# Patient Record
Sex: FEMALE | Race: WHITE | NOT HISPANIC OR LATINO | Employment: OTHER | URBAN - METROPOLITAN AREA
[De-identification: names, ages, dates, MRNs, and addresses within clinical notes are randomized per-mention and may not be internally consistent; named-entity substitution may affect disease eponyms.]

---

## 2017-01-19 ENCOUNTER — OFFICE VISIT (OUTPATIENT)
Dept: CONSULT | Facility: CLINIC | Age: 22
End: 2017-01-19
Attending: PEDIATRICS
Payer: COMMERCIAL

## 2017-01-19 VITALS
DIASTOLIC BLOOD PRESSURE: 66 MMHG | RESPIRATION RATE: 20 BRPM | SYSTOLIC BLOOD PRESSURE: 104 MMHG | HEART RATE: 102 BPM | TEMPERATURE: 97.6 F | OXYGEN SATURATION: 97 %

## 2017-01-19 DIAGNOSIS — M81.0 OSTEOPOROSIS: ICD-10-CM

## 2017-01-19 DIAGNOSIS — K92.89 GASTROINTESTINAL DYSMOTILITY: ICD-10-CM

## 2017-01-19 DIAGNOSIS — R48.8 PERSEVERATION: ICD-10-CM

## 2017-01-19 DIAGNOSIS — Z87.440 PERSONAL HISTORY OF URINARY TRACT INFECTION: ICD-10-CM

## 2017-01-19 DIAGNOSIS — F41.9 ANXIETY: ICD-10-CM

## 2017-01-19 DIAGNOSIS — E74.89 CONGENITAL DISORDER OF GLYCOSYLATION ASSOCIATED WITH MUTATION IN PMM2 GENE (H): Primary | ICD-10-CM

## 2017-01-19 PROCEDURE — 99213 OFFICE O/P EST LOW 20 MIN: CPT | Mod: ZF

## 2017-01-19 ASSESSMENT — PAIN SCALES - GENERAL: PAINLEVEL: NO PAIN (0)

## 2017-01-19 NOTE — Clinical Note
"  1/19/2017      RE: Sherly Ramires  C/O [facility] JUAN MANUEL [facility]  [address] [facility] [address]         Pediatric Integrative Health Subsequent Visit      Primary Care Provider: Leeanne Mustafa    Reason for initial consultation: integrative suggestions that may be of assistance for   Encounter Diagnoses   Name Primary?     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 Mom and Leticia, form her group home, are accompanying today. Anna continues to attend school as before. Since our last visit, it became more apparent that Anna was not sleeping well at night and not staying asleep which seemed to correlate with her behavior changes and were exacerbated by Doxepin and repeat melatonin dosing. Mom returned with Anna to Dr. Chaves at [place] who prescribed evening trazodone (Desyrel) which resolved the issues. Still uses sheep and whale noises at bedtime.    Anna still has perseverative behaviors, as noted when she went bowling with her twin brother and was unable to enjoy this because she was fixated on a toy behind the shelf.   Mom and the home have not yet received the \"Behavioral Plan\" and so are unsure about next steps. They have not implemented some of the suggestions form the last visit as the staff may need more direction. Mom would likeTali to have a laser acupoint treatment today.         Anxiety: not as much of an issue as above behaviors  Screen time:on iPad here but they try not to use it as much at the home, preferring for Anna to interact..    ALLERGIES:  Allergies   Allergen Reactions     Ativan [Lorazepam] " Other (See Comments)     Paradoxical reaction     Motrin [Ibuprofen] GI Disturbance     Pt is able to have motrin with food but not on an empty stomache.        IMMUNIZATIONS:  Immunization History   Administered Date(s) Administered     Influenza (IIV3) 10/12/2012       CURRENT MEDICATIONS:  Current Outpatient Prescriptions   Medication Sig Dispense Refill     UNABLE TO FIND MEDICATION NAME: Stress relax (magnesium citrate) 380 mg at bedtime       UNABLE TO FIND MEDICATION NAME: Mood probiotic 1 capsule (3,000,000,000 CFUs) daily.       melatonin 1 MG/ML LIQD liquid Take 3 mLs (3 mg) by mouth nightly as needed for sleep, only PRN 1 Bottle 2     sertraline (ZOLOFT) 20 MG/ML concentrated solution Take 200 mg by mouth daily        cholecalciferol (VITAMIN D/ D-VI-SOL) 400 UNIT/ML LIQD Take 1,600 Units by mouth daily       calcium carbonate 1250 (500 CA) MG/5ML SUSP Take 6 mLs (1,500 mg) by mouth 2 times daily (with meals) 450 mL 12     Nutritional Supplements (NUTREN 1.0) LIQD 4 Cans by gastronomy tube route daily (Patient taking differently: 4 Cans by gastronomy tube route daily Now using complete pediatric formula 4-5 cans daily) 124 each 12     ondansetron (ZOFRAN) 4 MG/5ML solution Take 5 mLs (4 mg) by mouth every 6 hours as needed for nausea or vomiting 90 mL 0     ARIPiprazole (ABILIFY) 1 MG/ML SOLN 7.5 mg daily        acetaminophen (TYLENOL) 160 MG/5ML solution 480 mg (15 mL) by mouth every 4 hours as needed       levETIRAcetam (KEPPRA) 100 MG/ML solution 10 mL by mouth  in the AM and 15 ml in the PM       diazepam (VALIUM) 5 MG/ML solution Take 7.5 mg by mouth every hour as needed for seizures (Seizures lasting longer than 5 minutes)       ORDER FOR DME Equipment being ordered: Other: chest strap for her tilt in space manual wheel chair  Treatment Diagnosis: For safe transportation to home secondary to poor sitting balance in upright. CDG type Ia, seizures, and G-tube dependence. 1 Device 0     bacitracin  ointment Apply topically 2 times daily as needed for wound care 14 g 0     Polyethylene Glycol 3350 (MIRALAX PO) Take 1 tsp. by mouth daily as needed        Docusate Sodium (ENEMEEZ MINI RE) 3 times a week (MWF) to help with bowel movements     . D-mannose 1/2 teaspoon, twice daily         The following history was reviewed and there were no changes except as noted below: PAST MEDICAL HISTORY:   Past Medical History   Diagnosis Date     Metabolic disease      congenital disorder of glycosilation     Seizures (H)      last at age 8 before today     Scoliosis      Strabismus        PAST SURGICAL HISTORY:   Past Surgical History   Procedure Laterality Date     Gi surgery       g tube     Back surgery       c1 decompression and fusionx3, scoliosis with instrumentation x1     Eye surgery Bilateral 1996     BMR 6mm (Dru)      Ent surgery        ear tubes     Hc replace gastrostomy/cecostomy tube percutaneous N/A 12/10/2014     Procedure: REPLACE GASTROSTOMY TUBE, PERCUTANEOUS;  Surgeon: Phong Perea MD;  Location: [facility] OR     Electroretinogram Bilateral 12/10/2014     ERG ([facility])     Exam under anesthesia eye(s) Bilateral 12/10/2014     EUA ([facility])     Recession resection (repair strabismus) bilateral Bilateral 12/10/2014     BLR 11 ([facility])     Extraction(s) dental N/A 12/10/2014     Procedure: EXTRACTION(S) DENTAL;  Surgeon: Hubert York DDS;  Location: [facility] OR     Strabismus surgery  1996     (1996) BMR 6 ([facility])     Strabismus surgery  2014     BLR 11 ([facility]) -        FAMILY HISTORY:   Family History   Problem Relation Age of Onset     Glaucoma Mother      no medications or surg to date     Amblyopia No family hx of      Strabismus No family hx of      Glaucoma Maternal Grandmother      s/p surg, decreased [facility]        SOCIAL HISTORY:   Social History   Substance Use Topics     Smoking status: Never Smoker      Smokeless tobacco: Never Used     Alcohol Use: No       Physical Exam:   Temp:  [97.6  F  (36.4  C)] 97.6  F (36.4  C)  Pulse:  [102] 102  Resp:  [20] 20  BP: (104)/(66) 104/66 mmHg  SpO2:  [97 %] 97 %  /66 mmHg  Pulse 102  Temp(Src) 97.6  F (36.4  C) (Axillary)  Resp 20  Ht   Wt   SpO2 97%  Filed Vitals:     TCM Pulses: Kim > Yin    Playing on iPad, smiling occasionally  Easy resps  Sitting up in wheelchair  No edema      Labs and Tests:  No results found for this or any previous visit (from the past 24 hour(s)).    Assessment:  Sherly is a 21 year old female patient with  Encounter Diagnoses   Name Primary?     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 Please do not hesitate to contact me with any questions or concerns.     I spent a total of 45 minutes face-to-face with Sherly Ramires during today's office visit.  Over 50% of this time was spent counseling the patient-family and/or coordinating care regarding comfort.  See note for details.    Sherrie Fontana MD, CM  Medical Director Pediatric Integrative Health and Wellbeing, [facility]    CC  Patient Care Team:  Leeanne Mustafa as PCP - General (Family Practice)  Ana Granda MD as MD (Pediatrics)  Barrett Lennon MD as MD (Pediatrics)  Skyler Correa MD (Ophthalmology)  Sara Miles MD as Referring Physician (Pediatrics)

## 2017-01-19 NOTE — PROGRESS NOTES
"  Pediatric Integrative Health Subsequent Visit      Primary Care Provider: Leeanne Mustafa    Reason for initial consultation: integrative suggestions that may be of assistance for   Encounter Diagnoses   Name Primary?     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 Mom and Leticia, form her group home, are accompanying today. Anna continues to attend school as before. Since our last visit, it became more apparent that Anna was not sleeping well at night and not staying asleep which seemed to correlate with her behavior changes and were exacerbated by Doxepin and repeat melatonin dosing. Mom returned with Anna to Dr. Chaves at [place] who prescribed evening trazodone (Desyrel) which resolved the issues. Still uses sheep and whale noises at bedtime.    Anna still has perseverative behaviors, as noted when she went bowling with her twin brother and was unable to enjoy this because she was fixated on a toy behind the shelf.   Mom and the home have not yet received the \"Behavioral Plan\" and so are unsure about next steps. They have not implemented some of the suggestions form the last visit as the staff may need more direction. Mom would likeAnna to have a laser acupoint treatment today.         Anxiety: not as much of an issue as above behaviors  Screen time:on iPad here but they try not to use it as much at the home, preferring for Anna to interact..    ALLERGIES:  Allergies   Allergen Reactions     Ativan [Lorazepam] Other (See Comments)     Paradoxical reaction     Motrin [Ibuprofen] GI Disturbance     Pt is able to have motrin with food but not " on an empty stomache.        IMMUNIZATIONS:  Immunization History   Administered Date(s) Administered     Influenza (IIV3) 10/12/2012       CURRENT MEDICATIONS:  Current Outpatient Prescriptions   Medication Sig Dispense Refill     UNABLE TO FIND MEDICATION NAME: Stress relax (magnesium citrate) 380 mg at bedtime       UNABLE TO FIND MEDICATION NAME: Mood probiotic 1 capsule (3,000,000,000 CFUs) daily.       melatonin 1 MG/ML LIQD liquid Take 3 mLs (3 mg) by mouth nightly as needed for sleep, only PRN 1 Bottle 2     sertraline (ZOLOFT) 20 MG/ML concentrated solution Take 200 mg by mouth daily        cholecalciferol (VITAMIN D/ D-VI-SOL) 400 UNIT/ML LIQD Take 1,600 Units by mouth daily       calcium carbonate 1250 (500 CA) MG/5ML SUSP Take 6 mLs (1,500 mg) by mouth 2 times daily (with meals) 450 mL 12     Nutritional Supplements (NUTREN 1.0) LIQD 4 Cans by gastronomy tube route daily (Patient taking differently: 4 Cans by gastronomy tube route daily Now using complete pediatric formula 4-5 cans daily) 124 each 12     ondansetron (ZOFRAN) 4 MG/5ML solution Take 5 mLs (4 mg) by mouth every 6 hours as needed for nausea or vomiting 90 mL 0     ARIPiprazole (ABILIFY) 1 MG/ML SOLN 7.5 mg daily        acetaminophen (TYLENOL) 160 MG/5ML solution 480 mg (15 mL) by mouth every 4 hours as needed       levETIRAcetam (KEPPRA) 100 MG/ML solution 10 mL by mouth  in the AM and 15 ml in the PM       diazepam (VALIUM) 5 MG/ML solution Take 7.5 mg by mouth every hour as needed for seizures (Seizures lasting longer than 5 minutes)       ORDER FOR DME Equipment being ordered: Other: chest strap for her tilt in space manual wheel chair  Treatment Diagnosis: For safe transportation to home secondary to poor sitting balance in upright. CDG type Ia, seizures, and G-tube dependence. 1 Device 0     bacitracin ointment Apply topically 2 times daily as needed for wound care 14 g 0     Polyethylene Glycol 3350 (MIRALAX PO) Take 1 tsp. by mouth  daily as needed        Docusate Sodium (ENEMEEZ MINI RE) 3 times a week (MWF) to help with bowel movements     . D-mannose 1/2 teaspoon, twice daily         The following history was reviewed and there were no changes except as noted below: PAST MEDICAL HISTORY:   Past Medical History   Diagnosis Date     Metabolic disease      congenital disorder of glycosilation     Seizures (H)      last at age 8 before today     Scoliosis      Strabismus        PAST SURGICAL HISTORY:   Past Surgical History   Procedure Laterality Date     Gi surgery       g tube     Back surgery       c1 decompression and fusionx3, scoliosis with instrumentation x1     Eye surgery Bilateral 1996     BMR 6mm ([facility])      Ent surgery        ear tubes     Hc replace gastrostomy/cecostomy tube percutaneous N/A 12/10/2014     Procedure: REPLACE GASTROSTOMY TUBE, PERCUTANEOUS;  Surgeon: Phong Perea MD;  [facility]     Electroretinogram Bilateral 12/10/2014     ERG ([facility])     Exam under anesthesia eye(s) Bilateral 12/10/2014     EUA ([facility])     Recession resection (repair strabismus) bilateral Bilateral 12/10/2014     BLR 11 ([facility])     Extraction(s) dental N/A 12/10/2014     Procedure: EXTRACTION(S) DENTAL;  Surgeon: Hubert York DDS;  [facility]     Strabismus surgery  1996     (1996) BMR 6 ([facility])     Strabismus surgery  2014     BLR 11 ([facility]) -        FAMILY HISTORY:   Family History   Problem Relation Age of Onset     Glaucoma Mother      no medications or surg to date     Amblyopia No family hx of      Strabismus No family hx of      Glaucoma Maternal Grandmother      s/p surg, decreased VA        SOCIAL HISTORY:   Social History   Substance Use Topics     Smoking status: Never Smoker      Smokeless tobacco: Never Used     Alcohol Use: No       Physical Exam:   Temp:  [97.6  F (36.4  C)] 97.6  F (36.4  C)  Pulse:  [102] 102  Resp:  [20] 20  BP: (104)/(66) 104/66 mmHg  SpO2:  [97 %] 97 %  /66 mmHg  Pulse  102  Temp(Src) 97.6  F (36.4  C) (Axillary)  Resp 20  Ht   Wt   SpO2 97%  Filed Vitals:     TCM Pulses: Kim > Yin    Playing on iPad, smiling occasionally  Easy resps  Sitting up in wheelchair  No edema      Labs and Tests:  No results found for this or any previous visit (from the past 24 hour(s)).    Assessment:  Sherly is a 21 year old female patient with  Encounter Diagnoses   Name Primary?     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 Please do not hesitate to contact me with any questions or concerns.     I spent a total of 45 minutes face-to-face with Sherly Ramires during today's office visit.  Over 50% of this time was spent counseling the patient-family and/or coordinating care regarding comfort.  See note for details.    Sherrie Fontana MD, CM  Medical Director Pediatric Integrative Health and Wellbeing, [facility]    CC  Patient Care Team:  Warner Mustafa as PCP - General (Family Practice)  Ana Granda MD as MD (Pediatrics)  Barrett Lennon MD as MD (Pediatrics)  Skyler Correa MD (Ophthalmology)  Sara Miles MD as Referring Physician (Pediatrics)  Sherrie Fontana MD as MD (Pediatrics)  WARNER MUSTAFA

## 2017-01-19 NOTE — NURSING NOTE
Chief Complaint   Patient presents with     RECHECK     Patient here today for follow up in pain management     /66 mmHg  Pulse 102  Temp(Src) 97.6  F (36.4  C) (Axillary)  Resp 20  Ht   Wt   SpO2 97%   Barbra Reynaga  January 19, 2017

## 2017-02-02 ENCOUNTER — OFFICE VISIT (OUTPATIENT)
Dept: ENDOCRINOLOGY | Facility: CLINIC | Age: 22
End: 2017-02-02
Attending: PEDIATRICS
Payer: COMMERCIAL

## 2017-02-02 VITALS
DIASTOLIC BLOOD PRESSURE: 62 MMHG | WEIGHT: 88 LBS | HEIGHT: 55 IN | BODY MASS INDEX: 20.37 KG/M2 | SYSTOLIC BLOOD PRESSURE: 96 MMHG | HEART RATE: 77 BPM

## 2017-02-02 DIAGNOSIS — R79.89 ELEVATED TSH: ICD-10-CM

## 2017-02-02 DIAGNOSIS — E16.2 HYPOGLYCEMIA: Primary | ICD-10-CM

## 2017-02-02 DIAGNOSIS — M81.0 OSTEOPOROSIS: ICD-10-CM

## 2017-02-02 DIAGNOSIS — E74.89 CONGENITAL DISORDER OF GLYCOSYLATION ASSOCIATED WITH MUTATION IN PMM2 GENE (H): ICD-10-CM

## 2017-02-02 DIAGNOSIS — E28.39 OVARIAN FAILURE: ICD-10-CM

## 2017-02-02 PROCEDURE — 99212 OFFICE O/P EST SF 10 MIN: CPT | Mod: ZF

## 2017-02-02 ASSESSMENT — PAIN SCALES - GENERAL: PAINLEVEL: NO PAIN (0)

## 2017-02-02 NOTE — NURSING NOTE
"Chief Complaint   Patient presents with     RECHECK     CDG follow up       Initial BP 96/62 mmHg  Pulse 77  Ht 4' 1\" (124.5 cm)  Wt 88 lb (39.917 kg)  BMI 25.75 kg/m2 Estimated body mass index is 25.75 kg/(m^2) as calculated from the following:    Height as of this encounter: 4' 1\" (124.5 cm).    Weight as of this encounter: 88 lb (39.917 kg).  BP completed using cuff size: regular    "

## 2017-02-02 NOTE — MR AVS SNAPSHOT
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  It was a pleasure to see you for your office visit today.   Barrett Lennon MD PhD, Sarah Dubon MD,   Lyubov Franco, [facility],  Samira Alan RN CNP  Shante Thurman MD    If you had any blood work, imaging or other tests:  Normal test results will be mailed to your home address in a letter.  Abnormal results will be communicated to you via phone call / letter.  Please allow 2 weeks for processing/interpretation of most lab work.  For urgent issues that cannot wait until the next business day, call [phone] and ask for the Pediatric Endocrinologist on call.    RN Care Coordinators (non urgent) Mon- Fri:  Julianne Norris MS,RN  [phone]  Amanda Bryan, [phone]  Please leave a message on one line only. Calls will be returned as soon as possible.  Requests for results will be returned after your physician has been able to review the results.  Main Office: [phone]  Fax: [phone]  Growth Hormone Coordinator:  Clau Miller [phone]  Medication renewals: Contact your pharmacy. Allow 3-4 days for completion.     Scheduling:    Pediatric Call Center, [phone]  Infusion Center: [phone] (for stimulation tests)  Radiology/ [phone]     Services:   [phone]     Please try the Passport to [facility] ([facility]) phone application for Virtual Tours, Procedure Preparation,  Resources, Preparation for Hospital Stay and the Coloring Board.     MD Instructions:  Please get fasting labs with her next visit with Dr. Rocha. Please contact me if any fractures or other concerns.          Follow-ups after your visit        Follow-up notes from your care team     Return in about 1 year (around 2018).      Your next 10 appointments already scheduled     May 11, 2017 10:00 AM   Return Visit with Sherrie Fontana MD   Peds Integrative Health ([facility])    [facility]  9th Floor  [address] [facility] [id number] [phone]            2017 10:00 AM   Return Metabolic Visit with Ana Granda MD   Peds Metabolism ([facility])    [facility]  [address] [facility]  [address] [id number] [phone]              Who to contact     Please call your clinic at [phone] to:    Ask questions about your health    Make or cancel appointments    Discuss your medicines    Learn about your test results    Speak to your doctor   If you have compliments or concerns about an experience at your clinic, or if you wish to file a complaint, please contact [facility] Patient Relations at [phone] or email us at [email or web address]         Additional Information About Your Visit        MyChart Information     Preisbock is an electronic gateway that provides easy, online access to your medical records. With Preisbock, you can request a clinic appointment, read your test results, renew a prescription or communicate with your care team.     To sign up for [employer]ze[employer] visit the website at [email or web address]   You will be asked to enter the access code listed below, as well as some personal information. Please follow the directions to create your username and password.     Your access code is: [email or web address]  Expires: 3/14/2017 10:55 AM     Your access code will  in 90 days. If you need help  "or a new code, please contact your [facility] or call [phone] for assistance.        Care EveryWhere ID     This is your Care EveryWhere ID. This could be used by other organizations to access your [place] medical records  [id number]        Your Vitals Were     Pulse Height BMI (Body Mass Index)             77 4' 1\" (124.5 cm) 25.75 kg/m2          Blood Pressure from Last 3 Encounters:   02/02/17 96/62   01/19/17 104/66   12/14/16 108/82    Weight from Last 3 Encounters:   02/02/17 88 lb (39.917 kg)   12/14/16 86 lb 8 oz (39.236 kg)   12/08/16 93 lb (42.185 kg)              Today, you had the following     No orders found for display         Today's Medication Changes          These changes are accurate as of: 2/2/17 12:42 PM.  If you have any questions, ask your nurse or doctor.               These medicines have changed or have updated prescriptions.        Dose/Directions    NUTREN 1.0 Liqd   This may have changed:  additional instructions   Used for:  Congenital disorder of glycosylation (CDG) (H)        Dose:  4 Can   4 Cans by gastronomy tube route daily   Quantity:  124 each   Refills:  12                Primary Care Provider Office Phone # Fax #    Leeanne SUSANNAH Mustafa [phone]       [facility] [address] [place] [address] [address] [place] [address]        Thank you!     Thank you for choosing PEDIATRIC ENDOCRINOLOGY  for your care. Our goal is always to provide you with excellent care. Hearing back from our patients is one way we can continue to improve our services. Please take a few minutes to complete the written survey that you may receive in the mail after your visit with us. Thank you!             Your Updated Medication List - Protect others around you: Learn how to safely use, store and throw away your medicines at www.disposemymeds.org.          This list is accurate as of: 2/2/17 12:42 PM.  Always use your most recent med list.                   Brand " Name Dispense Instructions for use    acetaminophen 160 MG/5ML solution    TYLENOL     480 mg (15 mL) by mouth every 4 hours as needed       ARIPiprazole 1 MG/ML Soln solution    ABILIFY     7.5 mg daily       bacitracin ointment     14 g    Apply topically 2 times daily as needed for wound care       calcium carbonate 1250 (500 CA) MG/5ML Susp suspension     450 mL    Take 6 mLs (1,500 mg) by mouth 2 times daily (with meals)       cholecalciferol 400 UNIT/ML Liqd liquid    vitamin D/D-VI-SOL     Take 1,600 Units by mouth daily       diazepam 5 MG/ML (HIGH CONC) solution    VALIUM     Take 7.5 mg by mouth every hour as needed for seizures (Seizures lasting longer than 5 minutes)       doxepin 10 MG/ML (HIGH CONC) solution    SINEquan     Take 50-75 mg by mouth nightly as needed       ENEMEEZ MINI RE      3 times a week (MWF) to help with bowel movements       levETIRAcetam 100 MG/ML solution    KEPPRA     10 mL by mouth  in the AM and 15 ml in the PM       melatonin 1 MG/ML Liqd liquid     1 Bottle    Take 3 mLs (3 mg) by mouth nightly as needed for sleep       MIRALAX PO      Take 1 tsp. by mouth daily as needed       nitrofurantoin 25 MG/5ML suspension    FURADANTIN    280 mL    Take 10 mLs (50 mg) by mouth 4 times daily       NUTREN 1.0 Liqd     124 each    4 Cans by gastronomy tube route daily       ondansetron 2 mg/2.5 mL solution    ZOFRAN    90 mL    Take 5 mLs (4 mg) by mouth every 6 hours as needed for nausea or vomiting       order for DME     1 Device    Equipment being ordered: Other: chest strap for her tilt in space manual wheel chair Treatment Diagnosis: For safe transportation to home secondary to poor sitting balance in upright. CDG type Ia, seizures, and G-tube dependence.       saccharomyces boulardii 250 MG capsule    FLORASTOR    60 capsule    1 capsule (250 mg) by Per J Tube route 2 times daily       sertraline 20 MG/ML (HIGH CONC) solution    ZOLOFT     Take 200 mg by mouth daily        TRAZODONE HCL PO      Take 50 mg by mouth       * UNABLE TO FIND      MEDICATION NAME: Stress relax (magnesium citrate) 380 mg at bedtime       * UNABLE TO FIND      MEDICATION NAME: Mood probiotic 1 capsule (3,000,000,000 CFUs) daily.       * Notice:  This list has 2 medication(s) that are the same as other medications prescribed for you. Read the directions carefully, and ask your doctor or other care provider to review them with you.

## 2017-02-02 NOTE — PATIENT INSTRUCTIONS
Thank you for choosing [facility].  It was a pleasure to see you for your office visit today.   Barrett Lennon MD PhD, Sarah Dubon MD,   Lyubov Franco, [facility],  Samira Alan, RN CNP  Shante Thurman MD    If you had any blood work, imaging or other tests:  Normal test results will be mailed to your home address in a letter.  Abnormal results will be communicated to you via phone call / letter.  Please allow 2 weeks for processing/interpretation of most lab work.  For urgent issues that cannot wait until the next business day, call [phone] and ask for the Pediatric Endocrinologist on call.    RN Care Coordinators (non urgent) Mon- Fri:  Julianne Norris MS,RN  [phone]  Amanda Bryan [phone]  Please leave a message on one line only. Calls will be returned as soon as possible.  Requests for results will be returned after your physician has been able to review the results.  Main Office: [phone]  Fax: [phone]  Growth Hormone Coordinator:  Clau Miller [phone]  Medication renewals: Contact your pharmacy. Allow 3-4 days for completion.     Scheduling:    Pediatric Call Center, [phone]  Infusion Center: [phone] (for stimulation tests)  Radiology/ [phone]     Services:   [phone]     Please try the Passport to [facility] ([facility]) phone application for Virtual Tours, Procedure Preparation, Resources, Preparation for Hospital Stay and the Coloring Board.     MD Instructions:  Please get fasting labs with her next visit with Dr. Rocha. Please contact me if any fractures or other concerns.

## 2017-02-02 NOTE — Clinical Note
Date: 2017 Regarding: Sherly Ramires   To:   [facility] [address] [place] [address]   MRN: [id number]     :  1995     Ordering Provider: Barrett Lennon MD, PhD     Lab Request    Diagnosis (ICD-10) Code: Congenital Disorder of Glycosylation (E74.8), Ovarian Failure (E28.39), Hypoglycemia (E16.2)    TEST: Prealbumin, TSH, Free T4, PT, PTT, CBC with platelets, Comprehensive metabolic panel. Insulin. LH, FSH, estradiol, 25-hydroxy vitamin D, Hgb A1c, Transferrin   REASON: Monitor Condition   FREQUENCY: Once per year   DURATION: 1 year   SPECIAL INSTRUCTIONS: Obtain fasting.     Please fax results once available to me at:  [phone].    If you or the family have questions or concerns regarding the above lab test request, please feel free to contact our office at [phone].  Thank you for your assistance with Sherly cuellar care.  Sincerely,    Barrett Lennon MD, PhD    Pediatric Endocrinology  [facility]  Phone: [phone]  Fax:  [phone]     CC:  Parents of Sherly Ramires  C/O [facility]  [id number] [place] [id number]

## 2017-02-02 NOTE — PROGRESS NOTES
Pediatric Endocrinology Follow-up Consultation    Patient: Sherly Ramires MRN# [id number]   YOB: 1995 Age: 21 year old    Date of Visit: Feb 2, 2017    Dear Dr. 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 Her past medical history is remarkable for twin gestation. She had an apneic episode at four days old and went home on apnea and bradycardia monitors. She was seen at three months of age due to maternal concerns of strabismus, fat pads on her knuckles, and developmental delay. She was diagnosed with congenital disorder of glycosylation at six months of age. Mom reports that no DNA or enzyme analysis was performed at that time. 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   She has had a history of recurrent otitis media  "and had tubes placed once, and she has not had any ear infections since the age of [age]. She had abnormal thyroid functions and was treated with Synthroid for six months. It was found to be an abnormal thyroglobulin and replacement was stopped. She has also had spinal stenosis with lower extremity weakness. Symptoms began in October 2002, diagnosed in 2003, and has undergone surgical repair. She developed seizure in 2013. She has anxiety, OCD-like tendencies, and muscle contractures.   Sherly has a history of fragility fractures. There have been two fragility fractures in her life. One occurred when she stuck her leg out and hit a door. It was a spiral fracture of the lower leg. Another occurred when her 4 year old brother fell on her and broke her lower leg. She has previously had normal PTH and DXA (when corrected for age).  Sherly was last evaluated in our clinic 2/27/14. At that time, the thyroid functions were normal. Previous hormone studies show elevated FSH (178) with nearly undetectable estradiol levels consistent with primary gonadal failure. We have not considered estrogen replacement due to the concern of hypercoaguability in individuals with CDG and the potentially increased risk of strokes or stroke-like episodes.      INTERIM HISTORY: Since the last visit on 2/04/16, Sherly has moved into a group home. Some behavioral changes have been noted by staff but mom believes this is just secondary to her getting over a \"honeymoon\" phase at the group home. She has also gained weight, mostly to the abdominal region, so her Gtube formula has been transitioned to a lower calorie formula. She gets 4-5 cans per day with the 5th being an optional PO meal. Group home staff has noticed an increase in pubic and axillary hair but there is no associated breast growth, vaginal discharge or vaginal bleeding. She also began taking Trazadone this past December due to lightness of sleep, which has been beneficial in " "allowing her deeper and less interrupted sleep. She has not had any changes in bowel habits, changes in skin, muscle cramps or pains. She has had 2-3 self resolving \"colds\" over the winter months. She was hospitalized in 05/2016 and 03/2016 for UTI and emesis respectively.     History was obtained from patient's mother.          Social History:     Social History     Social History Narrative    Lives with m/d/3 sibs    Sherly is in transition school until she is age 22 years. Sherly has been swimming.     Social history was reviewed and is unchanged. Refer to the initial note.         Family History:     Family History   Problem Relation Age of Onset     Glaucoma Mother      no medications or surg to date     Amblyopia No family hx of      Strabismus No family hx of      Glaucoma Maternal Grandmother      s/p surg, decreased [facility]    Mother was diagnosed with osteoporosis.     Family history was reviewed and is unchanged. Refer to the initial note.         Allergies:     Allergies   Allergen Reactions     Ativan [Lorazepam] Other (See Comments)     Paradoxical reaction     Motrin [Ibuprofen] GI Disturbance     Pt is able to have motrin with food but not on an empty stomache.              Medications:     Current Outpatient Prescriptions   Medication Sig Dispense Refill     TRAZODONE HCL PO Take 50 mg by mouth       nitrofurantoin (FURADANTIN) 25 MG/5ML suspension Take 10 mLs (50 mg) by mouth 4 times daily 280 mL 0     saccharomyces boulardii (FLORASTOR) 250 MG capsule 1 capsule (250 mg) by Per J Tube route 2 times daily 60 capsule 1     UNABLE TO FIND MEDICATION NAME: Stress relax (magnesium citrate) 380 mg at bedtime       UNABLE TO FIND MEDICATION NAME: Mood probiotic 1 capsule (3,000,000,000 CFUs) daily.       melatonin 1 MG/ML LIQD liquid Take 3 mLs (3 mg) by mouth nightly as needed for sleep 1 Bottle 2     sertraline (ZOLOFT) 20 MG/ML concentrated solution Take 200 mg by mouth daily        cholecalciferol " (VITAMIN D/ D-VI-SOL) 400 UNIT/ML LIQD Take 1,600 Units by mouth daily       calcium carbonate 1250 (500 CA) MG/5ML SUSP Take 6 mLs (1,500 mg) by mouth 2 times daily (with meals) 450 mL 12     Nutritional Supplements (NUTREN 1.0) LIQD 4 Cans by gastronomy tube route daily (Patient taking differently: 4 Cans by gastronomy tube route daily Now using complete pediatric formula 4-5 cans daily) 124 each 12     ondansetron (ZOFRAN) 4 MG/5ML solution Take 5 mLs (4 mg) by mouth every 6 hours as needed for nausea or vomiting 90 mL 0     bacitracin ointment Apply topically 2 times daily as needed for wound care 14 g 0     ARIPiprazole (ABILIFY) 1 MG/ML SOLN 7.5 mg daily        acetaminophen (TYLENOL) 160 MG/5ML solution 480 mg (15 mL) by mouth every 4 hours as needed       levETIRAcetam (KEPPRA) 100 MG/ML solution 10 mL by mouth  in the AM and 15 ml in the PM       diazepam (VALIUM) 5 MG/ML solution Take 7.5 mg by mouth every hour as needed for seizures (Seizures lasting longer than 5 minutes)       ORDER FOR DME Equipment being ordered: Other: chest strap for her tilt in space manual wheel chair  Treatment Diagnosis: For safe transportation to home secondary to poor sitting balance in upright. CDG type Ia, seizures, and G-tube dependence. 1 Device 0     Polyethylene Glycol 3350 (MIRALAX PO) Take 1 tsp. by mouth daily as needed        Docusate Sodium (ENEMEEZ MINI RE) 3 times a week (MWF) to help with bowel movements       DoxePIN (SINEQUAN) 10 MG/ML solution Take 50-75 mg by mouth nightly as needed                Review of Systems:   Gen: Negative  Eye:  Sherly was evaluated by the ophthalmologist on 2/1/16 and it was reported that her vision has not showed decline.  ENT: Sherly is evaluated by [facility].   Pulmonary:  Negative.   Cardio: Next cardiology evaluation will take place in 2017.  Gastrointestinal: See HPI regarding hospitalization for emesis  Hematologic: Negative, no blood clots.  Genitourinary: See  "HPI for UTI hospitalization.   Musculoskeletal: Negative, no recent fractures. One fall with subsequent bruising to right foot.  Psychiatric: Anxiety, Ma  Neurologic: Nystagmus, since hospitalization a year ago. 5-6 months ago, mom noticed that Sherly's eye movements were rapid for a week duration. Upon notifying the neurologist, Sherly's Keppra was increased, The nystagmus then subsided and she has been maintaining well, but mom has noticed that she has had increased nystagmus.  Skin: Negative  Endocrine: see HPI. No vaginal bleeding or discharge. She has had some underarm hair growth.             Physical Exam:   Blood pressure 96/62, pulse 77, height 4' 1\" (124.5 cm), weight 88 lb (39.917 kg).  Height: 124.5 cm  Weight: 39.92 kg (actual weight),   BMI: Body mass index is 25.75 kg/(m^2).       GENERAL:  She is alert and in no apparent distress.   HEENT:  Head is  normocephalic and atraumatic.  Pupils equal, round and reactive to light and accommodation.  Extraocular movements are intact.  Funduscopic exam shows crisp disc margins and normal venous pulsations.  Nares are clear.  Oropharynx shows normal dentition uvula and palate.  Tympanic membranes visualized and clear.   NECK:  Supple.  Thyroid was nonpalpable.   LUNGS:  Clear to auscultation bilaterally.   CARDIOVASCULAR:  Regular rate and rhythm without murmur, gallop or rub.   BREASTS:  Aguila III with atrophy .  Axillary hair present, odor and sweat were absent.   ABDOMEN:  Nondistended.  Positive bowel sounds, soft and nontender.  No hepatosplenomegaly or masses palpable.   GENITOURINARY EXAM:  Pubic hair is Aguila IV.  Normal external female genitalia.   MUSCULOSKELETAL:  Sherly is wheelchair bound with decreased muscle mass and tone.  NEUROLOGIC:  Cranial nerves II-XII tested and intact.  Deep tendon reflexes 2+ and symmetric. Sherly has dysarthria and developmental delay.  SKIN:  Normal with no evidence of acne or oiliness.         Laboratory results: "     [facility]/Nurse Visit on 06/23/2016   Component Date Value Ref Range Status     Color Urine 06/23/2016 Light Yellow   Final     Appearance Urine 06/23/2016 Slightly Cloudy   Final     Glucose Urine 06/23/2016 Negative  NEG mg/dL Final     Bilirubin Urine 06/23/2016 Negative  NEG Final     Ketones Urine 06/23/2016 Negative  NEG mg/dL Final     Specific Gravity Urine 06/23/2016 1.005  1.003 - 1.035 Final     Blood Urine 06/23/2016 Small* NEG Final     pH Urine 06/23/2016 7.5* 5.0 - 7.0 pH Final     Protein Albumin Urine 06/23/2016 Negative  NEG mg/dL Final     Urobilinogen mg/dL 06/23/2016 Normal  0.0 - 2.0 mg/dL Final     Nitrite Urine 06/23/2016 Negative  NEG Final     Leukocyte Esterase Urine 06/23/2016 Moderate* NEG Final     Source 06/23/2016 Catheterized Urine   Final     WBC Urine 06/23/2016 13* 0 - 2 /HPF Final     RBC Urine 06/23/2016 0  0 - 2 /HPF Final     Bacteria Urine 06/23/2016 Few* NEG /HPF Final     Mucous Urine 06/23/2016 Present* NEG /LPF Final     Granular Casts 06/23/2016 1* NEG /LPF Final     Amorphous Crystals 06/23/2016 Few* NEG /HPF Final     Specimen Description 06/23/2016 Catheterized Urine   Final     Culture Micro 06/23/2016 *  Final                    Value:>100,000 colonies/mL Escherichia coli ESBL ESBL (extended beta lactamase)   producing organisms require contact precautions.       Micro Report Status 06/23/2016 FINAL 06/26/2016   Final     Organism: 06/23/2016    Final                    Value:>100,000 colonies/mL Escherichia coli ESBL ESBL (extended beta lactamase)   producing organisms require contact precautions.               Assessment and Plan:   1. Primary Gonadal Failure.  2. Osteoporosis.  3. Fatigue.  4. 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  5. Risk of Endocrinopathies.  6. Hypocalcemia    Sherly has PMM2 deficiency, a Congenital Disorder of Glycosylation. This condition causes a mixture of primary and secondary hypogonadism and is associated  with osteopenia and risk for osteoporosis.  Hypothyroidism is increased due to abnormal glycosylation of TSH and thyroid binding globulin.  Hypoglycemia is common in this condition.  There is an increased risk of adrenal insufficiency.    Sherly has been stable since the last visit with no new concerns. Due to Sherly's relative immobility, she is also at increased risk for fractures. Sherly is receiving calcium and vitamin D supplementation.    MD Instructions:  Please get fasting labs with her next visit with Dr. Rocha. Please contact me if any fractures or other concerns.    An order letter was provided.     No orders of the defined types were placed in this encounter.    RTC for follow up evaluation in 1 year.     This document serves as a record of the services and decisions personally performed and made by Barrett Lennon MD, PhD. It was created on his behalf by Chito Steve, a medical student. The creation of this document is based on the provider's statements to the medical scribe.    Thank you for allowing me to participate in the care of your patient.  Please do not hesitate to call with questions or concerns.    Sincerely,    I personally performed the entire clinical encounter documented in this note.    Barrett Lennon MD, PhD    Pediatric Endocrinology  [facility]  Phone: [phone]  Fax:   [phone]     Total face-to-face time 40 minutes, >50% of time spent counseling and coordination of care regarding assessment and plan described above.     CC  Patient Care Team:  Leeanne Mustafa as PCP - General (Family Practice)  Ana Granda MD as MD (Pediatric Metabolism)  Barrett Lenonn MD as MD (Pediatrics)  Skyler Correa MD (Ophthalmology)  Sara Miles MD as Referring Physician (Pediatrics)  Sherrie Fontana MD as MD (Pediatrics)     Parents of Sherly Ramires  [facility] [address]  [facility]  [id number]

## 2017-02-02 NOTE — LETTER
2/2/2017      RE: Sherly Ramires  [address] [facility]  [address] [facility] [address]       Pediatric Endocrinology Follow-up Consultation    Patient: Sherly Ramires MRN# [id number]   YOB: 1995 Age: 21 year old    Date of Visit: Feb 2, 2017    Dear Dr. 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 Her past medical history is remarkable for twin gestation. She had an apneic episode at four days old and went home on apnea and bradycardia monitors. She was seen at three months of age due to maternal concerns of strabismus, fat pads on her knuckles, and developmental delay. She was diagnosed with congenital disorder of glycosylation at six months of age. Mom reports that no DNA or enzyme analysis was performed at that time. 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   She has had a history of recurrent otitis media and had tubes placed once, and she has not had any ear infections since the age of [age]. She had abnormal thyroid functions and was treated with Synthroid for six months. It was found to be an abnormal thyroglobulin and replacement was stopped. She has also had spinal stenosis with lower extremity weakness. Symptoms began in October 2002, diagnosed in 2003, and has undergone surgical repair. She developed seizure in 2013. She has anxiety, OCD-like tendencies, and muscle contractures.   Sherly has a history of fragility fractures. There have been two fragility fractures in her life. One occurred when she stuck her leg out and hit a door. It was a spiral fracture of the lower leg. Another occurred when her 4 year old brother fell on her and broke her lower leg. She has previously had normal PTH and DXA (when corrected for age).  Sherly was last evaluated in our clinic 2/27/14. At that time, the thyroid functions were normal. Previous hormone studies show elevated FSH (178) with nearly undetectable estradiol levels consistent with primary gonadal failure. We have not considered estrogen replacement due to the concern of hypercoaguability in individuals with CDG and the potentially increased risk of strokes or stroke-like episodes.      INTERIM HISTORY: Since the last visit on 2/04/16, Sherly has moved into a group home. Some behavioral changes have been noted by staff but mom believes this is just secondary to her getting over a \"honeymoon\" phase at the group home. She has also gained weight, mostly to the abdominal region, so her Gtube formula has been transitioned to a lower calorie formula. She gets 4-5 cans per day with the 5th being an optional PO meal. Group home staff has noticed an increase in pubic and axillary hair but there is no associated breast growth, vaginal discharge " "or vaginal bleeding. She also began taking Trazadone this past December due to lightness of sleep, which has been beneficial in allowing her deeper and less interrupted sleep. She has not had any changes in bowel habits, changes in skin, muscle cramps or pains. She has had 2-3 self resolving \"colds\" over the winter months. She was hospitalized in 05/2016 and 03/2016 for UTI and emesis respectively.     History was obtained from patient's mother.          Social History:     Social History     Social History Narrative    Lives with m/d/3 sibs    Sherly is in transition school until she is age 22 years. Sherly has been swimming.     Social history was reviewed and is unchanged. Refer to the initial note.         Family History:     Family History   Problem Relation Age of Onset     Glaucoma Mother      no medications or surg to date     Amblyopia No family hx of      Strabismus No family hx of      Glaucoma Maternal Grandmother      s/p surg, decreased VA    Mother was diagnosed with osteoporosis.     Family history was reviewed and is unchanged. Refer to the initial note.         Allergies:     Allergies   Allergen Reactions     Ativan [Lorazepam] Other (See Comments)     Paradoxical reaction     Motrin [Ibuprofen] GI Disturbance     Pt is able to have motrin with food but not on an empty stomache.              Medications:     Current Outpatient Prescriptions   Medication Sig Dispense Refill     TRAZODONE HCL PO Take 50 mg by mouth       nitrofurantoin (FURADANTIN) 25 MG/5ML suspension Take 10 mLs (50 mg) by mouth 4 times daily 280 mL 0     saccharomyces boulardii (FLORASTOR) 250 MG capsule 1 capsule (250 mg) by Per J Tube route 2 times daily 60 capsule 1     UNABLE TO FIND MEDICATION NAME: Stress relax (magnesium citrate) 380 mg at bedtime       UNABLE TO FIND MEDICATION NAME: Mood probiotic 1 capsule (3,000,000,000 CFUs) daily.       melatonin 1 MG/ML LIQD liquid Take 3 mLs (3 mg) by mouth nightly as needed for " sleep 1 Bottle 2     sertraline (ZOLOFT) 20 MG/ML concentrated solution Take 200 mg by mouth daily        cholecalciferol (VITAMIN D/ D-VI-SOL) 400 UNIT/ML LIQD Take 1,600 Units by mouth daily       calcium carbonate 1250 (500 CA) MG/5ML SUSP Take 6 mLs (1,500 mg) by mouth 2 times daily (with meals) 450 mL 12     Nutritional Supplements (NUTREN 1.0) LIQD 4 Cans by gastronomy tube route daily (Patient taking differently: 4 Cans by gastronomy tube route daily Now using complete pediatric formula 4-5 cans daily) 124 each 12     ondansetron (ZOFRAN) 4 MG/5ML solution Take 5 mLs (4 mg) by mouth every 6 hours as needed for nausea or vomiting 90 mL 0     bacitracin ointment Apply topically 2 times daily as needed for wound care 14 g 0     ARIPiprazole (ABILIFY) 1 MG/ML SOLN 7.5 mg daily        acetaminophen (TYLENOL) 160 MG/5ML solution 480 mg (15 mL) by mouth every 4 hours as needed       levETIRAcetam (KEPPRA) 100 MG/ML solution 10 mL by mouth  in the AM and 15 ml in the PM       diazepam (VALIUM) 5 MG/ML solution Take 7.5 mg by mouth every hour as needed for seizures (Seizures lasting longer than 5 minutes)       ORDER FOR DME Equipment being ordered: Other: chest strap for her tilt in space manual wheel chair  Treatment Diagnosis: For safe transportation to home secondary to poor sitting balance in upright. CDG type Ia, seizures, and G-tube dependence. 1 Device 0     Polyethylene Glycol 3350 (MIRALAX PO) Take 1 tsp. by mouth daily as needed        Docusate Sodium (ENEMEEZ MINI RE) 3 times a week (MWF) to help with bowel movements       DoxePIN (SINEQUAN) 10 MG/ML solution Take 50-75 mg by mouth nightly as needed                Review of Systems:   Gen: Negative  Eye:  Sherly was evaluated by the ophthalmologist on 2/1/16 and it was reported that her vision has not showed decline.  ENT: Sherly is evaluated by [facility].   Pulmonary:  Negative.   Cardio: Next cardiology evaluation will take place in  "2017.  Gastrointestinal: See HPI regarding hospitalization for emesis  Hematologic: Negative, no blood clots.  Genitourinary: See HPI for UTI hospitalization.   Musculoskeletal: Negative, no recent fractures. One fall with subsequent bruising to right foot.  Psychiatric: Anxiety, Ma  Neurologic: Nystagmus, since hospitalization a year ago. 5-6 months ago, mom noticed that Sherly's eye movements were rapid for a week duration. Upon notifying the neurologist, Sherly's Keppra was increased, The nystagmus then subsided and she has been maintaining well, but mom has noticed that she has had increased nystagmus.  Skin: Negative  Endocrine: see HPI. No vaginal bleeding or discharge. She has had some underarm hair growth.             Physical Exam:   Blood pressure 96/62, pulse 77, height 4' 1\" (124.5 cm), weight 88 lb (39.917 kg).  Height: 124.5 cm  Weight: 39.92 kg (actual weight),   BMI: Body mass index is 25.75 kg/(m^2).       GENERAL:  She is alert and in no apparent distress.   HEENT:  Head is  normocephalic and atraumatic.  Pupils equal, round and reactive to light and accommodation.  Extraocular movements are intact.  Funduscopic exam shows crisp disc margins and normal venous pulsations.  Nares are clear.  Oropharynx shows normal dentition uvula and palate.  Tympanic membranes visualized and clear.   NECK:  Supple.  Thyroid was nonpalpable.   LUNGS:  Clear to auscultation bilaterally.   CARDIOVASCULAR:  Regular rate and rhythm without murmur, gallop or rub.   BREASTS:  Aguila III with atrophy .  Axillary hair present, odor and sweat were absent.   ABDOMEN:  Nondistended.  Positive bowel sounds, soft and nontender.  No hepatosplenomegaly or masses palpable.   GENITOURINARY EXAM:  Pubic hair is Aguila IV.  Normal external female genitalia.   MUSCULOSKELETAL:  Sherly is wheelchair bound with decreased muscle mass and tone.  NEUROLOGIC:  Cranial nerves II-XII tested and intact.  Deep tendon reflexes 2+ and " symmetric. Sherly has dysarthria and developmental delay.  SKIN:  Normal with no evidence of acne or oiliness.         Laboratory results:     [facility]/Nurse Visit on 06/23/2016   Component Date Value Ref Range Status     Color Urine 06/23/2016 Light Yellow   Final     Appearance Urine 06/23/2016 Slightly Cloudy   Final     Glucose Urine 06/23/2016 Negative  NEG mg/dL Final     Bilirubin Urine 06/23/2016 Negative  NEG Final     Ketones Urine 06/23/2016 Negative  NEG mg/dL Final     Specific Gravity Urine 06/23/2016 1.005  1.003 - 1.035 Final     Blood Urine 06/23/2016 Small* NEG Final     pH Urine 06/23/2016 7.5* 5.0 - 7.0 pH Final     Protein Albumin Urine 06/23/2016 Negative  NEG mg/dL Final     Urobilinogen mg/dL 06/23/2016 Normal  0.0 - 2.0 mg/dL Final     Nitrite Urine 06/23/2016 Negative  NEG Final     Leukocyte Esterase Urine 06/23/2016 Moderate* NEG Final     Source 06/23/2016 Catheterized Urine   Final     WBC Urine 06/23/2016 13* 0 - 2 /HPF Final     RBC Urine 06/23/2016 0  0 - 2 /HPF Final     Bacteria Urine 06/23/2016 Few* NEG /HPF Final     Mucous Urine 06/23/2016 Present* NEG /LPF Final     Granular Casts 06/23/2016 1* NEG /LPF Final     Amorphous Crystals 06/23/2016 Few* NEG /HPF Final     Specimen Description 06/23/2016 Catheterized Urine   Final     Culture Micro 06/23/2016 *  Final                    Value:>100,000 colonies/mL Escherichia coli ESBL ESBL (extended beta lactamase)   producing organisms require contact precautions.       Micro Report Status 06/23/2016 FINAL 06/26/2016   Final     Organism: 06/23/2016    Final                    Value:>100,000 colonies/mL Escherichia coli ESBL ESBL (extended beta lactamase)   producing organisms require contact precautions.               Assessment and Plan:   1. Primary Gonadal Failure.  2. Osteoporosis.  3. Fatigue.  4. 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  5. Risk of Endocrinopathies.  6. Hypocalcemia    Sherly has PMM2  deficiency, a Congenital Disorder of Glycosylation. This condition causes a mixture of primary and secondary hypogonadism and is associated with osteopenia and risk for osteoporosis.  Hypothyroidism is increased due to abnormal glycosylation of TSH and thyroid binding globulin.  Hypoglycemia is common in this condition.  There is an increased risk of adrenal insufficiency.    Sherly has been stable since the last visit with no new concerns. Due to Sherly's relative immobility, she is also at increased risk for fractures. Sherly is receiving calcium and vitamin D supplementation.    MD Instructions:  Please get fasting labs with her next visit with Dr. Rocha. Please contact me if any fractures or other concerns.    An order letter was provided.     No orders of the defined types were placed in this encounter.    RTC for follow up evaluation in 1 year.     This document serves as a record of the services and decisions personally performed and made by Barrett Lennon MD, PhD. It was created on his behalf by Chito Steve, a medical student. The creation of this document is based on the provider's statements to the medical scribe.    Thank you for allowing me to participate in the care of your patient.  Please do not hesitate to call with questions or concerns.    Sincerely,    I personally performed the entire clinical encounter documented in this note.    Barrett Lennon MD, PhD    Pediatric Endocrinology  [facility]  Phone: [phone]  Fax:   [phone]     Total face-to-face time 40 minutes, >50% of time spent counseling and coordination of care regarding assessment and plan described above.     CC  Patient Care Team:  Leeanne Mustafa as PCP - General (Family Practice)  Ana Granda MD as MD (Pediatric Metabolism)  Barrett Lennon MD as MD (Pediatrics)  Skyler Correa MD (Ophthalmology)  Sara Miles MD as Referring  Physician (Pediatrics)  Sherrie Fontana MD as MD (Pediatrics)     Parents of Sherly Ramires  [address]  [facility] [address]

## 2017-03-06 RX ORDER — SERTRALINE HYDROCHLORIDE 20 MG/ML
200 SOLUTION ORAL DAILY
COMMUNITY

## 2017-03-06 RX ORDER — D-MANNOSE 99 %
1 POWDER (GRAM) MISCELLANEOUS 2 TIMES DAILY
COMMUNITY

## 2017-03-07 ENCOUNTER — ANESTHESIA (OUTPATIENT)
Dept: PEDIATRICS | Facility: CLINIC | Age: 22
End: 2017-03-07
Payer: COMMERCIAL

## 2017-03-07 ENCOUNTER — ANESTHESIA EVENT (OUTPATIENT)
Dept: PEDIATRICS | Facility: CLINIC | Age: 22
End: 2017-03-07
Payer: COMMERCIAL

## 2017-03-07 ENCOUNTER — HOSPITAL ENCOUNTER (OUTPATIENT)
Dept: MRI IMAGING | Facility: CLINIC | Age: 22
End: 2017-03-07
Attending: PSYCHIATRY & NEUROLOGY
Payer: COMMERCIAL

## 2017-03-07 ENCOUNTER — HOSPITAL ENCOUNTER (OUTPATIENT)
Facility: CLINIC | Age: 22
Discharge: HOME | End: 2017-03-07
Attending: PEDIATRICS | Admitting: PEDIATRICS
Payer: COMMERCIAL

## 2017-03-07 VITALS
TEMPERATURE: 97.2 F | BODY MASS INDEX: 25.77 KG/M2 | SYSTOLIC BLOOD PRESSURE: 120 MMHG | OXYGEN SATURATION: 99 % | WEIGHT: 88 LBS | RESPIRATION RATE: 24 BRPM | DIASTOLIC BLOOD PRESSURE: 92 MMHG

## 2017-03-07 DIAGNOSIS — M48.02 CERVICAL STENOSIS OF SPINE: ICD-10-CM

## 2017-03-07 DIAGNOSIS — H55.00 NYSTAGMUS: ICD-10-CM

## 2017-03-07 DIAGNOSIS — G93.40 ENCEPHALOPATHY: ICD-10-CM

## 2017-03-07 PROCEDURE — 25000125 ZZHC RX 250: Performed by: NURSE ANESTHETIST, CERTIFIED REGISTERED

## 2017-03-07 PROCEDURE — 37000008 ZZH ANESTHESIA TECHNICAL FEE, 1ST 30 MIN

## 2017-03-07 PROCEDURE — 25800025 ZZH RX 258: Performed by: NURSE ANESTHETIST, CERTIFIED REGISTERED

## 2017-03-07 PROCEDURE — 37000009 ZZH ANESTHESIA TECHNICAL FEE, EACH ADDTL 15 MIN

## 2017-03-07 PROCEDURE — 40000165 ZZH STATISTIC POST-PROCEDURE RECOVERY CARE

## 2017-03-07 PROCEDURE — 72141 MRI NECK SPINE W/O DYE: CPT

## 2017-03-07 RX ORDER — PROPOFOL 10 MG/ML
INJECTION, EMULSION INTRAVENOUS CONTINUOUS PRN
Status: DISCONTINUED | OUTPATIENT
Start: 2017-03-07 | End: 2017-03-07

## 2017-03-07 RX ORDER — LIDOCAINE HYDROCHLORIDE 20 MG/ML
INJECTION, SOLUTION INFILTRATION; PERINEURAL PRN
Status: DISCONTINUED | OUTPATIENT
Start: 2017-03-07 | End: 2017-03-07

## 2017-03-07 RX ORDER — EPHEDRINE SULFATE 50 MG/ML
INJECTION, SOLUTION INTRAMUSCULAR; INTRAVENOUS; SUBCUTANEOUS PRN
Status: DISCONTINUED | OUTPATIENT
Start: 2017-03-07 | End: 2017-03-07

## 2017-03-07 RX ORDER — PROPOFOL 10 MG/ML
INJECTION, EMULSION INTRAVENOUS PRN
Status: DISCONTINUED | OUTPATIENT
Start: 2017-03-07 | End: 2017-03-07

## 2017-03-07 RX ORDER — SODIUM CHLORIDE, SODIUM LACTATE, POTASSIUM CHLORIDE, CALCIUM CHLORIDE 600; 310; 30; 20 MG/100ML; MG/100ML; MG/100ML; MG/100ML
INJECTION, SOLUTION INTRAVENOUS CONTINUOUS PRN
Status: DISCONTINUED | OUTPATIENT
Start: 2017-03-07 | End: 2017-03-07

## 2017-03-07 RX ADMIN — Medication 30 MG: at 10:30

## 2017-03-07 RX ADMIN — Medication 4 MG: at 10:55

## 2017-03-07 RX ADMIN — SODIUM CHLORIDE, POTASSIUM CHLORIDE, SODIUM LACTATE AND CALCIUM CHLORIDE: 600; 310; 30; 20 INJECTION, SOLUTION INTRAVENOUS at 10:30

## 2017-03-07 RX ADMIN — PROPOFOL 200 MCG/KG/MIN: 10 INJECTION, EMULSION INTRAVENOUS at 10:31

## 2017-03-07 RX ADMIN — Medication 4 MG: at 11:13

## 2017-03-07 RX ADMIN — PROPOFOL 70 MG: 10 INJECTION, EMULSION INTRAVENOUS at 10:30

## 2017-03-07 ASSESSMENT — ENCOUNTER SYMPTOMS: SEIZURES: 1

## 2017-03-07 NOTE — ANESTHESIA PREPROCEDURE EVALUATION
Anesthesia Evaluation    ROS/Med Hx    No history of anesthetic complications    Cardiovascular Findings   Comments: Followed by Cardiology, note stated no indication of cardiac involvement, annual ECHO     Neuro Findings   (+) cervical spine instability, developmental delay and seizures    Seizures    Controlled: well controlled      Comments: Cervical stenosis of spine; Encephalopathy  Weakness upper and lower extremity    Pulmonary Findings - negative ROS    HENT Findings   (+) hearing problem  Comments: Nystagmus, Strabismus  Corneal epithelial defect - Left Eye   Corneal dellen of left eye        Skin Findings - negative skin ROS      GI/Hepatic/Renal Findings   (+) gastrostomy present    Endocrine/Metabolic Findings   (+) metabolic disease (Congenital disorder of glycosylation type 1a)      Comments: GHD (growth hormone deficiency) (H)  Hypoglycemia  Osteoporosis   Elevated TSH  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 (H)           Genetic/Syndrome Findings   (+) genetic syndrome    Hematology/Oncology Findings   (+) clotting disorder    Additional Notes  Scoliosis  Anxiety  Ovarian failure  Hip dislocation, left (H)      Physical Exam  Normal systems: cardiovascular, pulmonary and dental    Airway   TM distance: >3 FB  Neck ROM: limited    Dental     Cardiovascular   Rhythm and rate: regular and normal      Pulmonary    breath sounds clear to auscultation          Anesthesia Plan      History & Physical Review  History and physical reviewed and following examination, relevant changes include:    ASA Status:  3 .    NPO Status:  > 6 hours    Plan for MAC with Intravenous and Propofol induction. Maintenance will be TIVA.  Reason for MAC:  Extreme anxiety (QS)  PONV prophylaxis:  Ondansetron (or other 5HT-3)  21 yo for 3T MRI Cervical spine under MAC/GA backup      Postoperative Care  Postoperative pain management:  IV analgesics.      Consents  Anesthetic plan, risks,  benefits and alternatives discussed with:  Parent (Mother and/or Father) and Patient..

## 2017-03-07 NOTE — ANESTHESIA POSTPROCEDURE EVALUATION
Patient: Sherly Ramires    Procedure(s):  3T MRI Cervical spine - Wound Class: N/A    Diagnosis:Nystagmus  Cervical stenosis of spine  Encephalopathy  Diagnosis Additional Information: No value filed.    Anesthesia Type:  MAC    Note:  Anesthesia Post Evaluation    Patient location during evaluation: PACU  Patient participation: Unable to participate in evaluation secondary to underlying medical condition  Level of consciousness: awake and alert  Pain management: adequate  Airway patency: patent  Cardiovascular status: stable  Respiratory status: spontaneous ventilation and room air  Hydration status: stable  PONV: none     Anesthetic complications: None          Last vitals:  Vitals:    03/07/17 1149 03/07/17 1200 03/07/17 1215   BP:  132/90 (!) 120/92   Resp:  24 24   Temp:  36.2  C (97.2  F)    SpO2: 99% 99% 99%         Electronically Signed By: Ted Olguin MD  March 7, 2017  1:28 PM

## 2017-03-07 NOTE — ANESTHESIA CARE TRANSFER NOTE
Patient: Sherly Ramires    Procedure(s):  3T MRI Cervical spine - Wound Class: N/A    Diagnosis: Nystagmus  Cervical stenosis of spine  Encephalopathy  Diagnosis Additional Information: No value filed.    Anesthesia Type:   MAC     Note:  Airway :Nasal Cannula  Patient transferred to:PS Recovery  Comments: Patient transferred to peds sedation.  VSS.  Transfer of care with report to MARSHA.      Shante Knight CRNA      Vitals: (Last set prior to Anesthesia Care Transfer)    CRNA VITALS  3/7/2017 1105 - 3/7/2017 1142      3/7/2017             NIBP: (!)  75/36    Pulse: 77    NIBP Mean: 53    SpO2: 97 %    Resp Rate (observed): 19    EKG: NSR                Electronically Signed By: KIMBERLY Torres CRNA  March 7, 2017  11:42 AM

## 2017-03-07 NOTE — IP AVS SNAPSHOT
[facility] Sedation [facility]ation    [address]    [place] [address]    Phone:  [phone]                                       After Visit Summary   3/7/2017    Sherly Ramires    MRN: [id number]           After Visit Summary Signature Page     I have received my discharge instructions, and my questions have been answered. I have discussed any challenges I see with this plan with the nurse or doctor.    ..........................................................................................................................................  Patient/Patient Representative Signature      ..........................................................................................................................................  Patient Representative Print Name and Relationship to Patient    ..................................................               ................................................  Date                                            Time    ..........................................................................................................................................  Reviewed by Signature/Title    ...................................................              ..............................................  Date                                                            Time

## 2017-03-07 NOTE — DISCHARGE INSTRUCTIONS
Home Instructions for Your Child after Sedation  Today your child received propofol. Please keep this form with your health records  Your child may be more sleepy and irritable today than normal. Wake your child up every 1 to 11/2 hours during the day. (This way, both you and your child will sleep through the night.) Also, an adult should stay with your child for the rest of the day. The medicine may make the child dizzy. Avoid activities that require balance (bike riding, skating, climbing stairs, walking).  Remember:    For young infants: Do not allow the car seat or infant seat to bend the child's head forward and down. If it does, your child may not be able to breathe.    When your child wants to eat again, start with liquids (juice, soda pop, Popsicles). If your child feels well enough, you may try a regular diet. It is best to offer light meals for the first 24 hours.    If your child has nausea (feels sick to the stomach) or vomiting (throws up), give small amounts of clear liquids (7-Up, Sprite, apple juice or broth). Fluids are more important than food until your child is feeling better.    Wait 24 hours before giving medicine that contains alcohol. This includes liquid cold, cough and allergy medicines (Robitussin, Vicks Formula 44 for children, Benadryl, Chlor-Trimeton).    If you will leave your child with a , give the sitter a copy of these instructions.  Call your doctor if:    You have questions about the test results.    Your child vomits (throws up) more than two times.    Your child is very fussy or irritable.    You have trouble waking your child.     If your child has trouble breathing, call [phone].  If you have any questions or concerns, please call:  Pediatric Sedation Unit [phone]  Pediatric clinic  [phone]  [facility]  [phone] (ask for the anesthesia doctor on call)  Emergency department [phone]  [facility] toll-free number [phone]  (Monday--Friday, 8 a.m. to 4:30 p.m.)  I understand these instructions. I have all of my personal belongings.

## 2017-03-07 NOTE — IP AVS SNAPSHOT
MRN:[id number]                      After Visit Summary   3/7/2017    Sherly Ramires    MRN: [id number]           Thank you!     Thank you for choosing [facility] for your care. Our goal is always to provide you with excellent care. Hearing back from our patients is one way we can continue to improve our services. Please take a few minutes to complete the written survey that you may receive in the mail after you visit with us. Thank you!        Patient Information     Date Of Birth          1995        About your hospital stay     You were admitted on:  March 7, 2017 You last received care in the:  [facility] Sedation Observation    You were discharged on:  March 7, 2017       Who to Call     For medical emergencies, please call 911.  For non-urgent questions about your medical care, please call your primary care provider or clinic, [phone]  For questions related to your surgery, please call your surgery clinic        Attending Provider     Provider Specialty    Barrett Lennon MD Pediatrics       Primary Care Provider Office Phone # Fax #    Leeanne Mustafa [phone]       [facility] [address] [place] [address] [address] [place] [address]        Your next 10 appointments already scheduled     May 11, 2017 10:00 AM CDT   Return Visit with Sherrie Fontana MD   Peds Integrative Health ([facility])    [facility]  9th Floor  [facility] [address] [phone]            Jun 14, 2017 10:00 AM CDT   Return Metabolic Visit with Ana Granda MD   Peds Metabolism ([facility])    [facility]  [address] [facility]  [address] [address]   [phone]            Feb 01, 2018 11:15 AM CST   Return Visit with Barrett Lennon MD   Pediatric Endocrinology ([facility])    [facility]  [address] [place] [address] [phone]              Further instructions from  your care team       Home Instructions for Your Child after Sedation  Today your child received propofol. Please keep this form with your health records  Your child may be more sleepy and irritable today than normal. Wake your child up every 1 to 11/2 hours during the day. (This way, both you and your child will sleep through the night.) Also, an adult should stay with your child for the rest of the day. The medicine may make the child dizzy. Avoid activities that require balance (bike riding, skating, climbing stairs, walking).  Remember:    For young infants: Do not allow the car seat or infant seat to bend the child's head forward and down. If it does, your child may not be able to breathe.    When your child wants to eat again, start with liquids (juice, soda pop, Popsicles). If your child feels well enough, you may try a regular diet. It is best to offer light meals for the first 24 hours.    If your child has nausea (feels sick to the stomach) or vomiting (throws up), give small amounts of clear liquids (7-Up, Sprite, apple juice or broth). Fluids are more important than food until your child is feeling better.    Wait 24 hours before giving medicine that contains alcohol. This includes liquid cold, cough and allergy medicines (Robitussin, Vicks Formula 44 for children, Benadryl, Chlor-Trimeton).    If you will leave your child with a , give the sitter a copy of these instructions.  Call your doctor if:    You have questions about the test results.    Your child vomits (throws up) more than two times.    Your child is very fussy or irritable.    You have trouble waking your child.     If your child has trouble breathing, call [phone].  If you have any questions or concerns, please call:  Pediatric Sedation Unit [phone]  Pediatric clinic  [phone]  [facility]  [phone] (ask for the anesthesia doctor on call)  Emergency department [phone]  [facility] toll-free  "number [phone] (Monday--Friday, 8 a.m. to 4:30 p.m.)  I understand these instructions. I have all of my personal belongings.                Pending Results     Date and Time Order Name Status Description    3/7/2017 0854 MR Cervical Spine w/o Contrast In process             Admission Information     Date & Time Provider Department Dept. Phone    3/7/2017 Barrett Lennon MD [facility] Sedation Observation [phone]      Your Vitals Were     Blood Pressure Temperature Respirations Weight Pulse Oximetry BMI (Body Mass Index)    120/82 97.7  F (36.5  C) (Axillary) 20 39.9 kg (88 lb) 99% 25.77 kg/m2      MyChart Information     VivoText lets you send messages to your doctor, view your test results, renew your prescriptions, schedule appointments and more. To sign up, go to [email or web address] . Click on \"Log in\" on the left side of the screen, which will take you to the Welcome page. Then click on \"Sign up Now\" on the right side of the page.     You will be asked to enter the access code listed below, as well as some personal information. Please follow the directions to create your username and password.     Your access code is: [email or web address]  Expires: 3/14/2017 10:55 AM     Your access code will  in 90 days. If you need help or a new code, please call your [facility] clinic or [phone].        Care EveryWhere ID     This is your Care EveryWhere ID. This could be used by other organizations to access your [facility] medical records  [id number]           Review of your medicines      UNREVIEWED medicines. Ask your doctor about these medicines        Dose / Directions    acetaminophen 160 MG/5ML solution   Commonly known as:  TYLENOL        480 mg (15 mL) by mouth every 4 hours as needed   Refills:  0       ARIPiprazole 1 MG/ML Soln solution   Commonly known as:  ABILIFY        Dose:  7.5 mg   7.5 mg daily   Refills:  0       bacitracin ointment   Used for:  Skin irritation        Apply topically 2 " times daily as needed for wound care   Quantity:  14 g   Refills:  0       calcium carbonate 1250 (500 CA) MG/5ML Susp suspension   Used for:  Congenital disorder of glycosylation (CDG) (H), Osteoporosis        Dose:  1500 mg   Take 6 mLs (1,500 mg) by mouth 2 times daily (with meals)   Quantity:  450 mL   Refills:  12       cholecalciferol 400 UNIT/ML Liqd liquid   Commonly known as:  vitamin D/D-VI-SOL        Dose:  1600 Units   Take 1,600 Units by mouth daily   Refills:  0       COMPLEAT PEDIATRIC PO        Refills:  0       D-Mannose Powd        2 times daily   Refills:  0       diazepam 5 MG/ML (HIGH CONC) solution   Commonly known as:  VALIUM        Dose:  7.5 mg   Take 7.5 mg by mouth every hour as needed for seizures (Seizures lasting longer than 5 minutes)   Refills:  0       ENEMEEZ MINI RE        3 times a week (MWF) to help with bowel movements   Refills:  0       levETIRAcetam 100 MG/ML solution   Commonly known as:  KEPPRA        10 mL by mouth  in the AM and 15 ml in the PM   Refills:  0       MIRALAX PO        Dose:  1 tsp.   Take 1 tsp. by mouth daily as needed   Refills:  0       ondansetron 4 MG/5ML solution   Commonly known as:  ZOFRAN   Used for:  Viral gastroenteritis        Dose:  4 mg   Take 5 mLs (4 mg) by mouth every 6 hours as needed for nausea or vomiting   Quantity:  90 mL   Refills:  0       sertraline 20 MG/ML (HIGH CONC) solution   Commonly known as:  ZOLOFT        Dose:  10 mg   Take 10 mg by mouth daily   Refills:  0       * UNABLE TO FIND        MEDICATION NAME: Stress relax (magnesium citrate) 380 mg at bedtime   Refills:  0       * UNABLE TO FIND        MEDICATION NAME: Mood probiotic 1 capsule (3,000,000,000 CFUs) daily.   Refills:  0       * UNABLE TO FIND        MEDICATION NAME: ***   Refills:  0       * Notice:  This list has 3 medication(s) that are the same as other medications prescribed for you. Read the directions carefully, and ask your doctor or other care provider to  review them with you.      CONTINUE these medicines which have NOT CHANGED        Dose / Directions    order for DME   Used for:  Viral gastroenteritis, Dehydration, Acute respiratory failure with hypoxia (H), Congenital disorder of glycosylation (CDG) (H)        Equipment being ordered: Other: chest strap for her tilt in space manual wheel chair Treatment Diagnosis: For safe transportation to home secondary to poor sitting balance in upright. CDG type Ia, seizures, and G-tube dependence.   Quantity:  1 Device   Refills:  0                Protect others around you: Learn how to safely use, store and throw away your medicines at www.disposemymeds.org.             Medication List: This is a list of all your medications and when to take them. Check marks below indicate your daily home schedule. Keep this list as a reference.      Medications           Morning Afternoon Evening Bedtime As Needed    acetaminophen 160 MG/5ML solution   Commonly known as:  TYLENOL   480 mg (15 mL) by mouth every 4 hours as needed                                ARIPiprazole 1 MG/ML Soln solution   Commonly known as:  ABILIFY   7.5 mg daily                                bacitracin ointment   Apply topically 2 times daily as needed for wound care                                calcium carbonate 1250 (500 CA) MG/5ML Susp suspension   Take 6 mLs (1,500 mg) by mouth 2 times daily (with meals)                                cholecalciferol 400 UNIT/ML Liqd liquid   Commonly known as:  vitamin D/D-VI-SOL   Take 1,600 Units by mouth daily                                COMPLEAT PEDIATRIC PO                                D-Mannose Powd   2 times daily                                diazepam 5 MG/ML (HIGH CONC) solution   Commonly known as:  VALIUM   Take 7.5 mg by mouth every hour as needed for seizures (Seizures lasting longer than 5 minutes)                                ENEMEEZ MINI RE   3 times a week (MWF) to help with bowel movements                                 levETIRAcetam 100 MG/ML solution   Commonly known as:  KEPPRA   10 mL by mouth  in the AM and 15 ml in the PM                                MIRALAX PO   Take 1 tsp. by mouth daily as needed                                ondansetron 4 MG/5ML solution   Commonly known as:  ZOFRAN   Take 5 mLs (4 mg) by mouth every 6 hours as needed for nausea or vomiting                                order for DME   Equipment being ordered: Other: chest strap for her tilt in space manual wheel chair Treatment Diagnosis: For safe transportation to home secondary to poor sitting balance in upright. CDG type Ia, seizures, and G-tube dependence.                                sertraline 20 MG/ML (HIGH CONC) solution   Commonly known as:  ZOLOFT   Take 10 mg by mouth daily                                * UNABLE TO FIND   MEDICATION NAME: Stress relax (magnesium citrate) 380 mg at bedtime                                * UNABLE TO FIND   MEDICATION NAME: Mood probiotic 1 capsule (3,000,000,000 CFUs) daily.                                * UNABLE TO FIND   MEDICATION NAME: ***                                * Notice:  This list has 3 medication(s) that are the same as other medications prescribed for you. Read the directions carefully, and ask your doctor or other care provider to review them with you.

## 2017-03-09 NOTE — PROGRESS NOTES
03/07/17 1023   Child Life   Location Sedation  (MRI)   Intervention Initial Assessment;Family Support;Preparation   Preparation Comment CFL introduced self to patient and family in room.  This writer provided age apporpriate Birthday gift for Sherly.  Per mom: did great with PIV.  No further needs at this time.   Family Support Comment Mom present.  Another female adult present, possibly at home nurse.   Growth and Development Comment Developmental delays   Anxiety Low Anxiety;Appropriate   Able to Shift Focus From Anxiety Easy   Special Interests [place]   Outcomes/Follow Up Provided Materials;Continue to Follow/Support

## 2017-03-16 ENCOUNTER — TRANSFERRED RECORDS (OUTPATIENT)
Dept: HEALTH INFORMATION MANAGEMENT | Facility: CLINIC | Age: 22
End: 2017-03-16

## 2017-04-19 ENCOUNTER — ALLIED HEALTH/NURSE VISIT (OUTPATIENT)
Dept: NEUROLOGY | Facility: CLINIC | Age: 22
End: 2017-04-19

## 2017-04-19 ENCOUNTER — OFFICE VISIT (OUTPATIENT)
Dept: NEUROLOGY | Facility: CLINIC | Age: 22
End: 2017-04-19

## 2017-04-19 VITALS
SYSTOLIC BLOOD PRESSURE: 107 MMHG | BODY MASS INDEX: 19.44 KG/M2 | WEIGHT: 84 LBS | DIASTOLIC BLOOD PRESSURE: 66 MMHG | HEART RATE: 77 BPM | HEIGHT: 55 IN

## 2017-04-19 DIAGNOSIS — E74.89 CONGENITAL DISORDER OF GLYCOSYLATION ASSOCIATED WITH MUTATION IN PMM2 GENE (H): Primary | ICD-10-CM

## 2017-04-19 DIAGNOSIS — G40.209 COMPLEX PARTIAL SEIZURE EVOLVING TO GENERALIZED SEIZURE (H): ICD-10-CM

## 2017-04-19 DIAGNOSIS — R56.9 SEIZURES (H): Primary | ICD-10-CM

## 2017-04-19 RX ORDER — LEVETIRACETAM 100 MG/ML
SOLUTION ORAL
Qty: 750 ML | Refills: 11 | Status: SHIPPED | OUTPATIENT
Start: 2017-04-19 | End: 2017-10-25

## 2017-04-19 NOTE — MR AVS SNAPSHOT
After Visit Summary   4/19/2017    Sherly Ramires    MRN: [id number]           Patient Information     Date Of Birth          1995        Visit Information        Provider Department      4/19/2017 9:30 AM [facility] EEG 3 [facility] Epilepsy [facility]        Today's Diagnoses     Seizures (H)    -  1       Follow-ups after your visit        Your next 10 appointments already scheduled     May 11, 2017 10:00 AM CDT   Return Visit with Sherrie Fontana MD   Peds Integrative Health ([facility])    [facility]  9th Floor  [address] [facility] [address]   [phone]            Jun 14, 2017 10:00 AM CDT   Return Metabolic Visit with Ana Granda MD   Peds Metabolism ([facility])    [facility]  [address] [place] [address]  [address] [place] [address]   [phone]            Sep 06, 2017 10:00 AM CDT   Return Pediatric Visit with Skyler Correa MD   [facility] Peds Eye General ([facility])    [address]  [place] [address] [facility] [address] [phone]            Feb 01, 2018 11:15 AM CST   Return Visit with Barrett Lennon MD   Pediatric Endocrinology ([facility])    [facility]  [address] [facility]  [address] [facility] [address] [phone]              Who to contact     Please call your clinic at [phone] to:    Ask questions about your health    Make or cancel appointments    Discuss your medicines    Learn about your test results    Speak to your doctor   If you have compliments or concerns about an experience at your clinic, or if you wish to file a complaint, please contact [facility] Patient Relations at [phone] or email us at [email or web address]         Additional Information About Your Visit        MyChart Information     BeGo is an electronic gateway that provides easy, online access to your medical records. With BeGo, you can request a  clinic appointment, read your test results, renew a prescription or communicate with your care team.     To sign up for Definition 6hart visit the website at [email or web address]   You will be asked to enter the access code listed below, as well as some personal information. Please follow the directions to create your username and password.     Your access code is: [email or web address]  Expires: 2017  1:42 PM     Your access code will  in 90 days. If you need help or a new code, please contact your [facility] or call [phone] for assistance.        Care EveryWhere ID     This is your Care EveryWhere ID. This could be used by other organizations to access your [facility] medical records  [id number]         Blood Pressure from Last 3 Encounters:   17 107/66   17 (!) 120/92   17 96/62    Weight from Last 3 Encounters:   17 84 lb (38.1 kg)   17 88 lb (39.9 kg)   17 88 lb (39.9 kg)              We Performed the Following     CHARGE: Video EEG  < 12 hours ([address]27)        Primary Care Provider Office Phone # Fax #    Leeanen DAVALOS Mustafa [phone]       [facility] [address] [place] [address] [address] [place] [address]        Thank you!     Thank you for choosing [facility]  for your care. Our goal is always to provide you with excellent care. Hearing back from our patients is one way we can continue to improve our services. Please take a few minutes to complete the written survey that you may receive in the mail after your visit with us. Thank you!             Your Updated Medication List - Protect others around you: Learn how to safely use, store and throw away your medicines at www.disposemymeds.org.          This list is accurate as of: 17  1:42 PM.  Always use your most recent med list.                   Brand Name Dispense Instructions for use    acetaminophen 160 MG/5ML solution    TYLENOL     480 mg (15 mL) by mouth every 4  hours as needed       ARIPiprazole 1 MG/ML Soln solution    ABILIFY     7.5 mg daily       bacitracin ointment     14 g    Apply topically 2 times daily as needed for wound care       calcium carbonate 1250 (500 CA) MG/5ML Susp suspension     450 mL    Take 6 mLs (1,500 mg) by mouth 2 times daily (with meals)       COMPLEAT PEDIATRIC PO          D-Mannose Powd      2 times daily       diazepam 5 MG/ML (HIGH CONC) solution    VALIUM     Take 7.5 mg by mouth every hour as needed for seizures (Seizures lasting longer than 5 minutes)       ENEMEEZ MINI RE      3 times a week (MWF) to help with bowel movements       levETIRAcetam 100 MG/ML solution    KEPPRA     10 mL by mouth  in the AM and 15 ml in the PM       ondansetron 4 MG/5ML solution    ZOFRAN    90 mL    Take 5 mLs (4 mg) by mouth every 6 hours as needed for nausea or vomiting       order for DME     1 Device    Equipment being ordered: Other: chest strap for her tilt in space manual wheel chair Treatment Diagnosis: For safe transportation to home secondary to poor sitting balance in upright. CDG type Ia, seizures, and G-tube dependence.       sertraline 20 MG/ML (HIGH CONC) solution    ZOLOFT     Take 10 mg by mouth daily       * UNABLE TO FIND      MEDICATION NAME: Stress relax (magnesium citrate) 380 mg at bedtime       * UNABLE TO FIND      MEDICATION NAME: Mood probiotic 1 capsule (3,000,000,000 CFUs) daily.       * Notice:  This list has 2 medication(s) that are the same as other medications prescribed for you. Read the directions carefully, and ask your doctor or other care provider to review them with you.

## 2017-04-19 NOTE — MR AVS SNAPSHOT
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      Your next 10 appointments already scheduled     May 11, 2017 10:00 AM CDT   Return Visit with Sherrie Fontana MD   Peds Integrative Health ([facility])    [facility]  9th Floor  [address] [facility] [address] [phone]            Jun 14, 2017 10:00 AM CDT   Return Metabolic Visit with Ana Granda MD   Peds Metabolism ([facility])    [facility]  [address] [facility] 3rd [address]  [address] [place] [id number] [phone]            Sep 06, 2017 10:00 AM CDT   Return Pediatric Visit with Skyler Correa MD   [facility] Peds Eye General ([facility])    [address]  [place] 3rd [facility] [address] [phone]            Oct 25, 2017  1:00 PM CDT   Return Visit with MD [facility]IG[facility] ([facility])    [address] [place], [address]  [facility] [address]   [phone]            Feb 01, 2018 11:15 AM CST   Return Visit with Barrett Lennon MD   Pediatric Endocrinology ([facility])    [facility]  [address] [facility]  [address] [facility] [address] [phone]              Who to contact     Please call your clinic at [phone] to:    Ask questions about your health    Make or cancel appointments    Discuss your medicines    Learn about your test results    Speak to your doctor   If you have  "compliments or concerns about an experience at your clinic, or if you wish to file a complaint, please contact [facility] Patient Relations at [phone] or email us at [email or web address]         Additional Information About Your Visit        [employer] Information     Workday is an electronic gateway that provides easy, online access to your medical records. With Workday, you can request a clinic appointment, read your test results, renew a prescription or communicate with your care team.     To sign up for Workday visit the website at [email or web address]   You will be asked to enter the access code listed below, as well as some personal information. Please follow the directions to create your username and password.     Your access code is: [email or web address]  Expires: 2017  1:42 PM     Your access code will  in 90 days. If you need help or a new code, please contact your [facility] or call [phone] for assistance.        Care EveryWhere ID     This is your Care EveryWhere ID. This could be used by other organizations to access your [place] medical records  [id number]        Your Vitals Were     Pulse Height Breastfeeding? BMI (Body Mass Index)          77 4' 1\" (124.5 cm) No 24.6 kg/m2         Blood Pressure from Last 3 Encounters:   17 107/66   17 (!) 120/92   17 96/62    Weight from Last 3 Encounters:   17 84 lb (38.1 kg)   17 88 lb (39.9 kg)   17 88 lb (39.9 kg)              We Performed the Following     Levetiracetam level          Today's Medication Changes          These changes are accurate as of: 17  2:39 PM.  If you have any questions, ask your nurse or doctor.               These medicines have changed or have updated prescriptions.        Dose/Directions    levETIRAcetam 100 MG/ML solution   Commonly known as:  KEPPRA   This may have changed:  how to take this   Used for:  " Complex partial seizure evolving to generalized seizure (H)   Changed by:  Nhan Knott MD        10 mL by mouth  in the AM and 15 ml in the PM   Quantity:  750 mL   Refills:  11            Where to get your medicines      Some of these will need a paper prescription and others can be bought over the counter.  Ask your nurse if you have questions.     Bring a paper prescription for each of these medications     levETIRAcetam 100 MG/ML solution                Primary Care Provider Office Phone # Fax #    Leeanne Mustafa [phone]       [facility] [address] [place] [address]        Thank you!     Thank you for choosing [facility]  for your care. Our goal is always to provide you with excellent care. Hearing back from our patients is one way we can continue to improve our services. Please take a few minutes to complete the written survey that you may receive in the mail after your visit with us. Thank you!             Your Updated Medication List - Protect others around you: Learn how to safely use, store and throw away your medicines at www.disposemymeds.org.          This list is accurate as of: 4/19/17  2:39 PM.  Always use your most recent med list.                   Brand Name Dispense Instructions for use    acetaminophen 160 MG/5ML solution    TYLENOL     480 mg (15 mL) by mouth every 4 hours as needed       ARIPiprazole 1 MG/ML Soln solution    ABILIFY     7.5 mg daily       bacitracin ointment     14 g    Apply topically 2 times daily as needed for wound care       calcium carbonate 1250 (500 CA) MG/5ML Susp suspension     450 mL    Take 6 mLs (1,500 mg) by mouth 2 times daily (with meals)       COMPLEAT PEDIATRIC PO          D-Mannose Powd      2 times daily       diazepam 5 MG/ML (HIGH CONC) solution    VALIUM     Take 7.5 mg by mouth every hour as needed for seizures (Seizures lasting longer than 5 minutes)       ENEMEEZ MINI RE      3 times a week (MWF)  to help with bowel movements       levETIRAcetam 100 MG/ML solution    KEPPRA    750 mL    10 mL by mouth  in the AM and 15 ml in the PM       ondansetron 4 MG/5ML solution    ZOFRAN    90 mL    Take 5 mLs (4 mg) by mouth every 6 hours as needed for nausea or vomiting       order for DME     1 Device    Equipment being ordered: Other: chest strap for her tilt in space manual wheel chair Treatment Diagnosis: For safe transportation to home secondary to poor sitting balance in upright. CDG type Ia, seizures, and G-tube dependence.       sertraline 20 MG/ML (HIGH CONC) solution    ZOLOFT     Take 10 mg by mouth daily       * UNABLE TO FIND      MEDICATION NAME: Stress relax (magnesium citrate) 380 mg at bedtime       * UNABLE TO FIND      MEDICATION NAME: Mood probiotic 1 capsule (3,000,000,000 CFUs) daily.       * Notice:  This list has 2 medication(s) that are the same as other medications prescribed for you. Read the directions carefully, and ask your doctor or other care provider to review them with you.

## 2017-04-19 NOTE — LETTER
2017       RE: Sherly Ramires  : 1995   MRN: [id number]      Dear Colleague,    Thank you for referring your patient, Sherly Ramires, to the [facility] EPILEPSY CARE at [facility]. Please see a copy of my visit note below.      [facility]  Epilepsy Clinic:  NEW PATIENT EVALUATION    HISTORY:  Ms. Sherly Ramires is a 22-year-old, right-handed woman who was referred by Dr. Ancelmo Smith for evaluation of seizures, mental retardation and spastic quadriparesis in the setting of congenital disorder of glycosylation type 1a.  The patient was brought to the visit today by her mother, who is her guardian, and also one of the staff persons at the group home where she lives.  Her mother was able to provide highly detailed medical history spanning her entire life and I also reviewed extensive medical records on the [facility] chart.      Ictal semiology-history:   The patient has had essentially 3 types of seizures in her lifetime, which have been responsive to levetiracetam in the case of the most recent seizures.  These essentially occurred as generalized atonic seizures in early childhood and later as complex partial seizures and generalized tonic-clonic seizures.      The patient had generalized atonic seizures which occurred approximately 12 times, usually in association with a febrile illness or other physiological stressors, between the ages of 3 and 6 years.  Her mother was the primary witnesses of these events and noted that the patient suddenly became unresponsive, usually while lying in her crib and when she moves the patient's limbs, she noted generalized flaccidity.  She was told that these were generalized atonic seizures and the patient was given rectal Diastat to prevent a cluster of these seizures.  She did not receive chronic antiepileptic drug therapy at this time.  The patient had essentially no seizures that were definitely witnessed between the ages  of 6 and 16 years.      At 16 years of age, the patient had her first generalized tonic-clonic seizure witnessed by her mother.  She had 3 of these on the day of onset, a single grand mal seizure later that year and then a single grand mal seizure about 1 year after the first seizures.  All of these were associated with sleep deprivation.  Her mother witnessed her to suddenly extend her limbs stiffly with truncal extension followed by generalized rhythmic jerking for a minute or 2.  She did not have tongue biting.      It appears that over several years after the grand mal seizures began, there was a question with various care providers of brief staring spells.  Her mother witnessed a definite staring spell with essentially no movement and no responsiveness, but with diffuse stiffness for a minute or 2.  This occurred in , in association with sleep deprivation.  Subsequently, no one has seen any definite staring spell seizures.      The patient reportedly has no other type of paroxysmal behavioral alterations.     Epilepsy-seizure predispositions:   With early developmental delay, the patient was evaluated and eventually diagnosed with congenital disorder of glycosylation type 1.  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  She has had slow language development and has learned to communicate by producing single linguistic sound sequences which are not complete intelligible words, but are accompanied by gesturing to supplement the vocalization.  In this manner the patient is able to make a number of requests of people who know her well.  Her mother thinks that she has much greater comprehension of speech compared with her own speech production.      The patient has no family history of epilepsy or seizures.  She has no history of gestational or  injury, febrile convulsions, developmental delay, stroke, meningitis,  encephalitis, significant head injury, or other epileptic predispositions than CDG type 1a.     Laboratory evaluations:   The patient has had brain imaging and electroencephalography at a number of different clinics in [facility] and [place].  The most recent brain MRI was performed at [facility] on 06/07/2013, and this was reported to show deformities at the craniocervical junction consistent with reported surgical procedures, marked hypoplasia of the cerebellar vermis and cerebellar hemispheres (unchanged) and myelomalacia of adjacent portions of the superior cervical spinal cord, with limited T2 signal alterations in subcortical white matter.      Notes from her earlier pediatric neurologist, Dr. Gus Ny indicate that she had definite right frontal lobe interictal spikes on electroencephalograms in 2003 and 2004.     Epilepsy therapeutics:   The patient was treated with levetiracetam following her first grand mal seizure at 16 years of age.  She has continued on this since then, with upward dose adjustments a number of years ago.  She has not been noted to have adverse effects of levetiracetam.  She has not been on other chronic antiepileptic drugs therapies.     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  Her mother thinks that she was twice accidentally dropped or fell a small distance when she was about 6 years of age, once sliding often an adaptive tricycle onto the floor and once collapsing down out of the hands of a person who was holding her in an erect position.  It was at around this time that the patient was noted to have bilateral leg weakness, which progressed at an early age, leaving her with a spastic quadriplegia involving legs much more than arms.  Now over the last 6 months she appears to have had progression in arm weakness, however, probably  involving the left arm more than the right arm.  She appears clearly weaker in her assistance with transfers, although she is able to readily manipulate light objects such that she can feed herself and use coloring crayons.  With this myelopathy, she has had complete urinary and fecal incontinence, although possibly these problems began before other manifestations of myelopathy.  She seems to have some feeling in her feet.  Her mother is planning to pursue evaluation to determine whether further cervical spine surgery is indicated.     PAST MEDICAL-SURGICAL HISTORY:    1.  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     2.  Congenital cervical spine malformations, possibly complicated by early cervical trauma, with spastic quadriparesis; status post occipital-C2 fusion and C1 laminectomy (2003); status post foramen magnum decompression and C1 decompression, with dorsal [id number]-[id number] fusion (2003); status post additional decompression and stabilization procedure (2004).   3.  Status post Burns rojelio procedure for kyphoscoliosis (2007).   4.  Status post gastrostomy tube placement and multiple replacements.   5.  Status post 2 procedures for strabismus.     FAMILY HISTORY:  There is no family history of epilepsy, seizures or other neurological conditions than migraine headaches in both parents.     PERSONAL AND SOCIAL HISTORY:  The patient lived with her parents and siblings for most of her life and had special education classes for much of this time.  More recently she moved into a specialized group home with 3 other highly disabled clients and appears to be doing well there.  She reportedly is able to swallow safely based on repeated swallowing studies, but has very little appetite, so receives most of her  nutrition through the gastrostomy tube.  She does assist with some with activities of daily living.      REVIEW OF SYSTEMS:  The patient reportedly has not been evaluated for peripheral neuropathy.   She has no history of rashes and easy bruising.  The remainder of a 14-system symptom review was negative or unobtainable, except as noted above.       MEDICATIONS:  Levetiracetam solution 1000 mg in the morning and 1500 mg in the evening by mouth, sertraline, aripiprazole, diazepam, and other medications as per the electronic medical record.     PHYSICAL EXAMINATION:    The patient appeared continuously alert, sitting in her wheelchair, working on a board with coloring crayons.  Pulse was 77 and regular.  Blood pressure was 107/66.  Respirations were 20 per minute.  Height was reported to be 49 inches.  Weight was 84 pounds.  Skull and high cervical deformities were consistent with reported surgery, with markedly diminished cervical passive range of motion.      She communicated with her mother in single English language sounds sequences which utilized some sounds appropriate to the apparent intended word, with considerable gesturing with her right hand.  She appeared irritated by interactions with the examiner.  She did demonstrate full extraocular movements with variable nystagmus, but funduscopy was not possible due to inadequate fixation.  Pupils were equal, round and reactive to light.  She demonstrated slight movement of the face bilaterally and appeared to feel touch to the face on each side.  She did not open her mouth or move her tongue on command.  She did appear to observe high contrast shapes in her environment.  She clearly heard her mother's voice spoken softly.  Muscle tone was moderately hypotonic diffusely, with markedly reduced muscle masses.  She did not demonstrate strength on command, but was observed to move her right arm spontaneously more than her left arm.  Deep tendon reflexes were hypoactive  throughout.  She appeared distressed with further examination and toe signs were not tested.     IMPRESSION:    The patient has had diagnosis of partial epilepsy by observed convulsive and nonconvulsive seizures, in the past associated with spikes over the right frontal lobe on interictal EEG.  She has been seizure-free since 2014, on levetiracetam monotherapy.  We will check a serum level today to screen for any difficulties with renal excretion.  Her mother would like to consolidate her care in the [facility] system and I agreed to take over management of her levetiracetam.      The report of progressive bilateral arm weakness is quite concerning.  I agree with her mother that in view of her history of craniocervical bony anomalies with myelomalacia, cord or root compression would be of greatest concern.  She had a cervical MRI performed in the [facility] system on 03/07/2017, and this clearly ruled out any unexpected pathologies such as cervical abscess, meningioma or other neoplasms.  The known high cervical myelomalacia was again imaged with spinal canal narrowing extending as low as C7 and loss of C5-C7 vertebral height consistent with compression fracture or osteonecrosis.  In general, one might want to screen for evidence of root compression or even progressive peripheral neuropathy using electromyography with nerve conduction studies.  Clearly this would be uncomfortable and her mother would not be in favor of performing this.  She has decided to seek review by the neurosurgeon who performed her second and third craniocervical procedures a number of years ago, Dr. Italo Root, who practices at the [facility].  Presuming that he and his team have established protocols for further MR imaging as well as electrophysiological studies, I recommended that she would not have any more studies performed in the [facility], but rather directly arrange for an early review at the  [facility].  She will proceed with this.        PLAN:    1.  Check serum levetiracetam level today.   2.  Continue levetiracetam at 1000/1500 mg daily.   3.  Return visit in approximately 6 months.   I spent 65 minutes in this patient care, more than 50% of which consisted of counseling and coordinating care.       Nhan Knott M.D.    of Neurology         D: 2017 18:41   T: 2017 22:40   MT: nh      Name:     ANTOLIN WHITFIELD   MRN:      [id number]        Account:      [id number]   :      1995           Service Date: 2017      Document: [id number]

## 2017-04-20 NOTE — PROGRESS NOTES
[facility]  Epilepsy Clinic:  NEW PATIENT EVALUATION    HISTORY:  Ms. Sherly Ramires is a 22-year-old, right-handed woman who was referred by Dr. Ancelmo Smith for evaluation of seizures, mental retardation and spastic quadriparesis in the setting of congenital disorder of glycosylation type 1a.  The patient was brought to the visit today by her mother, who is her guardian, and also one of the staff persons at the group home where she lives.  Her mother was able to provide highly detailed medical history spanning her entire life and I also reviewed extensive medical records on the [facility] chart.      Ictal semiology-history:   The patient has had essentially 3 types of seizures in her lifetime, which have been responsive to levetiracetam in the case of the most recent seizures.  These essentially occurred as generalized atonic seizures in early childhood and later as complex partial seizures and generalized tonic-clonic seizures.      The patient had generalized atonic seizures which occurred approximately 12 times, usually in association with a febrile illness or other physiological stressors, between the ages of 3 and 6 years.  Her mother was the primary witnesses of these events and noted that the patient suddenly became unresponsive, usually while lying in her crib and when she moves the patient's limbs, she noted generalized flaccidity.  She was told that these were generalized atonic seizures and the patient was given rectal Diastat to prevent a cluster of these seizures.  She did not receive chronic antiepileptic drug therapy at this time.  The patient had essentially no seizures that were definitely witnessed between the ages of 6 and 16 years.      At 16 years of age, the patient had her first generalized tonic-clonic seizure witnessed by her mother.  She had 3 of these on the day of onset, a single grand mal seizure later that year and then a single grand mal seizure about 1 year after the first  seizures.  All of these were associated with sleep deprivation.  Her mother witnessed her to suddenly extend her limbs stiffly with truncal extension followed by generalized rhythmic jerking for a minute or 2.  She did not have tongue biting.      It appears that over several years after the grand mal seizures began, there was a question with various care providers of brief staring spells.  Her mother witnessed a definite staring spell with essentially no movement and no responsiveness, but with diffuse stiffness for a minute or 2.  This occurred in , in association with sleep deprivation.  Subsequently, no one has seen any definite staring spell seizures.      The patient reportedly has no other type of paroxysmal behavioral alterations.     Epilepsy-seizure predispositions:   With early developmental delay, the patient was evaluated and eventually diagnosed with congenital disorder of glycosylation type 1.  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  She has had slow language development and has learned to communicate by producing single linguistic sound sequences which are not complete intelligible words, but are accompanied by gesturing to supplement the vocalization.  In this manner the patient is able to make a number of requests of people who know her well.  Her mother thinks that she has much greater comprehension of speech compared with her own speech production.      The patient has no family history of epilepsy or seizures.  She has no history of gestational or  injury, febrile convulsions, developmental delay, stroke, meningitis, encephalitis, significant head injury, or other epileptic predispositions than CDG type 1a.     Laboratory evaluations:   The patient has had brain imaging and electroencephalography at a number of different clinics in [facility] and [place].  The most recent brain  MRI was performed at [facility] on 06/07/2013, and this was reported to show deformities at the craniocervical junction consistent with reported surgical procedures, marked hypoplasia of the cerebellar vermis and cerebellar hemispheres (unchanged) and myelomalacia of adjacent portions of the superior cervical spinal cord, with limited T2 signal alterations in subcortical white matter.      Notes from her earlier pediatric neurologist, Dr. Gus Ny indicate that she had definite right frontal lobe interictal spikes on electroencephalograms in 2003 and 2004.     Epilepsy therapeutics:   The patient was treated with levetiracetam following her first grand mal seizure at 16 years of age.  She has continued on this since then, with upward dose adjustments a number of years ago.  She has not been noted to have adverse effects of levetiracetam.  She has not been on other chronic antiepileptic drugs therapies.     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  Her mother thinks that she was twice accidentally dropped or fell a small distance when she was about 6 years of age, once sliding often an adaptive tricycle onto the floor and once collapsing down out of the hands of a person who was holding her in an erect position.  It was at around this time that the patient was noted to have bilateral leg weakness, which progressed at an early age, leaving her with a spastic quadriplegia involving legs much more than arms.  Now over the last 6 months she appears to have had progression in arm weakness, however, probably involving the left arm more than the right arm.  She appears clearly weaker in her assistance with transfers, although she is able to readily manipulate light objects such that she can feed herself and use coloring crayons.  With this myelopathy, she has had complete urinary and fecal  incontinence, although possibly these problems began before other manifestations of myelopathy.  She seems to have some feeling in her feet.  Her mother is planning to pursue evaluation to determine whether further cervical spine surgery is indicated.     PAST MEDICAL-SURGICAL HISTORY:    1.  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     2.  Congenital cervical spine malformations, possibly complicated by early cervical trauma, with spastic quadriparesis; status post occipital-C2 fusion and C1 laminectomy (2003); status post foramen magnum decompression and C1 decompression, with dorsal uatzypt-N3-X6 fusion (2003); status post additional decompression and stabilization procedure (2004).   3.  Status post Burns rojelio procedure for kyphoscoliosis (2007).   4.  Status post gastrostomy tube placement and multiple replacements.   5.  Status post 2 procedures for strabismus.     FAMILY HISTORY:  There is no family history of epilepsy, seizures or other neurological conditions than migraine headaches in both parents.     PERSONAL AND SOCIAL HISTORY:  The patient lived with her parents and siblings for most of her life and had special education classes for much of this time.  More recently she moved into a specialized group home with 3 other highly disabled clients and appears to be doing well there.  She reportedly is able to swallow safely based on repeated swallowing studies, but has very little appetite, so receives most of her nutrition through the gastrostomy tube.  She does assist with some with activities of daily living.      REVIEW OF SYSTEMS:  The patient reportedly has not been evaluated for peripheral neuropathy.   She has no history of rashes and easy bruising.  The remainder of a 14-system symptom  review was negative or unobtainable, except as noted above.       MEDICATIONS:  Levetiracetam solution 1000 mg in the morning and 1500 mg in the evening by mouth, sertraline, aripiprazole, diazepam, and other medications as per the electronic medical record.     PHYSICAL EXAMINATION:    The patient appeared continuously alert, sitting in her wheelchair, working on a board with coloring crayons.  Pulse was 77 and regular.  Blood pressure was 107/66.  Respirations were 20 per minute.  Height was reported to be 49 inches.  Weight was 84 pounds.  Skull and high cervical deformities were consistent with reported surgery, with markedly diminished cervical passive range of motion.      She communicated with her mother in single English language sounds sequences which utilized some sounds appropriate to the apparent intended word, with considerable gesturing with her right hand.  She appeared irritated by interactions with the examiner.  She did demonstrate full extraocular movements with variable nystagmus, but funduscopy was not possible due to inadequate fixation.  Pupils were equal, round and reactive to light.  She demonstrated slight movement of the face bilaterally and appeared to feel touch to the face on each side.  She did not open her mouth or move her tongue on command.  She did appear to observe high contrast shapes in her environment.  She clearly heard her mother's voice spoken softly.  Muscle tone was moderately hypotonic diffusely, with markedly reduced muscle masses.  She did not demonstrate strength on command, but was observed to move her right arm spontaneously more than her left arm.  Deep tendon reflexes were hypoactive throughout.  She appeared distressed with further examination and toe signs were not tested.     IMPRESSION:    The patient has had diagnosis of partial epilepsy by observed convulsive and nonconvulsive seizures, in the past associated with spikes over the right frontal lobe on  interictal EEG.  She has been seizure-free since 2014, on levetiracetam monotherapy.  We will check a serum level today to screen for any difficulties with renal excretion.  Her mother would like to consolidate her care in the [facility] system and I agreed to take over management of her levetiracetam.      The report of progressive bilateral arm weakness is quite concerning.  I agree with her mother that in view of her history of craniocervical bony anomalies with myelomalacia, cord or root compression would be of greatest concern.  She had a cervical MRI performed in the [place] system on 03/07/2017, and this clearly ruled out any unexpected pathologies such as cervical abscess, meningioma or other neoplasms.  The known high cervical myelomalacia was again imaged with spinal canal narrowing extending as low as C7 and loss of C5-C7 vertebral height consistent with compression fracture or osteonecrosis.  In general, one might want to screen for evidence of root compression or even progressive peripheral neuropathy using electromyography with nerve conduction studies.  Clearly this would be uncomfortable and her mother would not be in favor of performing this.  She has decided to seek review by the neurosurgeon who performed her second and third craniocervical procedures a number of years ago, Dr. Italo Root, who practices at the [facility].  Presuming that he and his team have established protocols for further MR imaging as well as electrophysiological studies, I recommended that she would not have any more studies performed in the [facility], but rather directly arrange for an early review at the [facility].  She will proceed with this.        PLAN:    1.  Check serum levetiracetam level today.   2.  Continue levetiracetam at 1000/1500 mg daily.   3.  Return visit in approximately 6 months.   I spent 65 minutes in this patient care, more than 50% of which consisted of  counseling and coordinating care.       Nhan Knott M.D.    of Neurology         D: 2017 18:41   T: 2017 22:40   MT: nh      Name:     ANTOLIN WHITFIELD   MRN:      [id number]        Account:      [id number]   :      1995           Service Date: 2017      Document: [id number]

## 2017-04-21 LAB — LEVETIRACETAM SERPL-MCNC: 38 UG/ML

## 2017-04-21 NOTE — PROCEDURES
[facility] EEG # [id number] (Out-Patient Video-EEG Monitoring)    Name:     SHERLY RAMIRES   MRN:      [id number]   :      1995      Duration of Recordin hours, 55 minutes.      CLINICAL SUMMARY:  This diagnostic video-EEG monitoring procedure was performed in evaluation of seizures in Sherly Ramires. She was reported to be receiving levetiracetam, diazepam, sertraline and aripiprazole at the time of this recording.      TECHNICAL SUMMARY:  This continuous EEG monitoring procedure was performed with 23 scalp electrodes in 10-20 system placements, and additional scalp, precordial and other surface electrodes used for electrical referencing and artifact detection.  A single channel of EKG was recorded for purposes of analyzing EKG artifacts in the EEG channels.  Video monitoring was utilized and periodically reviewed by EEG technologist and the physician for electroclinical correlation.    INTERICTAL EEG ACTIVITIES:  During waking there was a poorly sustained bilateral posterior dominant rhythm of approximately 9 Hz.  During waking there was ongoing generalized irregular 2-8 Hz theta-delta slowing of moderate amplitude, with frequently intermixed faster frequencies.  During waking there were frequent 1-2 seconds long bursts of high amplitude, irregular and semi-rhythmic, 1-4 Hz delta transients of bilateral frontocentral predominance, with shifting right-sided and left-sided maxima.  Photic stimulation did not result in any definite response.  Hyperventilation was not performed.    No interictal epileptiform abnormalities were recorded.      ICTAL RECORDINGS:  No electrographic seizures and no paroxysmal behavioral events were recorded during the period of monitoring.      SUMMARY OF VIDEO-EEG MONITORING:    The interictal EEG recording during waking was abnormal due to ongoing generalized theta-delta slowing, with frequent brief bursts of high-amplitude bifrontocentral delta slowing.  No interictal  epileptiform abnormalities, no electrographic seizures, and no paroxysmal behavioral events were recorded during the period of monitoring.    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  Clinical correlation is recommended.   Nhan Knott M.D., Professor of Neurology            D: 2017 12:31   T: 2017 14:42   MT: al      Name:     ANTOLIN WHITFIELD   MRN:      [id number]        Account:        [id number]   :      1995           Procedure Date: 2017      Document: [id number]

## 2017-05-02 ENCOUNTER — TELEPHONE (OUTPATIENT)
Dept: NEUROLOGY | Facility: CLINIC | Age: 22
End: 2017-05-02

## 2017-05-02 NOTE — LETTER
"5/2/2017       RE: Sherly Ramires  C/O ZACKARY ZAMBRANO [facility]  [address] [address] [id number]      After review of recent levetiracetam level, that were drawn on 4/19/17, Dr. Knott noted the following:    \"The levetiracetam level was in the range expected for the dose.\"      Next follow up appointment is 10/25/17    Please contact [facility] for any additional inquiries.           Daily Grimaldo, RN, BAN, CPN   Epilepsy Care Coordinator   [phone]       "

## 2017-05-02 NOTE — TELEPHONE ENCOUNTER
"Letter drafted and mailed to patient's home address with the result note below:   \"The levetiracetam level was in the range expected for the dose.\"    "

## 2017-05-02 NOTE — TELEPHONE ENCOUNTER
"EEG   Status:  Final result   Visible to patient:  No (Not Released) Order: [id number]       Notes Recorded by Nhan Knott MD on 4/21/2017 at 6:41 PM    For pt letter:  \"No epilepsy-related abnormalities were recorded on this EEG.\"                             "

## 2017-05-02 NOTE — TELEPHONE ENCOUNTER
"Levetiracetam level   Status:  Final result   Visible to patient:  No (Inaccessible in MyChart) Dx:  Complex partial seizure evolving to g... Order: [id number]       Notes Recorded by Nhan Knott MD on 4/21/2017 at 6:38 PM    For pt letter:  \"The levetiracetam level was in the range expected for the dose.\"            13d ago       Levetiracetam Level 38.0     Comments: Analysis performed by [facility] [place] [address]   Reference range: 10.0 to 40.0   Unit: ug/mL        Resulting Agency [facility]          Specimen Collected: 04/19/17  2:28 PM                 "

## 2017-05-02 NOTE — LETTER
"5/2/2017       RE: Sherly Ramires  C/O ZACKARY ZAMBRANO [facility]  [address] [address] [address]      After review of recent EEG results, that was completed on 4/19/17, Dr. Knott noted the following:    \"No epilepsy-related abnormalities were recorded on this EEG.\"    Next follow up appointment is 10/25/17    Please contact [facility] for any additional inquiries.           Daily Grmialdo, RN, BAN, CPN   Epilepsy Care Coordinator   [phone]       "

## 2017-05-11 ENCOUNTER — OFFICE VISIT (OUTPATIENT)
Dept: CONSULT | Facility: CLINIC | Age: 22
End: 2017-05-11
Attending: PEDIATRICS
Payer: COMMERCIAL

## 2017-05-11 ENCOUNTER — TELEPHONE (OUTPATIENT)
Dept: NEUROLOGY | Facility: CLINIC | Age: 22
End: 2017-05-11

## 2017-05-11 VITALS
TEMPERATURE: 96.8 F | HEART RATE: 118 BPM | RESPIRATION RATE: 24 BRPM | DIASTOLIC BLOOD PRESSURE: 62 MMHG | SYSTOLIC BLOOD PRESSURE: 107 MMHG | OXYGEN SATURATION: 99 %

## 2017-05-11 DIAGNOSIS — R48.8 PERSEVERATION: ICD-10-CM

## 2017-05-11 DIAGNOSIS — E74.89 CONGENITAL DISORDER OF GLYCOSYLATION ASSOCIATED WITH MUTATION IN PMM2 GENE (H): Primary | ICD-10-CM

## 2017-05-11 DIAGNOSIS — E74.89 CONGENITAL DISORDER OF GLYCOSYLATION (CDG) (H): ICD-10-CM

## 2017-05-11 DIAGNOSIS — F41.9 ANXIETY: ICD-10-CM

## 2017-05-11 DIAGNOSIS — M81.0 OSTEOPOROSIS: ICD-10-CM

## 2017-05-11 DIAGNOSIS — K92.89 GASTROINTESTINAL DYSMOTILITY: ICD-10-CM

## 2017-05-11 DIAGNOSIS — Z87.440 PERSONAL HISTORY OF URINARY TRACT INFECTION: ICD-10-CM

## 2017-05-11 PROCEDURE — 99213 OFFICE O/P EST LOW 20 MIN: CPT | Mod: ZF

## 2017-05-11 NOTE — PROGRESS NOTES
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 Since our last visit, Anna has continued to sleep well at night with trazodone. Her urination and bowel movements are fine with her bowel program. Behaviorally, things seem to be going better, except for the occasional bad day; school is also going well, but ends in 3 weeks. Anna has been accepted at the [facility] recreational program but it is another transition for her. Initially, she will be one-on-one but the paln is for her to interact socially, so this may change going forward.      Concerns today: change probiotic-rotate to another? suggestions for upcoming graduation party given Anna's stressed response to her 22st birthday party at the home recently, concerns for her apparent change in muscle strength, particularly in upper extremities.  Staff and family have noticed her dropping things that she had previously held, like a Yahtze cup; slouching more in her upper body, and drooping of her upper extremities.They will be taking her to her neurosurgeon in [place] but want to know if there will be imaging first, so they can prepare for a sedated scan, if necessary.    ALLERGIES:  Allergies   Allergen Reactions     Ativan [Lorazepam] Other (See Comments)     Paradoxical reaction     Motrin [Ibuprofen] GI Disturbance     Pt is  able to have motrin with food but not on an empty stomache.        IMMUNIZATIONS:  Immunization History   Administered Date(s) Administered     Influenza (IIV3) 10/12/2012       CURRENT MEDICATIONS:  Current Outpatient Prescriptions   Medication Sig Dispense Refill     Magnesium citrate MEDICATION NAME: Stress relax (magnesium citrate)= 1 scoop 300 mg at bedtime       Innovix probiotic (Bifidobacterium longum and Lactobacillus helveticus)  MEDICATION NAME: Mood probiotic 1 capsule (3,000,000,000 CFUs) daily.       melatonin 1 MG/ML LIQD liquid Take 3 mLs (3 mg) by mouth nightly as needed for sleep, only PRN 1 Bottle 2     sertraline (ZOLOFT) 20 MG/ML concentrated solution Take 200 mg by mouth daily        cholecalciferol (VITAMIN D/ D-VI-SOL) 400 UNIT/ML LIQD Take 1,600 Units by mouth daily       calcium carbonate 1250 (500 CA) MG/5ML SUSP Take 6 mLs (1,500 mg) by mouth 2 times daily (with meals) 450 mL 12     Nutritional Supplements (Pediatric COMPLEAT Formula) LIQD 4 Cans by gastronomy tube route daily (Patient taking differently: 4 Cans by gastronomy tube route daily up to 5 cans daily) 124 each 12     ondansetron (ZOFRAN) 4 MG/5ML solution Take 5 mLs (4 mg) by mouth every 6 hours as needed for nausea or vomiting 90 mL 0     ARIPiprazole (ABILIFY) 1 MG/ML SOLN 7.5 mg daily        acetaminophen (TYLENOL) 160 MG/5ML solution 480 mg (15 mL) by mouth every 4 hours as needed       levETIRAcetam (KEPPRA) 100 MG/ML solution    Trazodone   50 mg  Per GT at hs                                                 10 mL (1000 mg) in the AM        diazepam (VALIUM) 5 MG/ML solution Take 7.5 mg by mouth every hour as needed for seizures (Seizures lasting longer than 5 minutes)       ORDER FOR DME Equipment being ordered: Other: chest strap for her tilt in space manual wheel chair  Treatment Diagnosis: For safe transportation to home secondary to poor sitting balance in upright. CDG type Ia, seizures, and G-tube dependence. 1  Device 0     bacitracin ointment Apply topically 2 times daily as needed for wound care 14 g 0     Polyethylene Glycol 3350 (MIRALAX PO) Take 1 tsp. by mouth daily as needed        Docusate Sodium (ENEMEEZ MINI RE) 3 times a week (MWF) to help with bowel movements     . D-mannose 1/2 teaspoon, twice daily       Bach Rescue Remedy 1/2 dropper prn anxiety/stress  The following history was reviewed and there were no changes except as noted below: PAST MEDICAL HISTORY:   Past Medical History:   Diagnosis Date     Anxiety      Global developmental delay      Hypoglycemia      Metabolic disease     congenital disorder of glycosilation     Scoliosis      Seizures (H)     last at age 8 before today     Strabismus        PAST SURGICAL HISTORY:   Past Surgical History:   Procedure Laterality Date     ANESTHESIA OUT OF OR MRI 3T N/A 3/7/2017    Procedure: ANESTHESIA PEDS SEDATION MRI 3T;  Surgeon: GENERIC ANESTHESIA PROVIDER;  [facility]      BACK SURGERY      c1 decompression and fusionx3, scoliosis with instrumentation x1     ELECTRORETINOGRAM Bilateral 12/10/2014    ERG ([facility])     ENT SURGERY       ear tubes     EXAM UNDER ANESTHESIA EYE(S) Bilateral 12/10/2014    EUA ([facility])     EXTRACTION(S) DENTAL N/A 12/10/2014    Procedure: EXTRACTION(S) DENTAL;  Surgeon: Hubert York DDS;  [facility]     EYE SURGERY Bilateral 1996    BMR 6mm ([facility])      GI SURGERY      g tube     HC REPLACE GASTROSTOMY/CECOSTOMY TUBE PERCUTANEOUS N/A 12/10/2014    Procedure: REPLACE GASTROSTOMY TUBE, PERCUTANEOUS;  Surgeon: Phong Perea MD;  [facility]     RECESSION RESECTION (REPAIR STRABISMUS) BILATERAL Bilateral 12/10/2014    BLR 11 ([facility])     STRABISMUS SURGERY  1996    (1996) BMR 6 ([facility])     STRABISMUS SURGERY  2014    BLR 11 ([facility]) -        FAMILY HISTORY:   Family History   Problem Relation Age of Onset     Glaucoma Mother      no medications or surg to date     Glaucoma Maternal Grandmother       s/p surg, decreased VA      Amblyopia No family hx of      Strabismus No family hx of        SOCIAL HISTORY:   Social History   Substance Use Topics     Smoking status: Never Smoker     Smokeless tobacco: Never Used     Alcohol use No       Physical Exam:   Temp:  [96.8  F (36  C)] 96.8  F (36  C)  Pulse:  [118] 118  Resp:  [24] 24  BP: (107)/(62) 107/62  SpO2:  [99 %] 99 %  /62 (BP Location: Left arm, Patient Position: Fowlers, Cuff Size: Adult Small)  Pulse 118  Temp 96.8  F (36  C) (Axillary)  Resp 24  SpO2 99%    Showing some sadness/stress, refusing even shoni-shin today  Head preferentially turned to R as usual   Easy resps  Abdomen firm, but compressible, non-tender  Sitting up in wheelchair  No edema      Labs and Tests:  No results found for this or any previous visit (from the past 24 hour(s))., however there was a vitamin D level performed at [facility] that we have requested.     Assessment:  Sherly is a 21 year old female patient with complex medical hx now in group home with some new findings of UE weakness, NS evaluation pending.       Plan:  1. Orders signed for group home  2. Info given re: Massage Therapy class to be offered in September as info for staff at group home  3. Requested vitamin D level which was done at OSH to be faxed to us for Anna's records.  4. Rotate probiotic to Ultimate Laisha 25 billion capsules 1/day. Consider rotating on q 6 month schedule.    5. Suggestions made for manipulating the environment in anticipation of extra stimulation at Anna's graduation party.  5. RTC in 6-8 weeks; call earlier prn.      Therapy today:   Anna declined acupoint therapy today. Will start with therapy first next time to see if this is helpful, rather than waiting until end of visit when she is tired.      Follow-up:  Return to clinic 6-8 weeks, prn    Thank you for this consultation. Please do not hesitate to contact me with any questions or concerns.     I spent a total of 45  minutes face-to-face with Sherly Ramires during today's office visit.  Over 50% of this time was spent counseling the patient-family and/or coordinating care regarding comfort.  See note for details.    Sherrie Fontana MD, CM  Medical Director Pediatric Integrative Health and Wellbeing, [facility]    CC  Patient Care Team:  Warner Mustafa as PCP - General (Family Practice)  Ana Granda MD as MD (Pediatric Metabolism)  Brarett Lennon MD as MD (Pediatrics)  Skyler Correa MD (Ophthalmology)  Sherrie Fontana MD as MD (Pediatrics)  Ancelmo Smith MD as Referring Physician (Neurology)  Nhan Knott MD as MD (Neurology)  Brennan Vigil, RN as Registered Nurse  WARNER MUSTAFA

## 2017-05-11 NOTE — NURSING NOTE
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

## 2017-05-11 NOTE — TELEPHONE ENCOUNTER
Form printed from [employer] and completed based on clinic visit notes in EPIC. Spoke with Stone in Dr. Root' office and will fax it to him to review and add any necessary information.     Will fax to [phone].

## 2017-05-11 NOTE — PATIENT INSTRUCTIONS
1. Orders signed for group home  2. Info given re: Massage Therapy class to be offered in September as info for staff at group home  3. Requested vitamin D level which was done at OSH to be faxed to us for Anna's records.  4. Rotate probiotic to Ultimate Laisha 25 billion capsules 1/day. Consider rotating on q 6 month schedule.    5. Suggestions made for manipulating the environment in anticipation of extra stimulation at Anna's graduation party.  5. RTC in 6-8 weeks; call earlier prn.

## 2017-05-11 NOTE — LETTER
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, [facility]. Since our last visit, Anna has continued to sleep well at night with trazodone. Her urination and bowel movements are fine with her bowel program. Behaviorally, things seem to be going better, except for the occasional bad day; school is also going well, but ends in 3 weeks. Anna has been accepted at the [facility] recreational program but it is another transition for her. Initially, she will be one-on-one but the paln is for her to interact socially, so this may change going forward.      Concerns today: change probiotic-rotate to another? suggestions for upcoming graduation party given Anna's stressed response to her 22st birthday party at the home recently, concerns for her apparent change in muscle strength, particularly in upper extremities.  Staff and family have noticed her dropping things that she had previously held, like a Yahtze cup; slouching more in her upper body, and drooping of her upper extremities.They will be taking her to her neurosurgeon in [place] but want to know if there will be imaging first, so they can prepare for a sedated scan, if necessary.    ALLERGIES:  Allergies    Allergen Reactions     Ativan [Lorazepam] Other (See Comments)     Paradoxical reaction     Motrin [Ibuprofen] GI Disturbance     Pt is able to have motrin with food but not on an empty stomache.        IMMUNIZATIONS:  Immunization History   Administered Date(s) Administered     Influenza (IIV3) 10/12/2012       CURRENT MEDICATIONS:  Current Outpatient Prescriptions   Medication Sig Dispense Refill     Magnesium citrate MEDICATION NAME: Stress relax (magnesium citrate)= 1 scoop 300 mg at bedtime       Innovix probiotic (Bifidobacterium longum and Lactobacillus helveticus)  MEDICATION NAME: Mood probiotic 1 capsule (3,000,000,000 CFUs) daily.       melatonin 1 MG/ML LIQD liquid Take 3 mLs (3 mg) by mouth nightly as needed for sleep, only PRN 1 Bottle 2     sertraline (ZOLOFT) 20 MG/ML concentrated solution Take 200 mg by mouth daily        cholecalciferol (VITAMIN D/ D-VI-SOL) 400 UNIT/ML LIQD Take 1,600 Units by mouth daily       calcium carbonate 1250 (500 CA) MG/5ML SUSP Take 6 mLs (1,500 mg) by mouth 2 times daily (with meals) 450 mL 12     Nutritional Supplements (Pediatric COMPLEAT Formula) LIQD 4 Cans by gastronomy tube route daily (Patient taking differently: 4 Cans by gastronomy tube route daily up to 5 cans daily) 124 each 12     ondansetron (ZOFRAN) 4 MG/5ML solution Take 5 mLs (4 mg) by mouth every 6 hours as needed for nausea or vomiting 90 mL 0     ARIPiprazole (ABILIFY) 1 MG/ML SOLN 7.5 mg daily        acetaminophen (TYLENOL) 160 MG/5ML solution 480 mg (15 mL) by mouth every 4 hours as needed       levETIRAcetam (KEPPRA) 100 MG/ML solution    Trazodone   50 mg  Per GT at hs                                                 10 mL (1000 mg) in the AM        diazepam (VALIUM) 5 MG/ML solution Take 7.5 mg by mouth every hour as needed for seizures (Seizures lasting longer than 5 minutes)       ORDER FOR DME Equipment being ordered: Other: chest strap for her tilt in space manual wheel chair  Treatment  Diagnosis: For safe transportation to home secondary to poor sitting balance in upright. CDG type Ia, seizures, and G-tube dependence. 1 Device 0     bacitracin ointment Apply topically 2 times daily as needed for wound care 14 g 0     Polyethylene Glycol 3350 (MIRALAX PO) Take 1 tsp. by mouth daily as needed        Docusate Sodium (ENEMEEZ MINI RE) 3 times a week (MWF) to help with bowel movements     . D-mannose 1/2 teaspoon, twice daily       Bach Rescue Remedy 1/2 dropper prn anxiety/stress  The following history was reviewed and there were no changes except as noted below: PAST MEDICAL HISTORY:   Past Medical History:   Diagnosis Date     Anxiety      Global developmental delay      Hypoglycemia      Metabolic disease     congenital disorder of glycosilation     Scoliosis      Seizures (H)     last at age 8 before today     Strabismus        PAST SURGICAL HISTORY:   Past Surgical History:   Procedure Laterality Date     ANESTHESIA OUT OF OR MRI 3T N/A 3/7/2017    Procedure: ANESTHESIA PEDS SEDATION MRI 3T;  Surgeon: GENERIC ANESTHESIA PROVIDER;  [facility]      BACK SURGERY      c1 decompression and fusionx3, scoliosis with instrumentation x1     ELECTRORETINOGRAM Bilateral 12/10/2014    ERG ([facility])     ENT SURGERY       ear tubes     EXAM UNDER ANESTHESIA EYE(S) Bilateral 12/10/2014    EUA ([facility])     EXTRACTION(S) DENTAL N/A 12/10/2014    Procedure: EXTRACTION(S) DENTAL;  Surgeon: Hubert York DDS;  [facility]     EYE SURGERY Bilateral 1996    BMR 6mm ([facility])      GI SURGERY      g tube     HC REPLACE GASTROSTOMY/CECOSTOMY TUBE PERCUTANEOUS N/A 12/10/2014    Procedure: REPLACE GASTROSTOMY TUBE, PERCUTANEOUS;  Surgeon: Phong Peera MD;  [facility]     RECESSION RESECTION (REPAIR STRABISMUS) BILATERAL Bilateral 12/10/2014    BLR 11 ([facility])     STRABISMUS SURGERY  1996    (1996) BMR 6 ([facility])     STRABISMUS SURGERY  2014    BLR 11 ([facility]) -        FAMILY HISTORY:    Family History   Problem Relation Age of Onset     Glaucoma Mother      no medications or surg to date     Glaucoma Maternal Grandmother      s/p surg, decreased VA      Amblyopia No family hx of      Strabismus No family hx of        SOCIAL HISTORY:   Social History   Substance Use Topics     Smoking status: Never Smoker     Smokeless tobacco: Never Used     Alcohol use No       Physical Exam:   Temp:  [96.8  F (36  C)] 96.8  F (36  C)  Pulse:  [118] 118  Resp:  [24] 24  BP: (107)/(62) 107/62  SpO2:  [99 %] 99 %  /62 (BP Location: Left arm, Patient Position: Fowlers, Cuff Size: Adult Small)  Pulse 118  Temp 96.8  F (36  C) (Axillary)  Resp 24  SpO2 99%    Showing some sadness/stress, refusing even shoni-shin today  Head preferentially turned to R as usual   Easy resps  Abdomen firm, but compressible, non-tender  Sitting up in wheelchair  No edema      Labs and Tests:  No results found for this or any previous visit (from the past 24 hour(s))., however there was a vitamin D level performed at [facility] that we have requested.     Assessment:  Sherly is a 21 year old female patient with complex medical hx now in group home with some new findings of UE weakness, NS evaluation pending.       Plan:  1. Orders signed for group home  2. Info given re: Massage Therapy class to be offered in September as info for staff at group home  3. Requested vitamin D level which was done at OSH to be faxed to us for Anna's records.  4. Rotate probiotic to Ultimate Laisha 25 billion capsules 1/day. Consider rotating on q 6 month schedule.    5. Suggestions made for manipulating the environment in anticipation of extra stimulation at Anna's graduation party.  5. RTC in 6-8 weeks; call earlier prn.      Therapy today:   Anna declined acupoint therapy today. Will start with therapy first next time to see if this is helpful, rather than waiting until end of visit when she is tired.      Follow-up:  Return to clinic 6-8  weeks, prn    Thank you for this consultation. Please do not hesitate to contact me with any questions or concerns.     I spent a total of 45 minutes face-to-face with Sherly Ramires during today's office visit.  Over 50% of this time was spent counseling the patient-family and/or coordinating care regarding comfort.  See note for details.    Sherrie Fontana MD, CM  Medical Director Pediatric Integrative Health and Wellbeing, [facility]    CC  Patient Care Team:  Leeanne Mustafa as PCP - General (Family Practice)  Ana Granda MD as MD (Pediatric Metabolism)  Skyler Correa MD (Ophthalmology)  Ancelmo Smith MD as Referring Physician (Neurology)  Nhan Knott MD as MD (Neurology)  Brennan Vigil, RN as Registered Nurse

## 2017-05-11 NOTE — TELEPHONE ENCOUNTER
"----- Message from Manuel Navarrete sent at 5/11/2017  8:58 AM CDT -----  Regarding: PA for insurance  Joni Ambrosio, I understand you are Dr Knott's nurse? Hopefully I've got that right. Dr Knott referred a patient to Dr Italo Root from [facility] and this provider is out of network, and the pt's [employer] is requiring a prior auth to be submitted to pay for the out of network provider. Can you do this? You can go to [email or web address] and type in the search bar \"prior authorization request for out of network providers\" and the top link is a form to fill out and submit. The caller was Stone from Dr Root' office, can you follow up with him on this at [phone].    Thanks  Manuel"

## 2017-05-26 ENCOUNTER — TELEPHONE (OUTPATIENT)
Dept: NEUROLOGY | Facility: CLINIC | Age: 22
End: 2017-05-26

## 2017-05-26 NOTE — TELEPHONE ENCOUNTER
Forms refaxed on 5/25.     Stone contacted to verify these have been received. Forms sent again via email.    Forms then faxed to [employer] for preauthorization. Awaiting approval.

## 2017-05-26 NOTE — TELEPHONE ENCOUNTER
----- Message from Pita Campbell sent at 5/25/2017  1:42 PM CDT -----  Caller: Stone    Relationship to Patient: Nurse @ Dr. Root Office    Call Back Number: [phone]    Reason for Call: Stone did not receive the [employer] form from you ~ 2 weeks ago.  Would like to follow up on whether or not that's been submitted to [employer] and if patient's appointments will be covered.    Thanks, Pita

## 2017-06-01 ENCOUNTER — TELEPHONE (OUTPATIENT)
Dept: NEUROLOGY | Facility: CLINIC | Age: 22
End: 2017-06-01

## 2017-06-01 NOTE — TELEPHONE ENCOUNTER
Writer received below message, and forwarded to Dr. Knott.  Called Leticia to see if the surgeon had given any directions or parameters; Leticia indicates that the surgeon is dictating a letter (? To Dr. Knott ) and indicated thart it should be custom made in the prosthetics department at the [facility] and would like her scheduled asap.

## 2017-06-01 NOTE — TELEPHONE ENCOUNTER
----- Message from Krystyna Garcia CMA sent at 6/1/2017 11:32 AM CDT -----  Regarding: nikita   Caller: Leticia    Relationship to Patient: staff    Call Back Number: [phone]    Reason for Call: Patient had spinal surgery done when she was 7yrs old and was advise by Dr Knott at the last appt to see them. Patient went and saw this provider recently and was advised to get a [place] Collar through her neurologist. Would like to know if Dr Knott would be willing to write a prescription for this.

## 2017-06-02 DIAGNOSIS — G95.89 MYELOMALACIA OF CERVICAL CORD (H): ICD-10-CM

## 2017-06-02 DIAGNOSIS — E74.89 CONGENITAL DISORDER OF GLYCOSYLATION ASSOCIATED WITH MUTATION IN PMM2 GENE (H): ICD-10-CM

## 2017-06-02 DIAGNOSIS — G40.209 COMPLEX PARTIAL SEIZURE EVOLVING TO GENERALIZED SEIZURE (H): Primary | ICD-10-CM

## 2017-06-02 NOTE — TELEPHONE ENCOUNTER
Spoke to Mom about attending Physiatry clinic with Sherly, which she is readily agreeable with; she indicated that she is receiving letter from [facility] with directions regarding details of the brace that is to be made for Sherly.  When she gets the letter she will copy it and send a copy to [facility] to Dr. Knott's attention.

## 2017-06-09 NOTE — TELEPHONE ENCOUNTER
Received call from Mother asking about referral appointment in prosthetics dept. As she has not heard anything from them.    Writer indicated that he confirmed referral in [employer]IC, and had placed a call to them to inquire about appointment as it needed to be scheduled asap so that it could be completd as soon as orders came in from the [facility] (surgeon ).    Mother offered to call them herself and writer gave her the general number to call.   Mother also indicates that see is calling the [facility] to see what is delaying orders for the [place] collar.

## 2017-06-14 ENCOUNTER — OFFICE VISIT (OUTPATIENT)
Dept: PEDIATRICS | Facility: CLINIC | Age: 22
End: 2017-06-14
Attending: MEDICAL GENETICS
Payer: COMMERCIAL

## 2017-06-14 VITALS
SYSTOLIC BLOOD PRESSURE: 96 MMHG | WEIGHT: 89.5 LBS | HEART RATE: 81 BPM | BODY MASS INDEX: 26.21 KG/M2 | DIASTOLIC BLOOD PRESSURE: 65 MMHG

## 2017-06-14 DIAGNOSIS — E74.89 CONGENITAL DISORDER OF GLYCOSYLATION ASSOCIATED WITH MUTATION IN PMM2 GENE (H): Primary | ICD-10-CM

## 2017-06-14 PROCEDURE — 99212 OFFICE O/P EST SF 10 MIN: CPT | Mod: ZF

## 2017-06-14 RX ORDER — TRAZODONE HYDROCHLORIDE 50 MG/1
50 TABLET, FILM COATED ORAL AT BEDTIME
COMMUNITY
End: 2020-03-14

## 2017-06-14 ASSESSMENT — PAIN SCALES - GENERAL: PAINLEVEL: NO PAIN (0)

## 2017-06-14 NOTE — LETTER
2017      RE: Sherly Ramires  C/O ZACKARY ZAMBRANO [facility]  [address] [facility] [address]       Adult Metabolism Clinic Return Visit  Name:  Sherly Ramires  :   1995  MRN:   [id number]  Date of Visit: 2017  PCP: Leeanne Mustafa    Immunization status is: up to date.  Managing Metabolic Center(s):  [facility].    Chief Complaint:  Ms. Sherly Ramires is a 22 year old female whom I saw in follow up in Metabolism Clinic for congenital idsorder of glycosilation .  Ms. Ramires was accompanied to this visit by her  group home primary staff.     Assessment:    Ms. 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  Ms. Ramires was accompanied to this visit by her  group home staff.  She has been adjusting to her new group home finally and is thriving and doing well there.       Plan:    1. Testing ordered at this visit:   No orders of the defined types were placed in this encounter.    2. Medications: No specific medications related to her metabolic disorder    3. Continue to observe emergency precautions as previously discussed.  Our on-call metabolic service is available 24 hours/day by calling the page  ([phone]) and asking for the Genetics and Metabolism doctor on call.  4. Return to the Adult Metabolism Clinic in 6 months for follow-up.       ----------  History of Present Illness:    Graduated from [facility] [facility] [employer] plus in  and now planning on getting in a day program.  Doesn't do well with 1:1 because very demanding, whereas when she is interacting with others and social, she is less self centered/demanding and behaviors are better.     Had increased weakness in both arms and legs in December.  Neurology recommended MRI of the cervical spine due to a prior surgery in the neck and this showed an area of  weakness between her prior fusion and rods (cannot specify further).  She saw the surgeon who had done her surgery a few weeks ago and he recommended a neck brace.  She actually likes the brace and has been doing well with it, says it is more comfortable with it on.     Of note, new neurologist is Nhan Knott with [facility] (as she had to transition out of [facility] neurology clinic due to age)    No other new illnesses, hospitalizations, surgeries.  Not currently receiving any specific therapies.      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  Since the last clinic visit Sherly was not seen in the ED.  She did have the visit mentioned above.  No other acute concerns.     Other health services currently received are primary care, spine surgery (in [place]), neurology, endocrinology, psychiatrist   Current research study participation: none         Past Medical History:  Past Medical History:   Diagnosis Date     Anxiety      Global developmental delay      Hypoglycemia      Metabolic disease     congenital disorder of glycosilation     Scoliosis      Seizures (H)     last at age 8 before today     Strabismus      Lives in group home. Has 3 house mates, they all have their own room.   Stressors for patient and family: none.  Family moved out of cities, but mom comes in every weekend to stay and family overall still very involved.    Community resources received currently are: hoping for day program acceptance soon   Current insurance status commercial/private.   Neuropsychological evaluation: none.    Behavioral concerns: none reported - improved   Special education: graduated in June     I have reviewed Sherly s past medical history, family history, social history, medications and allergies as documented in the patient's electronic medical record.  There were no additional findings except as noted.     Review of Systems:   Constitional: negative  Eyes: negative - reportedly poor per last   Ears/Nose/Throat: negative - normal hearing  Respiratory: negative  Cardiovascular: negative  Gastrointestinal: negative  Genitourinary: negative  Hematologic/Lymphatic: negative  Allergy/Immunologic: negative - no drug allergies  Musculoskeletal: see above  Endocrine: At risk for osteoporosis and fractures given immobility.  Last saw Dr. Lennon with endocrinology in Feb 2017, no changes made at that time, due for follow up in 1 year from last visit   Integument: negative  Neurologic: negative  Psychiatric: anxiety, well controlled currently, uses a rescue remedy (natural herb supplements) through [facility] to help with any acute concerns and this works well     Nutrition History:  - Sees dietician, has switched to lower calorie formula per recs from dietician and endo as she had gained some weight, this has helped  - Occasionally eats lunch instead of formula     Medications:  Current Outpatient Prescriptions   Medication Sig Dispense Refill     traZODone (DESYREL) 50 MG tablet by Gastric Tube route At Bedtime       levETIRAcetam (KEPPRA) 100 MG/ML solution 10 mL by mouth  in the AM and 15 ml in the  mL 11     sertraline (ZOLOFT) 20 MG/ML (HIGH CONC) solution Take 10 mg by mouth daily       D-Mannose POWD 2 times daily       Nutritional Supplements (COMPLEAT PEDIATRIC PO)        UNABLE TO FIND MEDICATION NAME: Stress relax (magnesium  "citrate) 380 mg at bedtime       UNABLE TO FIND MEDICATION NAME: Mood probiotic 1 capsule (3,000,000,000 CFUs) daily.       calcium carbonate 1250 (500 CA) MG/5ML SUSP Take 6 mLs (1,500 mg) by mouth 2 times daily (with meals) 450 mL 12     ondansetron (ZOFRAN) 4 MG/5ML solution Take 5 mLs (4 mg) by mouth every 6 hours as needed for nausea or vomiting 90 mL 0     bacitracin ointment Apply topically 2 times daily as needed for wound care 14 g 0     ARIPiprazole (ABILIFY) 1 MG/ML SOLN 7.5 mg daily        acetaminophen (TYLENOL) 160 MG/5ML solution 480 mg (15 mL) by mouth every 4 hours as needed       diazepam (VALIUM) 5 MG/ML solution Take 7.5 mg by mouth every hour as needed for seizures (Seizures lasting longer than 5 minutes)       ORDER FOR DME Equipment being ordered: Other: chest strap for her tilt in space manual wheel chair  Treatment Diagnosis: For safe transportation to home secondary to poor sitting balance in upright. CDG type Ia, seizures, and G-tube dependence. 1 Device 0     Docusate Sodium (ENEMEEZ MINI RE) 3 times a week (MWF) to help with bowel movements     - Natural rescue anxiety regimen      Allergies:  Allergies   Allergen Reactions     Ativan [Lorazepam] Other (See Comments)     Paradoxical reaction     Motrin [Ibuprofen] GI Disturbance     Pt is able to have motrin with food but not on an empty stomache.      Physical Examination:  Blood pressure 96/65, pulse 81, weight 89 lb 8 oz (40.6 kg), head circumference 54.5 cm (21.46\"), not currently breastfeeding.  Body surface area is 1.18 meters squared.    General: Alert, sitting in wheel chair, has neck brace in place. Engaged with group home staff but primarily avoids interactions with me.   Head and Neck:  She had hair of normal texture and distribution and her head was proportionate in appearance.The neck was supple without lymphadenopathy.    Eyes:  The pupils were equal, round, and reacted to light.   The conjunctivae were clear.   Ears:  Her " ears were normal in architecture and placement.   Nose: The nose was clear.    Mouth and Throat: Unwilling to open mouth therefore unable to examine.   Respiratory: CTAB from anterior position, not willing to sit forward in chair for posterior examination     CV:  RRR, no murmurs  GI: Patient would not allow abdominal exam.     : I deferred a  examination.   Musculoskeletal: Thin upper and lower extremities.   Neurologic: Developmentally delayed.  One word, slow speech, which is difficult to understand. Withdrew while attempting to test tone therefore unable to examine.   Integument: The skin was normal with no rashes or unusual pigmentation.    Results of laboratory studies collected at this visit and available when this note was completed:   Results for orders placed or performed in visit on 04/19/17   Levetiracetam level   Result Value Ref Range    Levetiracetam Level 38.0      Patient seen and d/w Dr. Granda, metabolism attending    Ivana Iniguez MD  Med/Peds PGY-4    ANTOLIN WHITFIELD was seen in the [facility] Metabolism Clinic by me.  I reviewed the history & exam and repeated key elements as needed. Assessment and plan were jointly made.    SALINAS GRANDA M.D.  Professor and Director   Division of Genetics and Metabolism  Department of Pediatrics  [facility]     Routed to patient in Comm Mgt  Also to Leeanne Mustafa  Care Team

## 2017-06-14 NOTE — PROGRESS NOTES
Adult Metabolism Clinic Return Visit  Name:  Sherly Ramires  :   1995  MRN:   [id number]  Date of Visit: 2017  PCP: Leeanne Mustafa    Immunization status is: up to date.  Managing Metabolic Center(s):  [facility].    Chief Complaint:  Ms. Sherly Ramires is a 22 year old female whom I saw in follow up in Metabolism Clinic for congenital idsorder of glycosilation .  Ms. Ramires was accompanied to this visit by her  group home primary staff.     Assessment:    Ms. 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  Ms. Ramires was accompanied to this visit by her  group home staff.  She has been adjusting to her new group home finally and is thriving and doing well there.       Plan:    1. Testing ordered at this visit:   No orders of the defined types were placed in this encounter.    2. Medications: No specific medications related to her metabolic disorder    3. Continue to observe emergency precautions as previously discussed.  Our on-call metabolic service is available 24 hours/day by calling the page  ([phone]) and asking for the Genetics and Metabolism doctor on call.  4. Return to the Adult Metabolism Clinic in 6 months for follow-up.       ----------  History of Present Illness:    Graduated from [facility] in  and now planning on getting in a day program.  Doesn't do well with 1:1 because very demanding, whereas when she is interacting with others and social, she is less self centered/demanding and behaviors are better.     Had increased weakness in both arms and legs in December.  Neurology recommended MRI of the cervical spine due to a prior surgery in the neck and this showed an area of weakness between her prior fusion and rods (cannot specify further).  She saw the surgeon who had done her surgery a few weeks ago  and he recommended a neck brace.  She actually likes the brace and has been doing well with it, says it is more comfortable with it on.     Of note, new neurologist is Nhan Knott with [facility] (as she had to transition out of [facility] neurology clinic due to age)    No other new illnesses, hospitalizations, surgeries.  Not currently receiving any specific therapies.      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  Since the last clinic visit Sherly was not seen in the ED.  She did have the visit mentioned above.  No other acute concerns.     Other health services currently received are primary care, spine surgery (in [place]), neurology, endocrinology, psychiatrist   Current research study participation: none         Past Medical History:  Past Medical History:   Diagnosis Date     Anxiety      Global developmental delay      Hypoglycemia      Metabolic disease     congenital disorder of glycosilation     Scoliosis      Seizures (H)     last at age 8 before today     Strabismus      Lives in group home. Has 3 house mates, they all have their own room.   Stressors for patient and family: none.  Family moved out of cities, but mom comes in every weekend to stay and family overall still very involved.   Community resources received currently are: hoping for day program acceptance soon   Current insurance status commercial/private.    Neuropsychological evaluation: none.    Behavioral concerns: none reported - improved   Special education: graduated in June     I have reviewed Sherly s past medical history, family history, social history, medications and allergies as documented in the patient's electronic medical record.  There were no additional findings except as noted.     Review of Systems:   Constitional: negative  Eyes: negative - reportedly poor per last   Ears/Nose/Throat: negative - normal hearing  Respiratory: negative  Cardiovascular: negative  Gastrointestinal: negative  Genitourinary: negative  Hematologic/Lymphatic: negative  Allergy/Immunologic: negative - no drug allergies  Musculoskeletal: see above  Endocrine: At risk for osteoporosis and fractures given immobility.  Last saw Dr. Lennon with endocrinology in Feb 2017, no changes made at that time, due for follow up in 1 year from last visit   Integument: negative  Neurologic: negative  Psychiatric: anxiety, well controlled currently, uses a rescue remedy (natural herb supplements) through [facility] to help with any acute concerns and this works well     Nutrition History:  - Sees dietician, has switched to lower calorie formula per recs from dietician and endo as she had gained some weight, this has helped  - Occasionally eats lunch instead of formula     Medications:  Current Outpatient Prescriptions   Medication Sig Dispense Refill     traZODone (DESYREL) 50 MG tablet by Gastric Tube route At Bedtime       levETIRAcetam (KEPPRA) 100 MG/ML solution 10 mL by mouth  in the AM and 15 ml in the  mL 11     sertraline (ZOLOFT) 20 MG/ML (HIGH CONC) solution Take 10 mg by mouth daily       D-Mannose POWD 2 times daily       Nutritional Supplements (COMPLEAT PEDIATRIC PO)        UNABLE TO FIND MEDICATION NAME: Stress relax (magnesium citrate) 380 mg at bedtime       UNABLE TO FIND MEDICATION NAME: Mood probiotic 1 capsule (3,000,000,000 CFUs) daily.       calcium  "carbonate 1250 (500 CA) MG/5ML SUSP Take 6 mLs (1,500 mg) by mouth 2 times daily (with meals) 450 mL 12     ondansetron (ZOFRAN) 4 MG/5ML solution Take 5 mLs (4 mg) by mouth every 6 hours as needed for nausea or vomiting 90 mL 0     bacitracin ointment Apply topically 2 times daily as needed for wound care 14 g 0     ARIPiprazole (ABILIFY) 1 MG/ML SOLN 7.5 mg daily        acetaminophen (TYLENOL) 160 MG/5ML solution 480 mg (15 mL) by mouth every 4 hours as needed       diazepam (VALIUM) 5 MG/ML solution Take 7.5 mg by mouth every hour as needed for seizures (Seizures lasting longer than 5 minutes)       ORDER FOR DME Equipment being ordered: Other: chest strap for her tilt in space manual wheel chair  Treatment Diagnosis: For safe transportation to home secondary to poor sitting balance in upright. CDG type Ia, seizures, and G-tube dependence. 1 Device 0     Docusate Sodium (ENEMEEZ MINI RE) 3 times a week (MWF) to help with bowel movements     - Natural rescue anxiety regimen      Allergies:  Allergies   Allergen Reactions     Ativan [Lorazepam] Other (See Comments)     Paradoxical reaction     Motrin [Ibuprofen] GI Disturbance     Pt is able to have motrin with food but not on an empty stomache.      Physical Examination:  Blood pressure 96/65, pulse 81, weight 89 lb 8 oz (40.6 kg), head circumference 54.5 cm (21.46\"), not currently breastfeeding.  Body surface area is 1.18 meters squared.    General: Alert, sitting in wheel chair, has neck brace in place. Engaged with group home staff but primarily avoids interactions with me.   Head and Neck:  She had hair of normal texture and distribution and her head was proportionate in appearance.The neck was supple without lymphadenopathy.    Eyes:  The pupils were equal, round, and reacted to light.   The conjunctivae were clear.   Ears:  Her ears were normal in architecture and placement.   Nose: The nose was clear.    Mouth and Throat: Unwilling to open mouth therefore " unable to examine.   Respiratory: CTAB from anterior position, not willing to sit forward in chair for posterior examination     CV:  RRR, no murmurs  GI: Patient would not allow abdominal exam.     : I deferred a  examination.   Musculoskeletal: Thin upper and lower extremities.   Neurologic: Developmentally delayed.  One word, slow speech, which is difficult to understand. Withdrew while attempting to test tone therefore unable to examine.   Integument: The skin was normal with no rashes or unusual pigmentation.    Results of laboratory studies collected at this visit and available when this note was completed:   Results for orders placed or performed in visit on 04/19/17   Levetiracetam level   Result Value Ref Range    Levetiracetam Level 38.0      Patient seen and d/w Dr. Granda, metabolism attending    Ivana Iniguez MD  Med/Peds PGY-4    ANTOLIN SUSANNAH WHITFIELD was seen in the [facility] by me.  I reviewed the history & exam and repeated key elements as needed. Assessment and plan were jointly made.    SALINAS GRANDA M.D.  Professor and Director   Division of Genetics and Metabolism  Department of Pediatrics  [facility]     Routed to patient in Comm Mgt  Also to Leeanne Mustafa  Care Team

## 2017-06-14 NOTE — PATIENT INSTRUCTIONS
Metabolism Clinic    Maintain metabolic diet and medications.  Call if any general care questions arise - contact our nurse coordinator Terri Grady at [phone].      Contact the metabolic doctor on-call if any immediate need because of illness - [phone]

## 2017-06-14 NOTE — NURSING NOTE
"Chief Complaint   Patient presents with     RECHECK     follow up       Initial BP 96/65  Pulse 81  Wt 89 lb 8 oz (40.6 kg)  HC 54.5 cm (21.46\")  BMI 26.21 kg/m2 Estimated body mass index is 26.21 kg/(m^2) as calculated from the following:    Height as of 4/19/17: 4' 1\" (124.5 cm).    Weight as of this encounter: 89 lb 8 oz (40.6 kg).  Medication Reconciliation: complete     Ender Mosley LPN      "

## 2017-06-14 NOTE — MR AVS SNAPSHOT
After Visit Summary   6/14/2017    Sherly Ramires    MRN: [id number]           Patient Information     Date Of Birth          1995        Visit Information        Provider Department      6/14/2017 10:00 AM Ana Granda MD Peds Metabolism        Care Instructions    Metabolism Clinic    Maintain metabolic diet and medications.  Call if any general care questions arise - contact our nurse coordinator Terri Grady at [phone].      Contact the metabolic doctor on-call if any immediate need because of illness - [phone]            Follow-ups after your visit        Follow-up notes from your care team     Return in about 6 months (around 12/14/2017).      Your next 10 appointments already scheduled     Jun 22, 2017 10:00 AM CDT   Return Visit with Sherrie Fontana MD   Peds Integrative Health ([facility])    [facility]  9th Floor  [address] [address] [phone]            Sep 06, 2017 10:00 AM CDT   Return Pediatric Visit with Skyler Correa MD   [facility] Peds Eye General ([facility])    [address] [place] [facility]   [address] [address] [phone]            Oct 25, 2017  1:00 PM CDT   Return Visit with Nhan Knott MD   [facility] Epilepsy Care ([facility])    [address], [address]  [facility] [address]   [phone]            Feb 01, 2018 11:15 AM CST   Return Visit with Barrett Lennon MD   Pediatric Endocrinology ([facility])    [facility] [address]  [address] [place] [id number] [phone]              Who to contact     Please call your clinic at [phone] to:    Ask questions about your health    Make or cancel appointments    Discuss your medicines    Learn about your test results    Speak to your doctor   If you have compliments or concerns about an experience at your clinic, or if you wish to file a complaint, please  "contact [facility] Patient Relations at [phone] or email us at [email or web address]         Additional Information About Your Visit        Musicnotes[employer]Thomas [employer] Information     Bango is an electronic gateway that provides easy, online access to your medical records. With Bango, you can request a clinic appointment, read your test results, renew a prescription or communicate with your care team.     To sign up for Bango visit the website at [email or web address]   You will be asked to enter the access code listed below, as well as some personal information. Please follow the directions to create your username and password.     Your access code is: [email or web address]  Expires: 2017  1:42 PM     Your access code will  in 90 days. If you need help or a new code, please contact your [facility] or call [phone] for assistance.        Care EveryWhere ID     This is your Care EveryWhere ID. This could be used by other organizations to access your [place] medical records  [id number]        Your Vitals Were     Pulse Head Circumference BMI (Body Mass Index)             81 54.5 cm (21.46\") 26.21 kg/m2          Blood Pressure from Last 3 Encounters:   17 96/65   17 107/62   17 107/66    Weight from Last 3 Encounters:   17 89 lb 8 oz (40.6 kg)   17 84 lb (38.1 kg)   17 88 lb (39.9 kg)              Today, you had the following     No orders found for display       Primary Care Provider Office Phone # Fax #    Leeanne SUSANNAH Mustafa [phone]       [facility] [address] [place] [address] DR [place] [address]        Thank you!     Thank you for choosing PEDS METABOLISM  for your care. Our goal is always to provide you with excellent care. Hearing back from our patients is one way we can continue to improve our services. Please take a few minutes to complete the written survey that you may receive in the " mail after your visit with us. Thank you!             Your Updated Medication List - Protect others around you: Learn how to safely use, store and throw away your medicines at www.disposemymeds.org.          This list is accurate as of: 6/14/17 11:28 AM.  Always use your most recent med list.                   Brand Name Dispense Instructions for use    acetaminophen 160 MG/5ML solution    TYLENOL     480 mg (15 mL) by mouth every 4 hours as needed       ARIPiprazole 1 MG/ML Soln solution    ABILIFY     7.5 mg daily       bacitracin ointment     14 g    Apply topically 2 times daily as needed for wound care       calcium carbonate 1250 (500 CA) MG/5ML Susp suspension     450 mL    Take 6 mLs (1,500 mg) by mouth 2 times daily (with meals)       COMPLEAT PEDIATRIC PO          D-Mannose Powd      2 times daily       diazepam 5 MG/ML (HIGH CONC) solution    VALIUM     Take 7.5 mg by mouth every hour as needed for seizures (Seizures lasting longer than 5 minutes)       ENEMEEZ MINI RE      3 times a week (MWF) to help with bowel movements       levETIRAcetam 100 MG/ML solution    KEPPRA    750 mL    10 mL by mouth  in the AM and 15 ml in the PM       ondansetron 4 MG/5ML solution    ZOFRAN    90 mL    Take 5 mLs (4 mg) by mouth every 6 hours as needed for nausea or vomiting       order for DME     1 Device    Equipment being ordered: Other: chest strap for her tilt in space manual wheel chair Treatment Diagnosis: For safe transportation to home secondary to poor sitting balance in upright. CDG type Ia, seizures, and G-tube dependence.       sertraline 20 MG/ML (HIGH CONC) solution    ZOLOFT     Take 10 mg by mouth daily       traZODone 50 MG tablet    DESYREL     by Gastric Tube route At Bedtime       * UNABLE TO FIND      MEDICATION NAME: Stress relax (magnesium citrate) 380 mg at bedtime       * UNABLE TO FIND      MEDICATION NAME: Mood probiotic 1 capsule (3,000,000,000 CFUs) daily.       * Notice:  This list has 2  medication(s) that are the same as other medications prescribed for you. Read the directions carefully, and ask your doctor or other care provider to review them with you.

## 2017-06-22 ENCOUNTER — OFFICE VISIT (OUTPATIENT)
Dept: CONSULT | Facility: CLINIC | Age: 22
End: 2017-06-22
Attending: PEDIATRICS
Payer: COMMERCIAL

## 2017-06-22 VITALS
SYSTOLIC BLOOD PRESSURE: 110 MMHG | RESPIRATION RATE: 21 BRPM | BODY MASS INDEX: 24.89 KG/M2 | WEIGHT: 85 LBS | TEMPERATURE: 97 F | OXYGEN SATURATION: 97 % | DIASTOLIC BLOOD PRESSURE: 70 MMHG | HEART RATE: 87 BPM

## 2017-06-22 DIAGNOSIS — F41.9 ANXIETY: ICD-10-CM

## 2017-06-22 DIAGNOSIS — E74.89 CONGENITAL DISORDER OF GLYCOSYLATION ASSOCIATED WITH MUTATION IN PMM2 GENE (H): ICD-10-CM

## 2017-06-22 DIAGNOSIS — E74.89 CONGENITAL DISORDER OF GLYCOSYLATION (CDG) (H): Primary | ICD-10-CM

## 2017-06-22 DIAGNOSIS — Z87.440 PERSONAL HISTORY OF URINARY TRACT INFECTION: ICD-10-CM

## 2017-06-22 DIAGNOSIS — R48.8 PERSEVERATION: ICD-10-CM

## 2017-06-22 PROCEDURE — 99213 OFFICE O/P EST LOW 20 MIN: CPT | Mod: ZF

## 2017-06-22 ASSESSMENT — PAIN SCALES - GENERAL: PAINLEVEL: NO PAIN (0)

## 2017-06-22 NOTE — NURSING NOTE
Chief Complaint   Patient presents with     RECHECK     Patient here today for pain managment     /70 (BP Location: Right arm, Patient Position: Fowlers, Cuff Size: Adult Small)  Pulse 87  Temp 97  F (36.1  C) (Axillary)  Resp 21  Wt 38.6 kg (85 lb)  SpO2 97%  BMI 24.89 kg/m2  Eveyln Dangelo M.A  June 22, 2017

## 2017-06-24 NOTE — PATIENT INSTRUCTIONS
Note if any behavior changes helped with today's therapy. If better, consider increasing frequency of treatments while awaiting Adult DayProgram placement.  Will look for Labs form [facility] that mom had forwarded.  LAG

## 2017-06-24 NOTE — PROGRESS NOTES
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 We have switched things around at this visit and did Anna's shoni-cárdenas and acupoint therapy first before talking about any current problems in front of her. This was very successful and well-tolerated.   Since our last visit, Anna did well at her graduation party with the suggestions made at the last visit of slow introduction of people into her environment and \"normalization\" of her special day.   There were no new findings from their visit with the orthopedic surgeon, other than anticipated C4-C7 compression due to her rojelio placement- this is not amenable to surgical options. He recommended a neck brace for when Anna is upright. The brace can be off in reclining in wheelchair or when in bed.     The most pressing issue is Anna's behavioral regression and lack of cooperation with the staff since her housemates leave every day for the Adult Day Services Program and she has to stay home without structure or programming in her day. There has been a glitch in Anna's acceptance there over a dispute about her \"one-on-one\" care-which she has never required or received in the past. Mom continues to explore options and to advocate for her. "     ALLERGIES:  Allergies   Allergen Reactions     Ativan [Lorazepam] Other (See Comments)     Paradoxical reaction     Motrin [Ibuprofen] GI Disturbance     Pt is able to have motrin with food but not on an empty stomache.        IMMUNIZATIONS:  Immunization History   Administered Date(s) Administered     Influenza (IIV3) 10/12/2012       CURRENT MEDICATIONS:  Current Outpatient Prescriptions   Medication Sig Dispense Refill     Magnesium citrate MEDICATION NAME: Stress relax (magnesium citrate)= 1 scoop 300 mg at bedtime       Innovix probiotic (Bifidobacterium longum and Lactobacillus helveticus)  MEDICATION NAME: Mood probiotic 1 capsule (3,000,000,000 CFUs) daily.       melatonin 1 MG/ML LIQD liquid Take 3 mLs (3 mg) by mouth nightly as needed for sleep, only PRN 1 Bottle 2     sertraline (ZOLOFT) 20 MG/ML concentrated solution Take 200 mg by mouth daily        cholecalciferol (VITAMIN D/ D-VI-SOL) 400 UNIT/ML LIQD Take 1,600 Units by mouth daily       calcium carbonate 1250 (500 CA) MG/5ML SUSP Take 6 mLs (1,500 mg) by mouth 2 times daily (with meals) 450 mL 12     Nutritional Supplements (Pediatric COMPLEAT Formula) LIQD 4 Cans by gastronomy tube route daily (Patient taking differently: 4 Cans by gastronomy tube route daily up to 5 cans daily) 124 each 12     ondansetron (ZOFRAN) 4 MG/5ML solution Take 5 mLs (4 mg) by mouth every 6 hours as needed for nausea or vomiting 90 mL 0     ARIPiprazole (ABILIFY) 1 MG/ML SOLN 7.5 mg daily        acetaminophen (TYLENOL) 160 MG/5ML solution 480 mg (15 mL) by mouth every 4 hours as needed       levETIRAcetam (KEPPRA) 100 MG/ML solution    Trazodone   50 mg  Per GT at hs                                                 10 mL (1000 mg) in the AM        diazepam (VALIUM) 5 MG/ML solution Take 7.5 mg by mouth every hour as needed for seizures (Seizures lasting longer than 5 minutes)       ORDER FOR DME Equipment being ordered: Other: chest strap for her tilt in space manual  wheel chair  Treatment Diagnosis: For safe transportation to home secondary to poor sitting balance in upright. CDG type Ia, seizures, and G-tube dependence. 1 Device 0     bacitracin ointment Apply topically 2 times daily as needed for wound care 14 g 0     Polyethylene Glycol 3350 (MIRALAX PO) Take 1 tsp. by mouth daily as needed        Docusate Sodium (ENEMEEZ MINI RE) 3 times a week (MWF) to help with bowel movements     . D-mannose 1/2 teaspoon, twice daily       Bach Rescue Remedy 1/2 dropper prn anxiety/stress  The following history was reviewed and there were no changes except as noted below: PAST MEDICAL HISTORY:   Past Medical History:   Diagnosis Date     Anxiety      Global developmental delay      Hypoglycemia      Metabolic disease     congenital disorder of glycosilation     Scoliosis      Seizures (H)     last at age 8 before today     Strabismus        PAST SURGICAL HISTORY:   Past Surgical History:   Procedure Laterality Date     ANESTHESIA OUT OF OR MRI 3T N/A 3/7/2017    Procedure: ANESTHESIA PEDS SEDATION MRI 3T;  Surgeon: GENERIC ANESTHESIA PROVIDER;  Location: [facility] PEDS SEDATION      BACK SURGERY      c1 decompression and fusionx3, scoliosis with instrumentation x1     ELECTRORETINOGRAM Bilateral 12/10/2014    ERG ([place])     ENT SURGERY       ear tubes     EXAM UNDER ANESTHESIA EYE(S) Bilateral 12/10/2014    EUA ([place])     EXTRACTION(S) DENTAL N/A 12/10/2014    Procedure: EXTRACTION(S) DENTAL;  Surgeon: Hubert York DDS;  Location: [facility] OR     EYE SURGERY Bilateral 1996    BMR 6mm ([place])      GI SURGERY      g tube     HC REPLACE GASTROSTOMY/CECOSTOMY TUBE PERCUTANEOUS N/A 12/10/2014    Procedure: REPLACE GASTROSTOMY TUBE, PERCUTANEOUS;  Surgeon: Phong Perea MD;  Location: [facility] OR     RECESSION RESECTION (REPAIR STRABISMUS) BILATERAL Bilateral 12/10/2014    BLR 11 ([place])     STRABISMUS SURGERY  1996    (1996) BMR 6 ([place])     STRABISMUS SURGERY  2014    BLR 11 ([place]) -   "      FAMILY HISTORY:   Family History   Problem Relation Age of Onset     Glaucoma Mother      no medications or surg to date     Glaucoma Maternal Grandmother      s/p surg, decreased VA      Amblyopia No family hx of      Strabismus No family hx of        SOCIAL HISTORY:   Social History   Substance Use Topics     Smoking status: Never Smoker     Smokeless tobacco: Never Used     Alcohol use No       Physical Exam:   Sitting up in wheelchair with neck brace on in upright.   /70 (BP Location: Right arm, Patient Position: Fowlers, Cuff Size: Adult Small)  Pulse 87  Temp 97  F (36.1  C) (Axillary)  Resp 21  Wt 38.6 kg (85 lb)  SpO2 97%  BMI 24.89 kg/m2   Smiling, playing with iPad.  Easy respirations, only minimal intermittent distress.  No edema.    Shoni-shin using Kakibari rake and Choki with brass \"needle\" for tonification, bringing down the Kim.      Labs and Tests:  Copy of labs from 3/17 [facility] just in mail today. Will review and discuss with Anna's [facility] subspecialty team.      Assessment:  Sherly is a 21 year old female patient with complex medical hx now in group home with recent findings of UE weakness thought to be due to compression forces stemming from her extensive rojelio placement. New behavior concerns with lack of structure pending placemement in Adult Day Services Program.       Plan/Follow-up:  1. Orders signed for group home.  2. Observe closely for behavior changes in response to today's therapy. If seems improved, would consider increasing frequency while awaiting Day Program placement.   3. RTC in 6-8 weeks; call earlier prn.    Thank you for this consultation. Please do not hesitate to contact me with any questions or concerns.     I spent a total of 35 minutes face-to-face with Sherly Ramires during today's office visit.  Over 50% of this time was spent counseling the patient-family and/or coordinating care regarding comfort.  See note for details.    Sherrie HOGUE" MD Addi, CM  Medical Director Pediatric Integrative Health and [facility]    CC  Patient Care Team:  Warner Mustafa as PCP - General (Family Practice)  Ana Granda MD as MD (Pediatric Metabolism)  Barrett Lennon MD as MD (Pediatrics)  Skyler Correa MD (Ophthalmology)  hSerrie Fontana MD as MD (Pediatrics)  Ancelmo Simth MD as Referring Physician (Neurology)  Nhan Knott MD as MD (Neurology)  Brennan Vigil, RN as Registered Nurse  WARNER MUSTAFA

## 2017-06-26 DIAGNOSIS — T45.2X1A VITAMIN D TOXICITY, ACCIDENTAL OR UNINTENTIONAL, INITIAL ENCOUNTER: Primary | ICD-10-CM

## 2017-06-29 DIAGNOSIS — R62.50 SEVERE DEVELOPMENTAL DELAY: ICD-10-CM

## 2017-06-29 DIAGNOSIS — Z78.9 LIVES IN GROUP HOME: ICD-10-CM

## 2017-06-29 DIAGNOSIS — E74.89 CONGENITAL DISORDER OF GLYCOSYLATION ASSOCIATED WITH MUTATION IN PMM2 GENE (H): ICD-10-CM

## 2017-06-29 DIAGNOSIS — F41.9 ANXIETY: Primary | ICD-10-CM

## 2017-06-29 DIAGNOSIS — R47.01 NONVERBAL: ICD-10-CM

## 2017-06-30 PROBLEM — T45.2X1A: Status: ACTIVE | Noted: 2017-06-30

## 2017-07-03 ENCOUNTER — TELEPHONE (OUTPATIENT)
Dept: ENDOCRINOLOGY | Facility: CLINIC | Age: 22
End: 2017-07-03

## 2017-07-03 NOTE — TELEPHONE ENCOUNTER
----- Message from Barrett Lennon MD sent at 6/26/2017  6:12 PM CDT -----  Regarding: RE: Vitamin D level  I have been seeing her for bone health issues. I would recommend she reduce to 800 IU daily.  I will communicate this to the [facility] and her mom.  Junior,  Chago  ----- Message -----     From: Sara Miles MD     Sent: 6/26/2017   7:10 AM       To: Barrett Lennon MD, Ana Granda MD, #  Subject: RE: Vitamin D level                              I do not follow Anna; she is seen by Metabolism and Endocrine, so I defer to them.  ----- Message -----     From: Sherrie Fontana MD     Sent: 6/24/2017   2:30 PM       To: Barrett Lennon MD, #  Subject: Vitamin D level                                  Hi Team, for Anna, I had requested to see her last vitamin D level and found one from 3/16/2017 in a [employer] set of labs. I will ask for this entire grouping to be scanned into her chart, but the level was >96 ng/ml. Was this addressed? Her chart shows 1600 iu/day. Who is in charge of her calcium and vitamin D so that we have a coordinated plan for this going forward?  Please remember that any changes in her meds need to go through [facility] and her mom, Julianne would like to know as well as her PCP.   Thanks, All, LAG

## 2017-08-03 ENCOUNTER — OFFICE VISIT (OUTPATIENT)
Dept: CONSULT | Facility: CLINIC | Age: 22
End: 2017-08-03
Attending: PEDIATRICS
Payer: COMMERCIAL

## 2017-08-03 VITALS
OXYGEN SATURATION: 99 % | DIASTOLIC BLOOD PRESSURE: 76 MMHG | RESPIRATION RATE: 24 BRPM | TEMPERATURE: 98.7 F | SYSTOLIC BLOOD PRESSURE: 107 MMHG | HEART RATE: 102 BPM

## 2017-08-03 DIAGNOSIS — R48.8 PERSEVERATION: ICD-10-CM

## 2017-08-03 DIAGNOSIS — M81.0 OSTEOPOROSIS WITHOUT CURRENT PATHOLOGICAL FRACTURE, UNSPECIFIED OSTEOPOROSIS TYPE: ICD-10-CM

## 2017-08-03 DIAGNOSIS — E74.89 CONGENITAL DISORDER OF GLYCOSYLATION (CDG) (H): Primary | ICD-10-CM

## 2017-08-03 DIAGNOSIS — E74.89 CONGENITAL DISORDER OF GLYCOSYLATION ASSOCIATED WITH MUTATION IN PMM2 GENE (H): ICD-10-CM

## 2017-08-03 DIAGNOSIS — Z87.440 PERSONAL HISTORY OF URINARY TRACT INFECTION: ICD-10-CM

## 2017-08-03 DIAGNOSIS — F41.9 ANXIETY: ICD-10-CM

## 2017-08-03 DIAGNOSIS — K92.89 GASTROINTESTINAL DYSMOTILITY: ICD-10-CM

## 2017-08-03 NOTE — MR AVS SNAPSHOT
After Visit Summary   8/3/2017    Sherly Ramires    MRN: [id number]           Patient Information     Date Of Birth          1995        Visit Information        Provider Department      8/3/2017 10:00 AM Sherrie Fontana MD Peds Integrative Health         Follow-ups after your visit        Your next 10 appointments already scheduled     Sep 06, 2017 10:00 AM CDT   Return Pediatric Visit with Skyler Correa MD   [facility] Peds Eye General ([facility])    [address]  [place] [address] [facility] [address]   [phone]            Sep 14, 2017 10:00 AM CDT   Return Visit with Sherrie Fontana MD   Peds Integrative Health ([facility])    [facility]  9th Floor  [address] [address] [phone]            Oct 25, 2017  1:00 PM CDT   Return Visit with Nhan Knott MD   [facility] Epilepsy [facility] ([facility])    [address]  [facility] [address]   [phone]            Dec 13, 2017 10:00 AM CST   Return Metabolic Visit with Ana Granda MD   Peds Metabolism ([facility])    [facility]  [address]  [address] [facility] [address] [phone]            Feb 01, 2018 11:15 AM CST   Return Visit with Barrett Lennon MD   Pediatric Endocrinology ([facility])    [facility]  [address]  [address] [address] [phone]              Who to contact     Please call your clinic at [phone] to:    Ask questions about your health    Make or cancel appointments    Discuss your medicines    Learn about your test results    Speak to your doctor   If you have compliments or concerns about an experience at your clinic, or if you wish to file a complaint, please contact [facility] Patient Relations at [phone] or email us at [email or web address]         Additional Information About  Your Visit        iLike Information     iLike is an electronic gateway that provides easy, online access to your medical records. With iLike, you can request a clinic appointment, read your test results, renew a prescription or communicate with your care team.     To sign up for iLike visit the website at [email or web address]   You will be asked to enter the access code listed below, as well as some personal information. Please follow the directions to create your username and password.     Your access code is: [email or web address]  Expires: 2017 10:31 AM     Your access code will  in 90 days. If you need help or a new code, please contact your [facility] or call [phone] for assistance.        Care EveryWhere ID     This is your Care EveryWhere ID. This could be used by other organizations to access your [place] medical records  [id number]        Your Vitals Were     Pulse Temperature Respirations Pulse Oximetry          102 98.7  F (37.1  C) (Axillary) 24 99%         Blood Pressure from Last 3 Encounters:   17 107/76   17 110/70   17 96/65    Weight from Last 3 Encounters:   17 85 lb (38.6 kg)   17 89 lb 8 oz (40.6 kg)   17 84 lb (38.1 kg)              Today, you had the following     No orders found for display       Primary Care Provider Office Phone # Fax #    Leeanne DAVALOS Mustafa [phone]       [facility] [address] [place] [address] DR [place] [address]        Equal Access to Services     [facility]: Hadii aad ku hadasho Soomaali, waaxda luqadaha, qaybta kaalmada adeegyada, anahi moseley . So [facility] [phone].    ATENCIÓN: Si habla español, tiene a chakraborty disposición servicios gratuitos de asistencia lingüística. Llame al [phone].    We comply with applicable federal civil rights laws and [place] laws. We do not discriminate on the basis of race, color, national origin, age,  disability sex, sexual orientation or gender identity.            Thank you!     Thank you for choosing [facility]  for your care. Our goal is always to provide you with excellent care. Hearing back from our patients is one way we can continue to improve our services. Please take a few minutes to complete the written survey that you may receive in the mail after your visit with us. Thank you!             Your Updated Medication List - Protect others around you: Learn how to safely use, store and throw away your medicines at www.disposemymeds.org.          This list is accurate as of: 8/3/17 10:31 AM.  Always use your most recent med list.                   Brand Name Dispense Instructions for use Diagnosis    acetaminophen 160 MG/5ML solution    TYLENOL     480 mg (15 mL) by mouth every 4 hours as needed        ARIPiprazole 1 MG/ML Soln solution    ABILIFY     7.5 mg daily        bacitracin ointment     14 g    Apply topically 2 times daily as needed for wound care    Skin irritation       calcium carbonate 1250 (500 CA) MG/5ML Susp suspension     450 mL    Take 6 mLs (1,500 mg) by mouth 2 times daily (with meals)    Congenital disorder of glycosylation (CDG) (H), Osteoporosis       COMPLEAT PEDIATRIC PO           D-Mannose Powd      2 times daily        diazepam 5 MG/ML (HIGH CONC) solution    VALIUM     Take 7.5 mg by mouth every hour as needed for seizures (Seizures lasting longer than 5 minutes)        ENEMEEZ MINI RE      3 times a week (MWF) to help with bowel movements        levETIRAcetam 100 MG/ML solution    KEPPRA    750 mL    10 mL by mouth  in the AM and 15 ml in the PM    Complex partial seizure evolving to generalized seizure (H)       ondansetron 4 MG/5ML solution    ZOFRAN    90 mL    Take 5 mLs (4 mg) by mouth every 6 hours as needed for nausea or vomiting    Viral gastroenteritis       order for DME     1 Device    Equipment being ordered: Other: chest strap for her tilt in space  manual wheel chair Treatment Diagnosis: For safe transportation to home secondary to poor sitting balance in upright. CDG type Ia, seizures, and G-tube dependence.    Viral gastroenteritis, Dehydration, Acute respiratory failure with hypoxia (H), Congenital disorder of glycosylation (CDG) (H)       sertraline 20 MG/ML (HIGH CONC) solution    ZOLOFT     Take 10 mg by mouth daily        traZODone 50 MG tablet    DESYREL     by Gastric Tube route At Bedtime        * UNABLE TO FIND      MEDICATION NAME: Stress relax (magnesium citrate) 380 mg at bedtime        * UNABLE TO FIND      MEDICATION NAME: Mood probiotic 1 capsule (3,000,000,000 CFUs) daily.        * Notice:  This list has 2 medication(s) that are the same as other medications prescribed for you. Read the directions carefully, and ask your doctor or other care provider to review them with you.

## 2017-08-03 NOTE — NURSING NOTE
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    Margarita Rojas LPN  August 3, 2017

## 2017-08-03 NOTE — LETTER
8/3/2017      RE: Sherly Ramires  C/O [facility] JUAN MANUEL [facility]  [address]  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, [facility]. Anna has new soft straps on her wheelchair and a new soft cervical collar and she is very pleased to point hat out today. We do her shoni-shin treatment first, the longest she has tolerated so far, and then get an update. Anna is pleased to have picture to color while we chat and is able to loosely grasp a crayon in her right hand and to scribble on the page.     Leticia confirms that vitamin D has been discontinued to allow Anna's level to come down from >96 ng/mL. She has been otherwise healthy and doing well. Her dad is in town and will be visiting her this week.  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 It is ADA accessible and she was able to go on a boat, feed a loon, and go in the water.     With respect to the Adult Day Services Program that Anna was waiting to hear from, her  Mom advocated for her and she has now been accepted by the state, although they don't yet have the staff there to  care for her. The [facility] would agree to send their own staff for 3 half-days a week just to get Anna started there and a meeting is scheduled to discuss this proposal later this week.     Behavior is good as long as Anna has a schedule and familiar staff around her.      ALLERGIES:  Allergies   Allergen Reactions     Ativan [Lorazepam] Other (See Comments)     Paradoxical reaction     Motrin [Ibuprofen] GI Disturbance     Pt is able to have motrin with food but not on an empty stomache.        IMMUNIZATIONS:  Immunization History   Administered Date(s) Administered     Influenza (IIV3) 10/12/2012       CURRENT MEDICATIONS:      Current Outpatient Prescriptions:      traZODone (DESYREL) 50 MG tablet, by Gastric Tube route At Bedtime, Disp: , Rfl:      levETIRAcetam (KEPPRA) 100 MG/ML solution, 10 mL by mouth  in the AM and 15 ml in the PM, Disp: 750 mL, Rfl: 11     sertraline (ZOLOFT) 20 MG/ML (HIGH CONC) solution, Take 10 mg by mouth daily, Disp: , Rfl:      D-Mannose POWD, 2 times daily, Disp: , Rfl:      Nutritional Supplements (COMPLEAT PEDIATRIC PO), , Disp: , Rfl:      UNABLE TO FIND, MEDICATION NAME: Stress relax (magnesium citrate) 380 mg at bedtime, Disp: , Rfl:      UNABLE TO FIND, MEDICATION NAME: Mood probiotic 1 capsule (3,000,000,000 CFUs) daily., Disp: , Rfl:      ondansetron (ZOFRAN) 4 MG/5ML solution, Take 5 mLs (4 mg) by mouth every 6 hours as needed for nausea or vomiting, Disp: 90 mL, Rfl: 0     bacitracin ointment, Apply topically 2 times daily as needed for wound care, Disp: 14 g, Rfl: 0     ARIPiprazole (ABILIFY) 1 MG/ML SOLN, 7.5 mg daily , Disp: , Rfl:      acetaminophen (TYLENOL) 160 MG/5ML solution, 480 mg (15 mL) by mouth every 4 hours as needed, Disp: , Rfl:      diazepam (VALIUM) 5 MG/ML solution, Take 7.5 mg by mouth every hour as needed for seizures (Seizures lasting longer than 5 minutes), Disp: , Rfl:      ORDER FOR DME, Equipment being ordered: Other: chest strap for her  tilt in space manual wheel chair Treatment Diagnosis: For safe transportation to home secondary to poor sitting balance in upright. CDG type Ia, seizures, and G-tube dependence., Disp: 1 Device, Rfl: 0     Docusate Sodium (ENEMEEZ MINI RE), 3 times a week (MWF) to help with bowel movements, Disp: , Rfl:      calcium carbonate 1250 (500 CA) MG/5ML SUSP, Take 6 mLs (1,500 mg) by mouth 2 times daily (with meals), Disp: 450 mL, Rfl: 12      Bach Rescue Remedy spray, prn. anxiety/stressful situation.     PAST MEDICAL HISTORY:   Past Medical History:   Diagnosis Date     Anxiety      Global developmental delay      Hypoglycemia      Metabolic disease     congenital disorder of glycosilation     Scoliosis      Seizures (H)     last at age 8 before today     Strabismus        PAST SURGICAL HISTORY:   Past Surgical History:   Procedure Laterality Date     ANESTHESIA OUT OF OR MRI 3T N/A 3/7/2017    Procedure: ANESTHESIA PEDS SEDATION MRI 3T;  Surgeon: GENERIC ANESTHESIA PROVIDER;  [facility] P[facility]      BACK SURGERY      c1 decompression and fusionx3, scoliosis with instrumentation x1     ELECTRORETINOGRAM Bilateral 12/10/2014    ERG ([facility])     ENT SURGERY       ear tubes     EXAM UNDER ANESTHESIA EYE(S) Bilateral 12/10/2014    EUA ([place])     EXTRACTION(S) DENTAL N/A 12/10/2014    Procedure: EXTRACTION(S) DENTAL;  Surgeon: Hubert York DDS;  [facility]: [facility] OR     EYE SURGERY Bilateral 1996    BMR 6mm ([place])      GI SURGERY      g tube     HC REPLACE GASTROSTOMY/CECOSTOMY TUBE PERCUTANEOUS N/A 12/10/2014    Procedure: REPLACE GASTROSTOMY TUBE, PERCUTANEOUS;  Surgeon: Phong Perea MD;  [facility] OR     RECESSION RESECTION (REPAIR STRABISMUS) BILATERAL Bilateral 12/10/2014    BLR 11 ([place])     STRABISMUS SURGERY  1996    (1996) BMR 6 ([place])     STRABISMUS SURGERY  2014    BLR 11 ([place]) -        FAMILY HISTORY:   Family History   Problem Relation Age of Onset     Glaucoma Mother      no  "medications or surg to date     Glaucoma Maternal Grandmother      s/p surg, decreased [place]      Amblyopia No family hx of      Strabismus No family hx of        SOCIAL HISTORY:   Social History   Substance Use Topics     Smoking status: Never Smoker     Smokeless tobacco: Never Used     Alcohol use No       Physical Exam:   Sitting up in wheelchair with soft neck collar on in upright, smiling lots.   No edhand ema or jaundice  Easy respirations, L hand not moving too much; R with 3 finger , loose.    Shoni-cárdenas using Choki with brass \"needle\" for tonification, bringing down the Yang.  Focus points; SP-6, LR-8, ST-36, Victor Manuel 17, Victor Manuel 12, Victor Manuel-6; ALFRED-1, DU-20, DU-23      Labs and Tests:  none  Assessment:  Sherly is a 21 year old female patient with complex medical hx now in group home with recent findings of UE weakness, managed by soft collar and supportive positioning.      Plan/Follow-up:  1. Orders signed for group home.   2. RTC in 6-8 weeks; call earlier prn.      Thank you for this consultation. Please do not hesitate to contact me with any questions or concerns.     I spent a total of 25 minutes face-to-face with Sherly Ramires during today's office visit.  Over 50% of this time was spent counseling the patient-family-staff and/or coordinating care regarding comfort.  See note for details.    Sherrie Fontana MD, CM  Medical Director Pediatric Integrative Health and Wellbeing, [facility]          "

## 2017-08-04 NOTE — PROGRESS NOTES
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, [facility]. Anna has new soft straps on her wheelchair and a new soft cervical collar and she is very pleased to point hat out today. We do her shoni-shin treatment first, the longest she has tolerated so far, and then get an update. Anna is pleased to have picture to color while we chat and is able to loosely grasp a crayon in her right hand and to scribble on the page.     Leticia confirms that vitamin D has been discontinued to allow Anna's level to come down from >96 ng/mL. She has been otherwise healthy and doing well. Her dad is in town and will be visiting her this week.  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 It is ADA accessible and she was able to go on a boat, feed a loon, and go in the water.     With respect to the Adult Day Services Program that Anna was waiting to hear from, her  Mom advocated for her and she has now been accepted by the state, although they don't yet have the staff there to care for her. The [facility] would agree to send their own staff for 3 half-days a week just to get Anna started there and a  meeting is scheduled to discuss this proposal later this week.     Behavior is good as long as Anna has a schedule and familiar staff around her.      ALLERGIES:  Allergies   Allergen Reactions     Ativan [Lorazepam] Other (See Comments)     Paradoxical reaction     Motrin [Ibuprofen] GI Disturbance     Pt is able to have motrin with food but not on an empty stomache.        IMMUNIZATIONS:  Immunization History   Administered Date(s) Administered     Influenza (IIV3) 10/12/2012       CURRENT MEDICATIONS:      Current Outpatient Prescriptions:      traZODone (DESYREL) 50 MG tablet, by Gastric Tube route At Bedtime, Disp: , Rfl:      levETIRAcetam (KEPPRA) 100 MG/ML solution, 10 mL by mouth  in the AM and 15 ml in the PM, Disp: 750 mL, Rfl: 11     sertraline (ZOLOFT) 20 MG/ML (HIGH CONC) solution, Take 10 mg by mouth daily, Disp: , Rfl:      D-Mannose POWD, 2 times daily, Disp: , Rfl:      Nutritional Supplements (COMPLEAT PEDIATRIC PO), , Disp: , Rfl:      UNABLE TO FIND, MEDICATION NAME: Stress relax (magnesium citrate) 380 mg at bedtime, Disp: , Rfl:      UNABLE TO FIND, MEDICATION NAME: Mood probiotic 1 capsule (3,000,000,000 CFUs) daily., Disp: , Rfl:      ondansetron (ZOFRAN) 4 MG/5ML solution, Take 5 mLs (4 mg) by mouth every 6 hours as needed for nausea or vomiting, Disp: 90 mL, Rfl: 0     bacitracin ointment, Apply topically 2 times daily as needed for wound care, Disp: 14 g, Rfl: 0     ARIPiprazole (ABILIFY) 1 MG/ML SOLN, 7.5 mg daily , Disp: , Rfl:      acetaminophen (TYLENOL) 160 MG/5ML solution, 480 mg (15 mL) by mouth every 4 hours as needed, Disp: , Rfl:      diazepam (VALIUM) 5 MG/ML solution, Take 7.5 mg by mouth every hour as needed for seizures (Seizures lasting longer than 5 minutes), Disp: , Rfl:      ORDER FOR DME, Equipment being ordered: Other: chest strap for her tilt in space manual wheel chair Treatment Diagnosis: For safe transportation to home secondary to poor sitting balance in upright.  CDG type Ia, seizures, and G-tube dependence., Disp: 1 Device, Rfl: 0     Docusate Sodium (ENEMEEZ MINI RE), 3 times a week (MWF) to help with bowel movements, Disp: , Rfl:      calcium carbonate 1250 (500 CA) MG/5ML SUSP, Take 6 mLs (1,500 mg) by mouth 2 times daily (with meals), Disp: 450 mL, Rfl: 12      Bach Rescue Remedy spray, prn. anxiety/stressful situation.     PAST MEDICAL HISTORY:   Past Medical History:   Diagnosis Date     Anxiety      Global developmental delay      Hypoglycemia      Metabolic disease     congenital disorder of glycosilation     Scoliosis      Seizures (H)     last at age 8 before today     Strabismus        PAST SURGICAL HISTORY:   Past Surgical History:   Procedure Laterality Date     ANESTHESIA OUT OF OR MRI 3T N/A 3/7/2017    Procedure: ANESTHESIA PEDS SEDATION MRI 3T;  Surgeon: GENERIC ANESTHESIA PROVIDER;  [facility]      BACK SURGERY      c1 decompression and fusionx3, scoliosis with instrumentation x1     ELECTRORETINOGRAM Bilateral 12/10/2014    ERG ([facility])     ENT SURGERY       ear tubes     EXAM UNDER ANESTHESIA EYE(S) Bilateral 12/10/2014    EUA ([facility])     EXTRACTION(S) DENTAL N/A 12/10/2014    Procedure: EXTRACTION(S) DENTAL;  Surgeon: Hubert York DDS;  [facility]     EYE SURGERY Bilateral 1996    BMR 6mm ([place])      GI SURGERY      g tube     HC REPLACE GASTROSTOMY/CECOSTOMY TUBE PERCUTANEOUS N/A 12/10/2014    Procedure: REPLACE GASTROSTOMY TUBE, PERCUTANEOUS;  Surgeon: Phong Perea MD;  [facility]     RECESSION RESECTION (REPAIR STRABISMUS) BILATERAL Bilateral 12/10/2014    BLR 11 ([facility])     STRABISMUS SURGERY  1996    (1996) BMR 6 ([place])     STRABISMUS SURGERY  2014    BLR 11 ([place]) -        FAMILY HISTORY:   Family History   Problem Relation Age of Onset     Glaucoma Mother      no medications or surg to date     Glaucoma Maternal Grandmother      s/p surg, decreased [place]      Amblyopia No family hx of      Strabismus  "No family hx of        SOCIAL HISTORY:   Social History   Substance Use Topics     Smoking status: Never Smoker     Smokeless tobacco: Never Used     Alcohol use No       Physical Exam:   Sitting up in wheelchair with soft neck collar on in upright, smiling lots.   No edhand ema or jaundice  Easy respirations, L hand not moving too much; R with 3 finger , loose.    Shoni-cárdenas using Choki with brass \"needle\" for tonification, bringing down the Yang.  Focus points; SP-6, LR-8, ST-36, Victor Manuel 17, Victor Manuel 12, Victor Manuel-6; ALFRED-1, DU-20, DU-23      Labs and Tests:  none  Assessment:  Sherly is a 21 year old female patient with complex medical hx now in group home with recent findings of UE weakness, managed by soft collar and supportive positioning.      Plan/Follow-up:  1. Orders signed for group home.   2. RTC in 6-8 weeks; call earlier prn.      Thank you for this consultation. Please do not hesitate to contact me with any questions or concerns.     I spent a total of 25 minutes face-to-face with Sherly Ramires during today's office visit.  Over 50% of this time was spent counseling the patient-family-staff and/or coordinating care regarding comfort.  See note for details.    Sherrie Fontana MD, CM  Medical Director Pediatric Integrative Health and Wellbeing, [facility]          "

## 2017-08-24 ENCOUNTER — HOSPITAL ENCOUNTER (OUTPATIENT)
Facility: CLINIC | Age: 22
Setting detail: OBSERVATION
Discharge: HOME | End: 2017-08-25
Attending: EMERGENCY MEDICINE | Admitting: PEDIATRICS
Payer: COMMERCIAL

## 2017-08-24 ENCOUNTER — APPOINTMENT (OUTPATIENT)
Dept: GENERAL RADIOLOGY | Facility: CLINIC | Age: 22
End: 2017-08-24
Attending: EMERGENCY MEDICINE
Payer: COMMERCIAL

## 2017-08-24 DIAGNOSIS — N39.0 URINARY TRACT INFECTION, SITE UNSPECIFIED: ICD-10-CM

## 2017-08-24 DIAGNOSIS — E74.89 CONGENITAL DISORDER OF GLYCOSYLATION ASSOCIATED WITH MUTATION IN PMM2 GENE (H): ICD-10-CM

## 2017-08-24 DIAGNOSIS — R11.11 VOMITING WITHOUT NAUSEA, INTRACTABILITY OF VOMITING NOT SPECIFIED, UNSPECIFIED VOMITING TYPE: ICD-10-CM

## 2017-08-24 PROBLEM — K56.7 ILEUS (H): Status: ACTIVE | Noted: 2017-08-24

## 2017-08-24 LAB
ALBUMIN SERPL-MCNC: 3.3 G/DL (ref 3.4–5)
ALBUMIN UR-MCNC: 10 MG/DL
ALP SERPL-CCNC: 88 U/L (ref 40–150)
ALT SERPL W P-5'-P-CCNC: 26 U/L (ref 0–50)
ANION GAP SERPL CALCULATED.3IONS-SCNC: 9 MMOL/L (ref 3–14)
APPEARANCE UR: ABNORMAL
APTT PPP: 30 SEC (ref 22–37)
AST SERPL W P-5'-P-CCNC: 19 U/L (ref 0–45)
BACTERIA #/AREA URNS HPF: ABNORMAL /HPF
BASOPHILS # BLD AUTO: 0 10E9/L (ref 0–0.2)
BASOPHILS NFR BLD AUTO: 0 %
BILIRUB SERPL-MCNC: 0.3 MG/DL (ref 0.2–1.3)
BILIRUB UR QL STRIP: NEGATIVE
BUN SERPL-MCNC: 13 MG/DL (ref 7–30)
CALCIUM SERPL-MCNC: 8.4 MG/DL (ref 8.5–10.1)
CHLORIDE SERPL-SCNC: 109 MMOL/L (ref 94–109)
CO2 SERPL-SCNC: 25 MMOL/L (ref 20–32)
COLOR UR AUTO: YELLOW
CREAT SERPL-MCNC: 0.23 MG/DL (ref 0.52–1.04)
DIFFERENTIAL METHOD BLD: ABNORMAL
EOSINOPHIL # BLD AUTO: 0 10E9/L (ref 0–0.7)
EOSINOPHIL NFR BLD AUTO: 0 %
ERYTHROCYTE [DISTWIDTH] IN BLOOD BY AUTOMATED COUNT: 12.4 % (ref 10–15)
GFR SERPL CREATININE-BSD FRML MDRD: >90 ML/MIN/1.7M2
GLUCOSE SERPL-MCNC: 83 MG/DL (ref 70–99)
GLUCOSE UR STRIP-MCNC: NEGATIVE MG/DL
HCG UR QL: NEGATIVE
HCT VFR BLD AUTO: 40.7 % (ref 35–47)
HGB BLD-MCNC: 13.6 G/DL (ref 11.7–15.7)
HGB UR QL STRIP: NEGATIVE
IMM GRANULOCYTES # BLD: 0 10E9/L (ref 0–0.4)
IMM GRANULOCYTES NFR BLD: 0.2 %
INR PPP: 1.03 (ref 0.86–1.14)
INTERNAL QC OK POCT: YES
KETONES UR STRIP-MCNC: 40 MG/DL
LEUKOCYTE ESTERASE UR QL STRIP: ABNORMAL
LYMPHOCYTES # BLD AUTO: 0.5 10E9/L (ref 0.8–5.3)
LYMPHOCYTES NFR BLD AUTO: 8.5 %
MCH RBC QN AUTO: 31.3 PG (ref 26.5–33)
MCHC RBC AUTO-ENTMCNC: 33.4 G/DL (ref 31.5–36.5)
MCV RBC AUTO: 94 FL (ref 78–100)
MONOCYTES # BLD AUTO: 0.5 10E9/L (ref 0–1.3)
MONOCYTES NFR BLD AUTO: 9.4 %
NEUTROPHILS # BLD AUTO: 4.7 10E9/L (ref 1.6–8.3)
NEUTROPHILS NFR BLD AUTO: 81.9 %
NITRATE UR QL: POSITIVE
NRBC # BLD AUTO: 0 10*3/UL
NRBC BLD AUTO-RTO: 0 /100
PH UR STRIP: 7.5 PH (ref 5–7)
PLATELET # BLD AUTO: 114 10E9/L (ref 150–450)
POTASSIUM SERPL-SCNC: 3.5 MMOL/L (ref 3.4–5.3)
PROT SERPL-MCNC: 7.1 G/DL (ref 6.8–8.8)
RBC # BLD AUTO: 4.35 10E12/L (ref 3.8–5.2)
RBC #/AREA URNS AUTO: 1 /HPF (ref 0–2)
SODIUM SERPL-SCNC: 143 MMOL/L (ref 133–144)
SOURCE: ABNORMAL
SP GR UR STRIP: 1.01 (ref 1–1.03)
TRANS CELLS #/AREA URNS HPF: 1 /HPF (ref 0–1)
UROBILINOGEN UR STRIP-MCNC: NORMAL MG/DL (ref 0–2)
WBC # BLD AUTO: 5.8 10E9/L (ref 4–11)
WBC #/AREA URNS AUTO: 31 /HPF (ref 0–2)

## 2017-08-24 PROCEDURE — G0378 HOSPITAL OBSERVATION PER HR: HCPCS

## 2017-08-24 PROCEDURE — 25000128 H RX IP 250 OP 636: Performed by: PEDIATRICS

## 2017-08-24 PROCEDURE — 99285 EMERGENCY DEPT VISIT HI MDM: CPT | Mod: 25 | Performed by: EMERGENCY MEDICINE

## 2017-08-24 PROCEDURE — 25000132 ZZH RX MED GY IP 250 OP 250 PS 637: Performed by: STUDENT IN AN ORGANIZED HEALTH CARE EDUCATION/TRAINING PROGRAM

## 2017-08-24 PROCEDURE — 25000132 ZZH RX MED GY IP 250 OP 250 PS 637: Performed by: PEDIATRICS

## 2017-08-24 PROCEDURE — 96361 HYDRATE IV INFUSION ADD-ON: CPT

## 2017-08-24 PROCEDURE — 87086 URINE CULTURE/COLONY COUNT: CPT | Performed by: EMERGENCY MEDICINE

## 2017-08-24 PROCEDURE — 85025 COMPLETE CBC W/AUTO DIFF WBC: CPT | Performed by: EMERGENCY MEDICINE

## 2017-08-24 PROCEDURE — 87186 SC STD MICRODIL/AGAR DIL: CPT | Performed by: EMERGENCY MEDICINE

## 2017-08-24 PROCEDURE — 99285 EMERGENCY DEPT VISIT HI MDM: CPT | Mod: Z6 | Performed by: EMERGENCY MEDICINE

## 2017-08-24 PROCEDURE — 80053 COMPREHEN METABOLIC PANEL: CPT | Performed by: EMERGENCY MEDICINE

## 2017-08-24 PROCEDURE — 74020 XR ABDOMEN 2 VW: CPT

## 2017-08-24 PROCEDURE — 96361 HYDRATE IV INFUSION ADD-ON: CPT | Performed by: EMERGENCY MEDICINE

## 2017-08-24 PROCEDURE — 25800025 ZZH RX 258

## 2017-08-24 PROCEDURE — 85730 THROMBOPLASTIN TIME PARTIAL: CPT | Performed by: EMERGENCY MEDICINE

## 2017-08-24 PROCEDURE — 85610 PROTHROMBIN TIME: CPT | Performed by: EMERGENCY MEDICINE

## 2017-08-24 PROCEDURE — 81025 URINE PREGNANCY TEST: CPT | Performed by: EMERGENCY MEDICINE

## 2017-08-24 PROCEDURE — 96365 THER/PROPH/DIAG IV INF INIT: CPT | Performed by: EMERGENCY MEDICINE

## 2017-08-24 PROCEDURE — 87088 URINE BACTERIA CULTURE: CPT | Performed by: EMERGENCY MEDICINE

## 2017-08-24 PROCEDURE — 81001 URINALYSIS AUTO W/SCOPE: CPT | Performed by: EMERGENCY MEDICINE

## 2017-08-24 RX ORDER — CEFTRIAXONE 2 G/1
2 INJECTION, POWDER, FOR SOLUTION INTRAMUSCULAR; INTRAVENOUS EVERY 24 HOURS
Status: DISCONTINUED | OUTPATIENT
Start: 2017-08-24 | End: 2017-08-25 | Stop reason: HOSPADM

## 2017-08-24 RX ORDER — LEVETIRACETAM 100 MG/ML
1000 SOLUTION ORAL EVERY MORNING
Status: DISCONTINUED | OUTPATIENT
Start: 2017-08-24 | End: 2017-08-25 | Stop reason: HOSPADM

## 2017-08-24 RX ORDER — ONDANSETRON HYDROCHLORIDE 4 MG/5ML
4 SOLUTION ORAL EVERY 6 HOURS PRN
Status: DISCONTINUED | OUTPATIENT
Start: 2017-08-24 | End: 2017-08-25 | Stop reason: HOSPADM

## 2017-08-24 RX ORDER — ARIPIPRAZOLE ORAL 1 MG/ML
7.5 SOLUTION ORAL DAILY
Status: DISCONTINUED | OUTPATIENT
Start: 2017-08-24 | End: 2017-08-25 | Stop reason: HOSPADM

## 2017-08-24 RX ORDER — D-MANNOSE 99 %
0.5 POWDER (GRAM) MISCELLANEOUS 2 TIMES DAILY
Status: DISCONTINUED | OUTPATIENT
Start: 2017-08-24 | End: 2017-08-24

## 2017-08-24 RX ORDER — DIAZEPAM ORAL SOLUTION (CONCENTRATE) 5 MG/ML
7.5 SOLUTION ORAL
Status: DISCONTINUED | OUTPATIENT
Start: 2017-08-24 | End: 2017-08-25 | Stop reason: HOSPADM

## 2017-08-24 RX ORDER — TRAZODONE HYDROCHLORIDE 50 MG/1
50 TABLET, FILM COATED ORAL AT BEDTIME
Status: DISCONTINUED | OUTPATIENT
Start: 2017-08-24 | End: 2017-08-25 | Stop reason: HOSPADM

## 2017-08-24 RX ORDER — LEVETIRACETAM 100 MG/ML
1500 SOLUTION ORAL EVERY EVENING
Status: DISCONTINUED | OUTPATIENT
Start: 2017-08-24 | End: 2017-08-25 | Stop reason: HOSPADM

## 2017-08-24 RX ORDER — SERTRALINE HYDROCHLORIDE 20 MG/ML
200 SOLUTION ORAL DAILY
Status: DISCONTINUED | OUTPATIENT
Start: 2017-08-24 | End: 2017-08-25 | Stop reason: HOSPADM

## 2017-08-24 RX ORDER — LACTOBACILLUS RHAMNOSUS GG 10B CELL
1 CAPSULE ORAL DAILY
Status: DISCONTINUED | OUTPATIENT
Start: 2017-08-24 | End: 2017-08-25 | Stop reason: HOSPADM

## 2017-08-24 RX ADMIN — LEVETIRACETAM 1500 MG: 100 SOLUTION ORAL at 19:50

## 2017-08-24 RX ADMIN — CALCIUM CARBONATE 1500 MG: 1250 SUSPENSION ORAL at 18:17

## 2017-08-24 RX ADMIN — CEFTRIAXONE 2 G: 2 INJECTION, POWDER, FOR SOLUTION INTRAMUSCULAR; INTRAVENOUS at 11:26

## 2017-08-24 RX ADMIN — DEXTROSE AND SODIUM CHLORIDE 85 ML/HR: 10; .45 INJECTION, SOLUTION INTRAVENOUS at 10:08

## 2017-08-24 RX ADMIN — LEVETIRACETAM 1000 MG: 100 SOLUTION ORAL at 15:19

## 2017-08-24 RX ADMIN — Medication 1 CAPSULE: at 15:18

## 2017-08-24 RX ADMIN — TRAZODONE HYDROCHLORIDE 50 MG: 50 TABLET ORAL at 19:50

## 2017-08-24 RX ADMIN — ARIPIPRAZOLE 7.5 MG: 1 SOLUTION ORAL at 15:19

## 2017-08-24 ASSESSMENT — ACTIVITIES OF DAILY LIVING (ADL)
TRANSFERRING: 4-->COMPLETELY DEPENDENT
TOILETING: 4-->COMPLETELY DEPENDENT
DRESS: 4-->COMPLETELY DEPENDENT
RETIRED_EATING: 4-->COMPLETELY DEPENDENT
BATHING: 4-->COMPLETELY DEPENDENT
SWALLOWING: 2-->DIFFICULTY SWALLOWING LIQUIDS
COMMUNICATION: 3-->UNABLE TO UNDERSTAND (NOT RELATED TO LANGUAGE BARRIER)
TRANSFERRING: 4-->COMPLETELY DEPENDENT
BATHING: 4-->COMPLETELY DEPENDENT
RETIRED_COMMUNICATION: 3-->UNABLE TO UNDERSTAND (NOT RELATED TO LANGUAGE BARRIER)
EATING: 4-->COMPLETELY DEPENDENT
TOILETING: 4-->COMPLETELY DEPENDENT
DRESS: 4-->COMPLETELY DEPENDENT
AMBULATION: 4-->COMPLETELY DEPENDENT
AMBULATION: 4-->COMPLETELY DEPENDENT
SWALLOWING: 2-->DIFFICULTY SWALLOWING LIQUIDS

## 2017-08-24 ASSESSMENT — ENCOUNTER SYMPTOMS
FEVER: 0
CHILLS: 0
DIARRHEA: 0
COUGH: 0
EYE REDNESS: 0
ABDOMINAL PAIN: 1
VOMITING: 1

## 2017-08-24 NOTE — PROGRESS NOTES
Discharge Criteria - Outpatient/Observation goals to be met before discharge home:   1. No supplemental oxygen.   2. Tolerating G-tube feeds   3. Pain and nausea controlled on PO medications.    Pt not requiring supplemental oxygen, sats >98% on RA. Pedialyte running at 40ml/hour, tolerating. No s/s of pain or nausea, so no PRNs given.

## 2017-08-24 NOTE — ED NOTES
Pt lives in group home, mom was called and told pt began vomiting this morning around 0300 and has vomited every 30 minutes since then. Last emesis at 0730. Mom denies other symptoms, no fever. Per mom pt reported abdominal pain to staff. Pt alert and interactive with mom and staff.

## 2017-08-24 NOTE — IP AVS SNAPSHOT
MRN:[id number]                      After Visit Summary   8/24/2017    Sherly Ramires    MRN: [id number]           Thank you!     Thank you for choosing [facility] for your care. Our goal is always to provide you with excellent care. Hearing back from our patients is one way we can continue to improve our services. Please take a few minutes to complete the written survey that you may receive in the mail after you visit with us. Thank you!        Patient Information     Date Of Birth          1995        Designated Caregiver       Most Recent Value    Caregiver    Will someone help with your care after discharge? yes    Name of designated caregiver Julianne Ramires    Phone number of caregiver [phone]    Caregiver address [address] [place] [address]      About your hospital stay     You were admitted on:  August 24, 2017 You last received care in the:  [facility]    You were discharged on:  August 25, 2017        Reason for your hospital stay       Sherly was hospitalized for treatment of a urinary tract infection and advancement of tube feeds.                  Who to Call     For medical emergencies, please call 911.  For non-urgent questions about your medical care, please call your primary care provider or clinic, [phone]          Attending Provider     Provider Specialty    Kj Carpio MD Emergency Medicine - Pediatric Emergency Medicine    [facility]       Primary Care Provider Office Phone # Fax #    Leeanne Mustafa [phone]       When to contact your care team       Contact your care team if you have fevers, inability to tolerate feeds, or decreased urine output                  After Care Instructions     Activity       Your activity upon discharge: activity as tolerated            Diet       Follow this diet upon discharge: Pednikita at  80mL/hr  Advancement:   Increase feed concentration, start w/ 20% formula x 6 hrs, increase 10% every 6 hrs until goal 50% formula.  Increase rate to 100mL/hr (50% formula). Increase by 50 ml/hr w/ each bolus until reaching her normal rate of 400 ml/hr. If vomits, do 2 hrs of pedialyte & go one step back on concentration for 6 hours.   Mom can alter feeding plan based on her knowledge and experience.                  Follow-up Appointments     Follow Up and recommended labs and tests       Follow up with care team as previously scheduled                  Your next 10 appointments already scheduled     Sep 06, 2017 10:00 AM CDT   Return Pediatric Visit with Skyler Correa MD   [facility] Peds Eye General ([facility])    [address]  [place] [address] [facility] [address]   [phone]            Sep 14, 2017 10:00 AM CDT   Return Visit with Sherrie Fontana MD   Peds Integrative Health ([facility])    [facility]  9th Floor  [address] [address]   [phone]            Oct 25, 2017  1:00 PM CDT   Return Visit with Nhan Knott MD   [facility] Epilepsy Care ([facility])    [address], [address]  [facility] [address]   [phone]            Dec 13, 2017 10:00 AM CST   Return Metabolic Visit with Ana Granda MD   Peds Metabolism ([facility])    [facility]  [address]  [address] [facility] [place] [facility] [address]   [phone]            Feb 01, 2018 11:15 AM CST   Return Visit with Barrett Lennon MD   Pediatric Endocrinology ([facility])    [facility]  [address] [facility]  [address] [address]   [phone]              Pending Results     Date and Time Order Name Status Description    8/24/2017 0922 Urine Culture Aerobic Bacterial Preliminary             Statement of Approval     Ordered          08/25/17 1116  I have reviewed and agree with all the recommendations  "and orders detailed in this document.  EFFECTIVE NOW     Approved and electronically signed by:  Chelsey Hager MD             Admission Information     Date & Time Provider Department Dept. Phone    2017 Chelsey Hager MD [facility] Pediatric Medical Surgical Unit 5 [phone]      Your Vitals Were     Blood Pressure Temperature Respirations Weight Pulse Oximetry BMI (Body Mass Index)    96/66 (BP Location: Right arm) 97.6  F (36.4  C) (Axillary) 20 36.3 kg (80 lb) 96% 23.43 kg/m2      MyChart Information     [facility] lets you send messages to your doctor, view your test results, renew your prescriptions, schedule appointments and more. To sign up, go to [email or web address] . Click on \"Log in\" on the left side of the screen, which will take you to the Welcome page. Then click on \"Sign up Now\" on the right side of the page.     You will be asked to enter the access code listed below, as well as some personal information. Please follow the directions to create your username and password.     Your access code is: [email or web address]  Expires: 2017 10:31 AM     Your access code will  in 90 days. If you need help or a new code, please call your [facility] clinic or [phone].        Care EveryWhere ID     This is your Care EveryWhere ID. This could be used by other organizations to access your [facility] medical records  [id number]        Equal Access to Services     [facility] : Hadii ramos ku hadasho Soomaali, waaxda luqadaha, qaybta kaalmada adeegyada, anahi moseley . So [facility] [phone].    ATENCIÓN: Si habla español, tiene a chakraborty disposición servicios gratuitos de asistencia lingüística. Llame al [phone].    We comply with applicable federal civil rights laws and [place] laws. We do not discriminate on the basis of race, color, national origin, age, disability sex, sexual orientation or gender identity.             "   Review of your medicines      START taking        Dose / Directions    amoxicillin-clavulanate 600-42.9 MG/5ML suspension   Commonly known as:  AUGMENTIN-ES   Used for:  Urinary tract infection, site unspecified        Dose:  876 mg   Take 7.3 mLs (876 mg) by mouth 2 times daily for 10 days   Quantity:  146 mL   Refills:  0         CONTINUE these medicines which may have CHANGED, or have new prescriptions. If we are uncertain of the size of tablets/capsules you have at home, strength may be listed as something that might have changed.        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)        10 mL by mouth  in the AM and 15 ml in the PM   Quantity:  750 mL   Refills:  11       ondansetron 4 MG/5ML solution   Commonly known as:  ZOFRAN   This may have changed:  how to take this   Used for:  Viral gastroenteritis        Dose:  4 mg   Take 5 mLs (4 mg) by mouth every 6 hours as needed for nausea or vomiting   Quantity:  90 mL   Refills:  0         CONTINUE these medicines which have NOT CHANGED        Dose / Directions    acetaminophen 160 MG/5ML solution   Commonly known as:  TYLENOL        480 mg (15 mL) by G-tube every 4 hours as needed   Refills:  0       ARIPiprazole 1 MG/ML Soln solution   Commonly known as:  ABILIFY        Dose:  7.5 mg   7.5 mg daily   Refills:  0       bacitracin ointment   Used for:  Skin irritation        Apply topically 2 times daily as needed for wound care   Quantity:  14 g   Refills:  0       COMPLEAT PEDIATRIC PO         Refills:  0       D-Mannose Powd        2 times daily 1 scoop - mixed with water   Refills:  0       diazepam 5 MG/ML (HIGH CONC) solution   Commonly known as:  VALIUM        Dose:  7.5 mg   7.5 mg by Gastronomy tube route every hour as needed for seizures (Seizures lasting longer than 5 minutes)   Refills:  0       ENEMEEZ MINI RE        3 times a week (MWF) to help with bowel movements   Refills:  0       order for DME   Used for:  Viral gastroenteritis, Dehydration, Acute respiratory failure with hypoxia (H), Congenital disorder of glycosylation (CDG) (H)        Equipment being ordered: Other: chest strap for her tilt in space manual wheel chair Treatment Diagnosis: For safe transportation to home secondary to poor sitting balance in upright. CDG type Ia, seizures, and G-tube dependence.   Quantity:  1 Device   Refills:  0       sertraline 20 MG/ML (HIGH CONC) solution   Commonly known as:  ZOLOFT        Dose:  200 mg   200 mg by Gastronomy tube route daily   Refills:  0       traZODone 50 MG tablet   Commonly known as:  DESYREL        by Gastric Tube route At Bedtime   Refills:  0       * UNABLE TO FIND        MEDICATION NAME: Stress relax (magnesium citrate) 380 mg at bedtime   Refills:  0       * UNABLE TO FIND        MEDICATION NAME: Mood probiotic 1 capsule (3,000,000,000 CFUs) daily.   Refills:  0       * UNABLE TO FIND        MEDICATION NAME: Rescue Remedy Herbal Supplement - Used as needed.   Refills:  0       * Notice:  This list has 3 medication(s) that are the same as other medications prescribed for you. Read the directions carefully, and ask your doctor or other care provider to review them with you.         Where to get your medicines      Some of these will need a paper prescription and others can be bought over the counter. Ask your nurse if you have questions.     Bring a paper prescription for each of these medications     amoxicillin-clavulanate 600-42.9 MG/5ML suspension                 Protect others around you: Learn how to safely use, store and throw away your medicines at www.disposemymeds.org.             Medication List: This is a list of all your medications and when to take them. Check marks below indicate your daily home schedule. Keep this list as a reference.      Medications           Morning Afternoon Evening Bedtime As Needed    acetaminophen 160 MG/5ML solution   Commonly known as:  TYLENOL   480 mg (15 mL) by G-tube every 4 hours as needed                                amoxicillin-clavulanate 600-42.9 MG/5ML suspension   Commonly known as:  AUGMENTIN-ES   Take 7.3 mLs (876 mg) by mouth 2 times daily for 10 days                                ARIPiprazole 1 MG/ML Soln solution   Commonly known as:  ABILIFY   7.5 mg daily   Last time this was given:  7.5 mg on 8/25/2017  8:02 AM                                bacitracin ointment   Apply topically 2 times daily as needed for wound care                                calcium carbonate 1250 (500 CA) MG/5ML Susp suspension   Take 6 mLs (1,500 mg) by mouth 2 times daily (with meals)   Last time this was given:  1,500 mg on 8/25/2017  8:02 AM                                COMPLEAT PEDIATRIC PO                                D-Mannose Powd   2 times daily 1 scoop - mixed with water                                diazepam 5 MG/ML (HIGH CONC) solution   Commonly known as:  VALIUM   7.5 mg by Gastronomy tube route every hour as needed for seizures (Seizures lasting longer than 5 minutes)                                ENEMEEZ MINI RE   3 times a week (MWF) to help with bowel movements                                levETIRAcetam 100 MG/ML solution   Commonly known as:  KEPPRA   10 mL by mouth  in the AM and 15 ml in the PM   Last time this was given:  1,000 mg on 8/25/2017  8:02 AM                                ondansetron 4 MG/5ML solution   Commonly known as:  ZOFRAN   Take 5 mLs (4 mg) by mouth every 6 hours as needed for nausea or  vomiting                                order for DME   Equipment being ordered: Other: chest strap for her tilt in space manual wheel chair Treatment Diagnosis: For safe transportation to home secondary to poor sitting balance in upright. CDG type Ia, seizures, and G-tube dependence.                                sertraline 20 MG/ML (HIGH CONC) solution   Commonly known as:  ZOLOFT   200 mg by Gastronomy tube route daily   Last time this was given:  200 mg on 8/25/2017  8:58 AM                                traZODone 50 MG tablet   Commonly known as:  DESYREL   by Gastric Tube route At Bedtime   Last time this was given:  50 mg on 8/24/2017  7:50 PM                                * UNABLE TO FIND   MEDICATION NAME: Stress relax (magnesium citrate) 380 mg at bedtime                                * UNABLE TO FIND   MEDICATION NAME: Mood probiotic 1 capsule (3,000,000,000 CFUs) daily.                                * UNABLE TO FIND   MEDICATION NAME: Rescue Remedy Herbal Supplement - Used as needed.                                * Notice:  This list has 3 medication(s) that are the same as other medications prescribed for you. Read the directions carefully, and ask your doctor or other care provider to review them with you.

## 2017-08-24 NOTE — LETTER
Transition Communication Hand-off for Care Transitions to Next Level of Care Provider    Name: Sherly Ramires  MRN #: [id number]  Primary Care Provider: WARNER HAMLIN     Primary Clinic: [facility] [address] [place] [address] [address] [place] [address]     Reason for Hospitalization:  Urinary tract infection, site unspecified [N39.0]  Admit Date/Time: 8/24/2017  9:06 AM  Discharge Date: 08/25/17    Payor Source: Payor: BCBS / Plan: BCBS OUT OF STATE / Product Type: Indemnity /      Concern for non-adherence with plan of care:   Y/N n  Discharge Needs Assessment:  Needs       Most Recent Value    Equipment Currently Used at Home feeding device, hospital bed, nutrition supplies, power chair, shower chair, wheelchair            Follow-up plan:  Future Appointments  Date Time Provider Department Center   9/6/2017 10:00 AM Skyler Correa MD [facility] CLIN   9/14/2017 10:00 AM Sherrie Fontana MD [facility] CLIN   10/25/2017 1:00 PM Nhan Knott MD [facility]   12/13/2017 10:00 AM Ana Granda MD [facility] CLIN   2/1/2018 11:15 AM Barrett Lennon MD [facility] CLIN       Any outstanding tests or procedures:              Key Recommendations:      Chelsey Solis    AVS/Discharge Summary is the source of truth; this is a helpful guide for improved communication of patient story

## 2017-08-24 NOTE — PLAN OF CARE
Problem: Urinary Tract Infection (Pediatric)  Goal: Signs and Symptoms of Listed Potential Problems Will be Absent or Manageable (Urinary Tract Infection)  Signs and symptoms of listed potential problems will be absent or manageable by discharge/transition of care (reference Urinary Tract Infection (Pediatric) CPG).  Outcome: No Change  Avss. No c/o pain, nausea, or vomiting noted. Pt resting quietly in bed. Pedialyte started. Tolerating thus far.  1. No supplemental oxygen. Pt maintaining sats >92 % on room air  2. Tolerating G-tube feeds Pedialyte started. Tolerating thus far without any complaints  3. Pain and nausea controlled on PO medications: no c/o pain and nausea noted

## 2017-08-24 NOTE — IP AVS SNAPSHOT
[facility] Medical Surgical Unit 5    [address]    [facility] [place] [address]    Phone:  [phone]                                       After Visit Summary   8/24/2017    Sherly Ramires    MRN: [id number]           After Visit Summary Signature Page     I have received my discharge instructions, and my questions have been answered. I have discussed any challenges I see with this plan with the nurse or doctor.    ..........................................................................................................................................  Patient/Patient Representative Signature      ..........................................................................................................................................  Patient Representative Print Name and Relationship to Patient    ..................................................               ................................................  Date                                            Time    ..........................................................................................................................................  Reviewed by Signature/Title    ...................................................              ..............................................  Date                                                            Time

## 2017-08-24 NOTE — PHARMACY-ADMISSION MEDICATION HISTORY
"Admission medication history interview status for the 8/24/2017 admission is complete. See Epic admission navigator for allergy information, pharmacy, prior to admission medications and immunization status.     Medication history interview sources:  Mom, Group home staff.     Changes made to PTA medication list (reason)  Added: Rescue Remedy Herbal Supplement   Deleted: None   Changed: Sertraline - Corrected dose to 200 mg     Patient Medication Preference  Sherly prefers medications come as liquids administered via G-Tube.  Pt takes trazodone tablets crushed and mixed with water due to inability to obtain liquid product.     Patient Medication Schedule Preference  The patient does not have a preferred timing for medications, our standard may be used      Patient Supplied Medications  The patient does not have any home medications approved for use while inpatient.   -Pt uses and herbal Rescue Remedy product that is not stocked by inpatient pharmacy, however, this product is used as needed and has not been used recently.   -Pt uses Stress Relax - will need to review/evaluate home product to determine if it is appropriate for inpatient use.   -Pt will need to be converted to preferred inpatient probiotic (Culturelle) as we do not carry Mood Probiotic.     Additional medication history information (including reliability of information, actions taken by pharmacist):Medications reviewed with group home staff who was a good historian.   -Pts mother does not give pt products containing pseudoephedrine due to \"blood coagulation issues\"    Prior to Admission medications    Medication Sig Last Dose Taking? Auth Provider   traZODone (DESYREL) 50 MG tablet by Gastric Tube route At Bedtime 8/23/2017 at PM Yes Reported, Patient   levETIRAcetam (KEPPRA) 100 MG/ML solution 10 mL by mouth  in the AM and 15 ml in the PM  Patient taking differently: 10 mL by mouth  in the AM and 15 ml in the PM 8/23/2017 at PM Yes Nhan Knott, " MD   sertraline (ZOLOFT) 20 MG/ML (HIGH CONC) solution 200 mg by Gastronomy tube route daily  8/23/2017 at AM Yes Reported, Patient   UNABLE TO FIND MEDICATION NAME: Stress relax (magnesium citrate) 380 mg at bedtime 8/23/2017 at PM Yes Reported, Patient   UNABLE TO FIND MEDICATION NAME: Mood probiotic 1 capsule (3,000,000,000 CFUs) daily. 8/23/2017 at PM Yes Reported, Patient   calcium carbonate 1250 (500 CA) MG/5ML SUSP Take 6 mLs (1,500 mg) by mouth 2 times daily (with meals)  Patient taking differently: 1,500 mg by Gastronomy tube route 2 times daily (with meals)  8/23/2017 at PM Yes Barrett Lennon MD   ARIPiprazole (ABILIFY) 1 MG/ML SOLN 7.5 mg daily  8/23/2017 at AM Yes Unknown, Entered By History   Docusate Sodium (ENEMEEZ MINI RE) 3 times a week (MWF) to help with bowel movements 8/23/2017 at PM Yes Reported, Patient   UNABLE TO FIND MEDICATION NAME: Rescue Remedy Herbal Supplement - Used as needed. More than a month at Unknown time  Unknown, Entered By History   D-Mannose POWD 2 times daily 1 scoop - mixed with water   Reported, Patient   Nutritional Supplements (COMPLEAT PEDIATRIC PO)    Reported, Patient   ondansetron (ZOFRAN) 4 MG/5ML solution Take 5 mLs (4 mg) by mouth every 6 hours as needed for nausea or vomiting  Patient taking differently: 4 mg by Gastronomy tube route every 6 hours as needed for nausea or vomiting  More than a month at Unknown time  Erika Ozuna MD   bacitracin ointment Apply topically 2 times daily as needed for wound care More than a month at Unknown time  Erika Ozuna MD   acetaminophen (TYLENOL) 160 MG/5ML solution 480 mg (15 mL) by G-tube every 4 hours as needed More than a month at Unknown time  Unknown, Entered By History   diazepam (VALIUM) 5 MG/ML solution 7.5 mg by Gastronomy tube route every hour as needed for seizures (Seizures lasting longer than 5 minutes)  More than a month at Unknown time  Reported, Patient   ORDER FOR DME Equipment being ordered:  Other: chest strap for her tilt in space manual wheel chair  Treatment Diagnosis: For safe transportation to home secondary to poor sitting balance in upright. CDG type Ia, seizures, and G-tube dependence.   Elsa Cline MD         Medication history completed by: Mallorie Hansen Pharm.D.

## 2017-08-24 NOTE — H&P
[facility], [place]    History and Physical  Pediatric Gastroenterology     Date of Admission:  8/24/2017    Assessment & Plan   Sherly Ramires is a 22 year old female with a complex medical history of congenital disorder of glycosylation, global developmental delay, and seizure disorder who presents with one day of vomiting and UTI. Air-fluid levels seen on abdominal film. Multiple prior admissions for UTI-associated adynamic ileus. She is being admitted for close monitoring and IVF.    FEN/GI  Emesis: Likely due to adynamic ileus relating to UTI as she has had an episode like this before.   -- IVF D10 + 1/2NS @ 38ml/hr  -- begin slow feeds of pedialyte @ 20ml/hr and increase by 10 ml/hr up to her goal of 76mL/hr  -- Once tolerating pedialyte at 76 ml/hr for one hour, change to Compleat Pediatric 10% formula/90% water, and increase formula by 10% every 2 hours until she reaches goal of 50% formula/50% water  -- IV Zofran 4mg Q6h prn for nausea, vomiting  -- tylenol prn    Congenital disorder of glycosylation, Type Ia: per emergency letter, at risk of hypoalbuminemia and third spacing, albumin at low normal  -- IVF D10 + 1/2NS @ 38ml/hr  -- repeat CMP in am  -- avoid overaggressive hydration due to risk of 3rd spacing  -- genetics/metabolism contacted  -- BMP and albumin in AM    ID  UTI: Dirty UA in ED, previous admission with EBSL   -- CTX 2g qd  -- await culture return     NEURO  Seizure disorder:   -- continue home Keppra  -- Diazepam 7.5mg PRN for breakthrough seziures    Depression:  -- continue home abilify and zoloft    Sleep:  -- Continue home trazodone and melatonin    HEME  Coagulopathy risk: INR and PTT normal  -- Low threshold for neuro imaging if concern for stroke, AMS per emergency letter    Deb Asher MD  PL-1    Sherly Ramires has been evaluated by me. A comprehensive review of systems was performed and was negative other than as noted in the above  sections.  I reviewed today's vital signs, medications, labs and imaging results.  Discussed with the resident and agree with the findings and plan of care as documented in this note.   Anna is well appearing on exam. Abdomen is soft and nondistended. Tolerating Pedialyte through GT. Will attempt gradual advancement of GT feeds. Monitor closely for third-spacing. Possible discharge tomorrow if able to advance feeds. Mother in agreement with plan.     Chelsey Hager MD  Pediatric Gastroenterology  [facility]      Primary Care Physician   WARNER HAMLIN    Chief Complaint   Vomiting    History is obtained from the patient's mother.    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 She began vomiting at 3:30am and then continued to vomit every half hour until 7am. Emesis was non-bloody. Mom says she was complaining and showing signs of abdominal pain. Mom denies any recent fevers, cough, rhinorrhea, and diarrhea. Mom states she was admitted a year ago for a similar problem due to UTI which also resulted in adynamic ileus.    In the ED, UA was nitrite positive with 30 WBC's and moderate bacteria.     Past Medical History    I have reviewed this patient's medical history and updated it with pertinent information if needed.   Past Medical History:   Diagnosis Date     Anxiety      Global developmental delay      Hypoglycemia      Metabolic disease     congenital disorder of glycosilation     Scoliosis      Seizures (H)     last at age 8 before today     Strabismus        Past Surgical History   I have reviewed this patient's surgical history and updated it with pertinent information if needed.  Past Surgical History:   Procedure Laterality Date     ANESTHESIA OUT OF OR  MRI 3T N/A 3/7/2017    Procedure: ANESTHESIA PEDS SEDATION MRI 3T;  Surgeon: GENERIC ANESTHESIA PROVIDER;  Location: [facility] PEDS SEDATION      BACK SURGERY      c1 decompression and fusionx3, scoliosis with instrumentation x1     ELECTRORETINOGRAM Bilateral 12/10/2014    ERG ()     ENT SURGERY       ear tubes     EXAM UNDER ANESTHESIA EYE(S) Bilateral 12/10/2014    EUA ([place])     EXTRACTION(S) DENTAL N/A 12/10/2014    Procedure: EXTRACTION(S) DENTAL;  Surgeon: Hubert York DDS;  Location: [facility] OR     EYE SURGERY Bilateral     BMR 6mm ([place])      GI SURGERY      g tube     HC REPLACE GASTROSTOMY/CECOSTOMY TUBE PERCUTANEOUS N/A 12/10/2014    Procedure: REPLACE GASTROSTOMY TUBE, PERCUTANEOUS;  Surgeon: Phong Perea MD;  Location: [facility] OR     RECESSION RESECTION (REPAIR STRABISMUS) BILATERAL Bilateral 12/10/2014    BLR 11 ([place])     STRABISMUS SURGERY      () BMR 6 ([place])     STRABISMUS SURGERY      BLR 11 ([place]) -        Immunization History   Immunization Status:  up to date and documented    Prior to Admission Medications   Prior to Admission Medications   Prescriptions Last Dose Informant Patient Reported? Taking?   ARIPiprazole (ABILIFY) 1 MG/ML SOLN 2017 at AM  Yes Yes   Si.5 mg daily    D-Mannose POWD 2017 at Unknown time  Yes Yes   Si times daily 1 scoop - mixed with water   Docusate Sodium (ENEMEEZ MINI RE) 2017 at PM  Yes Yes   Sig: 3 times a week (MWF) to help with bowel movements   Nutritional Supplements (COMPLEAT PEDIATRIC PO) 2017 at Unknown time  Yes Yes   ORDER FOR DME 2017 at Unknown time  No Yes   Sig: Equipment being ordered: Other: chest strap for her tilt in space manual wheel chair  Treatment Diagnosis: For safe transportation to home secondary to poor sitting balance in upright. CDG type Ia, seizures, and G-tube dependence.   UNABLE TO FIND 2017 at PM  Yes Yes   Sig: MEDICATION NAME: Stress relax (magnesium citrate) 380  mg at bedtime   UNABLE TO FIND 2017 at PM  Yes Yes   Sig: MEDICATION NAME: Mood probiotic 1 capsule (3,000,000,000 CFUs) daily.   UNABLE TO FIND More than a month at Unknown time  Yes No   Sig: MEDICATION NAME: Rescue Remedy Herbal Supplement - Used as needed.   acetaminophen (TYLENOL) 160 MG/5ML solution Unknown at Unknown time  Yes No   Si mg (15 mL) by G-tube every 4 hours as needed   bacitracin ointment Unknown at Unknown time  No No   Sig: Apply topically 2 times daily as needed for wound care   calcium carbonate 1250 (500 CA) MG/5ML SUSP 2017 at PM  No Yes   Sig: Take 6 mLs (1,500 mg) by mouth 2 times daily (with meals)   Patient taking differently: 1,500 mg by Gastronomy tube route 2 times daily (with meals)    diazepam (VALIUM) 5 MG/ML solution More than a month at Unknown time  Yes No   Si.5 mg by Gastronomy tube route every hour as needed for seizures (Seizures lasting longer than 5 minutes)    levETIRAcetam (KEPPRA) 100 MG/ML solution 2017 at PM  No Yes   Sig: 10 mL by mouth  in the AM and 15 ml in the PM   Patient taking differently: 10 mL by mouth  in the AM and 15 ml in the PM   ondansetron (ZOFRAN) 4 MG/5ML solution More than a month at Unknown time  No No   Sig: Take 5 mLs (4 mg) by mouth every 6 hours as needed for nausea or vomiting   Patient taking differently: 4 mg by Gastronomy tube route every 6 hours as needed for nausea or vomiting    sertraline (ZOLOFT) 20 MG/ML (HIGH CONC) solution 2017 at AM  Yes Yes   Si mg by Gastronomy tube route daily    traZODone (DESYREL) 50 MG tablet 2017 at PM  Yes Yes   Sig: by Gastric Tube route At Bedtime      Facility-Administered Medications: None     Allergies   Allergies   Allergen Reactions     Ativan [Lorazepam] Other (See Comments)     Paradoxical reaction     Motrin [Ibuprofen] GI Disturbance     Pt is able to have motrin with food but not on an empty stomache.        Social History   Sherly has lived at the [facility] home for the past year. Has 3 siblings.     Family History   I have reviewed this patient's family history and updated it with pertinent information if needed.   Family History   Problem Relation Age of Onset     Glaucoma Mother      no medications or surg to date     Glaucoma Maternal Grandmother      s/p surg, decreased [facility]      Amblyopia No family hx of      Strabismus No family hx of        Review of Systems   The 10 point Review of Systems is negative other than noted in the HPI.    Physical Exam   Temp: 97.7  F (36.5  C) Temp src: Axillary BP: 101/67   Heart Rate: 107 Resp: 24 SpO2: 94 % O2 Device: None (Room air)    Vital Signs with Ranges  Temp:  [97.7  F (36.5  C)-99.1  F (37.3  C)] 97.7  F (36.5  C)  Heart Rate:  [107-108] 107  Resp:  [24] 24  BP: ()/(67-72) 101/67  SpO2:  [94 %-99 %] 94 %  80 lbs 0 oz    Constitutional: Appears content, laying in bed watching a movie, in no acute distress  Skin: no rashes  HEENT: normocephalic, atraumatic, no conjunctival injection or purulent discharge, EOM grossly intact  Neck: supple  Lymphatics: no LAD appreciated  Lungs: clear to auscultation bilaterally, no wheezing or ronchi, no increased work of breathing  Heart: regular rate and rhythm, no murmurs, normal pulses  Abdomen: soft, non-tender, non-distended, normoactive bowel sounds  Neurologic: developmentally delayed, moving all extremities, hypertonicity  Extremities: contractures at knees and elbows, decreased muscle bulk, distal extremity contractures    Data   Results for orders placed or performed during the hospital encounter of 08/24/17 (from the past 24 hour(s))   UA with Microscopic   Result Value Ref Range    Color Urine Yellow     Appearance Urine Slightly Cloudy     Glucose Urine Negative NEG^Negative mg/dL    Bilirubin Urine Negative NEG^Negative    Ketones Urine 40 (A) NEG^Negative mg/dL    Specific Gravity Urine 1.015 1.003 - 1.035    Blood Urine Negative NEG^Negative    pH Urine  7.5 (H) 5.0 - 7.0 pH    Protein Albumin Urine 10 (A) NEG^Negative mg/dL    Urobilinogen mg/dL Normal 0.0 - 2.0 mg/dL    Nitrite Urine Positive (A) NEG^Negative    Leukocyte Esterase Urine Large (A) NEG^Negative    Source Catheterized Urine     WBC Urine 31 (H) 0 - 2 /HPF    RBC Urine 1 0 - 2 /HPF    Bacteria Urine Moderate (A) NEG^Negative /HPF    Transitional Epi 1 0 - 1 /HPF   CBC with platelets differential   Result Value Ref Range    WBC 5.8 4.0 - 11.0 10e9/L    RBC Count 4.35 3.8 - 5.2 10e12/L    Hemoglobin 13.6 11.7 - 15.7 g/dL    Hematocrit 40.7 35.0 - 47.0 %    MCV 94 78 - 100 fl    MCH 31.3 26.5 - 33.0 pg    MCHC 33.4 31.5 - 36.5 g/dL    RDW 12.4 10.0 - 15.0 %    Platelet Count 114 (L) 150 - 450 10e9/L    Diff Method Automated Method     % Neutrophils 81.9 %    % Lymphocytes 8.5 %    % Monocytes 9.4 %    % Eosinophils 0.0 %    % Basophils 0.0 %    % Immature Granulocytes 0.2 %    Nucleated RBCs 0 0 /100    Absolute Neutrophil 4.7 1.6 - 8.3 10e9/L    Absolute Lymphocytes 0.5 (L) 0.8 - 5.3 10e9/L    Absolute Monocytes 0.5 0.0 - 1.3 10e9/L    Absolute Eosinophils 0.0 0.0 - 0.7 10e9/L    Absolute Basophils 0.0 0.0 - 0.2 10e9/L    Abs Immature Granulocytes 0.0 0 - 0.4 10e9/L    Absolute Nucleated RBC 0.0    Comprehensive metabolic panel   Result Value Ref Range    Sodium 143 133 - 144 mmol/L    Potassium 3.5 3.4 - 5.3 mmol/L    Chloride 109 94 - 109 mmol/L    Carbon Dioxide 25 20 - 32 mmol/L    Anion Gap 9 3 - 14 mmol/L    Glucose 83 70 - 99 mg/dL    Urea Nitrogen 13 7 - 30 mg/dL    Creatinine 0.23 (L) 0.52 - 1.04 mg/dL    GFR Estimate >90 >60 mL/min/1.7m2    GFR Estimate If Black >90 >60 mL/min/1.7m2    Calcium 8.4 (L) 8.5 - 10.1 mg/dL    Bilirubin Total 0.3 0.2 - 1.3 mg/dL    Albumin 3.3 (L) 3.4 - 5.0 g/dL    Protein Total 7.1 6.8 - 8.8 g/dL    Alkaline Phosphatase 88 40 - 150 U/L    ALT 26 0 - 50 U/L    AST 19 0 - 45 U/L   INR   Result Value Ref Range    INR 1.03 0.86 - 1.14   Partial thromboplastin time    Result Value Ref Range    PTT 30 22 - 37 sec   hCG qual urine POCT   Result Value Ref Range    HCG Qual Urine Negative neg    Internal QC OK Yes    Abdomen XR, 2 vw, flat and upright    Narrative    XR ABDOMEN 2 VW  8/24/2017 10:31 AM      HISTORY: vomiting    COMPARISON: 5/25/2016    FINDINGS: AP and left lateral decubitus views of the abdomen. There is  mild diffuse bowel gas distention with air-fluid levels, although  decreased distention from the comparison examination. There is a  moderate amount of rectal stool. Percutaneous gastrostomy tube  projects over the stomach. The lung bases are clear. Bone findings are  unchanged with chronic left hip dislocation, scoliosis status post  posterior spinal fusion, and diffuse demineralization.      Impression    IMPRESSION: Mild bowel gas distention and air-fluid levels with a  moderate amount of rectal stool. Bowel gas distention has decreased  from 5/25/2016.    CHERRIE OLIVEIRA MD

## 2017-08-24 NOTE — PROGRESS NOTES
Discharge Criteria - Outpatient/Observation goals to be met before discharge home:   1. No supplemental oxygen.   2. Tolerating G-tube feeds   3. Pain and nausea controlled on PO medications.    No supplemental oxygen needed, sats >98% on RA. Pedialyte drip at 60ml/hour, pt tolerating. No s/s of pain or nausea. Pt's current status does not currently meet discharge criteria.

## 2017-08-25 VITALS
DIASTOLIC BLOOD PRESSURE: 66 MMHG | RESPIRATION RATE: 20 BRPM | BODY MASS INDEX: 23.43 KG/M2 | WEIGHT: 80 LBS | TEMPERATURE: 97.6 F | SYSTOLIC BLOOD PRESSURE: 96 MMHG | OXYGEN SATURATION: 96 %

## 2017-08-25 LAB
ALBUMIN SERPL-MCNC: 3 G/DL (ref 3.4–5)
ANION GAP SERPL CALCULATED.3IONS-SCNC: 9 MMOL/L (ref 3–14)
BUN SERPL-MCNC: 9 MG/DL (ref 7–30)
CALCIUM SERPL-MCNC: 8.4 MG/DL (ref 8.5–10.1)
CHLORIDE SERPL-SCNC: 110 MMOL/L (ref 94–109)
CO2 SERPL-SCNC: 28 MMOL/L (ref 20–32)
CREAT SERPL-MCNC: 0.37 MG/DL (ref 0.52–1.04)
GFR SERPL CREATININE-BSD FRML MDRD: >90 ML/MIN/1.7M2
GLUCOSE SERPL-MCNC: 91 MG/DL (ref 70–99)
POTASSIUM SERPL-SCNC: 3.8 MMOL/L (ref 3.4–5.3)
SODIUM SERPL-SCNC: 147 MMOL/L (ref 133–144)

## 2017-08-25 PROCEDURE — 25000128 H RX IP 250 OP 636: Performed by: PEDIATRICS

## 2017-08-25 PROCEDURE — 82040 ASSAY OF SERUM ALBUMIN: CPT | Performed by: STUDENT IN AN ORGANIZED HEALTH CARE EDUCATION/TRAINING PROGRAM

## 2017-08-25 PROCEDURE — 96361 HYDRATE IV INFUSION ADD-ON: CPT

## 2017-08-25 PROCEDURE — G0378 HOSPITAL OBSERVATION PER HR: HCPCS

## 2017-08-25 PROCEDURE — 96376 TX/PRO/DX INJ SAME DRUG ADON: CPT

## 2017-08-25 PROCEDURE — 25800025 ZZH RX 258: Performed by: PEDIATRICS

## 2017-08-25 PROCEDURE — 25000132 ZZH RX MED GY IP 250 OP 250 PS 637: Performed by: PEDIATRICS

## 2017-08-25 PROCEDURE — 36415 COLL VENOUS BLD VENIPUNCTURE: CPT | Performed by: STUDENT IN AN ORGANIZED HEALTH CARE EDUCATION/TRAINING PROGRAM

## 2017-08-25 PROCEDURE — 80048 BASIC METABOLIC PNL TOTAL CA: CPT | Performed by: STUDENT IN AN ORGANIZED HEALTH CARE EDUCATION/TRAINING PROGRAM

## 2017-08-25 RX ORDER — AMOXICILLIN AND CLAVULANATE POTASSIUM 400; 57 MG/5ML; MG/5ML
875 POWDER, FOR SUSPENSION ORAL 2 TIMES DAILY
Qty: 218 ML | Refills: 0 | Status: SHIPPED | OUTPATIENT
Start: 2017-08-25 | End: 2017-08-25

## 2017-08-25 RX ORDER — AMOXICILLIN AND CLAVULANATE POTASSIUM 600; 42.9 MG/5ML; MG/5ML
876 POWDER, FOR SUSPENSION ORAL 2 TIMES DAILY
Qty: 146 ML | Refills: 0 | Status: SHIPPED | OUTPATIENT
Start: 2017-08-25 | End: 2017-09-04

## 2017-08-25 RX ADMIN — Medication 1 CAPSULE: at 08:02

## 2017-08-25 RX ADMIN — LEVETIRACETAM 1000 MG: 100 SOLUTION ORAL at 08:02

## 2017-08-25 RX ADMIN — DEXTROSE AND SODIUM CHLORIDE 38 ML/HR: 10; .45 INJECTION, SOLUTION INTRAVENOUS at 09:49

## 2017-08-25 RX ADMIN — ARIPIPRAZOLE 7.5 MG: 1 SOLUTION ORAL at 08:02

## 2017-08-25 RX ADMIN — SERTRALINE HYDROCHLORIDE 200 MG: 20 SOLUTION ORAL at 08:58

## 2017-08-25 RX ADMIN — CALCIUM CARBONATE 1500 MG: 1250 SUSPENSION ORAL at 08:02

## 2017-08-25 RX ADMIN — CEFTRIAXONE 2 G: 2 INJECTION, POWDER, FOR SOLUTION INTRAMUSCULAR; INTRAVENOUS at 11:34

## 2017-08-25 NOTE — PLAN OF CARE
Problem: Goal Outcome Summary  Goal: Goal Outcome Summary  Outcome: Improving  VSS. Good UOP, stooled. No evidence of pain/discomfort. Tolerating feeds at goal strength. Mom at the bedside, attentive to patient. Will continue to monitor and notify MD of changes.

## 2017-08-25 NOTE — PROGRESS NOTES
Pt titrated to full-strength feeds at 0400. Meeting other discharge goals. Will assess feeding tolerance in the morning and determine readiness for discharge.

## 2017-08-25 NOTE — PROGRESS NOTES
Discharge Criteria - Outpatient/Observation goals to be met before discharge home:   1. No supplemental oxygen.   2. Tolerating G-tube feeds   3. Pain and nausea controlled on PO medications..    No supplemental oxygen needed, on RA. 10% formula feeds running, pt tolerating. No s/s of pain or nausea. Pt does not meet discharge criteria at this time.

## 2017-08-25 NOTE — PLAN OF CARE
Problem: Goal Outcome Summary  Goal: Goal Outcome Summary  Outcome: Adequate for Discharge Date Met:  08/25/17  Afebrile. Other VSS. No signs of pain. 1 episode of gagging and report of emesis-per mom mainly mucous. Feeds stopped and Pedialyte started at 80 ml/hr. Caregiver from Group Home present. Discharge instructions reviewed with Mom over the phone who verbalized understanding. Witnessed by charge RN. Mom gave permission to discharge patient to group home.     Adequate for discharge.

## 2017-08-25 NOTE — CONSULTS
Genetics / Metabolism Consultation     Date of Admission:  8/24/2017    Assessment & Plan   Sherly Ramires is a 22 year old female with PMM2 deficiency (CDG1a) who presents with vomiting overnight with evaluation findings consistent with her having a UTI.  She has had previous similar episodes that ultimately resulted in several day to longer hospitalizations due to the development of ileus. Fortunately, this particular episode has been managed earlier or has not resulted in significant ileus.The primary rationale for admission was to observe for possible ileus and to minimize the risk of continuing vomiting resulting in dehydration. Fortunately, no further vomiting occurred and resumption of feedings was accomplished without further complication. At my suggestion, we very carefully but immediately restarted feedings to good effect.    At this point, with treatment of her UTI underway tolerance of enteral feedings accomplished, we are ready to plan for discharge.    Her mom will work with you to develop a feeding plan that can be incorporated in her discharge so that her group home providers can initiate feeding.    Ana Granda M.D.  Professor and Director   Division of Genetics and Metabolism  Department of Pediatrics  [facility]  Pager # [phone]    Reason for Consult   Reason for consult: I was asked by Chelsey Hager, to evaluate this patient for concurrent management of her underlying metabolic disorder during this early phase of treatment for a UTI.    Primary Care Physician   WARNER HAMLIN    Chief Complaint    Vomiting    History is obtained from the electronic health record and from mother    History of Present Illness   Sherly Ramires is a 22 year old female who presents with a history of vomiting overnight.  She typically receives gastrostomy feedings but began vomiting after starting feeds.  Her feedings were held and her care providers noted discomfort.  Her mom  brought her to the ED.  In the ED she was noted to have an abnormal UA suggesting a urinary tract infection, prompting admission as previous similar episodes have resulted in ileus. Fortunately, enteral feedings were restarted with Pedialyte without vomiting and advanced to her usual formula.     Past Medical History    Well known to us as she was diagnosed in infancy with this rare inherited metabolic disease.     Past Surgical History    No significant surgeries since she was last seen in clinic  Past Surgical History:   Procedure Laterality Date     ANESTHESIA OUT OF OR MRI 3T N/A 3/7/2017    Procedure: ANESTHESIA PEDS SEDATION MRI 3T;  Surgeon: GENERIC ANESTHESIA PROVIDER;  Location: [facility] PEDS SEDATION      BACK SURGERY      c1 decompression and fusionx3, scoliosis with instrumentation x1     ELECTRORETINOGRAM Bilateral 12/10/2014    ERG ()     ENT SURGERY       ear tubes     EXAM UNDER ANESTHESIA EYE(S) Bilateral 12/10/2014    EUA ([place])     EXTRACTION(S) DENTAL N/A 12/10/2014    Procedure: EXTRACTION(S) DENTAL;  Surgeon: Hubert York DDS;  Location: [facility] OR     EYE SURGERY Bilateral     BMR 6mm ([place])      GI SURGERY      g tube     HC REPLACE GASTROSTOMY/CECOSTOMY TUBE PERCUTANEOUS N/A 12/10/2014    Procedure: REPLACE GASTROSTOMY TUBE, PERCUTANEOUS;  Surgeon: Phong Perea MD;  Location: [facility] OR     RECESSION RESECTION (REPAIR STRABISMUS) BILATERAL Bilateral 12/10/2014    BLR 11 ([place])     STRABISMUS SURGERY      () BMR 6 ([place])     STRABISMUS SURGERY      BLR 11 ([place]) -      Prior to Admission Medications   Prior to Admission Medications   Prescriptions Last Dose Informant Patient Reported? Taking?   ARIPiprazole (ABILIFY) 1 MG/ML SOLN 2017 at AM  Yes Yes   Si.5 mg daily    D-Mannose POWD 2017 at Unknown time  Yes Yes   Si times daily 1 scoop - mixed with water   Docusate Sodium (ENEMEEZ MINI RE) 2017 at PM  Yes Yes   Sig: 3 times a week (MWF)  to help with bowel movements   Nutritional Supplements (COMPLEAT PEDIATRIC PO) 2017 at Unknown time  Yes Yes   ORDER FOR DME 2017 at Unknown time  No Yes   Sig: Equipment being ordered: Other: chest strap for her tilt in space manual wheel chair  Treatment Diagnosis: For safe transportation to home secondary to poor sitting balance in upright. CDG type Ia, seizures, and G-tube dependence.   UNABLE TO FIND 2017 at PM  Yes Yes   Sig: MEDICATION NAME: Stress relax (magnesium citrate) 380 mg at bedtime   UNABLE TO FIND 2017 at PM  Yes Yes   Sig: MEDICATION NAME: Mood probiotic 1 capsule (3,000,000,000 CFUs) daily.   UNABLE TO FIND More than a month at Unknown time  Yes No   Sig: MEDICATION NAME: Rescue Remedy Herbal Supplement - Used as needed.   acetaminophen (TYLENOL) 160 MG/5ML solution Unknown at Unknown time  Yes No   Si mg (15 mL) by G-tube every 4 hours as needed   bacitracin ointment Unknown at Unknown time  No No   Sig: Apply topically 2 times daily as needed for wound care   calcium carbonate 1250 (500 CA) MG/5ML SUSP 2017 at PM  No Yes   Sig: Take 6 mLs (1,500 mg) by mouth 2 times daily (with meals)   Patient taking differently: 1,500 mg by Gastronomy tube route 2 times daily (with meals)    diazepam (VALIUM) 5 MG/ML solution More than a month at Unknown time  Yes No   Si.5 mg by Gastronomy tube route every hour as needed for seizures (Seizures lasting longer than 5 minutes)    levETIRAcetam (KEPPRA) 100 MG/ML solution 2017 at PM  No Yes   Sig: 10 mL by mouth  in the AM and 15 ml in the PM   Patient taking differently: 10 mL by mouth  in the AM and 15 ml in the PM   ondansetron (ZOFRAN) 4 MG/5ML solution More than a month at Unknown time  No No   Sig: Take 5 mLs (4 mg) by mouth every 6 hours as needed for nausea or vomiting   Patient taking differently: 4 mg by Gastronomy tube route every 6 hours as needed for nausea or vomiting    sertraline (ZOLOFT) 20 MG/ML (HIGH  CONC) solution 2017 at AM  Yes Yes   Si mg by Gastronomy tube route daily    traZODone (DESYREL) 50 MG tablet 2017 at PM  Yes Yes   Sig: by Gastric Tube route At Bedtime      Facility-Administered Medications: None     Allergies   Allergies   Allergen Reactions     Ativan [Lorazepam] Other (See Comments)     Paradoxical reaction     Motrin [Ibuprofen] GI Disturbance     Pt is able to have motrin with food but not on an empty stomache.      Social History   I have updated and reviewed the following Social History Narrative:   Pediatric History   Patient Guardian Status     Mother:  DELIO WHITFIELD (mom/guardian)     Father:  RYAN WHITFIELD (dad/guardian)    Anna currently lives in a group home setting. Her mom and dad remained very involved in her cares and she spends many weekends with them. Anna has very significant developmental disability.    Family History    Anna has a twin brother who did not inherit the genes that cause this condition. She is the only affected family member.    Review of Systems   General:  generally small in size.  Skin: negative  Eyes: as above,  She has continuing follow-up through the pediatric ophthalmologist; this will likely need to change in the near future.  Ears/Nose/Throat: negative  Respiratory: negative  Cardiovascular: negative  Gastrointestinal:  Has a G-tube in place. Is primarily fed using her G-tube. Sometimes, when she is particularly stressed she has a dysmotile stomach and needs jejunal tube placement. Currently, she is tolerating gastric feedings without problems  Genitourinary: negative  Musculoskeletal:  Has had two leg fractures. Has a complex spinal abnormality related to her condition. She has osteopenia. Has dislocated left hip  Neurologic:  Has had seizures. See above  Psychiatric:  Has significant anxiety. Has been working with integrative medicine to develop a profile of medicines that minimize her  symptoms.  Hematologic/Lymphatic/Immunologic: negative; At risk for coagulopathy  Endocrine:  Has previously been treated with thyroid hormone but this is currently not required. She has primary amenorrhea. She has recurrent hypoglycemia, as noted above. She is followed by Dr. Lennon.    Physical Exam   Temp: 97.6  F (36.4  C) Temp src: Axillary BP: 108/72   Heart Rate: 76 Resp: 20 SpO2: 98 % O2 Device: None (Room air)    Vital Signs with Ranges  Temp:  [97.1  F (36.2  C)-99.1  F (37.3  C)] 97.6  F (36.4  C)  Heart Rate:  [] 76  Resp:  [18-24] 20  BP: ()/(51-73) 108/72  SpO2:  [94 %-99 %] 98 %  80 lbs 0 oz  Constitutional: This was a tiny, pleasant female who was playing with toys this morning     Head and Neck:  She had hair of normal texture and distribution and her head was proportionate in appearance.      Eyes:  The pupils were equal, round, and reacted to light.   The conjunctivae were clear.    Ears:  Her ears were normal in architecture and placement.    Nose: The nose was clear.     Mouth and Throat: deferred     Respiratory: The chest was clear to auscultation. She has significant distortion of her spine and chest wall..     Cardiovascular:  On examination of the heart, the rhythm was regular and there was no murmur.  The peripheral pulses were normal.     Gastrointestinal: The abdomen was soft with active bowel sounds  There was no hepatosplenomegaly to the extent that I could undertake such palpation..     : I deferred a  examination.   Musculoskeletal: There were contractures of the knees, ankles, elbows, and shoulders. She has markedly diminished muscular volume and bulk.    Neurologic: The neurologic exam showed exotropia. I did not do a complete cranial nerve exam. It was difficult to elicit deep tendon reflexes with her contractures. She had apparently normal sensation.  Integument: The skin was normal with no rashes or unusual pigmentation. The dentition was crowded. The nails were  normal in architecture.    Data   Results for orders placed or performed during the hospital encounter of 08/24/17 (from the past 24 hour(s))   Basic metabolic panel   Result Value Ref Range    Sodium 147 (H) 133 - 144 mmol/L    Potassium 3.8 3.4 - 5.3 mmol/L    Chloride 110 (H) 94 - 109 mmol/L    Carbon Dioxide 28 20 - 32 mmol/L    Anion Gap 9 3 - 14 mmol/L    Glucose 91 70 - 99 mg/dL    Urea Nitrogen 9 7 - 30 mg/dL    Creatinine 0.37 (L) 0.52 - 1.04 mg/dL    GFR Estimate >90 >60 mL/min/1.7m2    GFR Estimate If Black >90 >60 mL/min/1.7m2    Calcium 8.4 (L) 8.5 - 10.1 mg/dL   Albumin level   Result Value Ref Range    Albumin 3.0 (L) 3.4 - 5.0 g/dL

## 2017-08-25 NOTE — DISCHARGE SUMMARY
[facility] [place]    Discharge Summary  Pediatric Gastroenterology    Date of Admission:  8/24/2017  Date of Discharge:  8/25/2017  Discharging Provider: Chelsey Hager MD    Discharge Diagnoses   Urinary tract infection, site unspecified    History of Present Illness   Sherly Ramires is a 22 year old female with a complex medical history of congenital disorder of glycosylation, global developmental delay, and seizure disorder who presents with one day of vomiting and UTI with multiple prior admissions for UTI-associated adynamic ileus. She is being admitted for close monitoring and IVF.    Hospital Course   Sherly Ramires was admitted on 8/24/2017.  The following problems were addressed during her hospitalization:    FEN/GI  Emesis: Emesis likely caused by adynamic ileus related to her UTI as she has had episodes like this before. She was maintained on IVF of D10 + 1/2NS at 38 ml/hr due to her risk of 3rd spacing. Her G-tube feeds were also restarted with pedialyte at 20ml/hr and increased by 10 ml/hr up to her goal of 76mL/hr then her feeds were changed to Compleat Pediatric 10% formula/90% water and increased to formula by 10% every 2 hours until she reached her goal of 50% formula/50% water. She tolerated the G-tube feeds well and had no nausea or vomiting from time of admission.     Congenital disorder of glycosylation, Type Ia: Per Sherly's emergency letter, she is at risk of hypoalbuminemia and third spacing, albumin at admission was at her low normal. She was maintained on IVF of D10 + 1/2NS at 38 ml/hr due to her risk of 3rd spacing. IVF were able to be discontinued due to ability to tolerate pedialyte and formula feeds.      ID  UTI: Sherly was found to have a dirty UA in the ED with a culture positive for lactose fermenting gram negative rods. She was started on ceftriaxone 2 grams daily and was discharged with a 10 day course of Augmentin. Overnight, she had low  urine output and was bladder scanned which showed an insignificant amount of fluid. She later had a mixed urine and stool output.    Deb Asher MD  Pediatric Resident, PL-1    Sherly Ramires has been evaluated by me prior to discharge.  A comprehensive review of systems was performed and was negative other than as noted in the above sections.  I reviewed today's vital signs, medications, labs and imaging results.  I reviewed the discharge plan with the resident and agree with the final assessment and plan in this note. Appreciate Metabolism assistance during admission.   Tolerating GT hydration prior to discharge. Mother and home nursing staff comfortable gradually increasing concentration of feeds. Will call for any concerns or if unable to advance.   I personally spent 35 minutes on discharge activities.    Chelsey Hager MD  Pediatric Gastroenterology  [facility]      Significant Results and Procedures   Urine culture: lactose fermenting gram negative rods  Albumin 3.0  Cr 0.37    Pending Results   These results will be followed up by GI and/or metabolic team  Unresulted Labs Ordered in the Past 30 Days of this Admission     Date and Time Order Name Status Description    8/24/2017 0922 Urine Culture Aerobic Bacterial Preliminary         Code Status   Full Code    Primary Care Physician   WARNER HAMLIN    Physical Exam   Temp: 97.6  F (36.4  C) Temp src: Axillary BP: 96/66   Heart Rate: 87 Resp: 20 SpO2: 96 % O2 Device: None (Room air)    Vitals:    08/24/17 0907   Weight: 36.3 kg (80 lb)     Vital Signs with Ranges  Temp:  [97.1  F (36.2  C)-97.9  F (36.6  C)] 97.6  F (36.4  C)  Heart Rate:  [] 87  Resp:  [18-24] 20  BP: ()/(51-73) 96/66  SpO2:  [95 %-98 %] 96 %  I/O last 3 completed shifts:  In: 1956 [I.V.:844]  Out: 631 [Other:631]    Constitutional: Awake female in no apparent distress, global developmental delay  HEENT: NCAT, MMM  Respiratory: CTAB, no wheezing  or crackles  Cardiovascular: RRR, normal S1/S2, no murmurs appreciated  GI: Soft, nontender, nondistended, G-tube in place  Skin: G-tube site c/d/i with no erythema  Musculoskeletal: Contractures at distal extremities, hypertonicity apparent  Neurologic: Developmentally delayed, moving upper extremities only on exam with limited range      Discharge Disposition   Discharged to group home  Condition at discharge: Stable    Consultations This Hospital Stay   NUTRITION SERVICES PEDS IP CONSULT    Discharge Orders   No discharge procedures on file.  Discharge Medications   Current Discharge Medication List      START taking these medications    Details   amoxicillin-clavulanate (AUGMENTIN) 400-57 MG/5ML suspension Take 10.9 mLs (875 mg) by mouth 2 times daily  Qty: 218 mL, Refills: 0    Associated Diagnoses: Urinary tract infection, site unspecified         CONTINUE these medications which have NOT CHANGED    Details   traZODone (DESYREL) 50 MG tablet by Gastric Tube route At Bedtime      levETIRAcetam (KEPPRA) 100 MG/ML solution 10 mL by mouth  in the AM and 15 ml in the PM  Qty: 750 mL, Refills: 11    Associated Diagnoses: Complex partial seizure evolving to generalized seizure (H)      sertraline (ZOLOFT) 20 MG/ML (HIGH CONC) solution 200 mg by Gastronomy tube route daily       D-Mannose POWD 2 times daily 1 scoop - mixed with water      Nutritional Supplements (COMPLEAT PEDIATRIC PO)       !! UNABLE TO FIND MEDICATION NAME: Stress relax (magnesium citrate) 380 mg at bedtime      !! UNABLE TO FIND MEDICATION NAME: Mood probiotic 1 capsule (3,000,000,000 CFUs) daily.      calcium carbonate 1250 (500 CA) MG/5ML SUSP Take 6 mLs (1,500 mg) by mouth 2 times daily (with meals)  Qty: 450 mL, Refills: 12    Associated Diagnoses: Congenital disorder of glycosylation (CDG) (H); Osteoporosis      ARIPiprazole (ABILIFY) 1 MG/ML SOLN 7.5 mg daily       ORDER FOR DME Equipment being ordered: Other: chest strap for her tilt in  space manual wheel chair  Treatment Diagnosis: For safe transportation to home secondary to poor sitting balance in upright. CDG type Ia, seizures, and G-tube dependence.  Qty: 1 Device, Refills: 0    Associated Diagnoses: Viral gastroenteritis; Dehydration; Acute respiratory failure with hypoxia (H); Congenital disorder of glycosylation (CDG) (H)      Docusate Sodium (ENEMEEZ MINI RE) 3 times a week (MWF) to help with bowel movements      !! UNABLE TO FIND MEDICATION NAME: Rescue Remedy Herbal Supplement - Used as needed.      ondansetron (ZOFRAN) 4 MG/5ML solution Take 5 mLs (4 mg) by mouth every 6 hours as needed for nausea or vomiting  Qty: 90 mL, Refills: 0    Associated Diagnoses: Viral gastroenteritis      bacitracin ointment Apply topically 2 times daily as needed for wound care  Qty: 14 g, Refills: 0    Associated Diagnoses: Skin irritation      acetaminophen (TYLENOL) 160 MG/5ML solution 480 mg (15 mL) by G-tube every 4 hours as needed      diazepam (VALIUM) 5 MG/ML solution 7.5 mg by Gastronomy tube route every hour as needed for seizures (Seizures lasting longer than 5 minutes)        !! - Potential duplicate medications found. Please discuss with provider.        Allergies   Allergies   Allergen Reactions     Ativan [Lorazepam] Other (See Comments)     Paradoxical reaction     Motrin [Ibuprofen] GI Disturbance     Pt is able to have motrin with food but not on an empty stomache.      Data   Most Recent 3 CBC's:  Recent Labs   Lab Test  08/24/17   1005  05/30/16   0623  05/29/16   0618   WBC  5.8  5.7  5.2   HGB  13.6  10.8*  11.3*   MCV  94  97  96   PLT  114*  105*  107*      Most Recent 3 BMP's:  Recent Labs   Lab Test  08/25/17   0620  08/24/17   1005  06/23/16   1339  05/30/16   0623   NA  147*  143   --   142   POTASSIUM  3.8  3.5   --   4.1   CHLORIDE  110*  109   --   111*   CO2  28  25   --   28   BUN  9  13   --   13   CR  0.37*  0.23*   --   0.32*   ANIONGAP  9  9   --   3   LINDA  8.4*  8.4*   9.8  8.0*   GLC  91  83   --   89     Most Recent 2 LFT's:  Recent Labs   Lab Test  08/24/17   1005  05/30/16   0623   AST  19  31   ALT  26  33   ALKPHOS  88  120   BILITOTAL  0.3  0.2   Most Recent 6 Bacteria Isolates From Any Culture (See EPIC Reports for Culture Details):  Recent Labs   Lab Test  08/24/17   0954  06/23/16   1430  06/08/16   0930  05/24/16   1235  02/26/15   1855  02/26/15   1845   CULT  50,000 to 100,000 colonies/mL  Lactose fermenting gram negative rods  *  Culture in progress  >100,000 colonies/mL Escherichia coli ESBL ESBL (extended beta lactamase)   producing organisms require contact precautions.  *  >100,000 colonies/mL Escherichia coli ESBL ESBL (extended beta lactamase)   producing organisms require contact precautions.  *  >100,000 colonies/mL Escherichia coli*  No growth  No growth

## 2017-08-25 NOTE — PLAN OF CARE
Problem: Goal Outcome Summary  Goal: Goal Outcome Summary  Outcome: No Change  VSS. Afebrile. No c/o of pain. Awake and oriented. Cardiac and respiratory stable. No nausea or emesis. Transitioned to diluted formula feeds and slowly increasing concentration. Tolerating well so far. Has not voided since 1000 today. MD notified. Pt bladder scanned x2, 35ml detected both times. MD notified and plan is to keep assessing hydration status. No evidence of third spacing. No stool. Mom at bedside and updated on POC. Hourly rounding completed.     Discharge Criteria - Outpatient/Observation goals to be met before discharge home:   1. No supplemental oxygen.   2. Tolerating G-tube feeds   3. Pain and nausea controlled on PO medications.     Supplemental oxygen not required, sats good on RA. Tolerating partial strength feeds. No c/o pain or nausea. Pt does not meet discharge criteria at this time.

## 2017-08-25 NOTE — PHARMACY - DISCHARGE MEDICATION RECONCILIATION AND EDUCATION
Discharge medication review for this patient completed.  Pharmacist provided medication teaching for discharge with a focus on new medications/dose changes.  The discharge medication list was reviewed with Mom and the following points were discussed, as applicable: Name, description, purpose, dose/strength, duration of medications, measurement of liquid medications, strategies for giving medications to children, special storage requirements, common side effects, when to call MD and safe disposal of unused medications.    Mom was engaged during teaching and verbalized understanding.    Did not have medications in hand during teach due to filling in pharmacy.    The following medications were discussed:  Current Discharge Medication List      START taking these medications    Details   amoxicillin-clavulanate (AUGMENTIN-ES) 600-42.9 MG/5ML suspension Take 7.3 mLs (876 mg) by mouth 2 times daily for 10 days  Qty: 146 mL, Refills: 0    Associated Diagnoses: Urinary tract infection, site unspecified         CONTINUE these medications which have NOT CHANGED    Details   traZODone (DESYREL) 50 MG tablet by Gastric Tube route At Bedtime      levETIRAcetam (KEPPRA) 100 MG/ML solution 10 mL by mouth  in the AM and 15 ml in the PM  Qty: 750 mL, Refills: 11    Associated Diagnoses: Complex partial seizure evolving to generalized seizure (H)      sertraline (ZOLOFT) 20 MG/ML (HIGH CONC) solution 200 mg by Gastronomy tube route daily       D-Mannose POWD 2 times daily 1 scoop - mixed with water      Nutritional Supplements (COMPLEAT PEDIATRIC PO)       !! UNABLE TO FIND MEDICATION NAME: Stress relax (magnesium citrate) 380 mg at bedtime      !! UNABLE TO FIND MEDICATION NAME: Mood probiotic 1 capsule (3,000,000,000 CFUs) daily.      calcium carbonate 1250 (500 CA) MG/5ML SUSP Take 6 mLs (1,500 mg) by mouth 2 times daily (with meals)  Qty: 450 mL, Refills: 12    Associated Diagnoses: Congenital disorder of glycosylation (CDG) (H);  Osteoporosis      ARIPiprazole (ABILIFY) 1 MG/ML SOLN 7.5 mg daily       ORDER FOR DME Equipment being ordered: Other: chest strap for her tilt in space manual wheel chair  Treatment Diagnosis: For safe transportation to home secondary to poor sitting balance in upright. CDG type Ia, seizures, and G-tube dependence.  Qty: 1 Device, Refills: 0    Associated Diagnoses: Viral gastroenteritis; Dehydration; Acute respiratory failure with hypoxia (H); Congenital disorder of glycosylation (CDG) (H)      Docusate Sodium (ENEMEEZ MINI RE) 3 times a week (MWF) to help with bowel movements      !! UNABLE TO FIND MEDICATION NAME: Rescue Remedy Herbal Supplement - Used as needed.      ondansetron (ZOFRAN) 4 MG/5ML solution Take 5 mLs (4 mg) by mouth every 6 hours as needed for nausea or vomiting  Qty: 90 mL, Refills: 0    Associated Diagnoses: Viral gastroenteritis      bacitracin ointment Apply topically 2 times daily as needed for wound care  Qty: 14 g, Refills: 0    Associated Diagnoses: Skin irritation      acetaminophen (TYLENOL) 160 MG/5ML solution 480 mg (15 mL) by G-tube every 4 hours as needed      diazepam (VALIUM) 5 MG/ML solution 7.5 mg by Gastronomy tube route every hour as needed for seizures (Seizures lasting longer than 5 minutes)        !! - Potential duplicate medications found. Please discuss with provider.          I spent approximately 10 minutes in patient's room doing discharge medication teaching.

## 2017-08-26 LAB
BACTERIA SPEC CULT: ABNORMAL
SPECIMEN SOURCE: ABNORMAL

## 2017-08-29 ENCOUNTER — HOSPITAL ENCOUNTER (OUTPATIENT)
Facility: CLINIC | Age: 22
Discharge: HOME | End: 2017-08-29
Attending: PEDIATRICS | Admitting: PEDIATRICS
Payer: COMMERCIAL

## 2017-08-29 ENCOUNTER — APPOINTMENT (OUTPATIENT)
Dept: INTERVENTIONAL RADIOLOGY/VASCULAR | Facility: CLINIC | Age: 22
End: 2017-08-29
Attending: PEDIATRICS
Payer: COMMERCIAL

## 2017-08-29 DIAGNOSIS — T45.2X1A VITAMIN D TOXICITY, ACCIDENTAL OR UNINTENTIONAL, INITIAL ENCOUNTER: ICD-10-CM

## 2017-08-29 DIAGNOSIS — E74.89 CONGENITAL DISORDER OF GLYCOSYLATION ASSOCIATED WITH MUTATION IN PMM2 GENE (H): ICD-10-CM

## 2017-08-29 DIAGNOSIS — E74.89 CONGENITAL DISORDER OF GLYCOSYLATION ASSOCIATED WITH MUTATION IN PMM2 GENE (H): Primary | ICD-10-CM

## 2017-08-29 PROCEDURE — 27210904 ZZH KIT CR6

## 2017-08-29 PROCEDURE — 25000128 H RX IP 250 OP 636: Performed by: RADIOLOGY

## 2017-08-29 PROCEDURE — 25000125 ZZHC RX 250: Performed by: RADIOLOGY

## 2017-08-29 PROCEDURE — 49446 CHANGE G-TUBE TO G-J PERC: CPT

## 2017-08-29 PROCEDURE — C1769 GUIDE WIRE: HCPCS

## 2017-08-29 PROCEDURE — 27210916 ZZ H TUBE GASTRO CR5

## 2017-08-29 RX ORDER — IOPAMIDOL 612 MG/ML
15 INJECTION, SOLUTION INTRATHECAL ONCE
Status: COMPLETED | OUTPATIENT
Start: 2017-08-29 | End: 2017-08-29

## 2017-08-29 RX ADMIN — LIDOCAINE HYDROCHLORIDE 10 ML: 20 JELLY TOPICAL at 12:55

## 2017-08-29 RX ADMIN — IOPAMIDOL 16 ML: 612 INJECTION, SOLUTION INTRATHECAL at 12:56

## 2017-08-30 ENCOUNTER — TELEPHONE (OUTPATIENT)
Dept: GASTROENTEROLOGY | Facility: CLINIC | Age: 22
End: 2017-08-30

## 2017-08-30 NOTE — TELEPHONE ENCOUNTER
Rec'd GJ tube yesterday for ongoing vomiting. Venting G port with ongoing gagging and a small amount of vomiting, moderate gastric returns. Gagging seems to be the only complaint, but seems to be worsening since 8/28/17. Usually gets 2350 ml of formula plus pedialyte. Mom thinks Sherly rec'd about 80% of that volume yesterday; wonders if we should try pedialyte and then slowly advancing back to her full-strength formula. We would bring her to the ED if unable to get any formula going after 8 hours. Labs yesterday were drawn after about a 5 hour period without any intake while awaiting GJ tube conversion, and glucose was stable. Mom also expresses concern about not being [facility]. Discussed with Erma Hager and Dolores and they agree the plan is safe. Dr. Hager stated that she would admit Sherly to the GI service if needed, as she is on call.

## 2017-09-13 ENCOUNTER — CARE COORDINATION (OUTPATIENT)
Dept: GASTROENTEROLOGY | Facility: CLINIC | Age: 22
End: 2017-09-13

## 2017-09-13 NOTE — PROGRESS NOTES
Group home is calling to ask for tube feeding orders for 250ml of formula plus 200 ml water at 400 ml/hr up to 5 times per day, depending on oral intake, plus 250 ml formula plus 300 ml water dripped overnight at 70 ml per hour or as tolerated. Also wants order to give all meds via J tube. Dr. Hager willing to write this order for the next 6 months. When Sherly reaches age 23, Peds GI will not be able to continue to follow Sherly, and mom is aware.

## 2017-09-26 DIAGNOSIS — E74.89 CONGENITAL DISORDER OF GLYCOSYLATION ASSOCIATED WITH MUTATION IN PMM2 GENE (H): Primary | ICD-10-CM

## 2017-10-11 ENCOUNTER — TRANSFERRED RECORDS (OUTPATIENT)
Dept: HEALTH INFORMATION MANAGEMENT | Facility: CLINIC | Age: 22
End: 2017-10-11

## 2017-10-25 ENCOUNTER — OFFICE VISIT (OUTPATIENT)
Dept: NEUROLOGY | Facility: CLINIC | Age: 22
End: 2017-10-25

## 2017-10-25 DIAGNOSIS — G40.209 COMPLEX PARTIAL SEIZURE EVOLVING TO GENERALIZED SEIZURE (H): ICD-10-CM

## 2017-10-25 RX ORDER — LEVETIRACETAM 100 MG/ML
SOLUTION ORAL
Qty: 750 ML | Refills: 11 | Status: SHIPPED | OUTPATIENT
Start: 2017-10-25 | End: 2018-05-16

## 2017-10-25 RX ORDER — TRAZODONE HYDROCHLORIDE 50 MG/1
TABLET, FILM COATED ORAL
COMMUNITY
Start: 2017-01-04

## 2017-10-25 NOTE — PROGRESS NOTES
[facility]  Epilepsy Clinic:  RETURN VISIT    HISTORY:  Ms. 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      The patient reportedly has not had any type of seizure following the most recent visit to this clinic on 04/19/2017.  She has continued to receive levetiracetam by gastrostomy without apparent difficulty.      Ictal semiology-history:   The patient has had essentially 3 types of seizures in her lifetime, which have been responsive to levetiracetam in the case of the most recent seizures.  These essentially occurred as generalized atonic seizures in early childhood and later as complex partial seizures and generalized tonic-clonic seizures.      The patient had generalized atonic seizures which occurred approximately 12 times, usually in association with a febrile illness or other physiological stressors, between the ages of 3 and 6 years.  Her mother was the primary witnesses of these events and noted that the patient suddenly became unresponsive, usually while lying in her crib and when she moves the patient's limbs, she noted generalized flaccidity.  She was told that these were generalized atonic seizures and the patient was given rectal Diastat to prevent a cluster of these seizures.  She did not receive chronic antiepileptic drug therapy at this time.  The patient had essentially no seizures that were definitely witnessed between the ages of 6 and 16 years.      At 16 years of age, the patient had her first generalized tonic-clonic seizure witnessed by her mother.  She had 3 of these on the day of onset, a single grand mal seizure later that year and then a single grand mal seizure about 1 year after the first seizures.  All of these were associated with sleep deprivation.  Her mother witnessed her to suddenly extend her limbs stiffly with truncal extension  followed by generalized rhythmic jerking for a minute or 2.  She did not have tongue biting.      It appears that over several years after the grand mal seizures began, there was a question with various care providers of brief staring spells.  Her mother witnessed a definite staring spell with essentially no movement and no responsiveness, but with diffuse stiffness for a minute or 2.  This occurred in , in association with sleep deprivation.  Subsequently, no one has seen any definite staring spell seizures.      The patient reportedly has no other type of paroxysmal behavioral alterations.     Epilepsy-seizure predispositions:   With early developmental delay, the patient was evaluated and eventually diagnosed with congenital disorder of glycosylation type 1.  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  She has had slow language development and has learned to communicate by producing single linguistic sound sequences which are not complete intelligible words, but are accompanied by gesturing to supplement the vocalization.  In this manner the patient is able to make a number of requests of people who know her well.  Her mother thinks that she has much greater comprehension of speech compared with her own speech production.      The patient has no family history of epilepsy or seizures.  She has no history of gestational or  injury, febrile convulsions, developmental delay, stroke, meningitis, encephalitis, significant head injury, or other epileptic predispositions than CDG type 1a.     Laboratory evaluations:   The patient has had brain imaging and electroencephalography at a number of different clinics in [facility] and [place].  The most recent brain MRI was performed at [facility] on 2013, and this was reported to show deformities at the craniocervical junction consistent  with reported surgical procedures, marked hypoplasia of the cerebellar vermis and cerebellar hemispheres (unchanged) and myelomalacia of adjacent portions of the superior cervical spinal cord, with limited T2 signal alterations in subcortical white matter.      Notes from her earlier pediatric neurologist, Dr. Gus Ny indicate that she had definite right frontal lobe interictal spikes on electroencephalograms in 2003 and 2004.     Epilepsy therapeutics:   The patient was treated with levetiracetam following her first grand mal seizure at 16 years of age.  She has continued on this since then, with upward dose adjustments a number of years ago.  She has not been noted to have adverse effects of levetiracetam.  She has not been on other chronic antiepileptic drugs therapies.     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  Her mother thinks that she was twice accidentally dropped or fell a small distance when she was about 6 years of age, once sliding often an adaptive tricycle onto the floor and once collapsing down out of the hands of a person who was holding her in an erect position.  It was at around this time that the patient was noted to have bilateral leg weakness, which progressed at an early age, leaving her with a spastic quadriplegia involving legs much more than arms.  Now over the last 6 months she appears to have had progression in arm weakness, however, probably involving the left arm more than the right arm.  She appears clearly weaker in her assistance with transfers, although she is able to readily manipulate light objects such that she can feed herself and use coloring crayons.  With this myelopathy, she has had complete urinary and fecal incontinence, although possibly these problems began before other manifestations of myelopathy.  She seems to have some feeling in her feet.  Her  mother is planning to pursue evaluation to determine whether further cervical spine surgery is indicated.     PAST MEDICAL-SURGICAL HISTORY:    1.  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     2.  Congenital cervical spine malformations, possibly complicated by early cervical trauma, with spastic quadriparesis; status post occipital-C2 fusion and C1 laminectomy (2003); status post foramen magnum decompression and C1 decompression, with dorsal pudiopa-C9-R1 fusion (2003); status post additional decompression and stabilization procedure (2004).   3.  Status post Burns rojelio procedure for kyphoscoliosis (2007).   4.  Status post gastrostomy tube placement and multiple replacements.   5.  Status post 2 procedures for strabismus.     FAMILY HISTORY:  There is no family history of epilepsy, seizures or other neurological conditions than migraine headaches in both parents.     PERSONAL AND SOCIAL HISTORY:  The patient lived with her parents and siblings for most of her life and had special education classes for much of this time.  More recently she moved into a specialized group home with 3 other highly disabled clients and appears to be doing well there.  She reportedly is able to swallow safely based on repeated swallowing studies, but has very little appetite, so receives most of her nutrition through the gastrostomy tube.  She does assist with some with activities of daily living.      REVIEW OF SYSTEMS:  The patient reportedly has not been evaluated for peripheral neuropathy.   She has no history of rashes and easy bruising.  The remainder of a 14-system symptom review was negative or unobtainable, except as noted above.       MEDICATIONS:  Levetiracetam solution 1000 mg in the morning and 1500 mg in the  evening per gastrostomy, and other medications as per the electronic medical record.     PHYSICAL EXAMINATION:    The patient appeared continuously alert, but frequently looked away or moved slightly away from the examiner.  Vital signs were as per the electronic medical record.  Skull and high cervical deformities were consistent with reported surgery, with markedly diminished cervical passive range of motion.   She was observed to be wearing an adapted soft collar.    She communicated with her mother in single English language sounds sequences which utilized some sounds appropriate to the apparent intended word, with considerable gesturing with her right hand.  She appeared irritated by interactions with the examiner.  She did demonstrate full extraocular movements with variable nystagmus.  Pupils were equal, round and reactive to light.  She demonstrated slight movement of the face bilaterally and appeared to feel touch to the face on each side.  She did not open her mouth or move her tongue on command.  She did appear to observe high contrast shapes in her environment.  She clearly heard her mother's voice spoken softly.  Muscle tone was moderately hypotonic diffusely, with markedly reduced muscle masses.  She did not demonstrate strength on command, but was observed to move her right arm spontaneously more than her left arm.  Deep tendon reflexes were hypoactive throughout.  She appeared distressed with further examination and toe signs were not tested.     IMPRESSION:    The patient remains free of seizures on levetiracetam monotherapy.  She had an outpatient 3-hour video-EEG recording at [facility] on 04/19/2017, which showed ongoing generalized theta-delta slowing, with frequent brief bursts of high amplitude bifrontocentral delta slowing, but no epileptiform abnormalities.  She was reported to have had right frontal spikes on prior interictal EEG recordings.  The levetiracetam level was 38.0 mcg per mL on the day  of the prior visit.     The reported progression in bilateral arm weakness was further evaluated by her neurosurgeon at the [facility], Dr. Italo Root, who reportedly advised use of a supportive cervical collar to prevent compression of neural structures, but did not recommend any further surgical approach.  She was observed to be wearing an adapted soft collar today.     PLAN:    1.  Continue levetiracetam at 1000/1500 mg daily.   2.  Return visit in approximately 6 months.   I spent 15 minutes in this patient care, more than 50% of which consisted of counseling and coordinating care.       Nhan Knott M.D.   Professor of Neurology         D: 10/25/2017 14:16   T: 10/25/2017 14:28   MT: PERCY      Name:     ANTOLIN WHITFIELD   MRN:      [id number]        Account:      [id number]   :      1995           Service Date: 10/25/2017      Document: [id number]

## 2017-10-25 NOTE — LETTER
10/25/2017       RE: Sherly Ramires  : 1995   MRN: [id number]      Dear Colleague,    Thank you for referring your patient, Sherly Ramires, to the [facility] at [facility]. Please see a copy of my visit note below.      [facility]  Epilepsy Clinic:  RETURN VISIT    HISTORY:  Ms. 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      The patient reportedly has not had any type of seizure following the most recent visit to this clinic on 2017.  She has continued to receive levetiracetam by gastrostomy without apparent difficulty.      Ictal semiology-history:   The patient has had essentially 3 types of seizures in her lifetime, which have been responsive to levetiracetam in the case of the most recent seizures.  These essentially occurred as generalized atonic seizures in early childhood and later as complex partial seizures and generalized tonic-clonic seizures.      The patient had generalized atonic seizures which occurred approximately 12 times, usually in association with a febrile illness or other physiological stressors, between the ages of 3 and 6 years.  Her mother was the primary witnesses of these events and noted that the patient suddenly became unresponsive, usually while lying in her crib and when she moves the patient's limbs, she noted generalized flaccidity.  She was told that these were generalized atonic seizures and the patient was given rectal Diastat to prevent a cluster of these seizures.  She did not receive chronic antiepileptic drug therapy at this time.  The patient had essentially no seizures that were definitely witnessed between the ages of 6 and 16 years.      At 16 years of age, the patient had her first generalized tonic-clonic seizure witnessed by her mother.  She had 3 of these on  the day of onset, a single grand mal seizure later that year and then a single grand mal seizure about 1 year after the first seizures.  All of these were associated with sleep deprivation.  Her mother witnessed her to suddenly extend her limbs stiffly with truncal extension followed by generalized rhythmic jerking for a minute or 2.  She did not have tongue biting.      It appears that over several years after the grand mal seizures began, there was a question with various care providers of brief staring spells.  Her mother witnessed a definite staring spell with essentially no movement and no responsiveness, but with diffuse stiffness for a minute or 2.  This occurred in , in association with sleep deprivation.  Subsequently, no one has seen any definite staring spell seizures.      The patient reportedly has no other type of paroxysmal behavioral alterations.     Epilepsy-seizure predispositions:   With early developmental delay, the patient was evaluated and eventually diagnosed with congenital disorder of glycosylation type 1.  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  She has had slow language development and has learned to communicate by producing single linguistic sound sequences which are not complete intelligible words, but are accompanied by gesturing to supplement the vocalization.  In this manner the patient is able to make a number of requests of people who know her well.  Her mother thinks that she has much greater comprehension of speech compared with her own speech production.      The patient has no family history of epilepsy or seizures.  She has no history of gestational or  injury, febrile convulsions, developmental delay, stroke, meningitis, encephalitis, significant head injury, or other epileptic predispositions than CDG type 1a.     Laboratory evaluations:   The patient has had brain  imaging and electroencephalography at a number of different clinics in [facility] and [place].  The most recent brain MRI was performed at [facility] on 06/07/2013, and this was reported to show deformities at the craniocervical junction consistent with reported surgical procedures, marked hypoplasia of the cerebellar vermis and cerebellar hemispheres (unchanged) and myelomalacia of adjacent portions of the superior cervical spinal cord, with limited T2 signal alterations in subcortical white matter.      Notes from her earlier pediatric neurologist, Dr. Gus Ny indicate that she had definite right frontal lobe interictal spikes on electroencephalograms in 2003 and 2004.     Epilepsy therapeutics:   The patient was treated with levetiracetam following her first grand mal seizure at 16 years of age.  She has continued on this since then, with upward dose adjustments a number of years ago.  She has not been noted to have adverse effects of levetiracetam.  She has not been on other chronic antiepileptic drugs therapies.     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  Her mother thinks that she was twice accidentally dropped or fell a small distance when she was about 6 years of age, once sliding often an adaptive tricycle onto the floor and once collapsing down out of the hands of a person who was holding her in an erect position.  It was at around this time that the patient was noted to have bilateral leg weakness, which progressed at an early age, leaving her with a spastic quadriplegia involving legs much more than arms.  Now over the last 6 months she appears to have had progression in arm weakness, however, probably involving the left arm more than the right arm.  She appears clearly weaker in her assistance with transfers, although she is able to readily manipulate  light objects such that she can feed herself and use coloring crayons.  With this myelopathy, she has had complete urinary and fecal incontinence, although possibly these problems began before other manifestations of myelopathy.  She seems to have some feeling in her feet.  Her mother is planning to pursue evaluation to determine whether further cervical spine surgery is indicated.     PAST MEDICAL-SURGICAL HISTORY:    1.  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     2.  Congenital cervical spine malformations, possibly complicated by early cervical trauma, with spastic quadriparesis; status post occipital-C2 fusion and C1 laminectomy (2003); status post foramen magnum decompression and C1 decompression, with dorsal [id number]-I1-F8 fusion (2003); status post additional decompression and stabilization procedure (2004).   3.  Status post Burns rojelio procedure for kyphoscoliosis (2007).   4.  Status post gastrostomy tube placement and multiple replacements.   5.  Status post 2 procedures for strabismus.     FAMILY HISTORY:  There is no family history of epilepsy, seizures or other neurological conditions than migraine headaches in both parents.     PERSONAL AND SOCIAL HISTORY:  The patient lived with her parents and siblings for most of her life and had special education classes for much of this time.  More recently she moved into a specialized group home with 3 other highly disabled clients and appears to be doing well there.  She reportedly is able to swallow safely based on repeated swallowing studies, but has very little appetite, so receives most of her nutrition through the gastrostomy tube.  She does assist with some with activities of daily living.      REVIEW OF SYSTEMS:  The patient reportedly has not  been evaluated for peripheral neuropathy.   She has no history of rashes and easy bruising.  The remainder of a 14-system symptom review was negative or unobtainable, except as noted above.       MEDICATIONS:  Levetiracetam solution 1000 mg in the morning and 1500 mg in the evening per gastrostomy, and other medications as per the electronic medical record.     PHYSICAL EXAMINATION:    The patient appeared continuously alert, but frequently looked away or moved slightly away from the examiner.  Vital signs were as per the electronic medical record.  Skull and high cervical deformities were consistent with reported surgery, with markedly diminished cervical passive range of motion.   She was observed to be wearing an adapted soft collar.    She communicated with her mother in single English language sounds sequences which utilized some sounds appropriate to the apparent intended word, with considerable gesturing with her right hand.  She appeared irritated by interactions with the examiner.  She did demonstrate full extraocular movements with variable nystagmus.  Pupils were equal, round and reactive to light.  She demonstrated slight movement of the face bilaterally and appeared to feel touch to the face on each side.  She did not open her mouth or move her tongue on command.  She did appear to observe high contrast shapes in her environment.  She clearly heard her mother's voice spoken softly.  Muscle tone was moderately hypotonic diffusely, with markedly reduced muscle masses.  She did not demonstrate strength on command, but was observed to move her right arm spontaneously more than her left arm.  Deep tendon reflexes were hypoactive throughout.  She appeared distressed with further examination and toe signs were not tested.     IMPRESSION:    The patient remains free of seizures on levetiracetam monotherapy.  She had an outpatient 3-hour video-EEG recording at [email or web address] on 04/19/2017, which showed ongoing generalized  theta-delta slowing, with frequent brief bursts of high amplitude bifrontocentral delta slowing, but no epileptiform abnormalities.  She was reported to have had right frontal spikes on prior interictal EEG recordings.  The levetiracetam level was 38.0 mcg per mL on the day of the prior visit.     The reported progression in bilateral arm weakness was further evaluated by her neurosurgeon at the [facility], Dr. Italo Root, who reportedly advised use of a supportive cervical collar to prevent compression of neural structures, but did not recommend any further surgical approach.  She was observed to be wearing an adapted soft collar today.     PLAN:    1.  Continue levetiracetam at 1000/1500 mg daily.   2.  Return visit in approximately 6 months.   I spent 15 minutes in this patient care, more than 50% of which consisted of counseling and coordinating care.       Nhan Knott M.D.   Professor of Neurology

## 2017-11-08 ENCOUNTER — OFFICE VISIT (OUTPATIENT)
Dept: OPHTHALMOLOGY | Facility: CLINIC | Age: 22
End: 2017-11-08
Attending: OPHTHALMOLOGY
Payer: COMMERCIAL

## 2017-11-08 DIAGNOSIS — H55.01 CONGENITAL NYSTAGMUS: ICD-10-CM

## 2017-11-08 DIAGNOSIS — H35.50 RETINAL DYSTROPHY: ICD-10-CM

## 2017-11-08 DIAGNOSIS — H50.10 CONSECUTIVE EXOTROPIA: Primary | ICD-10-CM

## 2017-11-08 DIAGNOSIS — E74.89 CONGENITAL DISORDER OF GLYCOSYLATION ASSOCIATED WITH MUTATION IN PMM2 GENE (H): ICD-10-CM

## 2017-11-08 PROCEDURE — 92015 DETERMINE REFRACTIVE STATE: CPT | Mod: ZF

## 2017-11-08 PROCEDURE — 99213 OFFICE O/P EST LOW 20 MIN: CPT | Mod: ZF

## 2017-11-08 ASSESSMENT — TONOMETRY
IOP_METHOD: ICARE SINGLE
OS_IOP_MMHG: 16
OD_IOP_MMHG: 11

## 2017-11-08 ASSESSMENT — REFRACTION
OD_AXIS: 085
OD_CYLINDER: +2.25
OS_CYLINDER: +1.75
OD_SPHERE: -1.50
OS_AXIS: 090
OS_SPHERE: -1.25

## 2017-11-08 ASSESSMENT — EXTERNAL EXAM - LEFT EYE: OS_EXAM: NORMAL

## 2017-11-08 ASSESSMENT — VISUAL ACUITY
OD_SC: 20/200
METHOD: LEA - BLOCKED
OS_SC: 20/200

## 2017-11-08 ASSESSMENT — SLIT LAMP EXAM - LIDS
COMMENTS: NORMAL
COMMENTS: NORMAL

## 2017-11-08 ASSESSMENT — CUP TO DISC RATIO
OD_RATIO: 0.1
OS_RATIO: 0.1

## 2017-11-08 ASSESSMENT — EXTERNAL EXAM - RIGHT EYE: OD_EXAM: NORMAL

## 2017-11-08 NOTE — PROGRESS NOTES
Chief Complaints and History of Present Illnesses   Patient presents with     Exotropia Follow Up     no strabismus noted, vision seems stable. No changes since LV   Review of systems for the eyes was negative other than the pertinent positives and negatives noted in the HPI.  History is obtained from the patient and caregiver                  Primary care: Racheal Lennon   Assessment & Plan   Sherly SUSANNAH Ike is a 22 year old female who presents with:     Consecutive exotropia    (1996) BMR 6 ([facility])   (12/10/14) BLR 11 ([facility]) with subsequent refractory dellen that has resolved  Stable residual exotropia.     Retinal dystrophy, severe OU with cystoid macular edema, left eye    Congenital disorder of glycosylation (CDG)   Extinguished electroretinogram (ERG) 12/10/14.     Would not treat edema, give glasses, or repeat electroretinogram (ERG).   Nystagmus, thankfully, responding to Keppra.    Stable eye exam.        Return in about 2 years (around 11/8/2019) for vision & alignment, dilate & CRx.    Patient Instructions   Generally speaking, Steffanie does not need glasses. However, given her affinity for them, try +4.00 reading glasses over the counter that have big lenses to make them easy for Steffanie to see through. These are for increased magnification at near. If she seems to really like these and wants to wear them frequently, great! You could also call for a prescription from my office to have some made, but that would be much more expensive than the over the counter options.       Visit Diagnoses & Orders    ICD-10-CM    1. Consecutive exotropia H50.10    2. Retinal dystrophy H35.50    3. 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 Congenital nystagmus H55.01       Attending Physician Attestation:  Complete documentation of historical and exam elements from today's encounter can be found in the full encounter summary report (not reduplicated in this progress  note).  I personally obtained the chief complaint(s) and history of present illness.  I confirmed and edited as necessary the review of systems, past medical/surgical history, family history, social history, and examination findings as documented by others; and I examined the patient myself.  I personally reviewed the relevant tests, images, and reports as documented above.  I formulated and edited as necessary the assessment and plan and discussed the findings and management plan with the patient and family. - Skyler Correa Jr., MD

## 2017-11-08 NOTE — PATIENT INSTRUCTIONS
Generally speaking, Steffanie does not need glasses. However, given her affinity for them, try +4.00 reading glasses over the counter that have big lenses to make them easy for Stefafnie to see through. These are for increased magnification at near. If she seems to really like these and wants to wear them frequently, great! You could also call for a prescription from my office to have some made, but that would be much more expensive than the over the counter options.

## 2017-11-08 NOTE — MR AVS SNAPSHOT
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 However, given her affinity for them, try +4.00 reading glasses over the counter that have big lenses to make them easy for Steffanie to see through. These are for increased magnification at near. If she seems to really like these and wants to wear them frequently, great! You could also call for a prescription from my office to have some made, but that would be much more expensive than the over the counter options.           Follow-ups after your visit        Follow-up notes from your care team     Return in about 2 years (around 11/8/2019) for vision & alignment, dilate & CRx.      Your next 10 appointments already scheduled     Nov 20, 2017  2:00 PM CST   Return Visit with Sherrie Fontana MD   Peds Integrative Health ([facility])    [facility]  9th Floor  [address] [address]   [phone]            Nov 22, 2017 11:00 AM CST   Return Metabolic Visit with Sarah Dubon MD   Peds Metabolism ([facility])    [facility]  [address]  [address] [place] [address]   [phone]            Feb 01, 2018 11:15 AM CST   Return Visit with Barrett Lennon MD   Pediatric Endocrinology ([facility])    [facility]  [address] [facility]  [address] [address]   [phone]            Apr 25, 2018 10:00 AM CDT   Return Visit with Nhan Knott MD   [facility] ([facility]  [facility])    [address], [address]  [facility] [address]   [phone]            May 23, 2018 11:30 AM CDT   Return Metabolic Visit with Sarah Dubon MD   Peds Metabolism ([facility])    [facility]  [address]  [address]  [facility] [address] [phone]              Who to contact     Please call your clinic at [phone] to:    Ask questions about your health    Make or cancel appointments    Discuss your medicines    Learn about your test results    Speak to your doctor   If you have compliments or concerns about an experience at your clinic, or if you wish to file a complaint, please contact [facility] Patient Relations at [phone] or email us at [email or web address]         Additional Information About Your Visit        MyChart Information     Veroseet is an electronic gateway that provides easy, online access to your medical records. With Peeky, you can request a clinic appointment, read your test results, renew a prescription or communicate with your care team.     To sign up for Peeky visit the website at [email or web address]   You will be asked to enter the access code listed below, as well as some personal information. Please follow the directions to create your username and password.     Your access code is: [email or web address]  Expires: 2018 10:35 AM     Your access code will  in 90 days. If you need help or a new code, please contact your [facility] Clinic or call [phone] for assistance.        Care EveryWhere ID     This is your Care EveryWhere ID. This could be used by other organizations to access your [place] [facility] medical records  [id number]         Blood Pressure from Last 3 Encounters:   17 96/66   17 107/76   17 110/70    Weight from Last 3 Encounters:   17 36.3 kg (80 lb)   17 38.6 kg (85 lb)   17 40.6 kg (89 lb  8 oz)              Today, you had the following     No orders found for display         Today's Medication Changes          These changes are accurate as of: 11/8/17 10:39 AM.  If you have any questions, ask your nurse or doctor.               These medicines have changed or have updated prescriptions.        Dose/Directions    calcium carbonate 1250 (500 CA) MG/5ML Susp suspension   This may have changed:  how to take this   Used for:  Congenital disorder of glycosylation (CDG) (H), Osteoporosis        Dose:  1500 mg   Take 6 mLs (1,500 mg) by mouth 2 times daily (with meals)   Quantity:  450 mL   Refills:  12       ondansetron 4 MG/5ML solution   Commonly known as:  ZOFRAN   This may have changed:  how to take this   Used for:  Viral gastroenteritis        Dose:  4 mg   Take 5 mLs (4 mg) by mouth every 6 hours as needed for nausea or vomiting   Quantity:  90 mL   Refills:  0                Primary Care Provider Office Phone # Fax #    Leeanne DAVALOS Mustafa [phone]       [facility] [address] [place] [address] [address] [place] [address]        Equal Access to Services     [facility]: Hadii ramos vasquez hadasho Soomaali, waaxda luqadaha, qaybta kaalmada adeegyamoshe, anahi moseley . So [facility] [phone].    ATENCIÓN: Si habla español, tiene a chakraborty disposición servicios gratuitos de asistencia lingüística. Llame al [phone].    We comply with applicable federal civil rights laws and [place] laws. We do not discriminate on the basis of race, color, national origin, age, disability, sex, sexual orientation, or gender identity.            Thank you!     Thank you for choosing [facility]  for your care. Our goal is always to provide you with excellent care. Hearing back from our patients is one way we can continue to improve our services. Please take a few minutes to complete the written survey that you may receive in the mail after your visit with us. Thank you!             Your  Updated Medication List - Protect others around you: Learn how to safely use, store and throw away your medicines at www.disposemymeds.org.          This list is accurate as of: 11/8/17 10:39 AM.  Always use your most recent med list.                   Brand Name Dispense Instructions for use Diagnosis    acetaminophen 160 MG/5ML solution    TYLENOL     480 mg (15 mL) by G-tube every 4 hours as needed        ARIPiprazole 1 MG/ML Soln solution    ABILIFY     7.5 mg daily        bacitracin ointment     14 g    Apply topically 2 times daily as needed for wound care    Skin irritation       calcium carbonate 1250 (500 CA) MG/5ML Susp suspension     450 mL    Take 6 mLs (1,500 mg) by mouth 2 times daily (with meals)    Congenital disorder of glycosylation (CDG) (H), Osteoporosis       COMPLEAT PEDIATRIC PO           D-Mannose Powd      2 times daily 1 scoop - mixed with water        diazepam 5 MG/ML (HIGH CONC) solution    VALIUM     7.5 mg by Gastronomy tube route every hour as needed for seizures (Seizures lasting longer than 5 minutes)        ENEMEEZ MINI RE      3 times a week (MWF) to help with bowel movements        levETIRAcetam 100 MG/ML solution    KEPPRA    750 mL    10 mL  in the AM and 15 ml in the PM, by gastrostomy tube.    Complex partial seizure evolving to generalized seizure (H)       MELATONIN PO      Take 3 mg by mouth At Bedtime        ondansetron 4 MG/5ML solution    ZOFRAN    90 mL    Take 5 mLs (4 mg) by mouth every 6 hours as needed for nausea or vomiting    Viral gastroenteritis       order for DME     1 Device    Equipment being ordered: Other: chest strap for her tilt in space manual wheel chair Treatment Diagnosis: For safe transportation to home secondary to poor sitting balance in upright. CDG type Ia, seizures, and G-tube dependence.    Viral gastroenteritis, Dehydration, Acute respiratory failure with hypoxia (H), Congenital disorder of glycosylation (CDG) (H)       sertraline 20 MG/ML  (HIGH CONC) solution    ZOLOFT     200 mg by Gastronomy tube route daily        * traZODone 50 MG tablet    DESYREL     by Gastric Tube route At Bedtime        * traZODone 50 MG tablet    DESYREL     Crush one tablet and administer via G-tube 30 minutes prior to bedtime daily        * UNABLE TO FIND      MEDICATION NAME: Stress relax (magnesium citrate) 380 mg at bedtime        * UNABLE TO FIND      MEDICATION NAME: Mood probiotic 1 capsule (3,000,000,000 CFUs) daily.        * UNABLE TO FIND      MEDICATION NAME: Rescue Remedy Herbal Supplement - Used as needed.        * Notice:  This list has 5 medication(s) that are the same as other medications prescribed for you. Read the directions carefully, and ask your doctor or other care provider to review them with you.

## 2017-11-08 NOTE — NURSING NOTE
Chief Complaint   Patient presents with     Exotropia Follow Up     no strabismus noted, vision seems stable. No changes since LV     HPI    Symptoms:           Do you have eye pain now?:  No

## 2017-11-20 ENCOUNTER — OFFICE VISIT (OUTPATIENT)
Dept: CONSULT | Facility: CLINIC | Age: 22
End: 2017-11-20
Attending: PEDIATRICS
Payer: COMMERCIAL

## 2017-11-20 VITALS
DIASTOLIC BLOOD PRESSURE: 80 MMHG | OXYGEN SATURATION: 98 % | HEART RATE: 111 BPM | SYSTOLIC BLOOD PRESSURE: 112 MMHG | RESPIRATION RATE: 28 BRPM | TEMPERATURE: 97 F

## 2017-11-20 DIAGNOSIS — F41.9 ANXIETY: ICD-10-CM

## 2017-11-20 DIAGNOSIS — M81.0 OSTEOPOROSIS WITHOUT CURRENT PATHOLOGICAL FRACTURE, UNSPECIFIED OSTEOPOROSIS TYPE: ICD-10-CM

## 2017-11-20 DIAGNOSIS — E74.89 CONGENITAL DISORDER OF GLYCOSYLATION (CDG) (H): Primary | ICD-10-CM

## 2017-11-20 DIAGNOSIS — R48.8 PERSEVERATION: ICD-10-CM

## 2017-11-20 PROCEDURE — 99213 OFFICE O/P EST LOW 20 MIN: CPT | Mod: ZF

## 2017-11-20 NOTE — LETTER
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, [facility]. Anna has seen the ophthalmologist recently and enjoys wearing 4.0 magnifiers off and on. We do her shoni-shin treatment first, as is the most successful, and then get an update. Anna is pleased to hold some of the shonishin tools and seems to be using a little bit of each hand.     Vitamin D has been discontinued to allow Anna's level to come down from >96 ng/mL. Level is now down to 49 ng/mL from 10/2017 labs. Anna has been losing hair lately, more than usual??    There is some confusion about bowel program and staff is interested in suggestions, as Anna is now only having a BM if given the Enemeez.     Some dental odor, not sure of last Dentist appointment. Teeth brushed with electric toothbrush nightly, Biotene also.    Behavior continues to be good when Anna has a schedule and familiar staff around her.      ALLERGIES:  Allergies   Allergen Reactions     Ativan [Lorazepam] Other (See Comments)     Paradoxical reaction     Motrin [Ibuprofen] GI Disturbance     Pt is able to have motrin with food but not on an empty stomache.       Pseudoephedrine        IMMUNIZATIONS:  Immunization History   Administered Date(s) Administered     Influenza (IIV3) Received 2017 per staff       CURRENT MEDICATIONS:      Current Outpatient Prescriptions:      traZODone (DESYREL) 50 MG tablet, Crush one tablet and administer via G-tube 30 minutes prior to bedtime daily, Disp: , Rfl:      levETIRAcetam (KEPPRA) 100 MG/ML solution, 10 mL  in the AM and 15 ml in the PM, by gastrostomy tube., Disp: 750 mL, Rfl: 11     UNABLE TO FIND, MEDICATION NAME: Rescue Remedy Herbal Supplement - Used as needed., Disp: , Rfl:      traZODone (DESYREL) 50 MG tablet, by Gastric Tube route At Bedtime, Disp: , Rfl:      sertraline (ZOLOFT) 20 MG/ML (HIGH CONC) solution, 200 mg by Gastronomy tube route daily , Disp: , Rfl:      D-Mannose POWD, 2 times daily 1 scoop - mixed with water, Disp: , Rfl:      Nutritional Supplements (COMPLEAT PEDIATRIC PO), , Disp: , Rfl:      UNABLE TO FIND, MEDICATION NAME: Stress relax (magnesium citrate) 380 mg at bedtime, Disp: , Rfl:      UNABLE TO FIND, MEDICATION NAME: Ultimate Laisha, 1 CAPSULE per GT, daily      calcium carbonate 1250 (500 CA) MG/5ML SUSP, Take 6 mLs (1,500 mg) by mouth 2 times daily (with meals) (Patient taking differently: 1,500 mg by Gastronomy tube route 2 times daily (with meals) ), Disp: 450 mL, Rfl: 12     ondansetron (ZOFRAN) 4 MG/5ML solution, Take 5 mLs (4 mg) by mouth every 6 hours as needed for nausea or vomiting (Patient taking differently: 4 mg by Gastronomy tube route every 6 hours as needed for nausea or vomiting ), Disp: 90 mL, Rfl: 0     bacitracin ointment, Apply topically 2 times daily as needed for wound care, Disp: 14 g, Rfl: 0     ARIPiprazole (ABILIFY) 1 MG/ML SOLN, 7.5 mg daily , Disp: , Rfl:      acetaminophen (TYLENOL) 160 MG/5ML solution, 480 mg (15 mL) by G-tube every 4 hours as needed, Disp: , Rfl:      diazepam (VALIUM) 5 MG/ML solution, 7.5 mg by Gastronomy tube route every hour as needed for  seizures (Seizures lasting longer than 5 minutes) , Disp: , Rfl:      Docusate Sodium (ENEMEEZ MINI RE), 3 times a week (MWF) to help with bowel movements, Disp: , Rfl:      ORDER FOR DME, Equipment being ordered: Other: chest strap for her tilt in space manual wheel chair Treatment Diagnosis: For safe transportation to home secondary to poor sitting balance in upright. CDG type Ia, seizures, and G-tube dependence. (Patient not taking: Reported on 11/20/2017), Disp: 1 Device, Rfl: 0      Bach Rescue Remedy spray, prn. anxiety/stressful situation.     PAST MEDICAL HISTORY:   Past Medical History:   Diagnosis Date     Anxiety      Global developmental delay      Hypoglycemia      Metabolic disease     congenital disorder of glycosilation     Scoliosis      Seizures (H)     last at age 8 before today     Strabismus        PAST SURGICAL HISTORY:   Past Surgical History:   Procedure Laterality Date     ANESTHESIA OUT OF OR MRI 3T N/A 3/7/2017    Procedure: ANESTHESIA PEDS SEDATION MRI 3T;  Surgeon: GENERIC ANESTHESIA PROVIDER;  Location: [facility] PEDS SEDATION      BACK SURGERY      c1 decompression and fusionx3, scoliosis with instrumentation x1     ELECTRORETINOGRAM Bilateral 12/10/2014    ERG ([facility])     ENT SURGERY       ear tubes     EXAM UNDER ANESTHESIA EYE(S) Bilateral 12/10/2014    EUA ([facility])     EXTRACTION(S) DENTAL N/A 12/10/2014    Procedure: EXTRACTION(S) DENTAL;  Surgeon: Hubert York DDS;  Location: [facility] OR     EYE SURGERY Bilateral 1996    BMR 6mm ([place])      GI SURGERY      g tube     HC REPLACE GASTROSTOMY/CECOSTOMY TUBE PERCUTANEOUS N/A 12/10/2014    Procedure: REPLACE GASTROSTOMY TUBE, PERCUTANEOUS;  Surgeon: Phong Perea MD;  Location: [facility] OR     RECESSION RESECTION (REPAIR STRABISMUS) BILATERAL Bilateral 12/10/2014    BLR 11 ([facility])     STRABISMUS SURGERY  1996    (1996) BMR 6 ([facility])     STRABISMUS SURGERY  2014    BLR 11 ([facility]) -        FAMILY HISTORY:   Family History   Problem  "Relation Age of Onset     Glaucoma Mother      no medications or surg to date     Glaucoma Maternal Grandmother      s/p surg, decreased VA      Amblyopia No family hx of      Strabismus No family hx of        SOCIAL HISTORY:   Social History   Substance Use Topics     Smoking status: Never Smoker     Smokeless tobacco: Never Used     Alcohol use No       Physical Exam:   Sitting up in wheelchair, smiling.  Easy respirations, lungs clear to auscultation.   Belly soft, non-tender.    Shoni-cárdenas using Choki with brass \"needle\" for tonification, bringing down the Yang.  Focus points: ST-36, tapping upper and lower mid-back, top of head around GV-20      Labs and Tests:  None today     Assessment:  Sherly is a 21 year old female patient with complex medical hx now in group home.      Plan/Follow-up:    Any changes in vitamin D dosing per Endocrinology. Last thyroid function tests in chart from February/March 2016. Trazodone and Abilify, etc. may also be associated with hair loss. Anna has Endo follow-up this week.         Orders signed for group home. Suggestions for 1/2 scoop (190 mg) magnesium citrate (Stress relax) qAM;  hold if loose stools.         Check when last Dentist visit given oral odor.         RTC in 8 weeks; call earlier prn.      Thank you for this consultation. Please do not hesitate to contact me with any questions or concerns.     I spent a total of 25 minutes face-to-face with Sherly Ramires during today's office visit.  Over 50% of this time was spent counseling the patient-family-staff and/or coordinating care regarding comfort.  See note for details.    Sherrie Fontana MD, CM  Medical Director Pediatric Integrative Health and Wellbeing, [facility]          "

## 2017-11-20 NOTE — PROGRESS NOTES
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. Anna has seen the ophthalmologist recently and enjoys wearing 4.0 magnifiers off and on. We do her shoni-shin treatment first, as is the most successful, and then get an update. Anna is pleased to hold some of the shonishin tools and seems to be using a little bit of each hand.     Vitamin D has been discontinued to allow Anna's level to come down from >96 ng/mL. Level is now down to 49 ng/mL from 10/2017 labs. Anna has been losing hair lately, more than usual??    There is some confusion about bowel program and staff is interested in suggestions, as Anna is now only having a BM if given the Enemeez.     Some dental odor, not sure of last Dentist appointment. Teeth brushed with electric toothbrush nightly, Biotene also.    Behavior continues to be good when Anna has a schedule and familiar staff around her.      ALLERGIES:  Allergies   Allergen Reactions     Ativan [Lorazepam] Other (See Comments)     Paradoxical reaction     Motrin [Ibuprofen] GI Disturbance     Pt is able to have motrin with food but not on an empty stomache.      Pseudoephedrine        IMMUNIZATIONS:  Immunization History   Administered Date(s) Administered     Influenza (IIV3) Received  2017 per staff       CURRENT MEDICATIONS:      Current Outpatient Prescriptions:      traZODone (DESYREL) 50 MG tablet, Crush one tablet and administer via G-tube 30 minutes prior to bedtime daily, Disp: , Rfl:      levETIRAcetam (KEPPRA) 100 MG/ML solution, 10 mL  in the AM and 15 ml in the PM, by gastrostomy tube., Disp: 750 mL, Rfl: 11     UNABLE TO FIND, MEDICATION NAME: Rescue Remedy Herbal Supplement - Used as needed., Disp: , Rfl:      traZODone (DESYREL) 50 MG tablet, by Gastric Tube route At Bedtime, Disp: , Rfl:      sertraline (ZOLOFT) 20 MG/ML (HIGH CONC) solution, 200 mg by Gastronomy tube route daily , Disp: , Rfl:      D-Mannose POWD, 2 times daily 1 scoop - mixed with water, Disp: , Rfl:      Nutritional Supplements (COMPLEAT PEDIATRIC PO), , Disp: , Rfl:      UNABLE TO FIND, MEDICATION NAME: Stress relax (magnesium citrate) 380 mg at bedtime, Disp: , Rfl:      UNABLE TO FIND, MEDICATION NAME: Ultimate Laisha, 1 CAPSULE per GT, daily      calcium carbonate 1250 (500 CA) MG/5ML SUSP, Take 6 mLs (1,500 mg) by mouth 2 times daily (with meals) (Patient taking differently: 1,500 mg by Gastronomy tube route 2 times daily (with meals) ), Disp: 450 mL, Rfl: 12     ondansetron (ZOFRAN) 4 MG/5ML solution, Take 5 mLs (4 mg) by mouth every 6 hours as needed for nausea or vomiting (Patient taking differently: 4 mg by Gastronomy tube route every 6 hours as needed for nausea or vomiting ), Disp: 90 mL, Rfl: 0     bacitracin ointment, Apply topically 2 times daily as needed for wound care, Disp: 14 g, Rfl: 0     ARIPiprazole (ABILIFY) 1 MG/ML SOLN, 7.5 mg daily , Disp: , Rfl:      acetaminophen (TYLENOL) 160 MG/5ML solution, 480 mg (15 mL) by G-tube every 4 hours as needed, Disp: , Rfl:      diazepam (VALIUM) 5 MG/ML solution, 7.5 mg by Gastronomy tube route every hour as needed for seizures (Seizures lasting longer than 5 minutes) , Disp: , Rfl:      Docusate Sodium (ENEMEEZ MINI RE), 3 times a week (MWF) to help  with bowel movements, Disp: , Rfl:      ORDER FOR DME, Equipment being ordered: Other: chest strap for her tilt in space manual wheel chair Treatment Diagnosis: For safe transportation to home secondary to poor sitting balance in upright. CDG type Ia, seizures, and G-tube dependence. (Patient not taking: Reported on 11/20/2017), Disp: 1 Device, Rfl: 0      Bach Rescue Remedy spray, prn. anxiety/stressful situation.     PAST MEDICAL HISTORY:   Past Medical History:   Diagnosis Date     Anxiety      Global developmental delay      Hypoglycemia      Metabolic disease     congenital disorder of glycosilation     Scoliosis      Seizures (H)     last at age 8 before today     Strabismus        PAST SURGICAL HISTORY:   Past Surgical History:   Procedure Laterality Date     ANESTHESIA OUT OF OR MRI 3T N/A 3/7/2017    Procedure: ANESTHESIA PEDS SEDATION MRI 3T;  Surgeon: GENERIC ANESTHESIA PROVIDER;  Location: [facility] PEDS SEDATION      BACK SURGERY      c1 decompression and fusionx3, scoliosis with instrumentation x1     ELECTRORETINOGRAM Bilateral 12/10/2014    ERG ([place])     ENT SURGERY       ear tubes     EXAM UNDER ANESTHESIA EYE(S) Bilateral 12/10/2014    EUA ([place])     EXTRACTION(S) DENTAL N/A 12/10/2014    Procedure: EXTRACTION(S) DENTAL;  Surgeon: Hubert York DDS;  Location: [facility] OR     EYE SURGERY Bilateral 1996    BMR 6mm ([place])      GI SURGERY      g tube     HC REPLACE GASTROSTOMY/CECOSTOMY TUBE PERCUTANEOUS N/A 12/10/2014    Procedure: REPLACE GASTROSTOMY TUBE, PERCUTANEOUS;  Surgeon: Phong Perea MD;  Location: [facility] OR     RECESSION RESECTION (REPAIR STRABISMUS) BILATERAL Bilateral 12/10/2014    BLR 11 ([place])     STRABISMUS SURGERY  1996    (1996) BMR 6 ([place])     STRABISMUS SURGERY  2014    BLR 11 ([place]) -        FAMILY HISTORY:   Family History   Problem Relation Age of Onset     Glaucoma Mother      no medications or surg to date     Glaucoma Maternal Grandmother      s/p surg, decreased  "[facility]      Amblyopia No family hx of      Strabismus No family hx of        SOCIAL HISTORY:   Social History   Substance Use Topics     Smoking status: Never Smoker     Smokeless tobacco: Never Used     Alcohol use No       Physical Exam:   Sitting up in wheelchair, smiling.  Easy respirations, lungs clear to auscultation.   Belly soft, non-tender.    Shoni-cárdenas using Choki with brass \"needle\" for tonification, bringing down the Yang.  Focus points: ST-36, tapping upper and lower mid-back, top of head around GV-20      Labs and Tests:  None today     Assessment:  Sherly is a 21 year old female patient with complex medical hx now in group home.      Plan/Follow-up:    Any changes in vitamin D dosing per Endocrinology. Last thyroid function tests in chart from February/March 2016. Trazodone and Abilify, etc. may also be associated with hair loss. Anna has Endo follow-up this week.         Orders signed for group home. Suggestions for 1/2 scoop (190 mg) magnesium citrate (Stress relax) qAM;  hold if loose stools.         Check when last Dentist visit given oral odor.         RTC in 8 weeks; call earlier prn.      Thank you for this consultation. Please do not hesitate to contact me with any questions or concerns.     I spent a total of 25 minutes face-to-face with Sherly Ramires during today's office visit.  Over 50% of this time was spent counseling the patient-family-staff and/or coordinating care regarding comfort.  See note for details.    Sherrie Fontana MD, CM  Medical Director Pediatric Integrative Health and Wellbeing, [facility]          "

## 2017-11-20 NOTE — NURSING NOTE
Chief Complaint   Patient presents with     RECHECK     Patient here today for 6-8 week follow up with pain management     /80 (BP Location: Right arm, Patient Position: Fowlers, Cuff Size: Adult Small)  Pulse 111  Temp 97  F (36.1  C) (Axillary)  Resp 28  SpO2 98%  I spent 11 minutes reviewing medications, allergies, and obtaining vitals.    Margarita Rojas LPN  November 20, 2017

## 2017-11-20 NOTE — MR AVS SNAPSHOT
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  9th Floor  [address] [facility] [id number] [phone]            Feb 01, 2018 11:15 AM CST   Return Visit with Barrett Lennon MD   Pediatric Endocrinology ([facility])    [facility]  [id number] [facility] [id number] [phone]            Apr 25, 2018 10:00 AM CDT   Return Visit with Nhan Knott MD   [facility] ([facility])    [address], [address]  [facility] [address]   [phone]            May 23, 2018 11:30 AM CDT   Return Metabolic Visit with MD Onofre Frys Metabolism ([facility])    [facility]  [address]  [address] [place] [id number] [phone]              Who to contact     Please call your clinic at [phone] to:    Ask questions about your health    Make or cancel appointments    Discuss your medicines    Learn about your test results    Speak to your doctor   If you have compliments or concerns about an  experience at your clinic, or if you wish to file a complaint, please contact [facility] Patient Relations at [phone] or email us at [email or web address]         Additional Information About Your Visit        [employer] Information     [employer] is an electronic gateway that provides easy, online access to your medical records. With [employer], you can request a clinic appointment, read your test results, renew a prescription or communicate with your care team.     To sign up for [employer] visit the website at [email or web address]   You will be asked to enter the access code listed below, as well as some personal information. Please follow the directions to create your username and password.     Your access code is: [email or web address]  Expires: 2018 10:35 AM     Your access code will  in 90 days. If you need help or a new code, please contact your [facility] Clinic or call [phone] for assistance.        Care EveryWhere ID     This is your Care EveryWhere ID. This could be used by other organizations to access your [facility] medical records  [id number]        Your Vitals Were     Pulse Temperature Respirations Pulse Oximetry          111 97  F (36.1  C) (Axillary) 28 98%         Blood Pressure from Last 3 Encounters:   17 112/80   17 96/66   17 107/76    Weight from Last 3 Encounters:   17 80 lb (36.3 kg)   17 85 lb (38.6 kg)   17 89 lb 8 oz (40.6 kg)              Today, you had the following     No orders found for display         Today's Medication Changes          These changes are accurate as of: 17  2:49 PM.  If you have any questions, ask your nurse or doctor.               These medicines have changed or have updated prescriptions.        Dose/Directions    calcium carbonate 1250 (500 CA) MG/5ML Susp suspension   This may have changed:  how to take this   Used for:  Congenital disorder of  duglas (CDG) (H), Osteoporosis        Dose:  1500 mg   Take 6 mLs (1,500 mg) by mouth 2 times daily (with meals)   Quantity:  450 mL   Refills:  12       ondansetron 4 MG/5ML solution   Commonly known as:  ZOFRAN   This may have changed:  how to take this   Used for:  Viral gastroenteritis        Dose:  4 mg   Take 5 mLs (4 mg) by mouth every 6 hours as needed for nausea or vomiting   Quantity:  90 mL   Refills:  0                Primary Care Provider Office Phone # Fax #    Leeanne Mustafa [phone]       [facility] [address] [place] [address]        Equal Access to Services     [facility] : Jackelyn Silvestre, joelle noble, jeremiah love, anahi rider. So [facility] [phone].    ATENCIÓN: Si habla español, tiene a chakraborty disposición servicios gratuitos de asistencia lingüística. Llame al [phone].    We comply with applicable federal civil rights laws and [place] laws. We do not discriminate on the basis of race, color, national origin, age, disability, sex, sexual orientation, or gender identity.            Thank you!     Thank you for choosing [facility]  for your care. Our goal is always to provide you with excellent care. Hearing back from our patients is one way we can continue to improve our services. Please take a few minutes to complete the written survey that you may receive in the mail after your visit with us. Thank you!             Your Updated Medication List - Protect others around you: Learn how to safely use, store and throw away your medicines at www.disposemymeds.org.          This list is accurate as of: 11/20/17  2:49 PM.  Always use your most recent med list.                   Brand Name Dispense Instructions for use Diagnosis    acetaminophen 160 MG/5ML solution    TYLENOL     480 mg (15 mL) by G-tube every 4 hours as needed        ARIPiprazole 1 MG/ML Soln solution    ABILIFY      7.5 mg daily        bacitracin ointment     14 g    Apply topically 2 times daily as needed for wound care    Skin irritation       calcium carbonate 1250 (500 CA) MG/5ML Susp suspension     450 mL    Take 6 mLs (1,500 mg) by mouth 2 times daily (with meals)    Congenital disorder of glycosylation (CDG) (H), Osteoporosis       COMPLEAT PEDIATRIC PO           D-Mannose Powd      2 times daily 1 scoop - mixed with water        diazepam 5 MG/ML (HIGH CONC) solution    VALIUM     7.5 mg by Gastronomy tube route every hour as needed for seizures (Seizures lasting longer than 5 minutes)        ENEMEEZ MINI RE      3 times a week (MWF) to help with bowel movements        levETIRAcetam 100 MG/ML solution    KEPPRA    750 mL    10 mL  in the AM and 15 ml in the PM, by gastrostomy tube.    Complex partial seizure evolving to generalized seizure (H)       MELATONIN PO      Take 3 mg by mouth At Bedtime        ondansetron 4 MG/5ML solution    ZOFRAN    90 mL    Take 5 mLs (4 mg) by mouth every 6 hours as needed for nausea or vomiting    Viral gastroenteritis       order for DME     1 Device    Equipment being ordered: Other: chest strap for her tilt in space manual wheel chair Treatment Diagnosis: For safe transportation to home secondary to poor sitting balance in upright. CDG type Ia, seizures, and G-tube dependence.    Viral gastroenteritis, Dehydration, Acute respiratory failure with hypoxia (H), Congenital disorder of glycosylation (CDG) (H)       sertraline 20 MG/ML (HIGH CONC) solution    ZOLOFT     200 mg by Gastronomy tube route daily        * traZODone 50 MG tablet    DESYREL     by Gastric Tube route At Bedtime        * traZODone 50 MG tablet    DESYREL     Crush one tablet and administer via G-tube 30 minutes prior to bedtime daily        * UNABLE TO FIND      MEDICATION NAME: Stress relax (magnesium citrate) 380 mg at bedtime        * UNABLE TO FIND      MEDICATION NAME: Mood probiotic 1 capsule (3,000,000,000  CFUs) daily.        * UNABLE TO FIND      MEDICATION NAME: Rescue Remedy Herbal Supplement - Used as needed.        * Notice:  This list has 5 medication(s) that are the same as other medications prescribed for you. Read the directions carefully, and ask your doctor or other care provider to review them with you.

## 2017-11-22 ENCOUNTER — OFFICE VISIT (OUTPATIENT)
Dept: PEDIATRICS | Facility: CLINIC | Age: 22
End: 2017-11-22
Attending: PEDIATRICS
Payer: COMMERCIAL

## 2017-11-22 VITALS
DIASTOLIC BLOOD PRESSURE: 78 MMHG | SYSTOLIC BLOOD PRESSURE: 107 MMHG | WEIGHT: 78.92 LBS | BODY MASS INDEX: 18.27 KG/M2 | HEART RATE: 85 BPM | HEIGHT: 55 IN

## 2017-11-22 DIAGNOSIS — E74.89 CONGENITAL DISORDER OF GLYCOSYLATION (CDG) (H): Primary | ICD-10-CM

## 2017-11-22 PROCEDURE — 99212 OFFICE O/P EST SF 10 MIN: CPT | Mod: ZF

## 2017-11-22 ASSESSMENT — PAIN SCALES - GENERAL: PAINLEVEL: NO PAIN (0)

## 2017-11-22 NOTE — MR AVS SNAPSHOT
After Visit Summary   11/22/2017    Sherly Ramires    MRN: [id number]           Patient Information     Date Of Birth          1995        Visit Information        Provider Department      11/22/2017 11:00 AM Sarah Dubon MD Peds Metabolism        Today's Diagnoses     Congenital disorder of glycosylation (CDG) (H)    -  1       Follow-ups after your visit        Additional Services     ONC/HEME PEDS REFERRAL       Your provider has referred you to see Dr. Mays for evaluation because of her underlying condition of congenital glycosylation disorder.  Please be aware that coverage of these services is subject to the terms and limitations of your health insurance plan.  Call member services at your health plan with any benefit or coverage questions.      Please bring the following with you to your appointment:    (1) Any X-Rays, CTs or MRIs which have been performed.  Contact the facility where they were done to arrange for  prior to your scheduled   (2) List of current medications  (3) This referral request   (4) Any documents/labs given to you for this referral                  Follow-up notes from your care team     Return in about 1 year (around 11/22/2018).      Your next 10 appointments already scheduled     Candido 15, 2018  3:30 PM CST   Return Visit with Sherrie Fontana MD   Peds [facility] ([facility])    [facility]  9th Floor  [address] [address]   [phone]            Apr 25, 2018 10:00 AM CDT   [facility]t [facility]   [facility]    [address], [address]  [facility] [address]   [phone]            May 23, 2018 11:30 AM CDT   Return Metabolic Visit with Sarah Dubon MD   Peds Metabolism ([facility])    [facility]  [address]  [address] [facility] [address]   [phone]            Nov 29, 2018 10:30 AM  "CST   Return Visit with MD Georgie Prater Hematology Oncology ([facility])    [facility]  9th Floor  [address] [facility] [address] [phone]              Who to contact     Please call your clinic at [phone] to:    Ask questions about your health    Make or cancel appointments    Discuss your medicines    Learn about your test results    Speak to your doctor   If you have compliments or concerns about an experience at your clinic, or if you wish to file a complaint, please contact [facility] Patient Relations at [phone] or email us at [email or web address]         Additional Information About Your Visit        [employer] Information     [employer] is an electronic gateway that provides easy, online access to your medical records. With [employer], you can request a clinic appointment, read your test results, renew a prescription or communicate with your care team.     To sign up for [employer] visit the website at [email or web address]   You will be asked to enter the access code listed below, as well as some personal information. Please follow the directions to create your username and password.     Your access code is: [email or web address]  Expires: 2018 10:35 AM     Your access code will  in 90 days. If you need help or a new code, please contact your [facility] or call [phone] for assistance.        Care EveryWhere ID     This is your Care EveryWhere ID. This could be used by other organizations to access your [place] medical records  [id number]        Your Vitals Were     Pulse Height BMI (Body Mass Index)             85 4' 1\" (124.5 cm) 23.11 kg/m2          Blood Pressure from Last 3 Encounters:   17 105/78   17 107/78   17 112/80    Weight from Last 3 Encounters:   17 78 lb 14.8 oz (35.8 kg)   17 80 lb (36.3 kg)   17 85 lb (38.6 kg) "              We Performed the Following     ONC/HEME PEDS REFERRAL          Today's Medication Changes          These changes are accurate as of: 11/22/17 11:59 PM.  If you have any questions, ask your nurse or doctor.               Start taking these medicines.        Dose/Directions    cholecalciferol 1000 UNITS capsule   Commonly known as:  vitamin  -D   Used for:  Congenital disorder of glycosylation (CDG) (H)   Started by:  Sarah Dubon MD        Dose:  1 capsule   Take 1 capsule (1,000 Units) by mouth daily   Quantity:  30 capsule   Refills:  11         These medicines have changed or have updated prescriptions.        Dose/Directions    calcium carbonate 1250 (500 CA) MG/5ML Susp suspension   This may have changed:  how to take this   Used for:  Congenital disorder of glycosylation (CDG) (H), Osteoporosis        Dose:  1500 mg   Take 6 mLs (1,500 mg) by mouth 2 times daily (with meals)   Quantity:  450 mL   Refills:  12       ondansetron 4 MG/5ML solution   Commonly known as:  ZOFRAN   This may have changed:  how to take this   Used for:  Viral gastroenteritis        Dose:  4 mg   Take 5 mLs (4 mg) by mouth every 6 hours as needed for nausea or vomiting   Quantity:  90 mL   Refills:  0            Where to get your medicines      Some of these will need a paper prescription and others can be bought over the counter.  Ask your nurse if you have questions.     Bring a paper prescription for each of these medications     cholecalciferol 1000 UNITS capsule                Primary Care Provider Office Phone # Fax #    Leeanne Mustafa [phone]       [facility] [address] [place] [address] [address] [place] [address]        Equal Access to Services     [facility] AH: Jackelyn Silvestre, wacarmitada luqsonia, qaybta kaalmada kate, anahi rider. [facility] [phone].    ATENCIÓN: Si habla español, tiene a chakraborty disposición servicios gratuitos de asistencia  lingüística. Samuel al [phone].    We comply with applicable federal civil rights laws and [place] laws. We do not discriminate on the basis of race, color, national origin, age, disability, sex, sexual orientation, or gender identity.            Thank you!     Thank you for choosing P[facility]S [facility]ABOLISM  for your care. Our goal is always to provide you with excellent care. Hearing back from our patients is one way we can continue to improve our services. Please take a few minutes to complete the written survey that you may receive in the mail after your visit with us. Thank you!             Your Updated Medication List - Protect others around you: Learn how to safely use, store and throw away your medicines at www.disposemymeds.org.          This list is accurate as of: 11/22/17 11:59 PM.  Always use your most recent med list.                   Brand Name Dispense Instructions for use Diagnosis    acetaminophen 160 MG/5ML solution    TYLENOL     480 mg (15 mL) by G-tube every 4 hours as needed        ARIPiprazole 1 MG/ML Soln solution    ABILIFY     7.5 mg daily        bacitracin ointment     14 g    Apply topically 2 times daily as needed for wound care    Skin irritation       calcium carbonate 1250 (500 CA) MG/5ML Susp suspension     450 mL    Take 6 mLs (1,500 mg) by mouth 2 times daily (with meals)    Congenital disorder of glycosylation (CDG) (H), Osteoporosis       cholecalciferol 1000 UNITS capsule    vitamin  -D    30 capsule    Take 1 capsule (1,000 Units) by mouth daily    Congenital disorder of glycosylation (CDG) (H)       COMPLEAT PEDIATRIC PO           D-Mannose Powd      2 times daily 1 scoop - mixed with water        diazepam 5 MG/ML (HIGH CONC) solution    VALIUM     7.5 mg by Gastronomy tube route every hour as needed for seizures (Seizures lasting longer than 5 minutes)        ENEMEEZ MINI RE      3 times a week (MWF) to help with bowel movements        levETIRAcetam 100 MG/ML solution     KEPPRA    750 mL    10 mL  in the AM and 15 ml in the PM, by gastrostomy tube.    Complex partial seizure evolving to generalized seizure (H)       MELATONIN PO      Take 3 mg by mouth At Bedtime        ondansetron 4 MG/5ML solution    ZOFRAN    90 mL    Take 5 mLs (4 mg) by mouth every 6 hours as needed for nausea or vomiting    Viral gastroenteritis       order for DME     1 Device    Equipment being ordered: Other: chest strap for her tilt in space manual wheel chair Treatment Diagnosis: For safe transportation to home secondary to poor sitting balance in upright. CDG type Ia, seizures, and G-tube dependence.    Viral gastroenteritis, Dehydration, Acute respiratory failure with hypoxia (H), Congenital disorder of glycosylation (CDG) (H)       sertraline 20 MG/ML (HIGH CONC) solution    ZOLOFT     200 mg by Gastronomy tube route daily        * traZODone 50 MG tablet    DESYREL     by Gastric Tube route At Bedtime        * traZODone 50 MG tablet    DESYREL     Crush one tablet and administer via G-tube 30 minutes prior to bedtime daily        * UNABLE TO FIND      MEDICATION NAME: Stress relax (magnesium citrate) 380 mg at bedtime        * UNABLE TO FIND      MEDICATION NAME: Mood probiotic 1 capsule (3,000,000,000 CFUs) daily.        * UNABLE TO FIND      MEDICATION NAME: Rescue Remedy Herbal Supplement - Used as needed.        * Notice:  This list has 5 medication(s) that are the same as other medications prescribed for you. Read the directions carefully, and ask your doctor or other care provider to review them with you.

## 2017-11-22 NOTE — NURSING NOTE
"Chief Complaint   Patient presents with     RECHECK     follow up for congenital disorder glycosylation       Initial /78  Pulse 85  Ht 4' 1\" (124.5 cm)  Wt 78 lb 14.8 oz (35.8 kg)  BMI 23.11 kg/m2 Estimated body mass index is 23.11 kg/(m^2) as calculated from the following:    Height as of this encounter: 4' 1\" (124.5 cm).    Weight as of this encounter: 78 lb 14.8 oz (35.8 kg).  Medication Reconciliation: complete   Height and weight per weekly mom.  Sarah Galdamez LPN      "

## 2017-11-22 NOTE — PROGRESS NOTES
Adult Metabolism Clinic Return Visit  Name:  Sherly Ramires  :   1995  MRN:   [id number]  Date of Visit: 2017  PCP: Leeanne Mustafa    Immunization status is: up to date.  Managing Metabolic Center(s):  [facility].    Chief Complaint:  Ms. Sherly aRmires is a 22 year- 8 month old female whom I saw in follow up in Metabolism Clinic for congenital idsorder of glycosylation. Ms. Ramires was accompanied to this visit by her  group home primary staff and her parents.     Assessment:    Ms. 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  Ms. Ramires was accompanied to this visit by her  group home staff and both her parents.  Plan:    1. Testing ordered at this visit:   Orders Placed This Encounter   Procedures     INR     25 OH Vit D therapy monitoring     Comprehensive metabolic panel     ONC/HEME PEDS REFERRAL       2. Medications: No specific medications related to her metabolic disorder    3. Continue to observe emergency precautions as previously discussed.  Our on-call metabolic service is available 24 hours/day by calling the page  ([phone]) and asking for the Genetics and Metabolism doctor on call.  4. Return to the Adult Metabolism Clinic in 6 months for follow-up.       ----------  History of Present Illness:      Sherly has a history of fragility fractures. There have been two fragility fractures in her life. One occurred when she stuck her leg out and hit a door. It was a spiral fracture of the lower leg. Another occurred when her 4 year old brother fell on her and broke her lower leg. She has previously had normal PTH and DXA (when corrected for age).Previous hormone studies show elevated FSH (178) with nearly undetectable estradiol levels consistent with primary gonadal failure. In the past estrogen  replacement has not been considered by her endocrinologist, Dr. Lennon, due to the concern of hypercoaguability in individuals with CDG and the potentially increased risk of strokes or stroke-like episodes.      Sherly had increased weakness in both arms and legs in December of 2017.  Neurology recommended MRI of the cervical spine due to a prior surgery in the neck and this showed an area of weakness between her prior fusion and rods (cannot specify further).  She saw the surgeon who had done her surgery a few weeks ago and he recommended a neck brace.  She actually likes the brace and has been doing well with it, says it is more comfortable with it on. We discussed with family that because Sherly's history of fractures, weakness of the spine and ovarian failure and lack of ambulation she at high risk for progressive osteopenia and osteoporosis. Because of her coagulation abnormalities associated with CDG she was referred to see Dr. Mays for evaluation of need for aspirin prophylaxis and assessment whether she could start estrogen replacement therapy.     Family also advocated during this visit for having Sherly being admitted in Pediatrics because of her complex medical history and her small size and her stage of development which in par with a pediatric patient than an adult.    She follows Dr. Correa and it seems that sees although her retinal activity is extict. Her neurologist is Nhan Knott with [facility].    No other new illnesses, hospitalizations, surgeries.  Not currently receiving any specific therapies.      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)    Patient Active Problem List   Diagnosis     GHD (growth hormone deficiency) (H)     Hypoglycemia     Corneal epithelial defect - Left Eye     Corneal dellen of left eye     Ovarian failure     Hip dislocation, left (H)     Osteoporosis     Elevated TSH     Congenital disorder of  glycosylation associated with mutation in PMM2 gene (H)     Complex partial seizure evolving to generalized seizure (H)     Myelomalacia of cervical cord (H)     Vitamin D toxicity, accidental or unintentional, initial encounter     Ileus (H)     Interval History:  Sherly was last seen in our clinic in December 2016.  Since the last clinic visit Sherly was not seen in the ED.  She did have the visit mentioned above.  No other acute concerns.     Other health services currently received are primary care, spine surgery (in [place]), neurology, endocrinology, psychiatrist   Current research study participation: none         Past Medical History:  Past Medical History:   Diagnosis Date     Anxiety      Global developmental delay      Hypoglycemia      Metabolic disease     congenital disorder of glycosilation     Scoliosis      Seizures (H)     last at age 8 before today     Strabismus      Sherly Ramires is a 21 year old  female with history of congenital disorder of glycosylation, CDG type 1A (PMM2 Deficiency), who presents today for follow up of primary gonadal failure and concern for weak bones. Her past medical history is remarkable for twin gestation. She had an apneic episode at four days old and went home on apnea and bradycardia monitors. She was seen at [age] months of age due to maternal concerns of strabismus, fat pads on her knuckles, and developmental delay. She was diagnosed with congenital disorder of glycosylation at six months of age.  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   She had abnormal thyroid functions and was treated with Synthroid for six months. It was found to be an abnormal thyroglobulin and replacement was stopped. She has also had spinal stenosis with lower extremity weakness. Symptoms began in October 2002, diagnosed in 2003, and has undergone surgical repair. She  developed seizure in 2013. She has anxiety, OCD-like tendencies, and muscle contractures.     Lives in group home and has her own room.   Stressors for patient and family: none.  Family moved out of cities, but mom comes in every weekend to stay and family overall still very involved.   Community resources received currently are: hoping for day program acceptance soon   Current insurance status commercial/private.   Neuropsychological evaluation: none.    Behavioral concerns: none reported - improved   Special education: graduated in June     I have reviewed Sherly s past medical history, family history, social history, medications and allergies as documented in the patient's electronic medical record.  There were no additional findings except as noted.     Review of Systems:   Constitional: negative  Eyes: negative - reportedly poor per last   Ears/Nose/Throat: negative - normal hearing  Respiratory: negative  Cardiovascular: negative  Gastrointestinal: negative  Genitourinary: negative  Hematologic/Lymphatic: negative  Allergy/Immunologic: negative - no drug allergies  Musculoskeletal: see above  Endocrine: At risk for osteoporosis and fractures given immobility.  Last saw Dr. Lennon with endocrinology in Feb 2017, no changes made at that time, due for follow up in 1 year from last visit   Integument: negative  Neurologic: negative  Psychiatric: anxiety, well controlled currently, uses a rescue remedy (natural herb supplements) through [facility] to help with any acute concerns and this works well     Nutrition History:  - Sees dietician, has switched to lower calorie formula per recs from dietician and endo as she had gained some weight, this has helped  - Occasionally eats lunch instead of formula     Medications:  Current Outpatient Prescriptions   Medication Sig Dispense Refill     traZODone (DESYREL) 50 MG tablet Crush one tablet and administer via G-tube 30 minutes prior to bedtime daily        MELATONIN PO Take 3 mg by mouth At Bedtime       levETIRAcetam (KEPPRA) 100 MG/ML solution 10 mL  in the AM and 15 ml in the PM, by gastrostomy tube. 750 mL 11     UNABLE TO FIND MEDICATION NAME: Rescue Remedy Herbal Supplement - Used as needed.       traZODone (DESYREL) 50 MG tablet by Gastric Tube route At Bedtime       sertraline (ZOLOFT) 20 MG/ML (HIGH CONC) solution 200 mg by Gastronomy tube route daily        D-Mannose POWD 2 times daily 1 scoop - mixed with water       Nutritional Supplements (COMPLEAT PEDIATRIC PO)        UNABLE TO FIND MEDICATION NAME: Stress relax (magnesium citrate) 380 mg at bedtime       UNABLE TO FIND MEDICATION NAME: Mood probiotic 1 capsule (3,000,000,000 CFUs) daily.       calcium carbonate 1250 (500 CA) MG/5ML SUSP Take 6 mLs (1,500 mg) by mouth 2 times daily (with meals) (Patient taking differently: 1,500 mg by Gastronomy tube route 2 times daily (with meals) ) 450 mL 12     ondansetron (ZOFRAN) 4 MG/5ML solution Take 5 mLs (4 mg) by mouth every 6 hours as needed for nausea or vomiting (Patient taking differently: 4 mg by Gastronomy tube route every 6 hours as needed for nausea or vomiting ) 90 mL 0     bacitracin ointment Apply topically 2 times daily as needed for wound care 14 g 0     ARIPiprazole (ABILIFY) 1 MG/ML SOLN 7.5 mg daily        acetaminophen (TYLENOL) 160 MG/5ML solution 480 mg (15 mL) by G-tube every 4 hours as needed       diazepam (VALIUM) 5 MG/ML solution 7.5 mg by Gastronomy tube route every hour as needed for seizures (Seizures lasting longer than 5 minutes)        ORDER FOR DME Equipment being ordered: Other: chest strap for her tilt in space manual wheel chair  Treatment Diagnosis: For safe transportation to home secondary to poor sitting balance in upright. CDG type Ia, seizures, and G-tube dependence. 1 Device 0     Docusate Sodium (ENEMEEZ MINI RE) 3 times a week (MWF) to help with bowel movements     - Natural rescue anxiety regimen     "  Allergies:  Allergies   Allergen Reactions     Ativan [Lorazepam] Other (See Comments)     Paradoxical reaction     Motrin [Ibuprofen] GI Disturbance     Pt is able to have motrin with food but not on an empty stomache.      Pseudoephedrine      Physical Examination:  Blood pressure 107/78, pulse 85, height 4' 1\" (124.5 cm), weight 78 lb 14.8 oz (35.8 kg), not currently breastfeeding.  Body surface area is 1.11 meters squared.    General: Alert, sitting in wheel chair, has neck brace in place. Engaged with group home staff but primarily avoids interactions with me.   Head and Neck:  She had hair of normal texture and distribution and her head was proportionate in appearance.The neck was supple without lymphadenopathy.    Eyes:  The pupils were equal, round, and reacted to light.   The conjunctivae were clear.   Ears:  Her ears were normal in architecture and placement.   Nose: The nose was clear.    Mouth and Throat: Unwilling to open mouth therefore unable to examine.   Respiratory: CTAB from anterior position, not willing to sit forward in chair for posterior examination     CV:  RRR, no murmurs  GI: Patient would not allow abdominal exam.     : I deferred a  examination.   Musculoskeletal: Thin upper and lower extremities.   Neurologic: Developmentally delayed.  One word, slow speech, which is difficult to understand. Withdrew while attempting to test tone therefore unable to examine.   Integument: The skin was normal with no rashes or unusual pigmentation.    Results of laboratory studies collected at this visit available when this note was  Completed were reviewed.      I have reviewed patient's past medical history, family history, social history, medications and allergies as documented in the electronic medical record.  There were no additional findings except as noted.    Sarah Dubon M.D.     [facility]  Division of Pediatric " Endocrinology  Division of Genetics & Metabolism  [address]    [facility] [place] [address] [phone]      CC  SELF, REFERRED    Copy to patient  WHITFIELDDELIO (MOM/GUARDIAN) RYAN WHITFIELD (DAD/GUARDIAN)  C/O [facility]  [address] [facility] [address]

## 2017-11-22 NOTE — LETTER
2017      RE: Sherly Ramires  C/O MT MO[facility]T [facility]  [id number] [facility] [id number]       Adult Metabolism Clinic Return Visit  Name:  Sherly Ramires  :   1995  MRN:   [id number]  Date of Visit: 2017  PCP: Leeanne Mustafa    Immunization status is: up to date.  Managing Metabolic Center(s):  [facility] Adult Metabolism [facility].    Chief Complaint:  Ms. Sheryl Ramires is a 22 year- 8 month old female whom I saw in follow up in Metabolism Clinic for congenital idsorder of glycosylation. Ms. Ramires was accompanied to this visit by her  group home primary staff and her parents.     Assessment:    Ms. 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  Ms. Ramires was accompanied to this visit by her  group home staff and both her parents.  Plan:    1. Testing ordered at this visit:   Orders Placed This Encounter   Procedures     INR     25 OH Vit D therapy monitoring     Comprehensive metabolic panel     ONC/HEME PEDS REFERRAL       2. Medications: No specific medications related to her metabolic disorder    3. Continue to observe emergency precautions as previously discussed.  Our on-call metabolic service is available 24 hours/day by calling the page  ([phone]) and asking for the Genetics and Metabolism doctor on call.  4. Return to the Adult Metabolism Clinic in 6 months for follow-up.       ----------  History of Present Illness:      Sherly has a history of fragility fractures. There have been two fragility fractures in her life. One occurred when she stuck her leg out and hit a door. It was a spiral fracture of the lower leg. Another occurred when her 4 year old brother fell on her and broke her lower leg. She has previously had normal PTH and DXA (when corrected for age).Previous hormone studies show  elevated FSH (178) with nearly undetectable estradiol levels consistent with primary gonadal failure. In the past estrogen replacement has not been considered by her endocrinologist, Dr. Lennon, due to the concern of hypercoaguability in individuals with CDG and the potentially increased risk of strokes or stroke-like episodes.      Sherly had increased weakness in both arms and legs in December of 2017.  Neurology recommended MRI of the cervical spine due to a prior surgery in the neck and this showed an area of weakness between her prior fusion and rods (cannot specify further).  She saw the surgeon who had done her surgery a few weeks ago and he recommended a neck brace.  She actually likes the brace and has been doing well with it, says it is more comfortable with it on. We discussed with family that because Sherly's history of fractures, weakness of the spine and ovarian failure and lack of ambulation she at high risk for progressive osteopenia and osteoporosis. Because of her coagulation abnormalities associated with CDG she was referred to see Dr. Mays for evaluation of need for aspirin prophylaxis and assessment whether she could start estrogen replacement therapy.     Family also advocated during this visit for having Sherly being admitted in Pediatrics because of her complex medical history and her small size and her stage of development which in par with a pediatric patient than an adult.    She follows Dr. Correa and it seems that sees although her retinal activity is extict. Her neurolog[place]han [place]not[place] [facility] [facility].    No other new illnesses, hospitalizations, surgeries.  Not currently receiving any specific therapies.      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  Since the last clinic visit Sherly was not seen in the ED.  She did have the visit mentioned above.  No other acute concerns.     Other health services currently received are primary care, spine surgery (in [place]), neurology, endocrinology, psychiatrist   Current research study participation: none         Past Medical History:  Past Medical History:   Diagnosis Date     Anxiety      Global developmental delay      Hypoglycemia      Metabolic disease     congenital disorder of glycosilation     Scoliosis      Seizures (H)     last at age 8 before today     Strabismus      Sherly Ramires is a 21 year old  female with history of congenital disorder of glycosylation, CDG type 1A (PMM2 Deficiency), who presents today for follow up of primary gonadal failure and concern for weak bones. Her past medical history is remarkable for twin gestation. She had an apneic episode at four days old and went home on apnea and bradycardia monitors. She was seen at three months of age due to maternal concerns of strabismus, fat pads on her knuckles, and developmental delay. She was diagnosed with congenital disorder of glycosylation at six months of age.  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   She had abnormal thyroid functions and was treated with Synthroid for six months. It was found to be an abnormal thyroglobulin and replacement was stopped. She has also had spinal stenosis  with lower extremity weakness. Symptoms began in October 2002, diagnosed in 2003, and has undergone surgical repair. She developed seizure in 2013. She has anxiety, OCD-like tendencies, and muscle contractures.     Lives in group home and has her own room.   Stressors for patient and family: none.  Family moved out of cities, but mom comes in every weekend to stay and family overall still very involved.   Community resources received currently are: hoping for day program acceptance soon   Current insurance status commercial/private.   Neuropsychological evaluation: none.    Behavioral concerns: none reported - improved   Special education: graduated in June     I have reviewed Sherly s past medical history, family history, social history, medications and allergies as documented in the patient's electronic medical record.  There were no additional findings except as noted.     Review of Systems:   Constitional: negative  Eyes: negative - reportedly poor per last   Ears/Nose/Throat: negative - normal hearing  Respiratory: negative  Cardiovascular: negative  Gastrointestinal: negative  Genitourinary: negative  Hematologic/Lymphatic: negative  Allergy/Immunologic: negative - no drug allergies  Musculoskeletal: see above  Endocrine: At risk for osteoporosis and fractures given immobility.  Last saw Dr. Lennon with endocrinology in Feb 2017, no changes made at that time, due for follow up in 1 year from last visit   Integument: negative  Neurologic: negative  Psychiatric: anxiety, well controlled currently, uses a rescue remedy (natural herb supplements) through [facility] to help with any acute concerns and this works well     Nutrition History:  - Sees dietician, has switched to lower calorie formula per recs from dietician and endo as she had gained some weight, this has helped  - Occasionally eats lunch instead of formula     Medications:  Current Outpatient Prescriptions   Medication Sig Dispense Refill      traZODone (DESYREL) 50 MG tablet Crush one tablet and administer via G-tube 30 minutes prior to bedtime daily       MELATONIN PO Take 3 mg by mouth At Bedtime       levETIRAcetam (KEPPRA) 100 MG/ML solution 10 mL  in the AM and 15 ml in the PM, by gastrostomy tube. 750 mL 11     UNABLE TO FIND MEDICATION NAME: Rescue Remedy Herbal Supplement - Used as needed.       traZODone (DESYREL) 50 MG tablet by Gastric Tube route At Bedtime       sertraline (ZOLOFT) 20 MG/ML (HIGH CONC) solution 200 mg by Gastronomy tube route daily        D-Mannose POWD 2 times daily 1 scoop - mixed with water       Nutritional Supplements (COMPLEAT PEDIATRIC PO)        UNABLE TO FIND MEDICATION NAME: Stress relax (magnesium citrate) 380 mg at bedtime       UNABLE TO FIND MEDICATION NAME: Mood probiotic 1 capsule (3,000,000,000 CFUs) daily.       calcium carbonate 1250 (500 CA) MG/5ML SUSP Take 6 mLs (1,500 mg) by mouth 2 times daily (with meals) (Patient taking differently: 1,500 mg by Gastronomy tube route 2 times daily (with meals) ) 450 mL 12     ondansetron (ZOFRAN) 4 MG/5ML solution Take 5 mLs (4 mg) by mouth every 6 hours as needed for nausea or vomiting (Patient taking differently: 4 mg by Gastronomy tube route every 6 hours as needed for nausea or vomiting ) 90 mL 0     bacitracin ointment Apply topically 2 times daily as needed for wound care 14 g 0     ARIPiprazole (ABILIFY) 1 MG/ML SOLN 7.5 mg daily        acetaminophen (TYLENOL) 160 MG/5ML solution 480 mg (15 mL) by G-tube every 4 hours as needed       diazepam (VALIUM) 5 MG/ML solution 7.5 mg by Gastronomy tube route every hour as needed for seizures (Seizures lasting longer than 5 minutes)        ORDER FOR DME Equipment being ordered: Other: chest strap for her tilt in space manual wheel chair  Treatment Diagnosis: For safe transportation to home secondary to poor sitting balance in upright. CDG type Ia, seizures, and G-tube dependence. 1 Device 0     Docusate Sodium (ENEMEEZ  "MINI RE) 3 times a week (MWF) to help with bowel movements     - Natural rescue anxiety regimen      Allergies:  Allergies   Allergen Reactions     Ativan [Lorazepam] Other (See Comments)     Paradoxical reaction     Motrin [Ibuprofen] GI Disturbance     Pt is able to have motrin with food but not on an empty stomache.      Pseudoephedrine      Physical Examination:  Blood pressure 107/78, pulse 85, height 4' 1\" (124.5 cm), weight 78 lb 14.8 oz (35.8 kg), not currently breastfeeding.  Body surface area is 1.11 meters squared.    General: Alert, sitting in wheel chair, has neck brace in place. Engaged with group home staff but primarily avoids interactions with me.   Head and Neck:  She had hair of normal texture and distribution and her head was proportionate in appearance.The neck was supple without lymphadenopathy.    Eyes:  The pupils were equal, round, and reacted to light.   The conjunctivae were clear.   Ears:  Her ears were normal in architecture and placement.   Nose: The nose was clear.    Mouth and Throat: Unwilling to open mouth therefore unable to examine.   Respiratory: CTAB from anterior position, not willing to sit forward in chair for posterior examination     CV:  RRR, no murmurs  GI: Patient would not allow abdominal exam.     : I deferred a  examination.   Musculoskeletal: Thin upper and lower extremities.   Neurologic: Developmentally delayed.  One word, slow speech, which is difficult to understand. Withdrew while attempting to test tone therefore unable to examine.   Integument: The skin was normal with no rashes or unusual pigmentation.    Results of laboratory studies collected at this visit available when this note was  Completed were reviewed.      I have reviewed patient's past medical history, family history, social history, medications and allergies as documented in the electronic medical record.  There were no additional findings except as noted.    Sarah Dubon, " M.D.     [facility]  Division of Pediatric Endocrinology  Division of Genetics & Metabolism  [address]    [facility] [place] [id number] [phone]      CC  SELF, REFERRED    Copy to patient  DELIO RAMIRES (MOM/GUARDIAN) RYAN RAMIRES (DAD/GUARDIAN)  C/O MT [facility]IV[facility]  [id number] [place] [id number]        Adult Metabolism Clinic Return Visit  Name:  Sherly Ramires  :   1995  MRN:   [id number]  Date of Visit: 2017  PCP: Leeanne Mustafa    Immunization status is: up to date.  Managing Metabolic Center(s):  [facility] Adult Metabolism Clinic.    Chief Complaint:  Ms. Sherly Ramires is a 22 year- 8 month old female whom I saw in follow up in Metabolism Clinic for congenital idsorder of glycosylation. Ms. Ramires was accompanied to this visit by her  group home primary staff and her parents.     Assessment:    Ms. 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  Ms. Ramires was accompanied to this visit by her  group home staff and both her parents.  Plan:    1. Testing ordered at this visit:   Orders Placed This Encounter   Procedures     INR     25 OH Vit D therapy monitoring     Comprehensive metabolic panel     ONC/HEME PEDS REFERRAL       2. Medications: No specific medications related to her metabolic disorder    3. Continue to observe emergency precautions as previously discussed.  Our on-call metabolic service is available 24 hours/day by calling the page  ([phone]) and asking for the Genetics and Metabolism doctor on call.  4. Return to the Adult Metabolism Clinic in 6 months for follow-up.       ----------  History of Present Illness:      Had increased weakness in both arms and legs in December.  Neurology  recommended MRI of the cervical spine due to a prior surgery in the neck and this showed an area of weakness between her prior fusion and rods (cannot specify further).  She saw the surgeon who had done her surgery a few weeks ago and he recommended a neck brace.  She actually likes the brace and has been doing well with it, says it is more comfortable with it on.     She follows Dr. Correa and it seems that sees although her retinal activity is extict. Her neurologist is Nhan Knott with [facility]..No other new illnesses, hospitalizations, surgeries.  Not currently receiving any specific therapies.      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  Since the last clinic visit Sherly was not seen in the ED.  She did have the visit mentioned above.  No other acute concerns.     Other health services currently received are primary care, spine surgery (in [place]), neurology, endocrinology, psychiatrist   Current research study participation: none         Past Medical History:  Past Medical History:   Diagnosis Date     Anxiety      Global developmental delay      Hypoglycemia      Metabolic disease     congenital disorder of glycosilation     Scoliosis      Seizures (H)     last at age 8 before today     Strabismus         HPI:   Sherly Ramires is a 21 year  old  female with history of congenital disorder of glycosylation, CDG type 1A (PMM2 Deficiency), who presents today for follow up of primary gonadal failure and concern for weak bones.. Her past medical history is remarkable for twin gestation. She had an apneic episode at four days old and went home on apnea and bradycardia monitors. She was seen at three months of age due to maternal concerns of strabismus, fat pads on her knuckles, and developmental delay. She was diagnosed with congenital disorder of glycosylation at six months of age. Mom reports that no DNA or enzyme analysis was performed at that time. 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   She has had a history of recurrent otitis media and had tubes placed once, and she has not had any ear infections since the age of [age]. She had abnormal thyroid functions and was treated with Synthroid for six months. It was found to be an abnormal thyroglobulin and replacement was stopped. She has also had spinal stenosis with lower extremity weakness. Symptoms began in October 2002, diagnosed in 2003, and has undergone surgical repair. She developed seizure in 2013. She has anxiety, OCD-like tendencies, and muscle contractures.   Sherly has a history of fragility fractures. There have been two fragility fractures in her life. One occurred when she stuck her leg out and hit a door. It was a spiral fracture of the lower leg. Another occurred when her 4 year old brother fell on her and broke her lower leg. She has previously had normal PTH and DXA (when corrected for age).  Sherly was last evaluated in our clinic 2/27/14. At that time, the thyroid functions were normal. Previous hormone studies show elevated FSH (178) with nearly undetectable estradiol levels consistent with primary gonadal failure. We have not considered estrogen replacement due to the  concern of hypercoaguability in individuals with CDG and the potentially increased risk of strokes or stroke-like episodes.      Lives in group home. Has 3 house mates, they all have their own room.   Stressors for patient and family: none.  Family moved out of cities, but mom comes in every weekend to stay and family overall still very involved.   Community resources received currently are: hoping for day program acceptance soon   Current insurance status commercial/private.   Neuropsychological evaluation: none.    Behavioral concerns: none reported - improved   Special education: graduated in June     I have reviewed Sherly s past medical history, family history, social history, medications and allergies as documented in the patient's electronic medical record.  There were no additional findings except as noted.     Review of Systems:   Constitional: negative  Eyes: negative - reportedly poor per last   Ears/Nose/Throat: negative - normal hearing  Respiratory: negative  Cardiovascular: negative  Gastrointestinal: negative  Genitourinary: negative  Hematologic/Lymphatic: negative  Allergy/Immunologic: negative - no drug allergies  Musculoskeletal: see above  Endocrine: At risk for osteoporosis and fractures given immobility.  Last saw Dr. Lennon with endocrinology in Feb 2017, no changes made at that time, due for follow up in 1 year from last visit   Integument: negative  Neurologic: negative  Psychiatric: anxiety, well controlled currently, uses a rescue remedy (natural herb supplements) through [facility] to help with any acute concerns and this works well     Nutrition History:  - Sees dietician, has switched to lower calorie formula per recs from dietician and endo as she had gained some weight, this has helped  - Occasionally eats lunch instead of formula     Medications:  Current Outpatient Prescriptions   Medication Sig Dispense Refill     traZODone (DESYREL) 50 MG tablet Crush one tablet and  administer via G-tube 30 minutes prior to bedtime daily       MELATONIN PO Take 3 mg by mouth At Bedtime       levETIRAcetam (KEPPRA) 100 MG/ML solution 10 mL  in the AM and 15 ml in the PM, by gastrostomy tube. 750 mL 11     UNABLE TO FIND MEDICATION NAME: Rescue Remedy Herbal Supplement - Used as needed.       traZODone (DESYREL) 50 MG tablet by Gastric Tube route At Bedtime       sertraline (ZOLOFT) 20 MG/ML (HIGH CONC) solution 200 mg by Gastronomy tube route daily        D-Mannose POWD 2 times daily 1 scoop - mixed with water       Nutritional Supplements (COMPLEAT PEDIATRIC PO)        UNABLE TO FIND MEDICATION NAME: Stress relax (magnesium citrate) 380 mg at bedtime       UNABLE TO FIND MEDICATION NAME: Mood probiotic 1 capsule (3,000,000,000 CFUs) daily.       calcium carbonate 1250 (500 CA) MG/5ML SUSP Take 6 mLs (1,500 mg) by mouth 2 times daily (with meals) (Patient taking differently: 1,500 mg by Gastronomy tube route 2 times daily (with meals) ) 450 mL 12     ondansetron (ZOFRAN) 4 MG/5ML solution Take 5 mLs (4 mg) by mouth every 6 hours as needed for nausea or vomiting (Patient taking differently: 4 mg by Gastronomy tube route every 6 hours as needed for nausea or vomiting ) 90 mL 0     bacitracin ointment Apply topically 2 times daily as needed for wound care 14 g 0     ARIPiprazole (ABILIFY) 1 MG/ML SOLN 7.5 mg daily        acetaminophen (TYLENOL) 160 MG/5ML solution 480 mg (15 mL) by G-tube every 4 hours as needed       diazepam (VALIUM) 5 MG/ML solution 7.5 mg by Gastronomy tube route every hour as needed for seizures (Seizures lasting longer than 5 minutes)        ORDER FOR DME Equipment being ordered: Other: chest strap for her tilt in space manual wheel chair  Treatment Diagnosis: For safe transportation to home secondary to poor sitting balance in upright. CDG type Ia, seizures, and G-tube dependence. 1 Device 0     Docusate Sodium (ENEMEEZ MINI RE) 3 times a week (MWF) to help with bowel  "movements     - Natural rescue anxiety regimen      Allergies:  Allergies   Allergen Reactions     Ativan [Lorazepam] Other (See Comments)     Paradoxical reaction     Motrin [Ibuprofen] GI Disturbance     Pt is able to have motrin with food but not on an empty stomache.      Pseudoephedrine      Physical Examination:  Blood pressure 107/78, pulse 85, height 4' 1\" (124.5 cm), weight 78 lb 14.8 oz (35.8 kg), not currently breastfeeding.  Body surface area is 1.11 meters squared.    General: Alert, sitting in wheel chair, has neck brace in place. Engaged with group home staff but primarily avoids interactions with me.   Head and Neck:  She had hair of normal texture and distribution and her head was proportionate in appearance.The neck was supple without lymphadenopathy.    Eyes:  The pupils were equal, round, and reacted to light.   The conjunctivae were clear.   Ears:  Her ears were normal in architecture and placement.   Nose: The nose was clear.    Mouth and Throat: Unwilling to open mouth therefore unable to examine.   Respiratory: CTAB from anterior position, not willing to sit forward in chair for posterior examination     CV:  RRR, no murmurs  GI: Patient would not allow abdominal exam.     : I deferred a  examination.   Musculoskeletal: Thin upper and lower extremities.   Neurologic: Developmentally delayed.  One word, slow speech, which is difficult to understand. Withdrew while attempting to test tone therefore unable to examine.   Integument: The skin was normal with no rashes or unusual pigmentation.    Results of laboratory studies collected at this visit and available when this note was completed:   No visits with results within 30 Day(s) from this visit.  Latest known visit with results is:    Admission on 08/24/2017, Discharged on 08/25/2017   Component Date Value Ref Range Status     WBC 08/24/2017 5.8  4.0 - 11.0 10e9/L Final     RBC Count 08/24/2017 4.35  3.8 - 5.2 10e12/L Final     Hemoglobin " 08/24/2017 13.6  11.7 - 15.7 g/dL Final     Hematocrit 08/24/2017 40.7  35.0 - 47.0 % Final     MCV 08/24/2017 94  78 - 100 fl Final     MCH 08/24/2017 31.3  26.5 - 33.0 pg Final     MCHC 08/24/2017 33.4  31.5 - 36.5 g/dL Final     RDW 08/24/2017 12.4  10.0 - 15.0 % Final     Platelet Count 08/24/2017 114* 150 - 450 10e9/L Final     Diff Method 08/24/2017 Automated Method   Final     % Neutrophils 08/24/2017 81.9  % Final     % Lymphocytes 08/24/2017 8.5  % Final     % Monocytes 08/24/2017 9.4  % Final     % Eosinophils 08/24/2017 0.0  % Final     % Basophils 08/24/2017 0.0  % Final     % Immature Granulocytes 08/24/2017 0.2  % Final     Nucleated RBCs 08/24/2017 0  0 /100 Final     Absolute Neutrophil 08/24/2017 4.7  1.6 - 8.3 10e9/L Final     Absolute Lymphocytes 08/24/2017 0.5* 0.8 - 5.3 10e9/L Final     Absolute Monocytes 08/24/2017 0.5  0.0 - 1.3 10e9/L Final     Absolute Eosinophils 08/24/2017 0.0  0.0 - 0.7 10e9/L Final     Absolute Basophils 08/24/2017 0.0  0.0 - 0.2 10e9/L Final     Abs Immature Granulocytes 08/24/2017 0.0  0 - 0.4 10e9/L Final     Absolute Nucleated RBC 08/24/2017 0.0   Final     Sodium 08/24/2017 143  133 - 144 mmol/L Final     Potassium 08/24/2017 3.5  3.4 - 5.3 mmol/L Final     Chloride 08/24/2017 109  94 - 109 mmol/L Final     Carbon Dioxide 08/24/2017 25  20 - 32 mmol/L Final     Anion Gap 08/24/2017 9  3 - 14 mmol/L Final     Glucose 08/24/2017 83  70 - 99 mg/dL Final     Urea Nitrogen 08/24/2017 13  7 - 30 mg/dL Final     Creatinine 08/24/2017 0.23* 0.52 - 1.04 mg/dL Final     GFR Estimate 08/24/2017 >90  >60 mL/min/1.7m2 Final     GFR Estimate If Black 08/24/2017 >90  >60 mL/min/1.7m2 Final     Calcium 08/24/2017 8.4* 8.5 - 10.1 mg/dL Final     Bilirubin Total 08/24/2017 0.3  0.2 - 1.3 mg/dL Final     Albumin 08/24/2017 3.3* 3.4 - 5.0 g/dL Final     Protein Total 08/24/2017 7.1  6.8 - 8.8 g/dL Final     Alkaline Phosphatase 08/24/2017 88  40 - 150 U/L Final     ALT 08/24/2017 26  0  - 50 U/L Final     AST 08/24/2017 19  0 - 45 U/L Final     INR 08/24/2017 1.03  0.86 - 1.14 Final     PTT 08/24/2017 30  22 - 37 sec Final     Color Urine 08/24/2017 Yellow   Final     Appearance Urine 08/24/2017 Slightly Cloudy   Final     Glucose Urine 08/24/2017 Negative  NEG^Negative mg/dL Final     Bilirubin Urine 08/24/2017 Negative  NEG^Negative Final     Ketones Urine 08/24/2017 40* NEG^Negative mg/dL Final     Specific Gravity Urine 08/24/2017 1.015  1.003 - 1.035 Final     Blood Urine 08/24/2017 Negative  NEG^Negative Final     pH Urine 08/24/2017 7.5* 5.0 - 7.0 pH Final     Protein Albumin Urine 08/24/2017 10* NEG^Negative mg/dL Final     Urobilinogen mg/dL 08/24/2017 Normal  0.0 - 2.0 mg/dL Final     Nitrite Urine 08/24/2017 Positive* NEG^Negative Final     Leukocyte Esterase Urine 08/24/2017 Large* NEG^Negative Final     Source 08/24/2017 Catheterized Urine   Final     WBC Urine 08/24/2017 31* 0 - 2 /HPF Final     RBC Urine 08/24/2017 1  0 - 2 /HPF Final     Bacteria Urine 08/24/2017 Moderate* NEG^Negative /HPF Final     Transitional Epi 08/24/2017 1  0 - 1 /HPF Final     Specimen Description 08/24/2017 Catheterized Urine   Final     Culture Micro 08/24/2017 *  Final                    Value:>100,000 colonies/mL  Escherichia coli       HCG Qual Urine 08/24/2017 Negative  neg Final     Internal QC OK 08/24/2017 Yes   Final     Sodium 08/25/2017 147* 133 - 144 mmol/L Final     Potassium 08/25/2017 3.8  3.4 - 5.3 mmol/L Final     Chloride 08/25/2017 110* 94 - 109 mmol/L Final     Carbon Dioxide 08/25/2017 28  20 - 32 mmol/L Final     Anion Gap 08/25/2017 9  3 - 14 mmol/L Final     Glucose 08/25/2017 91  70 - 99 mg/dL Final     Urea Nitrogen 08/25/2017 9  7 - 30 mg/dL Final     Creatinine 08/25/2017 0.37* 0.52 - 1.04 mg/dL Final     GFR Estimate 08/25/2017 >90  >60 mL/min/1.7m2 Final     GFR Estimate If Black 08/25/2017 >90  >60 mL/min/1.7m2 Final     Calcium 08/25/2017 8.4* 8.5 - 10.1 mg/dL Final      Albumin 08/25/2017 3.0* 3.4 - 5.0 g/dL Final       Sarah Dubon MD

## 2017-11-30 ENCOUNTER — OFFICE VISIT (OUTPATIENT)
Dept: PEDIATRIC HEMATOLOGY/ONCOLOGY | Facility: CLINIC | Age: 22
End: 2017-11-30
Payer: COMMERCIAL

## 2017-11-30 VITALS
OXYGEN SATURATION: 98 % | SYSTOLIC BLOOD PRESSURE: 105 MMHG | RESPIRATION RATE: 16 BRPM | HEART RATE: 84 BPM | DIASTOLIC BLOOD PRESSURE: 78 MMHG | TEMPERATURE: 96.9 F

## 2017-11-30 DIAGNOSIS — D69.6 THROMBOCYTOPENIA (H): ICD-10-CM

## 2017-11-30 DIAGNOSIS — E74.89 CDG (CONGENITAL DISORDER OF GLYCOSYLATION) (H): Primary | ICD-10-CM

## 2017-11-30 LAB
ANGLE RATE OF CLOT STRENGTH: 75.3 DEGREES (ref 53–72)
APTT PPP: 31 SEC (ref 22–37)
CI HYPOCOAGULATION INDEX: 0.8 RATIO (ref 0–3)
FIBRINOGEN PPP-MCNC: 272 MG/DL (ref 200–420)
G ACTUAL CLOT STRENGTH: 6.8 KD/SC (ref 4.5–11)
INR PPP: 1.07 (ref 0.86–1.14)
K TIME TO SPEC CLOT STRENGTH: 1.8 MINUTE (ref 1–3)
LY30 LYSIS AT 30 MINUTES: 0 % (ref 0–8)
LY60 LYSIS AT 60 MINUTES: 0.9 % (ref 0–15)
MA MAXIMUM CLOT STRENGTH: 57.5 MM (ref 50–70)
PLATELETS.RETICULATED NFR BLD AUTO: 2.9 % (ref 1–7)
R TIME UNTIL CLOT FORMS: 7.8 MINUTE (ref 5–10)

## 2017-11-30 PROCEDURE — 85300 ANTITHROMBIN III ACTIVITY: CPT | Performed by: PEDIATRICS

## 2017-11-30 PROCEDURE — 85240 CLOT FACTOR VIII AHG 1 STAGE: CPT | Performed by: PEDIATRICS

## 2017-11-30 PROCEDURE — 85055 RETICULATED PLATELET ASSAY: CPT | Performed by: PEDIATRICS

## 2017-11-30 PROCEDURE — 85384 FIBRINOGEN ACTIVITY: CPT | Performed by: PEDIATRICS

## 2017-11-30 PROCEDURE — 00000401 ZZHCL STATISTIC THROMBIN TIME NC: Performed by: PEDIATRICS

## 2017-11-30 PROCEDURE — 85303 CLOT INHIBIT PROT C ACTIVITY: CPT | Performed by: PEDIATRICS

## 2017-11-30 PROCEDURE — 85396 CLOTTING ASSAY WHOLE BLOOD: CPT | Performed by: PEDIATRICS

## 2017-11-30 PROCEDURE — 85230 CLOT FACTOR VII PROCONVERTIN: CPT | Performed by: PEDIATRICS

## 2017-11-30 PROCEDURE — 85210 CLOT FACTOR II PROTHROM SPEC: CPT | Performed by: PEDIATRICS

## 2017-11-30 PROCEDURE — 85610 PROTHROMBIN TIME: CPT | Performed by: PEDIATRICS

## 2017-11-30 PROCEDURE — 85730 THROMBOPLASTIN TIME PARTIAL: CPT | Performed by: PEDIATRICS

## 2017-11-30 PROCEDURE — 85306 CLOT INHIBIT PROT S FREE: CPT | Performed by: PEDIATRICS

## 2017-11-30 PROCEDURE — 36415 COLL VENOUS BLD VENIPUNCTURE: CPT | Performed by: PEDIATRICS

## 2017-11-30 PROCEDURE — 85270 CLOT FACTOR XI PTA: CPT | Performed by: PEDIATRICS

## 2017-11-30 ASSESSMENT — PAIN SCALES - GENERAL: PAINLEVEL: NO PAIN (0)

## 2017-11-30 NOTE — PATIENT INSTRUCTIONS
"Coagulation studies obtained to assess balance between \"sticky\" and \"oozey\"    Will call mom with results    Will discuss profile with Dr. Bernal relative to the interest in starting estrogens for bone density     Follow-up every 1-2 years  "

## 2017-11-30 NOTE — LETTER
2017      RE: Sherly Ramires  C/O MT OLIVET [facility]  [address] [facility] [address]       2017            Sarah Dubon MD   [facility] Pediatric Endocrinology   [facility] [place] [id number]      Skyler Correa MD   [facility] Ophthalmology    [address] [place] [address]      Nhan Knott MD   [facility] Neurology    [address] [place] [address]      Chelsey Hager MD   [facility]    [address] [place] [address]      Sherrie Fontana MD   [facility]    [address] [place] [address]      RE: Sherly Ramires    MRN: [id number]   : 1995      Dear Doctors:       Sherly Ramires was seen in Pediatric Hematology Clinic at the referral of Dr. Dubon to evaluate her for coagulopathy and other possible hemostatic issues related to her congenital disorder of glycosylation.  This consult is in anticipation of starting estrogens for general bone demineralization.      HISTORY OF PRESENT ILLNESS:  Sherly is a 22-year-old female with a complex ongoing medical history.  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  She has not recently had evaluation of her coagulation system, in particular related to the possible bleeding or clotting imbalances seen in the general disorder.  She was seen many years ago by Hematology, but mother does not recall the results.  She currently has not had significant bleeding or bruising difficulty.  She does not have menstrual periods upon which to gauge her clinical bleeding.  The concern is whether or not she might have tendencies to have excessive clotting if she  starts estrogen therapy.      PAST MEDICAL HISTORY:  Sherly was identified as having a congenital disorder of glycosylation.  She has continued to have issues with speech and developmental delay and at this point has transitioned to a group home.  She has difficulties with mobility with joint contractures.  She had cervical spine surgery and also has issues with mobility in her neck and thus usually is in some sort of neck support.  She is usually in a wheelchair.  She has recently had a urinary tract infection.      In reviewing her medical records, I do not find information on anyone who has had a prior metabolic disorder.  There is mention made of glaucoma.      MEDICATIONS:  Current Outpatient Prescriptions   Medication     cholecalciferol (VITAMIN  -D) 1000 UNITS capsule     traZODone (DESYREL) 50 MG tablet     MELATONIN PO     levETIRAcetam (KEPPRA) 100 MG/ML solution     UNABLE TO FIND     traZODone (DESYREL) 50 MG tablet     sertraline (ZOLOFT) 20 MG/ML (HIGH CONC) solution     D-Mannose POWD     Nutritional Supplements (COMPLEAT PEDIATRIC PO)     UNABLE TO FIND     UNABLE TO FIND     calcium carbonate 1250 (500 CA) MG/5ML SUSP     ondansetron (ZOFRAN) 4 MG/5ML solution     bacitracin ointment     ARIPiprazole (ABILIFY) 1 MG/ML SOLN     acetaminophen (TYLENOL) 160 MG/5ML solution     diazepam (VALIUM) 5 MG/ML solution     ORDER FOR DME     Docusate Sodium (ENEMEEZ MINI RE)     No current facility-administered medications for this visit.          SOCIAL HISTORY:  Sherly is currently living in a group home and has a care attendant accompanying her in addition to her mother.     VITAL SIGNS:Vitals: /78 (BP Location: Left arm, Patient Position: Fowlers, Cuff Size: Adult Regular)  Pulse 84  Temp 96.9  F (36.1  C) (Axillary)  Resp 16  SpO2 98%  BMI= There is no height or weight on file to calculate BMI.     PHYSICAL EXAMINATION:  Sherly was in a wheelchair, which I did not have her move from.  She was  intent on watching an iPad and had limited interaction.   Head:coarse facies. No asymmmetry  Cardiovascular:Noraml S1 and S2, no murmur.  Intact pulses. Good perfusion  Respiratory:Lungs clear. No crackles.   Gastrointestinal:Difficult exam in wheelchair. + bowel sounds, cannot assess for masses, non-tender  Musculoskeletal: contractures noted. Muscle mass atrophied  Skin: no significant bruising on exposed surfaces  Neurologic: Delayed as above; in wheelchair     ASSESSMENT AND PLAN:   Sherly has not had a recent comprehensive evaluation of her hemostatic balance.  Multiple hemostatic abnormalities are found in the various congenital disorders of glycosylation and affect the balance as to whether somebody will tend to bleed or tend to clot.  This is important to identify before starting estrogens, which promote clot formation.  Clearly, with her neck instability, optimizing her bone density is of significance, and the risk/benefit of doing so is important.      The first labs I can find on her in our system are from 04/12/2006 and 10/22/2007, both of which show a low antithrombin of 29-48 and a low free protein S of 48% with a normal PTT of 32.  She subsequently had studies obtained 02/22/2015, which included an INR of 1.3, a PTT of 38, a fibrinogen of 426, a factor II of 61, a factor VII of 62, a factor VIII of 334, a factor IX of 146, a factor XI of 69, an antithrombin of 59, a protein C of 58, a protein S free of 39, a thromboelastogram, which showed normal R time of 7.1, an MA of 69.4 and a G clot strength slightly high at 11.3.  These lab values were obtained during a gastroenteritis with watery diarrhea, respiratory failure and an ICU stay.  Since those were very abnormal, and the pattern of hemostatic factors may change over time and she has not many major symptoms, we will obtain another comprehensive panel today.        When these results become available, I will discuss the pros and cons with   Ling and Sherly's family.  Again, as mentioned, it is probably very important to optimally mineralize her bones, especially her cervical spine, and the concern is whether or not she would be at risk for thrombosis with her mobilization and the profile of her underlying coagulopathy.  We will discuss the results when they become available.        In terms of followup, I usually see children with these disorders every 1-2 years.  Since she is 22 years of age and since she has transitioned to some of the adult services, she is welcome to stay in Pediatrics or could transition to Adult Hematology, specifically Dr. Camacho or Dr. Wheeler, who will be having Adult [facility]        Thank you very much for referring Sherly to me.  If there are any questions or concerns, please do not hesitate to call or contact the [employer]  at [phone].      Sincerely yours,      MD ANGELES Graham MD      NB:Sherly has some of the usual findings in CDG including low AT, free Protein S and Factor 11. Her overall balance is essentially normal per her TEG results.  Her risk of estrogen supplementation contributing to thrombosis is real from her immobilization aside from her hemostatic abnormalities.  I would recommend any estrogen be started at a low-dose and her TEG and coag panel be re-asessed in 1-2 months.     Results for orders placed or performed in visit on 11/30/17   INR   Result Value Ref Range    INR 1.07 0.86 - 1.14   Partial thromboplastin time   Result Value Ref Range    PTT 31 22 - 37 sec   Fibrinogen activity   Result Value Ref Range    Fibrinogen 272 200 - 420 mg/dL   Protein C chromogenic   Result Value Ref Range    Prot C Chromogenic 87 70 - 170 %   Antithrombin III   Result Value Ref Range    Antithrombin III Chromogenic 69 (L) 85 - 135 %   Factor 11 assay   Result Value Ref Range    Factor 11 Assay 52 (L) 65 - 150 %   Factor 2 assay   Result Value  Ref Range    Factor 2 Assay 99 60 - 140 %   Factor 8 assay   Result Value Ref Range    Factor 8 Assay 111 55 - 200 %   Protein S Antigen Free   Result Value Ref Range    Protein S Free 48 (L) 55 - 125 %   Factor 7 assay   Result Value Ref Range    Factor 7 Assay 77 50 - 129 %   TEG Without Heparinase IP   Result Value Ref Range    R time until clot forms 7.8 5 - 10 Minute    K time to spec clot strength 1.8 1 - 3 Minute    Angle rate of clot strength 75.3 (H) 53 - 72 Degrees    MA maximum clot strength 57.5 50 - 70 mm    CI hypocoagulation index 0.8 0.0 - 3.0 Ratio    G actual clot strength 6.8 4.5 - 11.0 Kd/sc    LY30 lysis at 30 minutes 0.0 0 - 8 %    LY60 lysis at 60 minutes 0.9 0 - 15 %   Immature PLT Fraction   Result Value Ref Range    Immature Platelet Fraction 2.9 1.0 - 7.0 %              D: 2017 07:10   T: 12/10/2017 14:11   MT: katina      Name:     ANTOLIN WHITFIELD   MRN:      [id number]        Account:      [id number]   :      1995           Service Date: 2017      Document: [id number]        Azra Mays MD

## 2017-11-30 NOTE — NURSING NOTE
Chief Complaint   Patient presents with     New Patient     Patient is here today for CDG consult      /78 (BP Location: Left arm, Patient Position: Fowlers, Cuff Size: Adult Regular)  Pulse 84  Temp 96.9  F (36.1  C) (Axillary)  Resp 16  SpO2 98%  I spent 10 minutes reviewing medications, allergies, and obtaining vitals.    Margarita Rojas LPN  November 30, 2017

## 2017-11-30 NOTE — MR AVS SNAPSHOT
"              After Visit Summary   11/30/2017    Sherly Ramires    MRN: [id number]           Patient Information     Date Of Birth          1995        Visit Information        Provider Department      11/30/2017 10:00 AM Azra Mays MD Peds Hematology Oncology        Today's Diagnoses     CDG (congenital disorder of glycosylation) (H)    -  1    Thrombocytopenia (H)              [facility], 9th floor  [address] [place] [address]  Phone: [phone]  Clinic Hours:   Monday-Friday:   7 am to 5:00 pm   closed weekends and major  holidays     If your fever is 100.5  or greater,   call the clinic during business hours.   After hours call [phone] and ask for the pediatric hematology / oncology physician to be paged for you.              Care Instructions    Coagulation studies obtained to assess balance between \"sticky\" and \"oozey\"    Will call mom with results    Will discuss profile with Dr. Bernal relative to the interest in starting estrogens for bone density     Follow-up every 1-2 years          Follow-ups after your visit        Follow-up notes from your care team     Call patient with results Return in about 1 year (around 11/30/2018).      Your next 10 appointments already scheduled     Candido 15, 2018  3:30 PM CST   Return Visit with Sherrie Fontana MD   Peds Integrative Health ([facility])    [facility]  [address]th [address] [address]   [phone]            Feb 01, 2018 11:15 AM CST   Return Visit with Barrett Lennon MD   Pediatric Endocrinology ([facility])    [facility]  [address] [place] [address]   [phone]            Apr 25, 2018 10:00 AM CDT   Return Visit with Nhan Knott MD   [facility] Epilepsy Care ([facility])    [address], [address]  [facility] [id number] [phone]    "         May 23, 2018 11:30 AM CDT   Return Metabolic Visit with Sarah Dubon MD   Peds Metabolism ([facility])    [facility]  [address]  [address] [facility] [address] [phone]            2018 10:30 AM CST   Return Visit with Azra Mays MD   Peds Hematology Oncology ([facility])    [facility]  9th Floor  [address] [facility] [address] [phone]              Who to contact     Please call your clinic at [phone] to:    Ask questions about your health    Make or cancel appointments    Discuss your medicines    Learn about your test results    Speak to your doctor   If you have compliments or concerns about an experience at your clinic, or if you wish to file a complaint, please contact [facility] Patient Relations at [phone] or email us at [email or web address]         Additional Information About Your Visit        [employer] Information     Trove is an electronic gateway that provides easy, online access to your medical records. With Trove, you can request a clinic appointment, read your test results, renew a prescription or communicate with your care team.     To sign up for Trove visit the website at [email or web address]   You will be asked to enter the access code listed below, as well as some personal information. Please follow the directions to create your username and password.     Your access code is: [email or web address]  Expires: 2018 10:35 AM     Your access code will  in 90 days. If you need help or a new code, please contact your [facility] Clinic or call [phone] for assistance.        Care EveryWhere ID     This is your Care EveryWhere ID. This could be used by other organizations to access your [place] medical records  [id number]        Your Vitals Were     Pulse Temperature Respirations Pulse Oximetry           84 96.9  F (36.1  C) (Axillary) 16 98%         Blood Pressure from Last 3 Encounters:   No data found for BP    Weight from Last 3 Encounters:   No data found for Wt              Today, you had the following     No orders found for display         Today's Medication Changes          These changes are accurate as of: 11/30/17 11:59 PM.  If you have any questions, ask your nurse or doctor.               These medicines have changed or have updated prescriptions.        Dose/Directions    calcium carbonate 1250 (500 CA) MG/5ML Susp suspension   This may have changed:  how to take this   Used for:  Congenital disorder of glycosylation (CDG) (H), Osteoporosis        Dose:  1500 mg   Take 6 mLs (1,500 mg) by mouth 2 times daily (with meals)   Quantity:  450 mL   Refills:  12       ondansetron 4 MG/5ML solution   Commonly known as:  ZOFRAN   This may have changed:  how to take this   Used for:  Viral gastroenteritis        Dose:  4 mg   Take 5 mLs (4 mg) by mouth every 6 hours as needed for nausea or vomiting   Quantity:  90 mL   Refills:  0                Primary Care Provider Office Phone # Fax #    Leeanne Mustafa [phone]       [facility] [address] [place] [address]        Equal Access to Services     PENNY RG AH: Hadana garciao Soroxann, waaxda luqadaha, qaybta kaalmada adeegyada, anahi rider. [facility] [phone].    ATENCIÓN: Si habla español, tiene a chakraborty disposición servicios gratuitos de asistencia lingüística. Llame al [phone].    We comply with applicable federal civil rights laws and [place] laws. We do not discriminate on the basis of race, color, national origin, age, disability, sex, sexual orientation, or gender identity.            Thank you!     Thank you for choosing PEDS HEMATOLOGY ONCOLOGY  for your care. Our goal is always to provide you with excellent care. Hearing back from our patients is one way we can continue  to improve our services. Please take a few minutes to complete the written survey that you may receive in the mail after your visit with us. Thank you!             Your Updated Medication List - Protect others around you: Learn how to safely use, store and throw away your medicines at www.disposemymeds.org.          This list is accurate as of: 11/30/17 11:59 PM.  Always use your most recent med list.                   Brand Name Dispense Instructions for use Diagnosis    acetaminophen 160 MG/5ML solution    TYLENOL     480 mg (15 mL) by G-tube every 4 hours as needed        ARIPiprazole 1 MG/ML Soln solution    ABILIFY     7.5 mg daily        bacitracin ointment     14 g    Apply topically 2 times daily as needed for wound care    Skin irritation       calcium carbonate 1250 (500 CA) MG/5ML Susp suspension     450 mL    Take 6 mLs (1,500 mg) by mouth 2 times daily (with meals)    Congenital disorder of glycosylation (CDG) (H), Osteoporosis       cholecalciferol 1000 UNITS capsule    vitamin  -D    30 capsule    Take 1 capsule (1,000 Units) by mouth daily    Congenital disorder of glycosylation (CDG) (H)       COMPLEAT PEDIATRIC PO           D-Mannose Powd      2 times daily 1 scoop - mixed with water        diazepam 5 MG/ML (HIGH CONC) solution    VALIUM     7.5 mg by Gastronomy tube route every hour as needed for seizures (Seizures lasting longer than 5 minutes)        ENEMEEZ MINI RE      3 times a week (MWF) to help with bowel movements        levETIRAcetam 100 MG/ML solution    KEPPRA    750 mL    10 mL  in the AM and 15 ml in the PM, by gastrostomy tube.    Complex partial seizure evolving to generalized seizure (H)       MELATONIN PO      Take 3 mg by mouth At Bedtime        ondansetron 4 MG/5ML solution    ZOFRAN    90 mL    Take 5 mLs (4 mg) by mouth every 6 hours as needed for nausea or vomiting    Viral gastroenteritis       order for DME     1 Device    Equipment being ordered: Other: chest strap for her  tilt in space manual wheel chair Treatment Diagnosis: For safe transportation to home secondary to poor sitting balance in upright. CDG type Ia, seizures, and G-tube dependence.    Viral gastroenteritis, Dehydration, Acute respiratory failure with hypoxia (H), Congenital disorder of glycosylation (CDG) (H)       sertraline 20 MG/ML (HIGH CONC) solution    ZOLOFT     200 mg by Gastronomy tube route daily        * traZODone 50 MG tablet    DESYREL     by Gastric Tube route At Bedtime        * traZODone 50 MG tablet    DESYREL     Crush one tablet and administer via G-tube 30 minutes prior to bedtime daily        * UNABLE TO FIND      MEDICATION NAME: Stress relax (magnesium citrate) 380 mg at bedtime        * UNABLE TO FIND      MEDICATION NAME: Mood probiotic 1 capsule (3,000,000,000 CFUs) daily.        * UNABLE TO FIND      MEDICATION NAME: Rescue Remedy Herbal Supplement - Used as needed.        * Notice:  This list has 5 medication(s) that are the same as other medications prescribed for you. Read the directions carefully, and ask your doctor or other care provider to review them with you.

## 2017-12-01 LAB
AT III ACT/NOR PPP CHRO: 69 % (ref 85–135)
FACT VII ACT/NOR PPP: 77 % (ref 50–129)
FACT VIII ACT/NOR PPP: 111 % (ref 55–200)
FACT XI ACT/NOR PPP: 52 % (ref 65–150)
PROTHROM ACT/NOR PPP: 99 % (ref 60–140)

## 2017-12-04 LAB
PROT C ACT/NOR PPP CHRO: 87 % (ref 70–170)
PROT S FREE AG ACT/NOR PPP IA: 48 % (ref 55–125)

## 2017-12-10 NOTE — PROGRESS NOTES
2017            Sarah Dubon MD   [facility] Pediatric Endocrinology   [facility] [place] [address]      Skyler Correa MD   [facility] Ophthalmology    [address] [place] [address]      Nhan Knott MD   [facility] Neurology    [address] [place] [address]      Chelsey Hager MD   [facility]    [address] [place] [address]      Sherrie Fontana MD   [facility]    [address] [place] [address]      RE: Sherly Ramires    MRN: [id number]   : 1995      Dear Doctors:       Sherly Ramires was seen in Pediatric Hematology Clinic at the referral of Dr. Dubon to evaluate her for coagulopathy and other possible hemostatic issues related to her congenital disorder of glycosylation.  This consult is in anticipation of starting estrogens for general bone demineralization.      HISTORY OF PRESENT ILLNESS:  Sherly is a 22-year-old female with a complex ongoing medical history.  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  She has not recently had evaluation of her coagulation system, in particular related to the possible bleeding or clotting imbalances seen in the general disorder.  She was seen many years ago by Hematology, but mother does not recall the results.  She currently has not had significant bleeding or bruising difficulty.  She does not have menstrual periods upon which to gauge her clinical bleeding.  The concern is whether or not she might have tendencies to have excessive clotting if she starts estrogen therapy.      PAST MEDICAL HISTORY:  Sherly was identified as having a congenital disorder of glycosylation.   She has continued to have issues with speech and developmental delay and at this point has transitioned to a group home.  She has difficulties with mobility with joint contractures.  She had cervical spine surgery and also has issues with mobility in her neck and thus usually is in some sort of neck support.  She is usually in a wheelchair.  She has recently had a urinary tract infection.      In reviewing her medical records, I do not find information on anyone who has had a prior metabolic disorder.  There is mention made of glaucoma.      MEDICATIONS:  Current Outpatient Prescriptions   Medication     cholecalciferol (VITAMIN  -D) 1000 UNITS capsule     traZODone (DESYREL) 50 MG tablet     MELATONIN PO     levETIRAcetam (KEPPRA) 100 MG/ML solution     UNABLE TO FIND     traZODone (DESYREL) 50 MG tablet     sertraline (ZOLOFT) 20 MG/ML (HIGH CONC) solution     D-Mannose POWD     Nutritional Supplements (COMPLEAT PEDIATRIC PO)     UNABLE TO FIND     UNABLE TO FIND     calcium carbonate 1250 (500 CA) MG/5ML SUSP     ondansetron (ZOFRAN) 4 MG/5ML solution     bacitracin ointment     ARIPiprazole (ABILIFY) 1 MG/ML SOLN     acetaminophen (TYLENOL) 160 MG/5ML solution     diazepam (VALIUM) 5 MG/ML solution     ORDER FOR DME     Docusate Sodium (ENEMEEZ MINI RE)     No current facility-administered medications for this visit.          SOCIAL HISTORY:  Sherly is currently living in a group home and has a care attendant accompanying her in addition to her mother.     VITAL SIGNS:Vitals: /78 (BP Location: Left arm, Patient Position: Fowlers, Cuff Size: Adult Regular)  Pulse 84  Temp 96.9  F (36.1  C) (Axillary)  Resp 16  SpO2 98%  BMI= There is no height or weight on file to calculate BMI.     PHYSICAL EXAMINATION:  Sherly was in a wheelchair, which I did not have her move from.  She was intent on watching an iPad and had limited interaction.   Head:coarse facies. No asymmmetry  Cardiovascular:Noraml S1 and S2,  no murmur.  Intact pulses. Good perfusion  Respiratory:Lungs clear. No crackles.   Gastrointestinal:Difficult exam in wheelchair. + bowel sounds, cannot assess for masses, non-tender  Musculoskeletal: contractures noted. Muscle mass atrophied  Skin: no significant bruising on exposed surfaces  Neurologic: Delayed as above; in wheelchair     ASSESSMENT AND PLAN:   Sherly has not had a recent comprehensive evaluation of her hemostatic balance.  Multiple hemostatic abnormalities are found in the various congenital disorders of glycosylation and affect the balance as to whether somebody will tend to bleed or tend to clot.  This is important to identify before starting estrogens, which promote clot formation.  Clearly, with her neck instability, optimizing her bone density is of significance, and the risk/benefit of doing so is important.      The first labs I can find on her in our system are from 04/12/2006 and 10/22/2007, both of which show a low antithrombin of 29-48 and a low free protein S of 48% with a normal PTT of 32.  She subsequently had studies obtained 02/22/2015, which included an INR of 1.3, a PTT of 38, a fibrinogen of 426, a factor II of 61, a factor VII of 62, a factor VIII of 334, a factor IX of 146, a factor XI of 69, an antithrombin of 59, a protein C of 58, a protein S free of 39, a thromboelastogram, which showed normal R time of 7.1, an MA of 69.4 and a G clot strength slightly high at 11.3.  These lab values were obtained during a gastroenteritis with watery diarrhea, respiratory failure and an ICU stay.  Since those were very abnormal, and the pattern of hemostatic factors may change over time and she has not many major symptoms, we will obtain another comprehensive panel today.        When these results become available, I will discuss the pros and cons with Dr. Dubon and Sherly's family.  Again, as mentioned, it is probably very important to optimally mineralize her bones, especially  her cervical spine, and the concern is whether or not she would be at risk for thrombosis with her mobilization and the profile of her underlying coagulopathy.  We will discuss the results when they become available.        In terms of followup, I usually see children with these disorders every 1-2 years.  Since she is 22 years of age and since she has transitioned to some of the adult services, she is welcome to stay in Pediatrics or could transition to Adult Hematology, specifically Dr. Camacho or Dr. Wheeler, who will be having Adult Hemostasis Clinic on the [facility].        Thank you very much for referring Sherly to me.  If there are any questions or concerns, please do not hesitate to call or contact the [facility]  at [phone].      Sincerely yours,      MD ANGELES Graham MD      NB:Sherly has some of the usual findings in CDG including low AT, free Protein S and Factor 11. Her overall balance is essentially normal per her TEG results.  Her risk of estrogen supplementation contributing to thrombosis is real from her immobilization aside from her hemostatic abnormalities.  I would recommend any estrogen be started at a low-dose and her TEG and coag panel be re-asessed in 1-2 months.     Results for orders placed or performed in visit on 11/30/17   INR   Result Value Ref Range    INR 1.07 0.86 - 1.14   Partial thromboplastin time   Result Value Ref Range    PTT 31 22 - 37 sec   Fibrinogen activity   Result Value Ref Range    Fibrinogen 272 200 - 420 mg/dL   Protein C chromogenic   Result Value Ref Range    Prot C Chromogenic 87 70 - 170 %   Antithrombin III   Result Value Ref Range    Antithrombin III Chromogenic 69 (L) 85 - 135 %   Factor 11 assay   Result Value Ref Range    Factor 11 Assay 52 (L) 65 - 150 %   Factor 2 assay   Result Value Ref Range    Factor 2 Assay 99 60 - 140 %   Factor 8 assay   Result Value Ref Range    Factor 8 Assay 111 55 - 200 %   Protein S  Antigen Free   Result Value Ref Range    Protein S Free 48 (L) 55 - 125 %   Factor 7 assay   Result Value Ref Range    Factor 7 Assay 77 50 - 129 %   TEG Without Heparinase IP   Result Value Ref Range    R time until clot forms 7.8 5 - 10 Minute    K time to spec clot strength 1.8 1 - 3 Minute    Angle rate of clot strength 75.3 (H) 53 - 72 Degrees    MA maximum clot strength 57.5 50 - 70 mm    CI hypocoagulation index 0.8 0.0 - 3.0 Ratio    G actual clot strength 6.8 4.5 - 11.0 Kd/sc    LY30 lysis at 30 minutes 0.0 0 - 8 %    LY60 lysis at 60 minutes 0.9 0 - 15 %   Immature PLT Fraction   Result Value Ref Range    Immature Platelet Fraction 2.9 1.0 - 7.0 %              D: 2017 07:10   T: 12/10/2017 14:11   MT: katina      Name:     ANTOLIN WHITFIELD   MRN:      [id number]        Account:      [id number]   :      1995           Service Date: 2017      Document: [id number]

## 2018-01-23 DIAGNOSIS — F41.9 ANXIETY: Primary | ICD-10-CM

## 2018-02-04 ENCOUNTER — DOCUMENTATION ONLY (OUTPATIENT)
Dept: ENDOCRINOLOGY | Facility: CLINIC | Age: 23
End: 2018-02-04

## 2018-02-04 DIAGNOSIS — E28.39 OVARIAN FAILURE: Primary | ICD-10-CM

## 2018-02-04 RX ORDER — ESTRADIOL 1 MG/1
TABLET ORAL
Qty: 15 TABLET | Refills: 2 | Status: SHIPPED | OUTPATIENT
Start: 2018-02-04 | End: 2018-02-05

## 2018-02-05 DIAGNOSIS — E28.39 OVARIAN FAILURE: ICD-10-CM

## 2018-02-05 NOTE — PROGRESS NOTES
"The following email was sent on 2/2/2018 to Sherly's mother:    Dear Julianne,    I wanted to give you an update:    I have given Sherly's name to [facility] leadership asking to allow for Sherly to continue receiving care in Pediatrics.    I saw Dr. Azra Mays's recommendation about starting low dose estrogen with follow up studies in 1-2 months.    As per Dr. Mays\" Sherly has some of the usual findings in CDG including low AT, free Protein S and Factor 11. Her overall balance is essentially normal per her TEG results.  Her risk of estrogen supplementation contributing to thrombosis is real from her immobilization aside from her hemostatic abnormalities.  I would recommend any estrogen be started at a low-dose and her TEG and coag panel be re-asessed in 1-2 months\".    We could start Sherly in a very low dose ( 0.5 mg of Estrace daily with repeat TEG and coag panel  in 2 months. Please call and make the appointment for these studies.  "

## 2018-04-23 ENCOUNTER — OFFICE VISIT (OUTPATIENT)
Dept: CONSULT | Facility: CLINIC | Age: 23
End: 2018-04-23
Attending: PEDIATRICS
Payer: COMMERCIAL

## 2018-04-23 VITALS
OXYGEN SATURATION: 97 % | HEART RATE: 67 BPM | DIASTOLIC BLOOD PRESSURE: 72 MMHG | SYSTOLIC BLOOD PRESSURE: 108 MMHG | RESPIRATION RATE: 18 BRPM

## 2018-04-23 DIAGNOSIS — R29.898 MUSCLE FUNCTION LOSS: ICD-10-CM

## 2018-04-23 DIAGNOSIS — F41.9 ANXIETY: ICD-10-CM

## 2018-04-23 DIAGNOSIS — M81.0 OSTEOPOROSIS WITHOUT CURRENT PATHOLOGICAL FRACTURE, UNSPECIFIED OSTEOPOROSIS TYPE: ICD-10-CM

## 2018-04-23 DIAGNOSIS — E74.89 CONGENITAL DISORDER OF GLYCOSYLATION ASSOCIATED WITH MUTATION IN PMM2 GENE (H): ICD-10-CM

## 2018-04-23 DIAGNOSIS — E74.89 CONGENITAL DISORDER OF GLYCOSYLATION (CDG) (H): Primary | ICD-10-CM

## 2018-04-23 DIAGNOSIS — Z87.440 PERSONAL HISTORY OF URINARY TRACT INFECTION: ICD-10-CM

## 2018-04-23 DIAGNOSIS — R48.8 PERSEVERATION: ICD-10-CM

## 2018-04-23 PROCEDURE — G0463 HOSPITAL OUTPT CLINIC VISIT: HCPCS | Mod: ZF

## 2018-04-23 ASSESSMENT — PAIN SCALES - GENERAL: PAINLEVEL: NO PAIN (0)

## 2018-04-23 NOTE — NURSING NOTE
Chief Complaint   Patient presents with     RECHECK     Patient here today for 8 week pain management follow up     /72 (BP Location: Left arm, Patient Position: Fowlers, Cuff Size: Adult Small)  Pulse 67  Resp 18  SpO2 97%  Evelyn Dangelo CMA  April 23, 2018

## 2018-04-23 NOTE — LETTER
4/23/2018      RE: Sherly Ramires  C/O [facility]  [address] [facility] [address]         Pediatric Integrative Health Subsequent Visit        Primary Care Provider: Leeanne Mustafa          Other involved providers: Ana Granda MD; Barrett Lennon MD; Sara Link MD     Reason for initial consultation: integrative suggestions that may be of assistance for behavior, history of recurrent UTI, and immune support     Interim History: Sherly Ramires is a 21 year old female with a complex medical history: including    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 (H) Yes     Anxiety        Perseveration        Personal history of urinary tract infection        Osteoporosis        Gastrointestinal dysmotility          Anna is here for follow-up and is accompanied by her support staff from [facility]. Dres behavior continues to be good when Anna has a schedule and familiar staff around her. Use of Rescue Remedy is rare but effective. She now has staff at her Day Program and it is going well. She has not had any UTIs since her last visit and remains on d-mannose and probiotics. BMs are fine with Fleet's or Enemeeze.   Today, Anna is sitting her wheel chair and has obvious loss of muscle mass in arms and legs. The House has requested that she use her regular wheel chair and a stander so that she can get some muscle activity- she had been using a power wheelchair exclusively.     There has been no follow-up of coagulation labs yet- Anna has been started on low-dose estrogen to help with preservation of bone mass.      Using mupirocin at GT site for last few days for slight redness.     ALLERGIES:        Allergies   Allergen Reactions     Ativan [Lorazepam] Other (See Comments)       Paradoxical reaction     Motrin [Ibuprofen] GI Disturbance       Pt is able to have motrin with food but not on an empty stomache.       Pseudoephedrine           IMMUNIZATIONS:       Immunization History   Administered Date(s) Administered     Influenza (IIV3) Received 2017 per staff         CURRENT MEDICATIONS:    Current Outpatient Prescriptions:      acetaminophen (TYLENOL) 160 MG/5ML solution, 480 mg (15 mL) by G-tube every 4 hours as needed, Disp: , Rfl:      ARIPiprazole (ABILIFY) 1 MG/ML SOLN, 7.5 mg daily , Disp: , Rfl:      bacitracin ointment, Apply topically 2 times daily as needed for wound care, Disp: 14 g, Rfl: 0     D-Mannose POWD, 2 times daily 1 scoop - mixed with water, Disp: , Rfl:      diazepam (VALIUM) 5 MG/ML solution, 7.5 mg by Gastronomy tube route every hour as needed for seizures (Seizures lasting longer than 5 minutes) , Disp: , Rfl:      estradiol (ESTRACE) 1 MG tablet, Take 0.5 mg ( 1/2 tablet) daily, Disp: 15 tablet, Rfl: 2     HERBALS, Bach Rescue Remedy Spray- 1-3 sprays, to tongue or inside mouth, prn, anxiety. Not to exceed threes times daily., Disp: 1 each, Rfl: 11     levETIRAcetam (KEPPRA) 100 MG/ML solution, 10 mL  in the AM and 15 ml in the PM, by gastrostomy tube., Disp: 750 mL, Rfl: 11     Nutritional Supplements (COMPLEAT PEDIATRIC PO), , Disp: , Rfl:      ondansetron (ZOFRAN) 4 MG/5ML solution, Take 5 mLs (4 mg) by mouth every 6 hours as needed for nausea or vomiting, Disp: 90 mL, Rfl: 0     ORDER FOR DME, Equipment being ordered: Other: chest strap for her tilt in space manual wheel chair Treatment Diagnosis: For safe transportation to home secondary to poor sitting balance in upright. CDG type Ia, seizures, and G-tube dependence., Disp: 1 Device, Rfl: 0     sertraline (ZOLOFT) 20 MG/ML (HIGH CONC) solution, 200 mg by Gastronomy tube route daily , Disp: , Rfl:      traZODone (DESYREL) 50 MG tablet, by Gastric Tube route At Bedtime, Disp: , Rfl:      traZODone (DESYREL) 50 MG tablet, Crush one tablet and administer via G-tube 30 minutes prior to bedtime daily, Disp: , Rfl:      UNABLE TO FIND, MEDICATION  NAME: Stress relax (magnesium citrate) 380 mg at bedtime, Disp: , Rfl:      UNABLE TO FIND, MEDICATION NAME: Mood probiotic 1 capsule (3,000,000,000 CFUs) daily., Disp: , Rfl:      UNABLE TO FIND, MEDICATION NAME: Rescue Remedy Herbal Supplement - Used as needed., Disp: , Rfl:      calcium carbonate 1250 (500 CA) MG/5ML SUSP, Take 6 mLs (1,500 mg) by mouth 2 times daily (with meals) (Patient taking differently: 1,500 mg by Gastronomy tube route 2 times daily (with meals) ), Disp: 450 mL, Rfl: 12     cholecalciferol (VITAMIN  -D) 1000 UNITS capsule, Take 1 capsule (1,000 Units) by mouth daily (Patient not taking: Reported on 4/23/2018), Disp: 30 capsule, Rfl: 11     Docusate Sodium (ENEMEEZ MINI RE), 3 times a week (MWF) to help with bowel movements, Disp: , Rfl:      MELATONIN PO, Take 3 mg by mouth At Bedtime, Disp: , Rfl:     PAST SURGICAL HISTORY:    Past Surgical History          Past Surgical History:   Procedure Laterality Date     ANESTHESIA OUT OF OR MRI 3T N/A 3/7/2017     Procedure: ANESTHESIA PEDS SEDATION MRI 3T;  Surgeon: GENERIC ANESTHESIA PROVIDER;  Location: [facility] PEDS SEDATION      BACK SURGERY         c1 decompression and fusionx3, scoliosis with instrumentation x1     ELECTRORETINOGRAM Bilateral 12/10/2014     ERG ([place])     ENT SURGERY          ear tubes     EXAM UNDER ANESTHESIA EYE(S) Bilateral 12/10/2014     EUA ([place])     EXTRACTION(S) DENTAL N/A 12/10/2014     Procedure: EXTRACTION(S) DENTAL;  Surgeon: Hubert York DDS;  Location: [facility] OR     EYE SURGERY Bilateral 1996     BMR 6mm ([place])      GI SURGERY         g tube     HC REPLACE GASTROSTOMY/CECOSTOMY TUBE PERCUTANEOUS N/A 12/10/2014     Procedure: REPLACE GASTROSTOMY TUBE, PERCUTANEOUS;  Surgeon: Phong Perea MD;  Location: [facility] OR     RECESSION RESECTION (REPAIR STRABISMUS) BILATERAL Bilateral 12/10/2014     BLR 11 ([place])     STRABISMUS SURGERY   1996     (1996) BMR 6 ([place])     STRABISMUS SURGERY   2014     BLR 11  ([place]) -             FAMILY HISTORY:    Family History           Family History   Problem Relation Age of Onset     Glaucoma Mother         no medications or surg to date     Glaucoma Maternal Grandmother         s/p surg, decreased VA      Amblyopia No family hx of       Strabismus No family hx of              SOCIAL HISTORY:        Social History   Substance Use Topics     Smoking status: Never Smoker     Smokeless tobacco: Never Used     Alcohol use No         Physical Exam:   Sitting up in wheelchair, smiling.  Easy respirations, lungs clear to auscultation.   Arms and legs thin.  Belly soft, non-tender. Slight redness around GT site      Shoni-shin meridian therapy using matasushin and rake to bring down the Kim.  GV-20 raking in outward fashion     Labs and Tests:  None today      Assessment:  Sherly is a 21 year old female patient with complex medical hx now in group home.        Plan/Follow-up:         Consider hydrocolloid powder if slight moisture at GT site continues- call prn.   Get repeat TEG and coag panel labs.  RTC 4 months, earlier prn.    Thank you for this consultation. Please do not hesitate to contact me with any questions or concerns.      I spent a total of 25 minutes face-to-face with Sherly Ramires during today's office visit.  Over 50% of this time was spent counseling the patient-family-staff and/or coordinating care regarding comfort.  See note for details.     Sherrie Fontana MD, CM  Medical Director Pediatric Integrative Health and Wellbeing, [facility]

## 2018-04-24 ENCOUNTER — TELEPHONE (OUTPATIENT)
Dept: INFUSION THERAPY | Facility: CLINIC | Age: 23
End: 2018-04-24

## 2018-04-24 NOTE — TELEPHONE ENCOUNTER
Leticia (caregiver for Sherly) called triage wondering about lab orders. Glenny msg sent to Julianne Norris to follow up with family since it is past the requested amt of time. Caregivers called to notify and aware of plan.

## 2018-04-24 NOTE — PROGRESS NOTES
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 Anna's behavior continues to be good when Anna has a schedule and familiar staff around her. Use of Rescue Remedy is rare but effective. She now has staff at her Day Program and it is going well. She has not had any UTIs since her last visit and remains on d-mannose and probiotics. BMs are fine with Fleet's or Enemeeze.   Today, Anna is sitting her wheel chair and has obvious loss of muscle mass in arms and legs. The House has requested that she use her regular wheel chair and a stander so that she can get some muscle activity- she had been using a power wheelchair exclusively.     There has been no follow-up of coagulation labs yet- Anna has been started on low-dose estrogen to help with preservation of bone mass.      Using mupirocin at GT site for last few days for slight redness.     ALLERGIES:        Allergies   Allergen Reactions     Ativan [Lorazepam] Other (See Comments)       Paradoxical reaction     Motrin [Ibuprofen] GI Disturbance       Pt is able to have motrin with food but not on an empty stomache.      Pseudoephedrine           IMMUNIZATIONS:       Immunization History   Administered Date(s) Administered     Influenza (IIV3)  Received 2017 per staff         CURRENT MEDICATIONS:    Current Outpatient Prescriptions:      acetaminophen (TYLENOL) 160 MG/5ML solution, 480 mg (15 mL) by G-tube every 4 hours as needed, Disp: , Rfl:      ARIPiprazole (ABILIFY) 1 MG/ML SOLN, 7.5 mg daily , Disp: , Rfl:      bacitracin ointment, Apply topically 2 times daily as needed for wound care, Disp: 14 g, Rfl: 0     D-Mannose POWD, 2 times daily 1 scoop - mixed with water, Disp: , Rfl:      diazepam (VALIUM) 5 MG/ML solution, 7.5 mg by Gastronomy tube route every hour as needed for seizures (Seizures lasting longer than 5 minutes) , Disp: , Rfl:      estradiol (ESTRACE) 1 MG tablet, Take 0.5 mg ( 1/2 tablet) daily, Disp: 15 tablet, Rfl: 2     HERBALS, Bach Rescue Remedy Spray- 1-3 sprays, to tongue or inside mouth, prn, anxiety. Not to exceed threes times daily., Disp: 1 each, Rfl: 11     levETIRAcetam (KEPPRA) 100 MG/ML solution, 10 mL  in the AM and 15 ml in the PM, by gastrostomy tube., Disp: 750 mL, Rfl: 11     Nutritional Supplements (COMPLEAT PEDIATRIC PO), , Disp: , Rfl:      ondansetron (ZOFRAN) 4 MG/5ML solution, Take 5 mLs (4 mg) by mouth every 6 hours as needed for nausea or vomiting, Disp: 90 mL, Rfl: 0     ORDER FOR DME, Equipment being ordered: Other: chest strap for her tilt in space manual wheel chair Treatment Diagnosis: For safe transportation to home secondary to poor sitting balance in upright. CDG type Ia, seizures, and G-tube dependence., Disp: 1 Device, Rfl: 0     sertraline (ZOLOFT) 20 MG/ML (HIGH CONC) solution, 200 mg by Gastronomy tube route daily , Disp: , Rfl:      traZODone (DESYREL) 50 MG tablet, by Gastric Tube route At Bedtime, Disp: , Rfl:      traZODone (DESYREL) 50 MG tablet, Crush one tablet and administer via G-tube 30 minutes prior to bedtime daily, Disp: , Rfl:      UNABLE TO FIND, MEDICATION NAME: Stress relax (magnesium citrate) 380 mg at bedtime, Disp: , Rfl:      UNABLE TO FIND, MEDICATION NAME: Mood probiotic 1  capsule (3,000,000,000 CFUs) daily., Disp: , Rfl:      UNABLE TO FIND, MEDICATION NAME: Rescue Remedy Herbal Supplement - Used as needed., Disp: , Rfl:      calcium carbonate 1250 (500 CA) MG/5ML SUSP, Take 6 mLs (1,500 mg) by mouth 2 times daily (with meals) (Patient taking differently: 1,500 mg by Gastronomy tube route 2 times daily (with meals) ), Disp: 450 mL, Rfl: 12     cholecalciferol (VITAMIN  -D) 1000 UNITS capsule, Take 1 capsule (1,000 Units) by mouth daily (Patient not taking: Reported on 4/23/2018), Disp: 30 capsule, Rfl: 11     Docusate Sodium (ENEMEEZ MINI RE), 3 times a week (MWF) to help with bowel movements, Disp: , Rfl:      MELATONIN PO, Take 3 mg by mouth At Bedtime, Disp: , Rfl:     PAST SURGICAL HISTORY:    Past Surgical History          Past Surgical History:   Procedure Laterality Date     ANESTHESIA OUT OF OR MRI 3T N/A 3/7/2017     Procedure: ANESTHESIA PEDS SEDATION MRI 3T;  Surgeon: GENERIC ANESTHESIA PROVIDER;  Location: [facility] PEDS SEDATION      BACK SURGERY         c1 decompression and fusionx3, scoliosis with instrumentation x1     ELECTRORETINOGRAM Bilateral 12/10/2014     ERG ([place])     ENT SURGERY          ear tubes     EXAM UNDER ANESTHESIA EYE(S) Bilateral 12/10/2014     EUA ([place])     EXTRACTION(S) DENTAL N/A 12/10/2014     Procedure: EXTRACTION(S) DENTAL;  Surgeon: Hubert York DDS;  Location: [facility] OR     EYE SURGERY Bilateral 1996     BMR 6mm ([place])      GI SURGERY         g tube     HC REPLACE GASTROSTOMY/CECOSTOMY TUBE PERCUTANEOUS N/A 12/10/2014     Procedure: REPLACE GASTROSTOMY TUBE, PERCUTANEOUS;  Surgeon: Phong Perea MD;  Location: [facility] OR     RECESSION RESECTION (REPAIR STRABISMUS) BILATERAL Bilateral 12/10/2014     BLR 11 ([place])     STRABISMUS SURGERY   1996     (1996) BMR 6 ([place])     STRABISMUS SURGERY   2014     BLR 11 ([place]) -             FAMILY HISTORY:    Family History           Family History   Problem Relation Age of Onset     Glaucoma  Mother         no medications or surg to date     Glaucoma Maternal Grandmother         s/p surg, decreased VA      Amblyopia No family hx of       Strabismus No family hx of              SOCIAL HISTORY:        Social History   Substance Use Topics     Smoking status: Never Smoker     Smokeless tobacco: Never Used     Alcohol use No         Physical Exam:   Sitting up in wheelchair, smiling.  Easy respirations, lungs clear to auscultation.   Arms and legs thin.  Belly soft, non-tender. Slight redness around GT site      Shoni-shin meridian therapy using matasushin and rake to bring down the Kim.  GV-20 raking in outward fashion     Labs and Tests:  None today      Assessment:  Sherly is a 21 year old female patient with complex medical hx now in group home.        Plan/Follow-up:         Consider hydrocolloid powder if slight moisture at GT site continues- call prn.   Get repeat TEG and coag panel labs.  RTC 4 months, earlier prn.    Thank you for this consultation. Please do not hesitate to contact me with any questions or concerns.      I spent a total of 25 minutes face-to-face with Sherly Ramires during today's office visit.  Over 50% of this time was spent counseling the patient-family-staff and/or coordinating care regarding comfort.  See note for details.     Sherrie Fontana MD, CM  Medical Director Pediatric Integrative Health and Wellbeing, [facility]

## 2018-04-26 ENCOUNTER — TELEPHONE (OUTPATIENT)
Dept: INFUSION THERAPY | Facility: CLINIC | Age: 23
End: 2018-04-26

## 2018-05-01 ENCOUNTER — TELEPHONE (OUTPATIENT)
Dept: INFUSION THERAPY | Facility: CLINIC | Age: 23
End: 2018-05-01

## 2018-05-01 NOTE — TELEPHONE ENCOUNTER
Leticia called looking for lab orders from Dr Mays. Email sent to Dr Mays to enter lab orders and will fax to caregiver of this patient when entered. Spoke to caregiver to update on the plan. Lab orders from Dr Dubon (CMP,INR,Vit D)  faxed to caregivers. Will fax orders from Dr Mays (TEG and coag panel) when they are entered.     Fax #: [phone]  Phone number: [phone]

## 2018-05-09 ENCOUNTER — TELEPHONE (OUTPATIENT)
Dept: INFUSION THERAPY | Facility: CLINIC | Age: 23
End: 2018-05-09

## 2018-05-09 NOTE — TELEPHONE ENCOUNTER
Patient's caregiver, Leticia left a message on the triage line requesting lab orders for patient that Dr. Mays wants drawn. Dr. Mays inbasket'ed with this request along with fax number to fax orders to or with a request to please contact triage nurse and she could fax orders to group home once they were placed by Dr. Mays. A voicemail left with the group home regarding Dr. Mays being contact with request.

## 2018-05-10 ENCOUNTER — TELEPHONE (OUTPATIENT)
Dept: INFUSION THERAPY | Facility: CLINIC | Age: 23
End: 2018-05-10

## 2018-05-10 ENCOUNTER — DOCUMENTATION ONLY (OUTPATIENT)
Dept: OTHER | Facility: CLINIC | Age: 23
End: 2018-05-10

## 2018-05-10 DIAGNOSIS — E74.89 CONGENITAL DISORDER OF GLYCOSYLATION (CDG) (H): Primary | ICD-10-CM

## 2018-05-10 NOTE — TELEPHONE ENCOUNTER
Dr. Mays entered lab orders on 5/10/18 per request from triage call on 5/9/18.  Orders printed off and faxed to the number provided by [facility] from call..

## 2018-05-16 ENCOUNTER — OFFICE VISIT (OUTPATIENT)
Dept: NEUROLOGY | Facility: CLINIC | Age: 23
End: 2018-05-16
Payer: COMMERCIAL

## 2018-05-16 VITALS
RESPIRATION RATE: 16 BRPM | SYSTOLIC BLOOD PRESSURE: 113 MMHG | HEART RATE: 72 BPM | TEMPERATURE: 97.6 F | DIASTOLIC BLOOD PRESSURE: 78 MMHG

## 2018-05-16 DIAGNOSIS — G40.209 COMPLEX PARTIAL SEIZURE EVOLVING TO GENERALIZED SEIZURE (H): Primary | ICD-10-CM

## 2018-05-16 RX ORDER — LEVETIRACETAM 100 MG/ML
SOLUTION ORAL
Qty: 750 ML | Refills: 11 | Status: SHIPPED | OUTPATIENT
Start: 2018-05-16 | End: 2018-05-16

## 2018-05-16 RX ORDER — LEVETIRACETAM 100 MG/ML
SOLUTION ORAL
Qty: 750 ML | Refills: 11 | Status: SHIPPED | OUTPATIENT
Start: 2018-05-16 | End: 2019-05-22

## 2018-05-16 NOTE — PROGRESS NOTES
[facility]  Epilepsy Clinic:  RETURN VISIT    HISTORY:  Ms. 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      The patient reportedly has not had any type of seizure following the most recent visit to this clinic on 04/19/2017.  She has continued to receive levetiracetam by gastrostomy without apparent difficulty.      Ictal semiology-history:   The patient has had essentially 3 types of seizures in her lifetime, which have been responsive to levetiracetam in the case of the most recent seizures.  These essentially occurred as generalized atonic seizures in early childhood and later as complex partial seizures and generalized tonic-clonic seizures.      The patient had generalized atonic seizures which occurred approximately 12 times, usually in association with a febrile illness or other physiological stressors, between the ages of 3 and 6 years.  Her mother was the primary witnesses of these events and noted that the patient suddenly became unresponsive, usually while lying in her crib and when she moves the patient's limbs, she noted generalized flaccidity.  She was told that these were generalized atonic seizures and the patient was given rectal Diastat to prevent a cluster of these seizures.  She did not receive chronic antiepileptic drug therapy at this time.  The patient had essentially no seizures that were definitely witnessed between the ages of 6 and 16 years.      At 16 years of age, the patient had her first generalized tonic-clonic seizure witnessed by her mother.  She had 3 of these on the day of onset, a single grand mal seizure later that year and then a single grand mal seizure about 1 year after the first seizures.  All of these were associated with sleep deprivation.  Her mother witnessed her to suddenly extend her limbs stiffly  with truncal extension followed by generalized rhythmic jerking for a minute or 2.  She did not have tongue biting.      It appears that over several years after the grand mal seizures began, there was a question with various care providers of brief staring spells.  Her mother witnessed a definite staring spell with essentially no movement and no responsiveness, but with diffuse stiffness for a minute or 2.  This occurred in , in association with sleep deprivation.  Subsequently, no one has seen any definite staring spell seizures.      The patient reportedly has no other type of paroxysmal behavioral alterations.     Epilepsy-seizure predispositions:   With early developmental delay, the patient was evaluated and eventually diagnosed with congenital disorder of glycosylation type 1.  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  She has had slow language development and has learned to communicate by producing single linguistic sound sequences which are not complete intelligible words, but are accompanied by gesturing to supplement the vocalization.  In this manner the patient is able to make a number of requests of people who know her well.  Her mother thinks that she has much greater comprehension of speech compared with her own speech production.      The patient has no family history of epilepsy or seizures.  She has no history of gestational or  injury, febrile convulsions, developmental delay, stroke, meningitis, encephalitis, significant head injury, or other epileptic predispositions than CDG type 1a.     Laboratory evaluations:   The patient has had brain imaging and electroencephalography at a number of different clinics in [facility] and [place].  The most recent brain MRI was performed at [facility] on 2013, and this was reported to show deformities at the  craniocervical junction consistent with reported surgical procedures, marked hypoplasia of the cerebellar vermis and cerebellar hemispheres (unchanged) and myelomalacia of adjacent portions of the superior cervical spinal cord, with limited T2 signal alterations in subcortical white matter.      Notes from her earlier pediatric neurologist, Dr. Gus Ny indicate that she had definite right frontal lobe interictal spikes on electroencephalograms in 2003 and 2004.     Epilepsy therapeutics:   The patient was treated with levetiracetam following her first grand mal seizure at 16 years of age.  She has continued on this since then, with upward dose adjustments a number of years ago.  She has not been noted to have adverse effects of levetiracetam.  She has not been on other chronic antiepileptic drugs therapies.     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  Her mother thinks that she was twice accidentally dropped or fell a small distance when she was about 6 years of age, once sliding often an adaptive tricycle onto the floor and once collapsing down out of the hands of a person who was holding her in an erect position.  It was at around this time that the patient was noted to have bilateral leg weakness, which progressed at an early age, leaving her with a spastic quadriplegia involving legs much more than arms.  Now over the last 6 months she appears to have had progression in arm weakness, however, probably involving the left arm more than the right arm.  She appears clearly weaker in her assistance with transfers, although she is able to readily manipulate light objects such that she can feed herself and use coloring crayons.  With this myelopathy, she has had complete urinary and fecal incontinence, although possibly these problems began before other manifestations of myelopathy.  She seems to have  some feeling in her feet.  Her mother is planning to pursue evaluation to determine whether further cervical spine surgery is indicated.     PAST MEDICAL-SURGICAL HISTORY:    1.  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     2.  Congenital cervical spine malformations, possibly complicated by early cervical trauma, with spastic quadriparesis; status post occipital-C2 fusion and C1 laminectomy (2003); status post foramen magnum decompression and C1 decompression, with dorsal [id number]-H5 fusion (2003); status post additional decompression and stabilization procedure (2004).   3.  Status post Burns rojelio procedure for kyphoscoliosis (2007).   4.  Status post gastrostomy tube placement and multiple replacements.   5.  Status post 2 procedures for strabismus.     FAMILY HISTORY:  There is no family history of epilepsy, seizures or other neurological conditions than migraine headaches in both parents.     PERSONAL AND SOCIAL HISTORY:  The patient lived with her parents and siblings for most of her life and had special education classes for much of this time.  More recently she moved into a specialized group home with 3 other highly disabled clients and appears to be doing well there.  She reportedly is able to swallow safely based on repeated swallowing studies, but has very little appetite, so receives most of her nutrition through the gastrostomy tube.  She does assist with some with activities of daily living.      REVIEW OF SYSTEMS:  The patient reportedly has not been evaluated for peripheral neuropathy.   She has no history of rashes and easy bruising.  The remainder of a 14-system symptom review was negative or unobtainable, except as noted above.       MEDICATIONS:  Levetiracetam solution 1000 mg in the  morning and 1500 mg in the evening per gastrostomy, and other medications as per the electronic medical record.     PHYSICAL EXAMINATION:    The patient appeared continuously alert, but frequently looked away or moved slightly away from the examiner.  Vital signs were as per the electronic medical record.  Skull and high cervical deformities were consistent with reported surgery, with markedly diminished cervical passive range of motion.   She was observed to be wearing an adapted soft collar.    She communicated with her mother in single English language sounds sequences which utilized some sounds appropriate to the apparent intended word, with considerable gesturing with her right hand.  She appeared irritated by interactions with the examiner.  She did demonstrate full extraocular movements with variable nystagmus.  Pupils were equal, round and reactive to light.  She demonstrated slight movement of the face bilaterally and appeared to feel touch to the face on each side.  She did not open her mouth or move her tongue on command.  She did appear to observe high contrast shapes in her environment.  She clearly heard her mother's voice spoken softly.  Muscle tone was moderately hypotonic diffusely, with markedly reduced muscle masses.  She did not demonstrate strength on command, but was observed to move her right arm spontaneously more than her left arm.  Deep tendon reflexes were hypoactive throughout.  She appeared distressed with further examination and toe signs were not tested.     IMPRESSION:    The patient continues to be free of seizures on levetiracetam monotherapy.  On 04/19/2017, she had an outpatient 3-hour video[facility]EEG recording at [facility], which showed ongoing generalized theta-delta slowing, with frequent brief bursts of high amplitude bifrontocentral delta slowing, but no epileptiform abnormalities.  She was reported to have had right frontal spikes on prior interictal EEG recordings.  The levetiracetam  level was 38.0 mcg per mL in 2017.     The previously reported progression in bilateral arm weakness was further evaluated by her neurosurgeon at the [facility], Dr. Italo Root, who reportedly advised use of a supportive cervical collar to prevent compression of neural structures, but did not recommend any further surgical approach.  She was observed to be wearing an adapted soft collar today.     PLAN:    1.  Continue levetiracetam at 1000/1500 mg daily.   2.  Return visit in approximately 11 months.   I spent 15 minutes in this patient care, more than 50% of which consisted of counseling and coordinating care.       Nhan Knott M.D.   Professor of Neurology

## 2018-05-16 NOTE — MR AVS SNAPSHOT
After Visit Summary   5/16/2018    Sherly Ramires    MRN: [id number]           Patient Information     Date Of Birth          1995        Visit Information        Provider Department      5/16/2018 1:30 PM Nhan Knott MD [facility] Epilepsy [facility]        Today's Diagnoses     Complex partial seizure evolving to generalized seizure (H)    -  1       Follow-ups after your visit        Follow-up notes from your care team     Return in about 11 months (around 4/16/2019).      Your next 10 appointments already scheduled     May 23, 2018 11:30 AM CDT   Return Metabolic Visit with Sarah Dubon MD   Peds Metabolism ([facility])    [facility]  [address]  [address] [facility] [id number] [phone]            Sep 17, 2018  2:00 PM CDT   Return Visit with Sherrie Fontana MD   Peds Integrative Health ([facility])    [facility]  9th Floor  [address] [facility] [address] [phone]            Nov 29, 2018 10:30 AM CST   Return Visit with Azra Mays MD   Peds Hematology Oncology ([facility])    [facility]  9th Floor  [address] [place] [id number] [phone]              Who to contact     Please call your clinic at [phone] to:    Ask questions about your health    Make or cancel appointments    Discuss your medicines    Learn about your test results    Speak to your doctor            Additional Information About Your Visit        MyChart Information     KeyEffx is an electronic gateway that provides easy, online access to your medical records. With KeyEffx, you can request a clinic appointment, read your test results, renew a prescription or communicate with your care team.     To sign up for Ibetort visit the website at [email or web address]   You will be asked to enter the access code listed below, as well as some personal information. Please follow the  directions to create your username and password.     Your access code is: [email or web address]  Expires: 2018  2:08 PM     Your access code will  in 90 days. If you need help or a new code, please contact your [facility] or call [phone] for assistance.        Care EveryWhere ID     This is your Care EveryWhere ID. This could be used by other organizations to access your [facility] medical records  [id number]        Your Vitals Were     Pulse Temperature Respirations             72 97.6  F (36.4  C) 16          Blood Pressure from Last 3 Encounters:   18 113/78   18 108/72   17 105/78    Weight from Last 3 Encounters:   17 78 lb 14.8 oz (35.8 kg)   17 80 lb (36.3 kg)   17 85 lb (38.6 kg)              Today, you had the following     No orders found for display         Today's Medication Changes          These changes are accurate as of 18  2:08 PM.  If you have any questions, ask your nurse or doctor.               Start taking these medicines.        Dose/Directions    * levETIRAcetam 100 MG/ML solution   Commonly known as:  KEPPRA   Used for:  Complex partial seizure evolving to generalized seizure (H)   Started by:  Nhan Knott MD        10 mL  in the AM and 15 ml in the PM, by gastrostomy tube.   Quantity:  750 mL   Refills:  11       * levETIRAcetam 100 MG/ML solution   Commonly known as:  KEPPRA   Used for:  Complex partial seizure evolving to generalized seizure (H)   Started by:  Nhan Knott MD        10 mL  in the AM and 15 ml in the PM, by gastrostomy tube.   Quantity:  750 mL   Refills:  11       * Notice:  This list has 2 medication(s) that are the same as other medications prescribed for you. Read the directions carefully, and ask your doctor or other care provider to review them with you.      These medicines have changed or have updated prescriptions.        Dose/Directions    calcium carbonate 1250 (500 Ca)  MG/5ML Susp suspension   This may have changed:  how to take this   Used for:  Congenital disorder of glycosylation (CDG) (H), Osteoporosis        Dose:  1500 mg   Take 6 mLs (1,500 mg) by mouth 2 times daily (with meals)   Quantity:  450 mL   Refills:  12            Where to get your medicines      Some of these will need a paper prescription and others can be bought over the counter.  Ask your nurse if you have questions.     Bring a paper prescription for each of these medications     levETIRAcetam 100 MG/ML solution    levETIRAcetam 100 MG/ML solution                Primary Care Provider Fax #    [facility] [phone]       [address] [place] [address]        Equal Access to Services     PENNY RG : Jackelyn Silvestre, joelle noble, jeremiah love, anahi rider. So [facility] [phone].    ATENCIÓN: Si habla español, tiene a chakraborty disposición servicios gratuitos de asistencia lingüística. Llame al [phone].    We comply with applicable federal civil rights laws and [place] laws. We do not discriminate on the basis of race, color, national origin, age, disability, sex, sexual orientation, or gender identity.            Thank you!     Thank you for choosing [facility]  for your care. Our goal is always to provide you with excellent care. Hearing back from our patients is one way we can continue to improve our services. Please take a few minutes to complete the written survey that you may receive in the mail after your visit with us. Thank you!             Your Updated Medication List - Protect others around you: Learn how to safely use, store and throw away your medicines at www.disposemymeds.org.          This list is accurate as of 5/16/18  2:08 PM.  Always use your most recent med list.                   Brand Name Dispense Instructions for use Diagnosis    acetaminophen 160 MG/5ML solution    TYLENOL      480 mg (15 mL) by G-tube every 4 hours as needed        ARIPiprazole 1 MG/ML Soln solution    ABILIFY     7.5 mg daily        bacitracin ointment     14 g    Apply topically 2 times daily as needed for wound care    Skin irritation       calcium carbonate 1250 (500 Ca) MG/5ML Susp suspension     450 mL    Take 6 mLs (1,500 mg) by mouth 2 times daily (with meals)    Congenital disorder of glycosylation (CDG) (H), Osteoporosis       cholecalciferol 1000 units capsule    vitamin  -D    30 capsule    Take 1 capsule (1,000 Units) by mouth daily    Congenital disorder of glycosylation (CDG) (H)       COMPLEAT PEDIATRIC PO           D-Mannose Powd      2 times daily 1 scoop - mixed with water        diazepam 5 MG/ML (HIGH CONC) solution    VALIUM     7.5 mg by Gastronomy tube route every hour as needed for seizures (Seizures lasting longer than 5 minutes)        ENEMEEZ MINI RE      3 times a week (MWF) to help with bowel movements        estradiol 1 MG tablet    ESTRACE    15 tablet    Take 0.5 mg ( 1/2 tablet) daily    Ovarian failure       HERBALS     1 each    Bach Rescue Remedy Spray- 1-3 sprays, to tongue or inside mouth, prn, anxiety. Not to exceed threes times daily.    Anxiety       * levETIRAcetam 100 MG/ML solution    KEPPRA    750 mL    10 mL  in the AM and 15 ml in the PM, by gastrostomy tube.    Complex partial seizure evolving to generalized seizure (H)       * levETIRAcetam 100 MG/ML solution    KEPPRA    750 mL    10 mL  in the AM and 15 ml in the PM, by gastrostomy tube.    Complex partial seizure evolving to generalized seizure (H)       MELATONIN PO      Take 3 mg by mouth At Bedtime        ondansetron 4 MG/5ML solution    ZOFRAN    90 mL    Take 5 mLs (4 mg) by mouth every 6 hours as needed for nausea or vomiting    Viral gastroenteritis       order for DME     1 Device    Equipment being ordered: Other: chest strap for her tilt in space manual wheel chair Treatment Diagnosis: For safe  transportation to home secondary to poor sitting balance in upright. CDG type Ia, seizures, and G-tube dependence.    Viral gastroenteritis, Dehydration, Acute respiratory failure with hypoxia (H), Congenital disorder of glycosylation (CDG) (H)       sertraline 20 MG/ML (HIGH CONC) solution    ZOLOFT     200 mg by Gastronomy tube route daily        * traZODone 50 MG tablet    DESYREL     by Gastric Tube route At Bedtime        * traZODone 50 MG tablet    DESYREL     Crush one tablet and administer via G-tube 30 minutes prior to bedtime daily        * UNABLE TO FIND      MEDICATION NAME: Stress relax (magnesium citrate) 380 mg at bedtime        * UNABLE TO FIND      MEDICATION NAME: Mood probiotic 1 capsule (3,000,000,000 CFUs) daily.        * UNABLE TO FIND      MEDICATION NAME: Rescue Remedy Herbal Supplement - Used as needed.        * Notice:  This list has 7 medication(s) that are the same as other medications prescribed for you. Read the directions carefully, and ask your doctor or other care provider to review them with you.

## 2018-05-16 NOTE — LETTER
2018       RE: Sherly Ramires  : 1995   MRN: [id number]      Dear Colleague,    Thank you for referring your patient, Sherly Ramires, to the [facility] EPILEPSY [facility]ARE at [facility]. Please see a copy of my visit note below.        [facility]  Epilepsy Clinic:  RETURN VISIT    HISTORY:  Ms. 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      The patient reportedly has not had any type of seizure following the most recent visit to this clinic on 2017.  She has continued to receive levetiracetam by gastrostomy without apparent difficulty.      Ictal semiology-history:   The patient has had essentially 3 types of seizures in her lifetime, which have been responsive to levetiracetam in the case of the most recent seizures.  These essentially occurred as generalized atonic seizures in early childhood and later as complex partial seizures and generalized tonic-clonic seizures.      The patient had generalized atonic seizures which occurred approximately 12 times, usually in association with a febrile illness or other physiological stressors, between the ages of 3 and 6 years.  Her mother was the primary witnesses of these events and noted that the patient suddenly became unresponsive, usually while lying in her crib and when she moves the patient's limbs, she noted generalized flaccidity.  She was told that these were generalized atonic seizures and the patient was given rectal Diastat to prevent a cluster of these seizures.  She did not receive chronic antiepileptic drug therapy at this time.  The patient had essentially no seizures that were definitely witnessed between the ages of 6 and 16 years.      At 16 years of age, the patient had her first generalized tonic-clonic seizure witnessed by her mother.   She had 3 of these on the day of onset, a single grand mal seizure later that year and then a single grand mal seizure about 1 year after the first seizures.  All of these were associated with sleep deprivation.  Her mother witnessed her to suddenly extend her limbs stiffly with truncal extension followed by generalized rhythmic jerking for a minute or 2.  She did not have tongue biting.      It appears that over several years after the grand mal seizures began, there was a question with various care providers of brief staring spells.  Her mother witnessed a definite staring spell with essentially no movement and no responsiveness, but with diffuse stiffness for a minute or 2.  This occurred in , in association with sleep deprivation.  Subsequently, no one has seen any definite staring spell seizures.      The patient reportedly has no other type of paroxysmal behavioral alterations.     Epilepsy-seizure predispositions:   With early developmental delay, the patient was evaluated and eventually diagnosed with congenital disorder of glycosylation type 1.  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  She has had slow language development and has learned to communicate by producing single linguistic sound sequences which are not complete intelligible words, but are accompanied by gesturing to supplement the vocalization.  In this manner the patient is able to make a number of requests of people who know her well.  Her mother thinks that she has much greater comprehension of speech compared with her own speech production.      The patient has no family history of epilepsy or seizures.  She has no history of gestational or  injury, febrile convulsions, developmental delay, stroke, meningitis, encephalitis, significant head injury, or other epileptic predispositions than CDG type 1a.     Laboratory evaluations:   The  patient has had brain imaging and electroencephalography at a number of different clinics in [facility] and [place].  The most recent brain MRI was performed at [facility] on 06/07/2013, and this was reported to show deformities at the craniocervical junction consistent with reported surgical procedures, marked hypoplasia of the cerebellar vermis and cerebellar hemispheres (unchanged) and myelomalacia of adjacent portions of the superior cervical spinal cord, with limited T2 signal alterations in subcortical white matter.      Notes from her earlier pediatric neurologist, Dr. Gus Ny indicate that she had definite right frontal lobe interictal spikes on electroencephalograms in 2003 and 2004.     Epilepsy therapeutics:   The patient was treated with levetiracetam following her first grand mal seizure at 16 years of age.  She has continued on this since then, with upward dose adjustments a number of years ago.  She has not been noted to have adverse effects of levetiracetam.  She has not been on other chronic antiepileptic drugs therapies.     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  Her mother thinks that she was twice accidentally dropped or fell a small distance when she was about 6 years of age, once sliding often an adaptive tricycle onto the floor and once collapsing down out of the hands of a person who was holding her in an erect position.  It was at around this time that the patient was noted to have bilateral leg weakness, which progressed at an early age, leaving her with a spastic quadriplegia involving legs much more than arms.  Now over the last 6 months she appears to have had progression in arm weakness, however, probably involving the left arm more than the right arm.  She appears clearly weaker in her assistance with transfers, although she is able  to readily manipulate light objects such that she can feed herself and use coloring crayons.  With this myelopathy, she has had complete urinary and fecal incontinence, although possibly these problems began before other manifestations of myelopathy.  She seems to have some feeling in her feet.  Her mother is planning to pursue evaluation to determine whether further cervical spine surgery is indicated.     PAST MEDICAL-SURGICAL HISTORY:    1.  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     2.  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   3.  Status post Burns rojelio procedure for kyphoscoliosis (2007).   4.  Status post gastrostomy tube placement and multiple replacements.   5.  Status post 2 procedures for strabismus.     FAMILY HISTORY:  There is no family history of epilepsy, seizures or other neurological conditions than migraine headaches in both parents.     PERSONAL AND SOCIAL HISTORY:  The patient lived with her parents and siblings for most of her life and had special education classes for much of this time.  More recently she moved into a specialized group home with 3 other highly disabled clients and appears to be doing well there.  She reportedly is able to swallow safely based on repeated swallowing studies, but has very little appetite, so receives most of her nutrition through the gastrostomy tube.  She does assist with some with activities of daily living.      REVIEW OF SYSTEMS:  The  patient reportedly has not been evaluated for peripheral neuropathy.   She has no history of rashes and easy bruising.  The remainder of a 14-system symptom review was negative or unobtainable, except as noted above.       MEDICATIONS:  Levetiracetam solution 1000 mg in the morning and 1500 mg in the evening per gastrostomy, and other medications as per the electronic medical record.     PHYSICAL EXAMINATION:    The patient appeared continuously alert, but frequently looked away or moved slightly away from the examiner.  Vital signs were as per the electronic medical record.  Skull and high cervical deformities were consistent with reported surgery, with markedly diminished cervical passive range of motion.   She was observed to be wearing an adapted soft collar.    She communicated with her mother in single English language sounds sequences which utilized some sounds appropriate to the apparent intended word, with considerable gesturing with her right hand.  She appeared irritated by interactions with the examiner.  She did demonstrate full extraocular movements with variable nystagmus.  Pupils were equal, round and reactive to light.  She demonstrated slight movement of the face bilaterally and appeared to feel touch to the face on each side.  She did not open her mouth or move her tongue on command.  She did appear to observe high contrast shapes in her environment.  She clearly heard her mother's voice spoken softly.  Muscle tone was moderately hypotonic diffusely, with markedly reduced muscle masses.  She did not demonstrate strength on command, but was observed to move her right arm spontaneously more than her left arm.  Deep tendon reflexes were hypoactive throughout.  She appeared distressed with further examination and toe signs were not tested.     IMPRESSION:    The patient continues to be free of seizures on levetiracetam monotherapy.  On 04/19/2017, she had an outpatient 3-hour video-EEG recording at  [facility], which showed ongoing generalized theta-delta slowing, with frequent brief bursts of high amplitude bifrontocentral delta slowing, but no epileptiform abnormalities.  She was reported to have had right frontal spikes on prior interictal EEG recordings.  The levetiracetam level was 38.0 mcg per mL in 2017.     The previously reported progression in bilateral arm weakness was further evaluated by her neurosurgeon at [facility], Dr. Italo Root, who reportedly advised use of a supportive cervical collar to prevent compression of neural structures, but did not recommend any further surgical approach.  She was observed to be wearing an adapted soft collar today.     PLAN:    1.  Continue levetiracetam at 1000/1500 mg daily.   2.  Return visit in approximately 11 months.   I spent 15 minutes in this patient care, more than 50% of which consisted of counseling and coordinating care.     Nhan Knott M.D.   Professor of Neurology

## 2018-05-23 ENCOUNTER — OFFICE VISIT (OUTPATIENT)
Dept: PEDIATRICS | Facility: CLINIC | Age: 23
End: 2018-05-23
Attending: PEDIATRICS
Payer: COMMERCIAL

## 2018-05-23 VITALS
HEIGHT: 55 IN | SYSTOLIC BLOOD PRESSURE: 92 MMHG | WEIGHT: 75 LBS | HEART RATE: 95 BPM | BODY MASS INDEX: 17.36 KG/M2 | DIASTOLIC BLOOD PRESSURE: 63 MMHG

## 2018-05-23 DIAGNOSIS — E74.89 CONGENITAL DISORDER OF GLYCOSYLATION (CDG) (H): ICD-10-CM

## 2018-05-23 LAB
ANGLE RATE OF CLOT STRENGTH: 67.2 DEGREES (ref 53–72)
CI HYPERCOAGULATION INDEX: 0.6 RATIO (ref 0–3)
G ACTUAL CLOT STRENGTH: 7.7 KD/SC (ref 4.5–11)
K TIME TO SPEC CLOT STRENGTH: 1.7 MINUTE (ref 1–3)
LY30 LYSIS AT 30 MINUTES: 0 % (ref 0–8)
LY60 LYSIS AT 60 MINUTES: 0.1 % (ref 0–15)
MA MAXIMUM CLOT STRENGTH: 60.7 MM (ref 50–70)
R TIME UNTIL CLOT FORMS: 5.3 MINUTE (ref 5–10)

## 2018-05-23 PROCEDURE — 36415 COLL VENOUS BLD VENIPUNCTURE: CPT | Performed by: PEDIATRICS

## 2018-05-23 PROCEDURE — G0463 HOSPITAL OUTPT CLINIC VISIT: HCPCS | Mod: ZF

## 2018-05-23 PROCEDURE — 85396 CLOTTING ASSAY WHOLE BLOOD: CPT | Performed by: PEDIATRICS

## 2018-05-23 ASSESSMENT — PAIN SCALES - GENERAL: PAINLEVEL: NO PAIN (0)

## 2018-05-23 NOTE — PROGRESS NOTES
Adult Metabolism Clinic Return Visit  Name:  Sherly Ramires  :   1995  MRN:   [id number]  Date of Visit: May 23, 2018  PCP: Leeanne Mustafa    Immunization status is: up to date.  Managing Metabolic Center(s):  [facility].    Chief Complaint:  Ms. Sherly Ramires is a 23 year- 2 month old female whom I saw in follow up in Metabolism Clinic for congenital idsorder of glycosylation.     Assessment:    Ms. 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 Before further increasing her estrogen we will wait for her thromboelastogram results and Dr. Azra Mays's interpretation. We will not do any tests at this point until she has been for a month on an increased estrogen dose.  The labs would include comprehensive metabolic panel, vitamin D , estradiol level  and thyroid function tests.    Plan:    1. Labs will be  drawn as described above.  2. Medications: No specific medications related to her metabolic disorder  3. Continue to observe emergency precautions as previously discussed.  Our on-call metabolic service is available 24 hours/day by calling the page  ([phone]) and asking for the Genetics and Metabolism doctor on call.  4. Return to the Adult Metabolism Clinic in 6 months for follow-up.       ----------  History of Present Illness:    Sherly Ramires is a 23 year- 2 month  old  female with history of congenital disorder of glycosylation, CDG type 1A (PMM2 Deficiency), who presents today for follow up of primary gonadal failure and concern for weak bones. Sherly was last evaluated in our clinic 17. Ms. Ramires was accompanied to this visit by a care giver from her group home. Her mother was unable to attend the visit today but would like to know (per note from care giver) if there are ongoing concerns with  coagulation status and also if the new medication (estradiol) is effective.    Overall has been doing well, no major health problems. No emergency visits, hospitalizations or surgeries. Has started a recreation/lesiure day program, where she goes daily. She uses her gate  while she is there, otherwise uses a wheelchair or a power chair at home. Group home has not observed any major changes since starting the estradiol.  No complaints of back pain, no recent fractures. She occassionally gets bruises on her knees which the staff believes is a result of her hitting the table when moving her wheelchair forward.    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 Has 3 house mates, they all have their own room. Parents are living in [facility] in a town home 10 minutes from the group home. They visit often on the weekends.   Stressors for patient and family: none.    Community resources received currently are: recently started a day program (see HPI)   Current insurance status commercial/private  Neuropsychological evaluation: none  Behavioral concerns: none  Special education: graduated a year ago from transition program    Past Medical History:  Past Medical History:   Diagnosis Date     Anxiety      Global developmental delay      Hypoglycemia      Metabolic disease     congenital disorder of  glycosilation     Scoliosis      Seizures (H)     last at age 8 before today     Strabismus      Her past medical history is remarkable for twin gestation. She had an apneic episode at four days old and went home on apnea and bradycardia monitors. She was seen at three months of age due to maternal concerns of strabismus, fat pads on her knuckles, and developmental delay. She was diagnosed with congenital disorder of glycosylation at six months of age. Mom reports that no DNA or enzyme analysis was performed at that time. 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 She had abnormal thyroid functions and was treated with Synthroid for six months. It was found to be an abnormal thyroglobulin and replacement was stopped. She has also had spinal stenosis with lower extremity weakness. Symptoms began in October 2002, diagnosed in 2003, and has undergone surgical repair. She developed seizure in 2013. She has anxiety, OCD-like tendencies, and muscle contractures.   Sherly has a history of fragility fractures. There have been two fragility fractures in her life. One occurred when she stuck her leg out and hit a door. It was a spiral fracture of the lower leg. Another occurred when her 4 year old brother fell on her and broke her lower leg. She has previously had normal PTH and DXA (when corrected for age).    Review of Systems:   Constitional: frequent headaches, takes tylenol po  Eyes: negative, reportedly poor vision  Ears/Nose/Throat: negative, no noticeable changes to hearing  Respiratory: negative  Cardiovascular: negative  Gastrointestinal: occasional abdominal pain, usually when she is full and does not want to eat  Genitourinary: negative  Hematologic/Lymphatic: negative  Allergy/Immunologic: negative - no drug allergies  Musculoskeletal: noticeable muscle loss, increased weakness evident  Endocrine: see  HPI  Integument: negative  Neurologic: negative  Psychiatric: some anxiety, well controlled currently, uses a rescue remedy (natural herb supplements) through [facility] to help with any acute concerns and this works well     Nutrition History:  Gets most of her nutrition through a G-tube. Has the option to have 200-300 calorie option at lunch and dinner. She takes trazadone at night which has been helping significantly with her sleep.    Medications:  Current Outpatient Prescriptions   Medication Sig Dispense Refill     acetaminophen (TYLENOL) 160 MG/5ML solution 480 mg (15 mL) by G-tube every 4 hours as needed       ARIPiprazole (ABILIFY) 1 MG/ML SOLN 7.5 mg daily        bacitracin ointment Apply topically 2 times daily as needed for wound care 14 g 0     calcium carbonate 1250 (500 CA) MG/5ML SUSP Take 6 mLs (1,500 mg) by mouth 2 times daily (with meals) (Patient taking differently: 1,500 mg by Gastronomy tube route 2 times daily (with meals) ) 450 mL 12     cholecalciferol (VITAMIN  -D) 1000 UNITS capsule Take 1 capsule (1,000 Units) by mouth daily 30 capsule 11     D-Mannose POWD 2 times daily 1 scoop - mixed with water       diazepam (VALIUM) 5 MG/ML solution 7.5 mg by Gastronomy tube route every hour as needed for seizures (Seizures lasting longer than 5 minutes)        Docusate Sodium (ENEMEEZ MINI RE) 3 times a week (MWF) to help with bowel movements       estradiol (ESTRACE) 1 MG tablet Take 0.5 mg ( 1/2 tablet) daily 15 tablet 2     HERBALS Bach Rescue Remedy Spray- 1-3 sprays, to tongue or inside mouth, prn, anxiety.  Not to exceed threes times daily. 1 each 11     levETIRAcetam (KEPPRA) 100 MG/ML solution 10 mL  in the AM and 15 ml in the PM, by gastrostomy tube. 750 mL 11     MELATONIN PO Take 3 mg by mouth At Bedtime       Nutritional Supplements (COMPLEAT PEDIATRIC PO)        ondansetron (ZOFRAN) 4 MG/5ML solution Take 5 mLs (4 mg) by mouth every 6 hours as needed for nausea or vomiting 90 mL  "0     ORDER FOR DME Equipment being ordered: Other: chest strap for her tilt in space manual wheel chair  Treatment Diagnosis: For safe transportation to home secondary to poor sitting balance in upright. CDG type Ia, seizures, and G-tube dependence. 1 Device 0     sertraline (ZOLOFT) 20 MG/ML (HIGH CONC) solution 200 mg by Gastronomy tube route daily        traZODone (DESYREL) 50 MG tablet Crush one tablet and administer via G-tube 30 minutes prior to bedtime daily       traZODone (DESYREL) 50 MG tablet by Gastric Tube route At Bedtime       UNABLE TO FIND MEDICATION NAME: Rescue Remedy Herbal Supplement - Used as needed.       UNABLE TO FIND MEDICATION NAME: Stress relax (magnesium citrate) 380 mg at bedtime       UNABLE TO FIND MEDICATION NAME: Mood probiotic 1 capsule (3,000,000,000 CFUs) daily.       Allergies:  Allergies   Allergen Reactions     Ativan [Lorazepam] Other (See Comments)     Paradoxical reaction     Pseudoephedrine      Physical Examination:  Blood pressure 92/63, pulse 95, height 4' 1\" (124.5 cm), weight 75 lb (34 kg), head circumference 53 cm (20.87\"), not currently breastfeeding.  Body surface area is 1.08 meters squared.    General: Alert, sitting in wheel chair, has neck brace in place. Engaged with group home staff but primarily avoids interactions with me.   Head and Neck:  She had hair of normal texture and distribution and her head was proportionate in appearance.The neck was supple without lymphadenopathy.    Eyes:  The pupils were equal, round, and reacted to light.   The conjunctivae were clear.   Ears:  Her ears were normal in architecture and placement.   Nose: The nose was clear.    Mouth and Throat: Unwilling to open mouth therefore unable to examine.   Respiratory: CTAB from anterior position, not willing to sit forward in chair for posterior examination     CV:  RRR, no murmurs  GI: Patient would not allow abdominal exam.     : I deferred a  examination.   Musculoskeletal: " Thin upper and lower extremities.   Neurologic: Developmentally delayed.  One word, slow speech, which is difficult to understand. Withdrew while attempting to test tone therefore unable to examine.   Integument: The skin was normal with no rashes or unusual pigmentation.    Results of laboratory studies collected at this visit and available when this note was completed:         Addendum:        Scribe Disclosure:   I, Daily Brewster MD/PhD student, am serving as a scribe; to document services personally performed by Dr. Sarah Dubon- -based on data collection and the provider's statements to me.     Provider Disclosure:  I agree with above History, Review of Systems, Physical exam and Plan.  I have reviewed the content of the documentation and have edited it as needed. I have personally performed the services documented here and the documentation accurately represents those services and the decisions I have made.

## 2018-05-23 NOTE — NURSING NOTE
"[facility] [[id number]]  Chief Complaint   Patient presents with     RECHECK     metabolic follow-up      Initial BP 92/63  Pulse 95  Ht 4' 1\" (124.5 cm)  Wt 75 lb (34 kg)  HC 53 cm (20.87\")  BMI 21.96 kg/m2 Estimated body mass index is 21.96 kg/(m^2) as calculated from the following:    Height as of this encounter: 4' 1\" (124.5 cm).    Weight as of this encounter: 75 lb (34 kg).  Medication Reconciliation: complete     Martínez Bah      "

## 2018-05-23 NOTE — LETTER
2018      RE: Sherly Ramires  [place] [place] [facility]  [address] [facility] [id number]       Adult Metabolism Clinic Return Visit  Name:  Sherly Ramires  :   1995  MRN:   [id number]  Date of Visit: May 23, 2018  PCP: Leeanne Mustafa    Immunization status is: up to date.  Managing Metabolic Center(s):  [facility].    Chief Complaint:  Ms. Sherly Ramires is a 23 year- 2 month old female whom I saw in follow up in Metabolism Clinic for congenital idsorder of glycosylation.     Assessment:    Ms. 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 Before further increasing her estrogen we will wait for her thromboelastogram results and Dr. Azra Mays's interpretation. We will not do any tests at this point until she has been for a month on an increased estrogen dose.  The labs would include comprehensive metabolic panel, vitamin D , estradiol level  and thyroid function tests.    Plan:    1. Labs will be  drawn as described above.  2. Medications: No specific medications related to her metabolic disorder  3. Continue to observe emergency precautions as previously discussed.  Our on-call metabolic service is available 24 hours/day by calling the page  ([phone]) and asking for the Genetics and Metabolism doctor on call.  4. Return to the Adult Metabolism Clinic in 6 months for follow-up.       ----------  History of Present Illness:    Sherly Ramires is a 23 year- 2 month  old  female with history of congenital disorder of glycosylation, CDG type 1A (PMM2 Deficiency), who presents today for follow up of primary gonadal failure and concern for weak bones. Sherly was last evaluated in our clinic 17. Ms. Ramires was accompanied to this visit by a care giver from her group home. Her mother  was unable to attend the visit today but would like to know (per note from care giver) if there are ongoing concerns with coagulation status and also if the new medication (estradiol) is effective.    Overall has been doing well, no major health problems. No emergency visits, hospitalizations or surgeries. Has started a recreation/lesiure day program, where she goes daily. She uses her gate  while she is there, otherwise uses a wheelchair or a power chair at home. Group home has not observed any major changes since starting the estradiol.  No complaints of back pain, no recent fractures. She occassionally gets bruises on her knees which the staff believes is a result of her hitting the table when moving her wheelchair forward.    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 Has 3 house mates, they all have their own room. [place] minutes from the group home. They visit often on the weekends.   Stressors for patient and family: none.    Community resources received currently are: recently started a day program (see HPI)   Current insurance status commercial/private  Neuropsychological evaluation: none  Behavioral concerns: none  Special education: graduated a year ago from transition program    Past Medical History:  Past Medical History:   Diagnosis Date      Anxiety      Global developmental delay      Hypoglycemia      Metabolic disease     congenital disorder of glycosilation     Scoliosis      Seizures (H)     last at age 8 before today     Strabismus      Her past medical history is remarkable for twin gestation. She had an apneic episode at four days old and went home on apnea and bradycardia monitors. She was seen at three months of age due to maternal concerns of strabismus, fat pads on her knuckles, and developmental delay. She was diagnosed with congenital disorder of glycosylation at six months of age. Mom reports that no DNA or enzyme analysis was performed at that time. 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 She had abnormal thyroid functions and was treated with Synthroid for six months. It was found to be an abnormal thyroglobulin and replacement was stopped. She has also had spinal stenosis with lower extremity weakness. Symptoms began in October 2002, diagnosed in 2003, and has undergone surgical repair. She developed seizure in 2013. She has anxiety, OCD-like tendencies, and muscle contractures.   Sherly has a history of fragility fractures. There have been two fragility fractures in her life. One occurred when she stuck her leg out and hit a door. It was a spiral fracture of the lower leg. Another occurred when her 4 year old brother fell on her and broke her lower leg. She has previously had normal PTH and DXA (when corrected for age).    Review of Systems:   Constitional: frequent headaches, takes tylenol po  Eyes: negative, reportedly poor vision  Ears/Nose/Throat: negative, no noticeable changes to hearing  Respiratory: negative  Cardiovascular: negative  Gastrointestinal: occasional abdominal pain, usually when she is full and does not want to eat  Genitourinary: negative  Hematologic/Lymphatic: negative  Allergy/Immunologic: negative  - no drug allergies  Musculoskeletal: noticeable muscle loss, increased weakness evident  Endocrine: see HPI  Integument: negative  Neurologic: negative  Psychiatric: some anxiety, well controlled currently, uses a rescue remedy (natural herb supplements) through [facility] to help with any acute concerns and this works well     Nutrition History:  Gets most of her nutrition through a G-tube. Has the option to have 200-300 calorie option at lunch and dinner. She takes trazadone at night which has been helping significantly with her sleep.    Medications:  Current Outpatient Prescriptions   Medication Sig Dispense Refill     acetaminophen (TYLENOL) 160 MG/5ML solution 480 mg (15 mL) by G-tube every 4 hours as needed       ARIPiprazole (ABILIFY) 1 MG/ML SOLN 7.5 mg daily        bacitracin ointment Apply topically 2 times daily as needed for wound care 14 g 0     calcium carbonate 1250 (500 CA) MG/5ML SUSP Take 6 mLs (1,500 mg) by mouth 2 times daily (with meals) (Patient taking differently: 1,500 mg by Gastronomy tube route 2 times daily (with meals) ) 450 mL 12     cholecalciferol (VITAMIN  -D) 1000 UNITS capsule Take 1 capsule (1,000 Units) by mouth daily 30 capsule 11     D-Mannose POWD 2 times daily 1 scoop - mixed with water       diazepam (VALIUM) 5 MG/ML solution 7.5 mg by Gastronomy tube route every hour as needed for seizures (Seizures lasting longer than 5 minutes)        Docusate Sodium (ENEMEEZ MINI RE) 3 times a week (MWF) to help with bowel movements       estradiol (ESTRACE) 1 MG tablet Take 0.5 mg ( 1/2 tablet) daily 15 tablet 2     HERBALS Bach Rescue Remedy Spray- 1-3 sprays, to tongue or inside mouth, prn, anxiety.  Not to exceed threes times daily. 1 each 11     levETIRAcetam (KEPPRA) 100 MG/ML solution 10 mL  in the AM and 15 ml in the PM, by gastrostomy tube. 750 mL 11     MELATONIN PO Take 3 mg by mouth At Bedtime       Nutritional Supplements (COMPLEAT PEDIATRIC PO)        ondansetron  "(ZOFRAN) 4 MG/5ML solution Take 5 mLs (4 mg) by mouth every 6 hours as needed for nausea or vomiting 90 mL 0     ORDER FOR DME Equipment being ordered: Other: chest strap for her tilt in space manual wheel chair  Treatment Diagnosis: For safe transportation to home secondary to poor sitting balance in upright. CDG type Ia, seizures, and G-tube dependence. 1 Device 0     sertraline (ZOLOFT) 20 MG/ML (HIGH CONC) solution 200 mg by Gastronomy tube route daily        traZODone (DESYREL) 50 MG tablet Crush one tablet and administer via G-tube 30 minutes prior to bedtime daily       traZODone (DESYREL) 50 MG tablet by Gastric Tube route At Bedtime       UNABLE TO FIND MEDICATION NAME: Rescue Remedy Herbal Supplement - Used as needed.       UNABLE TO FIND MEDICATION NAME: Stress relax (magnesium citrate) 380 mg at bedtime       UNABLE TO FIND MEDICATION NAME: Mood probiotic 1 capsule (3,000,000,000 CFUs) daily.       Allergies:  Allergies   Allergen Reactions     Ativan [Lorazepam] Other (See Comments)     Paradoxical reaction     Pseudoephedrine      Physical Examination:  Blood pressure 92/63, pulse 95, height 4' 1\" (124.5 cm), weight 75 lb (34 kg), head circumference 53 cm (20.87\"), not currently breastfeeding.  Body surface area is 1.08 meters squared.    General: Alert, sitting in wheel chair, has neck brace in place. Engaged with group home staff but primarily avoids interactions with me.   Head and Neck:  She had hair of normal texture and distribution and her head was proportionate in appearance.The neck was supple without lymphadenopathy.    Eyes:  The pupils were equal, round, and reacted to light.   The conjunctivae were clear.   Ears:  Her ears were normal in architecture and placement.   Nose: The nose was clear.    Mouth and Throat: Unwilling to open mouth therefore unable to examine.   Respiratory: CTAB from anterior position, not willing to sit forward in chair for posterior examination     CV:  RRR, no " murmurs  GI: Patient would not allow abdominal exam.     : I deferred a  examination.   Musculoskeletal: Thin upper and lower extremities.   Neurologic: Developmentally delayed.  One word, slow speech, which is difficult to understand. Withdrew while attempting to test tone therefore unable to examine.   Integument: The skin was normal with no rashes or unusual pigmentation.    Results of laboratory studies collected at this visit and available when this note was completed:         Addendum:        Scribe Disclosure:   I, Daily Brewster MD/PhD student, am serving as a scribe; to document services personally performed by Dr. Sarah Dubon- -based on data collection and the provider's statements to me.     Provider Disclosure:  I agree with above History, Review of Systems, Physical exam and Plan.  I have reviewed the content of the documentation and have edited it as needed. I have personally performed the services documented here and the documentation accurately represents those services and the decisions I have made.          Sarah Dubon MD

## 2018-05-23 NOTE — MR AVS SNAPSHOT
After Visit Summary   5/23/2018    Sherly Ramires    MRN: [id number]           Patient Information     Date Of Birth          1995        Visit Information        Provider Department      5/23/2018 11:30 AM Sarah Dubon MD Peds Metabolism        Today's Diagnoses     Congenital disorder of glycosylation (CDG) (H)           Follow-ups after your visit        Your next 10 appointments already scheduled     Sep 17, 2018  2:00 PM CDT   Return Visit with Sherrie Fontana MD   Peds Integrative Health ([facility])    [facility]  9th Floor  [address] [address]   [phone]            Nov 29, 2018 10:30 AM CST   Return Visit with Azra Mays MD   Peds Hematology Oncology ([facility])    [facility]  9th Floor  [address] [address]   [phone]            Jan 23, 2019 11:00 AM CST   Return Metabolic Visit with Sarah Dubon MD   Peds Metabolism ([facility])    [place] [id number] [facility], [address] [place], [place] [address] [address] [id number] [phone]              Who to contact     Please call your clinic at [phone] to:    Ask questions about your health    Make or cancel appointments    Discuss your medicines    Learn about your test results    Speak to your doctor            Additional Information About Your Visit        MyChart Information     Lagout is an electronic gateway that provides easy, online access to your medical records. With [email or web address], you can request a clinic appointment, read your test results, renew a prescription or communicate with your care team.     To sign up for Lagout visit the website at [email or web address]   You will be asked to enter the access code listed below, as well as some personal information. Please follow the directions to create your username and password.     Your access code is: [email or web address]  Expires: 8/14/2018   "2:08 PM     Your access code will  in 90 days. If you need help or a new code, please contact your [facility] or call [phone] for assistance.        Care EveryWhere ID     This is your Care EveryWhere ID. This could be used by other organizations to access your [facility] medical records  [id number]        Your Vitals Were     Pulse Height Head Circumference BMI (Body Mass Index)          95 4' 1\" (124.5 cm) 53 cm (20.87\") 21.96 kg/m2         Blood Pressure from Last 3 Encounters:   18 92/63   18 113/78   18 108/72    Weight from Last 3 Encounters:   18 75 lb (34 kg)   17 78 lb 14.8 oz (35.8 kg)   17 80 lb (36.3 kg)              We Performed the Following     TEG without Heparinase          Today's Medication Changes          These changes are accurate as of 18 11:59 PM.  If you have any questions, ask your nurse or doctor.               These medicines have changed or have updated prescriptions.        Dose/Directions    calcium carbonate 1250 (500 Ca) MG/5ML Susp suspension   This may have changed:  how to take this   Used for:  Congenital disorder of glycosylation (CDG) (H), Osteoporosis        Dose:  1500 mg   Take 6 mLs (1,500 mg) by mouth 2 times daily (with meals)   Quantity:  450 mL   Refills:  12                Primary Care Provider Fax #    [facility] [phone]       [address] [place] [address]        Equal Access to Services     PENNY RG AH: Hadii ramos garciao Soroxann, waaxda luqadaha, qaybta kaalmada adeegyamoshe, waxgrant kathy rider. So [facility] [phone].    ATENCIÓN: Si habla karthikeyanañol, tiene a chakraborty disposición servicios gratuitos de asistencia lingüística. Llame al [phone].    We comply with applicable federal civil rights laws and [place] laws. We do not discriminate on the basis of race, color, national origin, age, disability, sex, sexual " orientation, or gender identity.            Thank you!     Thank you for choosing PEDS METABOLISM  for your care. Our goal is always to provide you with excellent care. Hearing back from our patients is one way we can continue to improve our services. Please take a few minutes to complete the written survey that you may receive in the mail after your visit with us. Thank you!             Your Updated Medication List - Protect others around you: Learn how to safely use, store and throw away your medicines at www.disposemymeds.org.          This list is accurate as of 5/23/18 11:59 PM.  Always use your most recent med list.                   Brand Name Dispense Instructions for use Diagnosis    acetaminophen 160 MG/5ML solution    TYLENOL     480 mg (15 mL) by G-tube every 4 hours as needed        ARIPiprazole 1 MG/ML Soln solution    ABILIFY     7.5 mg daily        bacitracin ointment     14 g    Apply topically 2 times daily as needed for wound care    Skin irritation       calcium carbonate 1250 (500 Ca) MG/5ML Susp suspension     450 mL    Take 6 mLs (1,500 mg) by mouth 2 times daily (with meals)    Congenital disorder of glycosylation (CDG) (H), Osteoporosis       cholecalciferol 1000 units capsule    vitamin  -D    30 capsule    Take 1 capsule (1,000 Units) by mouth daily    Congenital disorder of glycosylation (CDG) (H)       COMPLEAT PEDIATRIC PO           D-Mannose Powd      2 times daily 1 scoop - mixed with water        diazepam 5 MG/ML (HIGH CONC) solution    VALIUM     7.5 mg by Gastronomy tube route every hour as needed for seizures (Seizures lasting longer than 5 minutes)        ENEMEEZ MINI RE      3 times a week (MWF) to help with bowel movements        estradiol 1 MG tablet    ESTRACE    15 tablet    Take 0.5 mg ( 1/2 tablet) daily    Ovarian failure       HERBALS     1 each    Bach Rescue Remedy Spray- 1-3 sprays, to tongue or inside mouth, prn, anxiety. Not to exceed threes times daily.    Anxiety        levETIRAcetam 100 MG/ML solution    KEPPRA    750 mL    10 mL  in the AM and 15 ml in the PM, by gastrostomy tube.    Complex partial seizure evolving to generalized seizure (H)       MELATONIN PO      Take 3 mg by mouth At Bedtime        ondansetron 4 MG/5ML solution    ZOFRAN    90 mL    Take 5 mLs (4 mg) by mouth every 6 hours as needed for nausea or vomiting    Viral gastroenteritis       order for DME     1 Device    Equipment being ordered: Other: chest strap for her tilt in space manual wheel chair Treatment Diagnosis: For safe transportation to home secondary to poor sitting balance in upright. CDG type Ia, seizures, and G-tube dependence.    Viral gastroenteritis, Dehydration, Acute respiratory failure with hypoxia (H), Congenital disorder of glycosylation (CDG) (H)       sertraline 20 MG/ML (HIGH CONC) solution    ZOLOFT     200 mg by Gastronomy tube route daily        * traZODone 50 MG tablet    DESYREL     by Gastric Tube route At Bedtime        * traZODone 50 MG tablet    DESYREL     Crush one tablet and administer via G-tube 30 minutes prior to bedtime daily        * UNABLE TO FIND      MEDICATION NAME: Stress relax (magnesium citrate) 380 mg at bedtime        * UNABLE TO FIND      MEDICATION NAME: Mood probiotic 1 capsule (3,000,000,000 CFUs) daily.        * UNABLE TO FIND      MEDICATION NAME: Rescue Remedy Herbal Supplement - Used as needed.        * Notice:  This list has 5 medication(s) that are the same as other medications prescribed for you. Read the directions carefully, and ask your doctor or other care provider to review them with you.

## 2018-06-05 ENCOUNTER — TELEPHONE (OUTPATIENT)
Dept: PEDIATRICS | Facility: CLINIC | Age: 23
End: 2018-06-05

## 2018-06-05 NOTE — TELEPHONE ENCOUNTER
Per provider request, phone call to Mom regarding follow up information from appointment on 5/23. Mom verbalized understanding and had no questions or concerns. Mom has contact information and will call if any questions arise.

## 2018-06-12 RX ORDER — ESTRADIOL 1 MG/1
TABLET ORAL
Qty: 15 TABLET | Refills: 2 | Status: SHIPPED | OUTPATIENT
Start: 2018-06-12 | End: 2018-10-31

## 2018-06-27 ENCOUNTER — TELEPHONE (OUTPATIENT)
Dept: INFUSION THERAPY | Facility: CLINIC | Age: 23
End: 2018-06-27

## 2018-06-27 NOTE — TELEPHONE ENCOUNTER
Received call on RN triage line from patient's group home. Per group home RN, patient had been seen by Dr. Dubon in clinic recently and Dr. Dubon is holding off on increasing patient's estrogen dose until labs are collected and interpreted by Dr. Mays. Group home called clinic for update. Dr. Mays emailed with question and group home RN called and updated on status of medication question.

## 2018-07-25 ENCOUNTER — TELEPHONE (OUTPATIENT)
Dept: INFUSION THERAPY | Facility: CLINIC | Age: 23
End: 2018-07-25

## 2018-07-25 NOTE — TELEPHONE ENCOUNTER
Pt's mom called to talk to Dr. Fontana. Julianne wants to know if pt could start using CBD oil for her anxiety attacks. Message sent to Dr. Fontana.

## 2018-07-26 ENCOUNTER — TELEPHONE (OUTPATIENT)
Dept: PEDIATRICS | Facility: CLINIC | Age: 23
End: 2018-07-26

## 2018-07-26 NOTE — TELEPHONE ENCOUNTER
"July 26, 2018 1:47 PM  Patient's Mother called with concerns that patient is  becoming ill and currently experiencing some GI upset (constipation and vomiting). Mom reports that patient threw up two days ago (three times in the morning and stated \"not a lot\"), on that day, patient received regular formula, and Mother slowly titrated it up to her regular 400 mL/hr. At 7 PM, patient started throwing up again. Mom then gave patient Pedialyte over night. Today, Mom started patient's feeds at 100 mL/hr. Patient also received bowel enema in AM and stool was \"rock hard\". Patient had another bowel movement later today, which was \"soft and more normal\".     Mom is calling because she is concerned that patient is becoming ill and wants to know if Dr. Dubon recommends blood work. Mom also wants to know if patient should receive another enema today and if she should make an appointment for a G-J tube placement because of patient's GI disturbances and her \"stomach being slow to empty\". Writer informed Mom that Dr. Dubon is currently out of the office and writer can speak with the provider on-call, Dr. Ricardo regarding recommendations. Mom expressed wanting to address these concerns as soon as possible, writer advised Mom to call the Genetics/Metabolism Physician on-call to speak with provider about concerns, number given. Notified on-call physician that Mom would be calling.     Marline Jackson, BSN, RN, PHN  Nurse Coordinator- Metabolism & Genetics    "

## 2018-08-13 NOTE — TELEPHONE ENCOUNTER
"August 13, 2018 2:22 PM  Follow up phone call to patient's Mother to discuss how Sherly has been doing since the end of July and after speaking with Dr. Ricardo. Mom reports that patient is doing well and did not get sick but was \"just very constipated\" and is now taking Miralax. Mom relayed that she is also planning to have patient follow up with Dr. Fontana about bowel regimen. Mom would like to know if Dr. Dubon has any different recommendations. Writer will update Dr. Dubon and if provider has any different recommendations will call. Mom verbalized understanding and had no other questions or concerns.      Marline Jackson, LORETON, RN, PHN  Nurse Coordinator- Metabolism & Genetics          "

## 2018-09-17 ENCOUNTER — OFFICE VISIT (OUTPATIENT)
Dept: CONSULT | Facility: CLINIC | Age: 23
End: 2018-09-17
Attending: PEDIATRICS
Payer: COMMERCIAL

## 2018-09-17 VITALS
HEART RATE: 58 BPM | TEMPERATURE: 96.7 F | RESPIRATION RATE: 18 BRPM | DIASTOLIC BLOOD PRESSURE: 78 MMHG | WEIGHT: 71.5 LBS | SYSTOLIC BLOOD PRESSURE: 115 MMHG | BODY MASS INDEX: 20.94 KG/M2 | OXYGEN SATURATION: 99 %

## 2018-09-17 DIAGNOSIS — K92.89 GASTROINTESTINAL DYSMOTILITY: ICD-10-CM

## 2018-09-17 DIAGNOSIS — M81.0 OSTEOPOROSIS WITHOUT CURRENT PATHOLOGICAL FRACTURE, UNSPECIFIED OSTEOPOROSIS TYPE: ICD-10-CM

## 2018-09-17 DIAGNOSIS — E74.89 CONGENITAL DISORDER OF GLYCOSYLATION (CDG) (H): Primary | ICD-10-CM

## 2018-09-17 DIAGNOSIS — R29.898 MUSCLE FUNCTION LOSS: ICD-10-CM

## 2018-09-17 PROCEDURE — G0463 HOSPITAL OUTPT CLINIC VISIT: HCPCS | Mod: ZF,27

## 2018-09-17 ASSESSMENT — PAIN SCALES - GENERAL: PAINLEVEL: NO PAIN (0)

## 2018-09-17 NOTE — PROGRESS NOTES
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. Anna's behavior continues to be good, she is using her communication board, and continues to attend her Day Program. She has her 3 wheel bike and is able to bike around the [facility] sometimes.  She now has staff at her Day Program and it is going well. She has not had any UTIs since her last visit and remains on d-mannose and probiotics. BMs are fine with Fleet's or Enemeeze, and addition of Miralax. Miralax was added because of an episode of vomiting last month which was found to be due to obstipation.     Anna uses her power wheelchair as a stander twice a week; she uses a gait  the other days.        ALLERGIES:        Allergies   Allergen Reactions     Ativan [Lorazepam] Other (See Comments)       Paradoxical reaction     Motrin [Ibuprofen] GI Disturbance       Pt is able to have motrin with food but not on an empty stomache.      Pseudoephedrine           IMMUNIZATIONS:       Immunization History   Administered Date(s) Administered     Influenza (IIV3) Received 2017 per staff    Will receive in October 2019 at the [facility].      CURRENT MEDICATIONS:    Current Outpatient Prescriptions:      acetaminophen (TYLENOL) 160 MG/5ML  solution, 480 mg (15 mL) by G-tube every 4 hours as needed, Disp: , Rfl:      ARIPiprazole (ABILIFY) 1 MG/ML SOLN, 7.5 mg daily , Disp: , Rfl:      bacitracin ointment, Apply topically 2 times daily as needed for wound care, Disp: 14 g, Rfl: 0     calcium carbonate 1250 (500 CA) MG/5ML SUSP, Take 6 mLs (1,500 mg) by mouth 2 times daily (with meals) (Patient taking differently: 1,500 mg by Gastronomy tube route 2 times daily (with meals) ), Disp: 450 mL, Rfl: 12     cholecalciferol (VITAMIN  -D) 1000 UNITS capsule, Take 1 capsule (1,000 Units) by mouth daily, Disp: 30 capsule, Rfl: 11     D-Mannose POWD, 2 times daily 1 scoop - mixed with water, Disp: , Rfl:      diazepam (VALIUM) 5 MG/ML solution, 7.5 mg by Gastronomy tube route every hour as needed for seizures (Seizures lasting longer than 5 minutes) , Disp: , Rfl:      estradiol (ESTRACE) 1 MG tablet, Take 0.5 mg ( 1/2 tablet) daily., Disp: 15 tablet, Rfl: 2     HERBALS, Bach Rescue Remedy Spray- 1-3 sprays, to tongue or inside mouth, prn, anxiety. Not to exceed threes times daily., Disp: 1 each, Rfl: 11     levETIRAcetam (KEPPRA) 100 MG/ML solution, 10 mL  in the AM and 15 ml in the PM, by gastrostomy tube., Disp: 750 mL, Rfl: 11     Nutritional Supplements (COMPLEAT PEDIATRIC PO), , Disp: , Rfl:      ondansetron (ZOFRAN) 4 MG/5ML solution, Take 5 mLs (4 mg) by mouth every 6 hours as needed for nausea or vomiting, Disp: 90 mL, Rfl: 0     ORDER FOR DME, Equipment being ordered: Other: chest strap for her tilt in space manual wheel chair Treatment Diagnosis: For safe transportation to home secondary to poor sitting balance in upright. CDG type Ia, seizures, and G-tube dependence., Disp: 1 Device, Rfl: 0     sertraline (ZOLOFT) 20 MG/ML (HIGH CONC) solution, 200 mg by Gastronomy tube route daily , Disp: , Rfl:      traZODone (DESYREL) 50 MG tablet, Crush one tablet and administer via G-tube 30 minutes prior to bedtime daily, Disp: , Rfl:      traZODone (DESYREL) 50  MG tablet, by Gastric Tube route At Bedtime, Disp: , Rfl:      UNABLE TO FIND, MEDICATION NAME: Rescue Remedy Herbal Supplement - Used as needed., Disp: , Rfl:      UNABLE TO FIND, MEDICATION NAME: Stress relax (magnesium citrate) 380 mg at bedtime, Disp: , Rfl:      UNABLE TO FIND, MEDICATION NAME: Mood probiotic 1 capsule (3,000,000,000 CFUs) daily., Disp: , Rfl:      Docusate Sodium (ENEMEEZ MINI RE), 3 times a week (MWF) to help with bowel movements, Disp: , Rfl:      MELATONIN PO, Take 3 mg by mouth At Bedtime, Disp: , Rfl:   Miralax added per PCP    PAST SURGICAL HISTORY:    Past Surgical History          Past Surgical History:   Procedure Laterality Date     ANESTHESIA OUT OF OR MRI 3T N/A 3/7/2017     Procedure: ANESTHESIA PEDS SEDATION MRI 3T;  Surgeon: GENERIC ANESTHESIA PROVIDER;  Location: [facility] PEDS SEDATION      BACK SURGERY         c1 decompression and fusionx3, scoliosis with instrumentation x1     ELECTRORETINOGRAM Bilateral 12/10/2014     ERG ([place])     ENT SURGERY          ear tubes     EXAM UNDER ANESTHESIA EYE(S) Bilateral 12/10/2014     EUA ([place])     EXTRACTION(S) DENTAL N/A 12/10/2014     Procedure: EXTRACTION(S) DENTAL;  Surgeon: Hubert York DDS;  Location: [facility] OR     EYE SURGERY Bilateral 1996     BMR 6mm ([place])      GI SURGERY         g tube     HC REPLACE GASTROSTOMY/CECOSTOMY TUBE PERCUTANEOUS N/A 12/10/2014     Procedure: REPLACE GASTROSTOMY TUBE, PERCUTANEOUS;  Surgeon: Phong Perea MD;  Location: [facility] OR     RECESSION RESECTION (REPAIR STRABISMUS) BILATERAL Bilateral 12/10/2014     BLR 11 ([place])     STRABISMUS SURGERY   1996     (1996) BMR 6 ([place])     STRABISMUS SURGERY   2014     BLR 11 ([place]) -             FAMILY HISTORY:    Family History           Family History   Problem Relation Age of Onset     Glaucoma Mother         no medications or surg to date     Glaucoma Maternal Grandmother         s/p surg, decreased VA      Amblyopia No family hx of        Strabismus No family hx of              SOCIAL HISTORY:        Social History   Substance Use Topics     Smoking status: Never Smoker     Smokeless tobacco: Never Used     Alcohol use No         Physical Exam:   /78 (BP Location: Left arm, Patient Position: Fowlers, Cuff Size: Adult Small)  Pulse 58  Temp 96.7  F (35.9  C) (Axillary)  Resp 18  Wt 32.4 kg (71 lb 8 oz)  SpO2 99%  BMI 20.94 kg/m2    Sitting up in wheelchair, smiling with headphones on.  Easy respirations.  L arm down by wheel of chair; decreased strength from previously.   Arms and legs thin.    Shoni-shin meridian therapy using matasushin and rake to bring down the Yang.  GV-20; GB-20. Well-healed scar posterior scalp.  Gentle raking along Du meridian of scalp moving posteriorly to anteriorly along midline, raking in outward fashion  Focus on SP and KI meridians for tonification; leg Yang in downward motion; also UE Kim surface downward motion.      Labs and Tests:  None today      Assessment:  Sherly is a 21 year old female patient with complex medical hx now stable in group home.        Plan/Follow-up:        RTC 4-5 months, earlier prn.    Thank you for this consultation. Please do not hesitate to contact me with any questions or concerns.      I spent a total of 20 minutes face-to-face with Sherly Ramires during today's office visit.  Over 50% of this time was spent counseling the patient-family-staff and/or coordinating care regarding comfort.  See note for details.     Sherrie Fontana MD, CM  Medical Director Pediatric Integrative Health and Wellbeing, [facility]        Mom(Julianne) called with questions about pre-biotics. I think at this time that given Anna's already lower GI motility that the extra gas from inulin or oligosaccharides would be uncomfortable and potentially confusing as she cannot articulate her symptoms clearly. Can re-visit in the future if mom desires. LAG

## 2018-09-17 NOTE — MR AVS SNAPSHOT
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 LAG          Follow-ups after your visit        Follow-up notes from your care team     Return in about 5 months (around 2/17/2019).      Your next 10 appointments already scheduled     Nov 29, 2018 10:30 AM CST   Return Visit with Azra Mays MD   Peds Hematology Oncology ([facility])    [facility]  9th Floor  [address] [facility] [id number] [phone]            Jan 23, 2019 11:00 AM CST   Return Metabolic Visit with Sarah Dubon MD   Peds Metabolism ([facility])    [place] [address] [facility], [address] [place], [place] [address] [address] [id number] [phone]              Who to contact     Please call your clinic at [phone] to:    Ask questions about your health    Make or cancel appointments    Discuss your medicines    Learn about your test results    Speak to your doctor            Additional Information About Your Visit        MyChart Information     [employer] Mediat is an electronic gateway that provides easy, online access to your medical records. With [employer], you can request a clinic appointment, read your test results, renew a prescription or communicate with your care team.     To sign up for [employer] visit the website at [email or web address]   You will be asked to enter the access code listed below, as well as some personal information. Please follow the directions to create your  username and password.     Your access code is: [email or web address]  Expires: 2018  2:39 PM     Your access code will  in 90 days. If you need help or a new code, please contact your [facility] or call [phone] for assistance.        Care EveryWhere ID     This is your Care EveryWhere ID. This could be used by other organizations to access your [facility] medical records  [id number]        Your Vitals Were     Pulse Temperature Respirations Pulse Oximetry BMI (Body Mass Index)       58 96.7  F (35.9  C) (Axillary) 18 99% 20.94 kg/m2        Blood Pressure from Last 3 Encounters:   18 115/78   18 92/63   18 113/78    Weight from Last 3 Encounters:   18 32.4 kg (71 lb 8 oz)   18 34 kg (75 lb)   17 35.8 kg (78 lb 14.8 oz)              Today, you had the following     No orders found for display         Today's Medication Changes          These changes are accurate as of 18  2:39 PM.  If you have any questions, ask your nurse or doctor.               These medicines have changed or have updated prescriptions.        Dose/Directions    calcium carbonate 1250 (500 Ca) MG/5ML Susp suspension   This may have changed:  how to take this   Used for:  Congenital disorder of glycosylation (CDG) (H), Osteoporosis        Dose:  1500 mg   Take 6 mLs (1,500 mg) by mouth 2 times daily (with meals)   Quantity:  450 mL   Refills:  12                Primary Care Provider Fax #    [facility] [phone]       [address] [place] [address]        Equal Access to Services     PENNY RG AH: Jackelyn Silvestre, joelle noble, qaanahi palm. So [facility] [phone].    ATENCIÓN: Si habla español, tiene a chakraborty disposición servicios gratuitos de asistencia lingüística. Llame al [phone].    We comply with applicable federal civil rights laws and [place]  laws. We do not discriminate on the basis of race, color, national origin, age, disability, sex, sexual orientation, or gender identity.            Thank you!     Thank you for choosing [facility]  for your care. Our goal is always to provide you with excellent care. Hearing back from our patients is one way we can continue to improve our services. Please take a few minutes to complete the written survey that you may receive in the mail after your visit with us. Thank you!             Your Updated Medication List - Protect others around you: Learn how to safely use, store and throw away your medicines at www.disposemymeds.org.          This list is accurate as of 9/17/18  2:39 PM.  Always use your most recent med list.                   Brand Name Dispense Instructions for use Diagnosis    acetaminophen 160 MG/5ML solution    TYLENOL     480 mg (15 mL) by G-tube every 4 hours as needed        ARIPiprazole 1 MG/ML Soln solution    ABILIFY     7.5 mg daily        bacitracin ointment     14 g    Apply topically 2 times daily as needed for wound care    Skin irritation       calcium carbonate 1250 (500 Ca) MG/5ML Susp suspension     450 mL    Take 6 mLs (1,500 mg) by mouth 2 times daily (with meals)    Congenital disorder of glycosylation (CDG) (H), Osteoporosis       cholecalciferol 1000 units capsule    vitamin  -D    30 capsule    Take 1 capsule (1,000 Units) by mouth daily    Congenital disorder of glycosylation (CDG) (H)       COMPLEAT PEDIATRIC PO           D-Mannose Powd      2 times daily 1 scoop - mixed with water        diazepam 5 MG/ML (HIGH CONC) solution    VALIUM     7.5 mg by Gastronomy tube route every hour as needed for seizures (Seizures lasting longer than 5 minutes)        ENEMEEZ MINI RE      3 times a week (MWF) to help with bowel movements        estradiol 1 MG tablet    ESTRACE    15 tablet    Take 0.5 mg ( 1/2 tablet) daily.    Ovarian failure       HERBALS     1 each    Bach Rescue  Remedy Spray- 1-3 sprays, to tongue or inside mouth, prn, anxiety. Not to exceed threes times daily.    Anxiety       levETIRAcetam 100 MG/ML solution    KEPPRA    750 mL    10 mL  in the AM and 15 ml in the PM, by gastrostomy tube.    Complex partial seizure evolving to generalized seizure (H)       MELATONIN PO      Take 3 mg by mouth At Bedtime        ondansetron 4 MG/5ML solution    ZOFRAN    90 mL    Take 5 mLs (4 mg) by mouth every 6 hours as needed for nausea or vomiting    Viral gastroenteritis       order for DME     1 Device    Equipment being ordered: Other: chest strap for her tilt in space manual wheel chair Treatment Diagnosis: For safe transportation to home secondary to poor sitting balance in upright. CDG type Ia, seizures, and G-tube dependence.    Viral gastroenteritis, Dehydration, Acute respiratory failure with hypoxia (H), Congenital disorder of glycosylation (CDG) (H)       sertraline 20 MG/ML (HIGH CONC) solution    ZOLOFT     200 mg by Gastronomy tube route daily        * traZODone 50 MG tablet    DESYREL     by Gastric Tube route At Bedtime        * traZODone 50 MG tablet    DESYREL     Crush one tablet and administer via G-tube 30 minutes prior to bedtime daily        * UNABLE TO FIND      MEDICATION NAME: Stress relax (magnesium citrate) 380 mg at bedtime        * UNABLE TO FIND      MEDICATION NAME: Mood probiotic 1 capsule (3,000,000,000 CFUs) daily.        * UNABLE TO FIND      MEDICATION NAME: Rescue Remedy Herbal Supplement - Used as needed.        * Notice:  This list has 5 medication(s) that are the same as other medications prescribed for you. Read the directions carefully, and ask your doctor or other care provider to review them with you.

## 2018-09-17 NOTE — NURSING NOTE
Chief Complaint   Patient presents with     RECHECK     Patient here today for 4 month follow up with pain management     /78 (BP Location: Left arm, Patient Position: Fowlers, Cuff Size: Adult Small)  Pulse 58  Temp 96.7  F (35.9  C) (Axillary)  Resp 18  Wt 32.4 kg (71 lb 8 oz)  SpO2 99%  BMI 20.94 kg/m2  Evelyn Dangelo CMA  September 17, 2018

## 2018-09-17 NOTE — LETTER
9/17/2018      RE: Sherly Ramires  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. Anna's behavior continues to be good, she is using her communication board, and continues to attend her Day Program. She has her 3 wheel bike and is able to bike around the long-term sometimes.  She now has staff at her Day Program and it is going well. She has not had any UTIs since her last visit and remains on d-mannose and probiotics. BMs are fine with Fleet's or Enemeeze, and addition of Miralax. Miralax was added because of an episode of vomiting last month which was found to be due to obstipation.     Anna uses her power wheelchair as a stander twice a week; she uses a gait  the other days.        ALLERGIES:        Allergies   Allergen Reactions     Ativan [Lorazepam] Other (See Comments)       Paradoxical reaction     Motrin [Ibuprofen] GI Disturbance       Pt is able to have motrin with food but not on an empty stomache.      Pseudoephedrine           IMMUNIZATIONS:       Immunization History   Administered Date(s) Administered     Influenza (IIV3) Received 2017 per staff    Will receive in October 2019 at the  house.      CURRENT MEDICATIONS:    Current Outpatient Prescriptions:      acetaminophen (TYLENOL) 160 MG/5ML solution, 480 mg (15 mL) by G-tube every 4 hours as needed, Disp: , Rfl:      ARIPiprazole (ABILIFY) 1 MG/ML SOLN, 7.5 mg daily , Disp: , Rfl:      bacitracin ointment, Apply topically 2 times daily as needed for wound care, Disp: 14 g, Rfl: 0     calcium carbonate 1250 (500 CA) MG/5ML SUSP, Take 6 mLs (1,500 mg) by mouth 2 times daily (with meals) (Patient taking differently: 1,500 mg by Gastronomy tube route 2 times daily (with meals) ), Disp: 450 mL, Rfl: 12     cholecalciferol (VITAMIN  -D) 1000 UNITS capsule, Take 1 capsule (1,000 Units) by mouth daily, Disp: 30 capsule, Rfl: 11     D-Mannose POWD, 2 times daily 1 scoop - mixed with water, Disp: , Rfl:      diazepam (VALIUM) 5 MG/ML solution, 7.5 mg by Gastronomy tube route every hour as needed for seizures (Seizures lasting longer than 5 minutes) , Disp: , Rfl:      estradiol (ESTRACE) 1 MG tablet, Take 0.5 mg ( 1/2 tablet) daily., Disp: 15 tablet, Rfl: 2     HERBALS, Bach Rescue Remedy Spray- 1-3 sprays, to tongue or inside mouth, prn, anxiety. Not to exceed threes times daily., Disp: 1 each, Rfl: 11     levETIRAcetam (KEPPRA) 100 MG/ML solution, 10 mL  in the AM and 15 ml in the PM, by gastrostomy tube., Disp: 750 mL, Rfl: 11     Nutritional Supplements (COMPLEAT PEDIATRIC PO), , Disp: , Rfl:      ondansetron (ZOFRAN) 4 MG/5ML solution, Take 5 mLs (4 mg) by mouth every 6 hours as needed for nausea or vomiting, Disp: 90 mL, Rfl: 0     ORDER FOR DME, Equipment being ordered: Other: chest strap for her tilt in space manual wheel chair Treatment Diagnosis: For safe transportation to home secondary to poor sitting balance in upright. CDG type Ia, seizures, and G-tube dependence., Disp: 1 Device, Rfl: 0     sertraline (ZOLOFT) 20 MG/ML (HIGH CONC) solution, 200 mg by Gastronomy tube route daily , Disp: , Rfl:      traZODone (DESYREL) 50 MG tablet, Crush  one tablet and administer via G-tube 30 minutes prior to bedtime daily, Disp: , Rfl:      traZODone (DESYREL) 50 MG tablet, by Gastric Tube route At Bedtime, Disp: , Rfl:      UNABLE TO FIND, MEDICATION NAME: Rescue Remedy Herbal Supplement - Used as needed., Disp: , Rfl:      UNABLE TO FIND, MEDICATION NAME: Stress relax (magnesium citrate) 380 mg at bedtime, Disp: , Rfl:      UNABLE TO FIND, MEDICATION NAME: Mood probiotic 1 capsule (3,000,000,000 CFUs) daily., Disp: , Rfl:      Docusate Sodium (ENEMEEZ MINI RE), 3 times a week (MWF) to help with bowel movements, Disp: , Rfl:      MELATONIN PO, Take 3 mg by mouth At Bedtime, Disp: , Rfl:   Miralax added per PCP    PAST SURGICAL HISTORY:    Past Surgical History          Past Surgical History:   Procedure Laterality Date     ANESTHESIA OUT OF OR MRI 3T N/A 3/7/2017     Procedure: ANESTHESIA PEDS SEDATION MRI 3T;  Surgeon: GENERIC ANESTHESIA PROVIDER;  Location: [facility] PEDS SEDATION      BACK SURGERY         c1 decompression and fusionx3, scoliosis with instrumentation x1     ELECTRORETINOGRAM Bilateral 12/10/2014     ERG ([place])     ENT SURGERY          ear tubes     EXAM UNDER ANESTHESIA EYE(S) Bilateral 12/10/2014     EUA ([place])     EXTRACTION(S) DENTAL N/A 12/10/2014     Procedure: EXTRACTION(S) DENTAL;  Surgeon: Hubert York DDS;  Location: [facility] OR     EYE SURGERY Bilateral 1996     BMR 6mm ([place])      GI SURGERY         g tube     HC REPLACE GASTROSTOMY/CECOSTOMY TUBE PERCUTANEOUS N/A 12/10/2014     Procedure: REPLACE GASTROSTOMY TUBE, PERCUTANEOUS;  Surgeon: Phong Perea MD;  Location: [facility] OR     RECESSION RESECTION (REPAIR STRABISMUS) BILATERAL Bilateral 12/10/2014     BLR 11 ([place])     STRABISMUS SURGERY   1996     (1996) BMR 6 ([place])     STRABISMUS SURGERY   2014     BLR 11 ([place]) -             FAMILY HISTORY:    Family History           Family History   Problem Relation Age of Onset     Glaucoma Mother         no medications or surg to  date     Glaucoma Maternal Grandmother         s/p surg, decreased VA      Amblyopia No family hx of       Strabismus No family hx of              SOCIAL HISTORY:        Social History   Substance Use Topics     Smoking status: Never Smoker     Smokeless tobacco: Never Used     Alcohol use No         Physical Exam:   /78 (BP Location: Left arm, Patient Position: Fowlers, Cuff Size: Adult Small)  Pulse 58  Temp 96.7  F (35.9  C) (Axillary)  Resp 18  Wt 32.4 kg (71 lb 8 oz)  SpO2 99%  BMI 20.94 kg/m2    Sitting up in wheelchair, smiling with headphones on.  Easy respirations.  L arm down by wheel of chair; decreased strength from previously.   Arms and legs thin.    Shoni-shin meridian therapy using matasushin and rake to bring down the Yang.  GV-20; GB-20. Well-healed scar posterior scalp.  Gentle raking along Du meridian of scalp moving posteriorly to anteriorly along midline, raking in outward fashion  Focus on SP and KI meridians for tonification; leg Yang in downward motion; also UE Kim surface downward motion.      Labs and Tests:  None today      Assessment:  Sherly is a 21 year old female patient with complex medical hx now stable in group home.        Plan/Follow-up:        RTC 4-5 months, earlier prn.    Thank you for this consultation. Please do not hesitate to contact me with any questions or concerns.      I spent a total of 20 minutes face-to-face with Sherly Ramires during today's office visit.  Over 50% of this time was spent counseling the patient-family-staff and/or coordinating care regarding comfort.  See note for details.     Sherrie Fontana MD, CM  Medical Director Pediatric Integrative Health and Wellbeing, [facility]        Mom(Julianne) called with questions about pre-biotics. I think at this time that given Anna's already lower GI motility that the extra gas from inulin or oligosaccharides would be uncomfortable and potentially confusing as she cannot articulate her symptoms clearly.  Can re-visit in the future if mom desires. AQUILES Fontana MD

## 2018-10-31 DIAGNOSIS — E28.39 OVARIAN FAILURE: ICD-10-CM

## 2018-10-31 RX ORDER — ESTRADIOL 1 MG/1
TABLET ORAL
Qty: 15 TABLET | Refills: 2 | Status: SHIPPED | OUTPATIENT
Start: 2018-10-31 | End: 2020-09-16

## 2018-11-29 ENCOUNTER — OFFICE VISIT (OUTPATIENT)
Dept: PEDIATRIC HEMATOLOGY/ONCOLOGY | Facility: CLINIC | Age: 23
End: 2018-11-29
Attending: PEDIATRICS
Payer: COMMERCIAL

## 2018-11-29 VITALS — RESPIRATION RATE: 24 BRPM | TEMPERATURE: 97.5 F | WEIGHT: 69 LBS | BODY MASS INDEX: 20.21 KG/M2

## 2018-11-29 DIAGNOSIS — Z92.23: ICD-10-CM

## 2018-11-29 DIAGNOSIS — E74.89 CDG (CONGENITAL DISORDER OF GLYCOSYLATION) (H): Primary | ICD-10-CM

## 2018-11-29 LAB
ANGLE RATE OF CLOT STRENGTH: 68.1 DEGREES (ref 53–72)
APTT PPP: 31 SEC (ref 22–37)
CI HYPERCOAGULATION INDEX: 1.5 RATIO (ref 0–3)
FIBRINOGEN PPP-MCNC: 291 MG/DL (ref 200–420)
G ACTUAL CLOT STRENGTH: 8.1 KD/SC (ref 4.5–11)
INR PPP: 1.07 (ref 0.86–1.14)
K TIME TO SPEC CLOT STRENGTH: 1.5 MINUTE (ref 1–3)
LY30 LYSIS AT 30 MINUTES: 0 % (ref 0–8)
LY60 LYSIS AT 60 MINUTES: 0.9 % (ref 0–15)
MA MAXIMUM CLOT STRENGTH: 62 MM (ref 50–70)
R TIME UNTIL CLOT FORMS: 4.5 MINUTE (ref 5–10)

## 2018-11-29 PROCEDURE — 85300 ANTITHROMBIN III ACTIVITY: CPT | Performed by: PEDIATRICS

## 2018-11-29 PROCEDURE — 85210 CLOT FACTOR II PROTHROM SPEC: CPT | Performed by: PEDIATRICS

## 2018-11-29 PROCEDURE — 85303 CLOT INHIBIT PROT C ACTIVITY: CPT | Performed by: PEDIATRICS

## 2018-11-29 PROCEDURE — 85246 CLOT FACTOR VIII VW ANTIGEN: CPT | Performed by: PEDIATRICS

## 2018-11-29 PROCEDURE — G0463 HOSPITAL OUTPT CLINIC VISIT: HCPCS | Mod: ZF

## 2018-11-29 PROCEDURE — 00000447 ZZHCL STATISTIC VON WILLEBRAND MULTIMERS: Performed by: PEDIATRICS

## 2018-11-29 PROCEDURE — 85396 CLOTTING ASSAY WHOLE BLOOD: CPT | Performed by: PEDIATRICS

## 2018-11-29 PROCEDURE — 36415 COLL VENOUS BLD VENIPUNCTURE: CPT | Performed by: PEDIATRICS

## 2018-11-29 PROCEDURE — 85240 CLOT FACTOR VIII AHG 1 STAGE: CPT | Performed by: PEDIATRICS

## 2018-11-29 PROCEDURE — 85270 CLOT FACTOR XI PTA: CPT | Performed by: PEDIATRICS

## 2018-11-29 PROCEDURE — 00000401 ZZHCL STATISTIC THROMBIN TIME NC: Performed by: PEDIATRICS

## 2018-11-29 PROCEDURE — 85610 PROTHROMBIN TIME: CPT | Performed by: PEDIATRICS

## 2018-11-29 PROCEDURE — 85306 CLOT INHIBIT PROT S FREE: CPT | Performed by: PEDIATRICS

## 2018-11-29 PROCEDURE — 85384 FIBRINOGEN ACTIVITY: CPT | Performed by: PEDIATRICS

## 2018-11-29 PROCEDURE — 85730 THROMBOPLASTIN TIME PARTIAL: CPT | Performed by: PEDIATRICS

## 2018-11-29 PROCEDURE — 85245 CLOT FACTOR VIII VW RISTOCTN: CPT | Performed by: PEDIATRICS

## 2018-11-29 ASSESSMENT — PAIN SCALES - GENERAL: PAINLEVEL: NO PAIN (0)

## 2018-11-29 NOTE — NURSING NOTE
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

## 2018-11-29 NOTE — PROGRESS NOTES
Pediatric Hematology Clinic Note    CC: Coagulopathy and hemostatic issues related to glycosylation.     HPI: Sherly is a 23-year-old female with a complex ongoing medical history.  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     Since our last visit on 11/30/2017, Sherly has not had any major changes in her health. Mom reports she neck was growing weak so she was fitted for a soft cervical collar. With the collar, the strength in her neck has stayed consistent.    Mom denies Sherly has had abnormal nose bleeds or bruising. Sherly has a small cut on her head that was not healing as quickly as they usually do. A would doctor saw Sherly at her group home and was not overtly concerned about Sherly's ability to heal. Mom denies Sherly having clots in her legs or chest.     Mom reports Sherly independently uses her wheelchair. Sherly experienced dramatic muscular atrophy approximately 2 years ago. She has difficultly holding her iPad up in the air, reaching to turn off her light switch, and traveling around her group home in a mobility chair by scooting with her legs.    Sherly is currently taking 0.5mg of Estradiol daily. This has been increased from her previously dose of 0.25mg daily.         Past Medical History: Sherly was identified as having a congenital disorder of glycosylation.  She has continued to have issues with speech and developmental delay and at this point has transitioned to a group home.  She has difficulties with mobility with joint contractures.  She had cervical spine surgery and also has issues with mobility in her neck and thus usually is in some sort of neck support.  She is usually in a wheelchair.       Social  History: Sherly is currently living in a group home. She has a care attendant accompanying her to the clinic today in addition to her mother.       Family History: In reviewing her medical records, I do not find information on anyone who has had a prior metabolic disorder.  There is mention made of glaucoma.       ROS:  Complete 10 point review of systems reviewed and otherwise negative aside from statements mentioned in the HPI.       Allergies:   Allergies as of 11/29/2018 - Vlad as Reviewed 11/29/2018   Allergen Reaction Noted     Ativan [lorazepam] Other (See Comments) 02/18/2015     Pseudoephedrine  02/16/2017       Medications:  Current Outpatient Prescriptions   Medication Sig Dispense Refill     acetaminophen (TYLENOL) 160 MG/5ML solution 480 mg (15 mL) by G-tube every 4 hours as needed       ARIPiprazole (ABILIFY) 1 MG/ML SOLN 7.5 mg daily        bacitracin ointment Apply topically 2 times daily as needed for wound care 14 g 0     calcium carbonate 1250 (500 CA) MG/5ML SUSP Take 6 mLs (1,500 mg) by mouth 2 times daily (with meals) (Patient taking differently: 1,500 mg by Gastronomy tube route 2 times daily (with meals) ) 450 mL 12     D-Mannose POWD 2 times daily 1 scoop - mixed with water       diazepam (VALIUM) 5 MG/ML solution 7.5 mg by Gastronomy tube route every hour as needed for seizures (Seizures lasting longer than 5 minutes)        Docusate Sodium (ENEMEEZ MINI RE) 3 times a week (MWF) to help with bowel movements       estradiol (ESTRACE) 1 MG tablet Take 0.5 mg ( 1/2 tablet) daily. 15 tablet 2     HERBALS Bach Rescue Remedy Spray- 1-3 sprays, to tongue or inside mouth, prn, anxiety.  Not to exceed threes times daily. 1 each 11     levETIRAcetam (KEPPRA) 100 MG/ML solution 10 mL  in the AM and 15 ml in the PM, by gastrostomy tube. 750 mL 11     MELATONIN PO Take 3 mg by mouth At Bedtime       Nutritional Supplements (COMPLEAT PEDIATRIC PO)        ondansetron (ZOFRAN) 4 MG/5ML solution Take 5 mLs  (4 mg) by mouth every 6 hours as needed for nausea or vomiting 90 mL 0     ORDER FOR DME Equipment being ordered: Other: chest strap for her tilt in space manual wheel chair  Treatment Diagnosis: For safe transportation to home secondary to poor sitting balance in upright. CDG type Ia, seizures, and G-tube dependence. 1 Device 0     sertraline (ZOLOFT) 20 MG/ML (HIGH CONC) solution 200 mg by Gastronomy tube route daily        traZODone (DESYREL) 50 MG tablet Crush one tablet and administer via G-tube 30 minutes prior to bedtime daily       traZODone (DESYREL) 50 MG tablet by Gastric Tube route At Bedtime       UNABLE TO FIND MEDICATION NAME: Rescue Remedy Herbal Supplement - Used as needed.       UNABLE TO FIND MEDICATION NAME: Stress relax (magnesium citrate) 380 mg at bedtime       UNABLE TO FIND MEDICATION NAME: Mood probiotic 1 capsule (3,000,000,000 CFUs) daily.       cholecalciferol (VITAMIN  -D) 1000 UNITS capsule Take 1 capsule (1,000 Units) by mouth daily (Patient not taking: Reported on 11/29/2018) 30 capsule 11       Labs:   Results for orders placed or performed in visit on 05/23/18   TEG without Heparinase   Result Value Ref Range    R time until clot forms 5.3 5 - 10 Minute    K time to spec clot strength 1.7 1 - 3 Minute    Angle rate of clot strength 67.2 53 - 72 Degrees    MA maximum clot strength 60.7 50 - 70 mm    CI hypercoagulation index 0.6 0.0 - 3.0 Ratio    G actual clot strength 7.7 4.5 - 11.0 Kd/sc    LY30 lysis at 30 minutes 0.0 0 - 8 %    LY60 lysis at 60 minutes 0.1 0 - 15 %       Vitals:  Temp 97.5  F (36.4  C) (Axillary)  Resp 24  Wt 31.3 kg (69 lb)  BMI 20.21 kg/m2    Physical Exam:  Physical Exam   Constitutional: She is oriented to person, place, and time and well-developed, well-nourished, and in no distress.   HENT:   Head: Normocephalic and atraumatic.   Eyes: Conjunctivae are normal.   Neurological: She is alert and oriented to person, place, and time. GCS score is 15.    Skin: Skin is warm and dry.   Psychiatric: Mood and affect normal.       A/P:    We will screen Caitlin for her hemostatic balance on her increased estrogen supplements.    Scribe Disclosure:   I, Helen Becerra, am serving as a scribe; to document services personally performed by Azra Mays- -based on data collection and the provider's statements to me.      Provider Disclosure:  I agree with above History, Review of Systems, Physical exam and Plan.  I have reviewed the content of the documentation and have edited it as needed. I have personally performed the services documented here and the documentation accurately represents those services and the decisions I have made.      Azra Mays MD, MS    NB:  Per her lab results, Caitlin remains slightly sticky but not excessively so.    Results for orders placed or performed in visit on 11/29/18   INR   Result Value Ref Range    INR 1.07 0.86 - 1.14   Partial thromboplastin time   Result Value Ref Range    PTT 31 22 - 37 sec   Fibrinogen activity   Result Value Ref Range    Fibrinogen 291 200 - 420 mg/dL   Factor 8 assay   Result Value Ref Range    Factor 8 Assay 116 55 - 200 %   Factor 11 assay   Result Value Ref Range    Factor 11 Assay 51 (L) 65 - 150 %   Antithrombin III   Result Value Ref Range    Antithrombin III Chromogenic 65 (L) 85 - 135 %   Protein C chromogenic   Result Value Ref Range    Prot C Chromogenic 78 70 - 170 %   Protein S Antigen Free   Result Value Ref Range    Protein S Free 42 (L) 55 - 125 %   Factor 2 assay   Result Value Ref Range    Factor 2 Assay 101 60 - 140 %   von Willebrand Interpretation   Result Value Ref Range    von Willebrand Interpretation (Note)    VWF Activity with reflex to Ristocetin Cofactor Activity   Result Value Ref Range    von Willebrand Factor Activity 121 50 - 180 %   Von Willebrand antigen   Result Value Ref Range    von Willebrand Antigen 105 50 - 200 %   Von Willebrand Multimers   Result Value Ref Range    Von  Willebrand Multimers       Multimer analysis is not indicated on this specimen. Test canceled.    TEG without Heparinase   Result Value Ref Range    R time until clot forms 4.5 (L) 5 - 10 Minute    K time to spec clot strength 1.5 1 - 3 Minute    Angle rate of clot strength 68.1 53 - 72 Degrees    MA maximum clot strength 62.0 50 - 70 mm    CI hypercoagulation index 1.5 0.0 - 3.0 Ratio    G actual clot strength 8.1 4.5 - 11.0 Kd/sc    LY30 lysis at 30 minutes 0.0 0 - 8 %    LY60 lysis at 60 minutes 0.9 0 - 15 %       CC  Patient Care Team:  Services, [facility] Physician as PCP - General  Ana Granda MD as MD (Pediatric Metabolism)  Barrett Lennon MD as MD (Pediatrics)  Skyler Correa MD (Ophthalmology)  Sherrie Fontana MD as MD (Pediatrics)  Ancelmo Smith MD as Referring Physician (Neurology)  Nhan Knott MD as MD (Neurology)  Brennan Vigil, RN as Registered Nurse  SERVICES, [facility]

## 2018-11-29 NOTE — PATIENT INSTRUCTIONS
Checking TEG and factors, inc Factor 8 and von Willebrand profile on higher estrogen dose    F/U 6-12 months or prn   awakens in the middle of the night

## 2018-11-29 NOTE — MR AVS SNAPSHOT
After Visit Summary   11/29/2018    Sherly Ramires    MRN: [id number]           Patient Information     Date Of Birth          1995        Visit Information        Provider Department      11/29/2018 10:30 AM Azra Masy MD Peds Hematology Oncology        Today's Diagnoses     CDG (congenital disorder of glycosylation) (H)    -  1    H/O estrogen therapy              [facility], 9th floor  [address] [place] [address]  Phone: [phone]  Clinic Hours:   Monday-Friday:   7 am to 5:00 pm   closed weekends and major  holidays     If your fever is 100.5  or greater,   call the clinic during business hours.   After hours call [phone] and ask for the pediatric hematology / oncology physician to be paged for you.              Care Instructions    Checking TEG and factors, inc Factor 8 and von Willebrand profile on higher estrogen dose    F/U 6-12 months or prn          Follow-ups after your visit        Follow-up notes from your care team     Return in about 6 months (around 5/29/2019).      Your next 10 appointments already scheduled     Jan 23, 2019 11:00 AM CST   Return Metabolic Visit with Sarah Dubon MD   Peds Metabolism ([facility])    [place] [id number] [facility], 3rd Flr  [address] [place] [id number] [phone]            Jan 24, 2019 10:00 AM CST   Return Visit with Sherrie Fontana MD   Peds Integrative Health ([facility])    [facility]  9th Floor  [address] [facility] [address] [phone]            Apr 24, 2019  3:30 PM CDT   Return Visit with Nhan Knott MD   [facility] ([facility])    [address], [address]  [facility] [address]   [phone]            Jun 05, 2019 10:30 AM CDT   Return Visit with Azra Mays MD   Peds Hematology Oncology ([facility])    [facility]  [facility]  9th Floor  [address] [facility] [id number] [phone]              Who to contact     Please call your clinic at [phone] to:    Ask questions about your health    Make or cancel appointments    Discuss your medicines    Learn about your test results    Speak to your doctor            Additional Information About Your Visit        MyChart Information     [employer] is an electronic gateway that provides easy, online access to your medical records. With [employer], you can request a clinic appointment, read your test results, renew a prescription or communicate with your care team.     To sign up for [employer] visit the website at [email or web address]   You will be asked to enter the access code listed below, as well as some personal information. Please follow the directions to create your username and password.     Your access code is: [email or web address]  Expires: 2018  1:39 PM     Your access code will  in 90 days. If you need help or a new code, please contact your [facility] or call [phone] for assistance.        Care EveryWhere ID     This is your Care EveryWhere ID. This could be used by other organizations to access your [place] medical records  [id number]        Your Vitals Were     Temperature Respirations BMI (Body Mass Index)             97.5  F (36.4  C) (Axillary) 24 20.21 kg/m2          Blood Pressure from Last 3 Encounters:   18 115/78   18 92/63   18 113/78    Weight from Last 3 Encounters:   18 31.3 kg (69 lb)   18 32.4 kg (71 lb 8 oz)   18 34 kg (75 lb)              We Performed the Following     Antithrombin III     Factor 11 assay     Factor 2 assay     Factor 8 assay     Fibrinogen activity     INR     Partial thromboplastin time     Protein C chromogenic     Protein S Antigen Free     TEG without Heparinase     Von Willebrand antigen     von Willebrand Interpretation     Von  Willebrand Multimers     VWF Activity with reflex to Ristocetin Cofactor Activity          Today's Medication Changes          These changes are accurate as of 11/29/18 11:59 PM.  If you have any questions, ask your nurse or doctor.               These medicines have changed or have updated prescriptions.        Dose/Directions    calcium carbonate 1250 (500 Ca) MG/5ML Susp suspension   This may have changed:  how to take this   Used for:  Congenital disorder of glycosylation (CDG) (H), Osteoporosis        Dose:  1500 mg   Take 6 mLs (1,500 mg) by mouth 2 times daily (with meals)   Quantity:  450 mL   Refills:  12                Primary Care Provider Fax #    [facility] [phone]       [address] [place] [address]        Equal Access to Services     PENNY RG : Jackelyn Silvestre, joelle noble, jeremiah love, anahi rider. So [facility] [phone].    ATENCIÓN: Si habla español, tiene a chakraborty disposición servicios gratuitos de asistencia lingüística. Llame al [phone].    We comply with applicable federal civil rights laws and [place] laws. We do not discriminate on the basis of race, color, national origin, age, disability, sex, sexual orientation, or gender identity.            Thank you!     Thank you for choosing [facility] HEMAT[facility]  for your care. Our goal is always to provide you with excellent care. Hearing back from our patients is one way we can continue to improve our services. Please take a few minutes to complete the written survey that you may receive in the mail after your visit with us. Thank you!             Your Updated Medication List - Protect others around you: Learn how to safely use, store and throw away your medicines at www.disposemymeds.org.          This list is accurate as of 11/29/18 11:59 PM.  Always use your most recent med list.                   Brand Name Dispense Instructions  for use Diagnosis    acetaminophen 160 MG/5ML solution    TYLENOL     480 mg (15 mL) by G-tube every 4 hours as needed        ARIPiprazole 1 MG/ML Soln solution    ABILIFY     7.5 mg daily        bacitracin 500 UNIT/GM external ointment     14 g    Apply topically 2 times daily as needed for wound care    Skin irritation       calcium carbonate 1250 (500 Ca) MG/5ML Susp suspension     450 mL    Take 6 mLs (1,500 mg) by mouth 2 times daily (with meals)    Congenital disorder of glycosylation (CDG) (H), Osteoporosis       cholecalciferol 1000 units (25 mcg) capsule    VITAMIN D3    30 capsule    Take 1 capsule (1,000 Units) by mouth daily    Congenital disorder of glycosylation (CDG) (H)       COMPLEAT PEDIATRIC PO           D-Mannose Powd      2 times daily 1 scoop - mixed with water        diazepam 5 MG/ML (HIGH CONC) solution    VALIUM     7.5 mg by Gastronomy tube route every hour as needed for seizures (Seizures lasting longer than 5 minutes)        ENEMEEZ MINI RE      3 times a week (MWF) to help with bowel movements        estradiol 1 MG tablet    ESTRACE    15 tablet    Take 0.5 mg ( 1/2 tablet) daily.    Ovarian failure       HERBALS     1 each    Bach Rescue Remedy Spray- 1-3 sprays, to tongue or inside mouth, prn, anxiety. Not to exceed threes times daily.    Anxiety       levETIRAcetam 100 MG/ML solution    KEPPRA    750 mL    10 mL  in the AM and 15 ml in the PM, by gastrostomy tube.    Complex partial seizure evolving to generalized seizure (H)       MELATONIN PO      Take 3 mg by mouth At Bedtime        ondansetron 4 MG/5ML solution    ZOFRAN    90 mL    Take 5 mLs (4 mg) by mouth every 6 hours as needed for nausea or vomiting    Viral gastroenteritis       order for DME     1 Device    Equipment being ordered: Other: chest strap for her tilt in space manual wheel chair Treatment Diagnosis: For safe transportation to home secondary to poor sitting balance in upright. CDG type Ia, seizures, and G-tube  dependence.    Viral gastroenteritis, Dehydration, Acute respiratory failure with hypoxia (H), Congenital disorder of glycosylation (CDG) (H)       sertraline 20 MG/ML (HIGH CONC) solution    ZOLOFT     200 mg by Gastronomy tube route daily        * traZODone 50 MG tablet    DESYREL     by Gastric Tube route At Bedtime        * traZODone 50 MG tablet    DESYREL     Crush one tablet and administer via G-tube 30 minutes prior to bedtime daily        * UNABLE TO FIND      MEDICATION NAME: Stress relax (magnesium citrate) 380 mg at bedtime        * UNABLE TO FIND      MEDICATION NAME: Mood probiotic 1 capsule (3,000,000,000 CFUs) daily.        * UNABLE TO FIND      MEDICATION NAME: Rescue Remedy Herbal Supplement - Used as needed.        * Notice:  This list has 5 medication(s) that are the same as other medications prescribed for you. Read the directions carefully, and ask your doctor or other care provider to review them with you.

## 2018-11-29 NOTE — LETTER
11/29/2018      RE: Sherly Ramires  [facility] [place]  [address] [facility] [address]       Pediatric Hematology Clinic Note    CC: Coagulopathy and hemostatic issues related to glycosylation.     HPI: Sherly is a 23-year-old female with a complex ongoing medical history.  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     Since our last visit on 11/30/2017, Sherly has not had any major changes in her health. Mom reports she neck was growing weak so she was fitted for a soft cervical collar. With the collar, the strength in her neck has stayed consistent.    Mom denies Sherly has had abnormal nose bleeds or bruising. Sherly has a small cut on her head that was not healing as quickly as they usually do. A would doctor saw Sherly at her group home and was not overtly concerned about Sherly's ability to heal. Mom denies Sherly having clots in her legs or chest.     Mom reports Sherly independently uses her wheelchair. Sherly experienced dramatic muscular atrophy approximately 2 years ago. She has difficultly holding her iPad up in the air, reaching to turn off her light switch, and traveling around her group home in a mobility chair by scooting with her legs.    Sherly is currently taking 0.5mg of Estradiol daily. This has been increased from her previously dose of 0.25mg daily.         Past Medical History: Sherly was identified as having a congenital disorder of glycosylation.  She has continued to have issues with speech and developmental delay and at this point has transitioned to a group home.  She has difficulties with mobility with joint contractures.  She had cervical spine surgery and also has  issues with mobility in her neck and thus usually is in some sort of neck support.  She is usually in a wheelchair.       Social History: Sherly is currently living in a group home. She has a care attendant accompanying her to the clinic today in addition to her mother.       Family History: In reviewing her medical records, I do not find information on anyone who has had a prior metabolic disorder.  There is mention made of glaucoma.       ROS:  Complete 10 point review of systems reviewed and otherwise negative aside from statements mentioned in the HPI.       Allergies:   Allergies as of 11/29/2018 - Vlad as Reviewed 11/29/2018   Allergen Reaction Noted     Ativan [lorazepam] Other (See Comments) 02/18/2015     Pseudoephedrine  02/16/2017       Medications:  Current Outpatient Prescriptions   Medication Sig Dispense Refill     acetaminophen (TYLENOL) 160 MG/5ML solution 480 mg (15 mL) by G-tube every 4 hours as needed       ARIPiprazole (ABILIFY) 1 MG/ML SOLN 7.5 mg daily        bacitracin ointment Apply topically 2 times daily as needed for wound care 14 g 0     calcium carbonate 1250 (500 CA) MG/5ML SUSP Take 6 mLs (1,500 mg) by mouth 2 times daily (with meals) (Patient taking differently: 1,500 mg by Gastronomy tube route 2 times daily (with meals) ) 450 mL 12     D-Mannose POWD 2 times daily 1 scoop - mixed with water       diazepam (VALIUM) 5 MG/ML solution 7.5 mg by Gastronomy tube route every hour as needed for seizures (Seizures lasting longer than 5 minutes)        Docusate Sodium (ENEMEEZ MINI RE) 3 times a week (MWF) to help with bowel movements       estradiol (ESTRACE) 1 MG tablet Take 0.5 mg ( 1/2 tablet) daily. 15 tablet 2     HERBALS Bach Rescue Remedy Spray- 1-3 sprays, to tongue or inside mouth, prn, anxiety.  Not to exceed threes times daily. 1 each 11     levETIRAcetam (KEPPRA) 100 MG/ML solution 10 mL  in the AM and 15 ml in the PM, by gastrostomy tube. 750 mL 11     MELATONIN PO Take 3 mg  by mouth At Bedtime       Nutritional Supplements (COMPLEAT PEDIATRIC PO)        ondansetron (ZOFRAN) 4 MG/5ML solution Take 5 mLs (4 mg) by mouth every 6 hours as needed for nausea or vomiting 90 mL 0     ORDER FOR DME Equipment being ordered: Other: chest strap for her tilt in space manual wheel chair  Treatment Diagnosis: For safe transportation to home secondary to poor sitting balance in upright. CDG type Ia, seizures, and G-tube dependence. 1 Device 0     sertraline (ZOLOFT) 20 MG/ML (HIGH CONC) solution 200 mg by Gastronomy tube route daily        traZODone (DESYREL) 50 MG tablet Crush one tablet and administer via G-tube 30 minutes prior to bedtime daily       traZODone (DESYREL) 50 MG tablet by Gastric Tube route At Bedtime       UNABLE TO FIND MEDICATION NAME: Rescue Remedy Herbal Supplement - Used as needed.       UNABLE TO FIND MEDICATION NAME: Stress relax (magnesium citrate) 380 mg at bedtime       UNABLE TO FIND MEDICATION NAME: Mood probiotic 1 capsule (3,000,000,000 CFUs) daily.       cholecalciferol (VITAMIN  -D) 1000 UNITS capsule Take 1 capsule (1,000 Units) by mouth daily (Patient not taking: Reported on 11/29/2018) 30 capsule 11       Labs:   Results for orders placed or performed in visit on 05/23/18   TEG without Heparinase   Result Value Ref Range    R time until clot forms 5.3 5 - 10 Minute    K time to spec clot strength 1.7 1 - 3 Minute    Angle rate of clot strength 67.2 53 - 72 Degrees    MA maximum clot strength 60.7 50 - 70 mm    CI hypercoagulation index 0.6 0.0 - 3.0 Ratio    G actual clot strength 7.7 4.5 - 11.0 Kd/sc    LY30 lysis at 30 minutes 0.0 0 - 8 %    LY60 lysis at 60 minutes 0.1 0 - 15 %       Vitals:  Temp 97.5  F (36.4  C) (Axillary)  Resp 24  Wt 31.3 kg (69 lb)  BMI 20.21 kg/m2    Physical Exam:  Physical Exam   Constitutional: She is oriented to person, place, and time and well-developed, well-nourished, and in no distress.   HENT:   Head: Normocephalic and  atraumatic.   Eyes: Conjunctivae are normal.   Neurological: She is alert and oriented to person, place, and time. GCS score is 15.   Skin: Skin is warm and dry.   Psychiatric: Mood and affect normal.           A/P:    Scribe Disclosure:   I, Helen Becerra, am serving as a scribe; to document services personally performed by Azra Mays- -based on data collection and the provider's statements to me.      Provider Disclosure:  I agree with above History, Review of Systems, Physical exam and Plan.  I have reviewed the content of the documentation and have edited it as needed. I have personally performed the services documented here and the documentation accurately represents those services and the decisions I have made.      Azra Mays MD    CC  Patient Care Team:  [place], [facility] Physician as PCP - Ana Kent MD as MD (Pediatric Metabolism)  Barrett Lennon MD as MD (Pediatrics)  Skyler Correa MD (Ophthalmology)  Sherrie Fontana MD as MD (Pediatrics)  Ancelmo Smith MD as Referring Physician (Neurology)  Nhan Knott MD as MD (Neurology)  Brennan Vigil, RN as Registered Nurse  SERVICES, [facility] PHYSICIAN

## 2018-11-30 LAB
AT III ACT/NOR PPP CHRO: 65 % (ref 85–135)
FACT VIII ACT/NOR PPP: 116 % (ref 55–200)
FACT XI ACT/NOR PPP: 51 % (ref 65–150)
PROT C ACT/NOR PPP CHRO: 78 % (ref 70–170)
PROT S FREE AG ACT/NOR PPP IA: 42 % (ref 55–125)
PROTHROM ACT/NOR PPP: 101 % (ref 60–140)
VON WILLEBRAND INTERPRETATION: NORMAL
VWF CBA/VWF AG PPP IA-RTO: 105 % (ref 50–200)
VWF MULTIMERS PPP QL: NORMAL
VWF:AC ACT/NOR PPP IA: 121 % (ref 50–180)

## 2019-02-28 ENCOUNTER — OFFICE VISIT (OUTPATIENT)
Dept: CONSULT | Facility: CLINIC | Age: 24
End: 2019-02-28
Attending: PEDIATRICS
Payer: COMMERCIAL

## 2019-02-28 VITALS
RESPIRATION RATE: 28 BRPM | OXYGEN SATURATION: 99 % | SYSTOLIC BLOOD PRESSURE: 103 MMHG | DIASTOLIC BLOOD PRESSURE: 76 MMHG | TEMPERATURE: 96.4 F | HEART RATE: 74 BPM

## 2019-02-28 DIAGNOSIS — E74.89 CONGENITAL DISORDER OF GLYCOSYLATION ASSOCIATED WITH MUTATION IN PMM2 GENE (H): ICD-10-CM

## 2019-02-28 DIAGNOSIS — M81.0 OSTEOPOROSIS WITHOUT CURRENT PATHOLOGICAL FRACTURE, UNSPECIFIED OSTEOPOROSIS TYPE: ICD-10-CM

## 2019-02-28 DIAGNOSIS — R48.8 PERSEVERATION: ICD-10-CM

## 2019-02-28 DIAGNOSIS — E74.89 CONGENITAL DISORDER OF GLYCOSYLATION (CDG) (H): Primary | ICD-10-CM

## 2019-02-28 DIAGNOSIS — K92.89 GASTROINTESTINAL DYSMOTILITY: ICD-10-CM

## 2019-02-28 DIAGNOSIS — Z87.440 PERSONAL HISTORY OF URINARY TRACT INFECTION: ICD-10-CM

## 2019-02-28 DIAGNOSIS — F41.9 ANXIETY: ICD-10-CM

## 2019-02-28 DIAGNOSIS — R29.898 MUSCLE FUNCTION LOSS: ICD-10-CM

## 2019-02-28 RX ORDER — LORATADINE 10 MG/1
10 TABLET ORAL DAILY
COMMUNITY

## 2019-02-28 RX ORDER — HYDROXYZINE HCL 10 MG/5 ML
10 SOLUTION, ORAL ORAL 2 TIMES DAILY PRN
COMMUNITY
End: 2024-05-24

## 2019-02-28 ASSESSMENT — PAIN SCALES - GENERAL: PAINLEVEL: NO PAIN (0)

## 2019-02-28 NOTE — PROGRESS NOTES
Pediatric Integrative Health Subsequent Visit        Primary Care Provider: MD Leeanne Mustafa          Other involved providers: Ana Granda MD; Barrett Lennon MD; Sara Link MD, Azra Mays MD     Reason for initial consultation: integrative suggestions that may be of assistance for behavior, history of recurrent UTI, and immune support     Interim History: Sherly Ramires is a 23 year old female with a complex medical history: including    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 (H) Yes     Anxiety        Perseveration        Personal history of urinary tract infection        Osteoporosis        Gastrointestinal dysmotility          Anna is here for follow-up and is accompanied by her support staff from [facility]. Anna's behavior continues to be good, she is using her communication board, and continues to attend her Day Program. There had been difficulties with behavior at the program, but these seem to have resolved. Anna attends [employer] on Mondays and loves the games and other activities that they do. She attends Bible Study every other week. Leticia, her support staff, remarks how Anna now sings a bit during bath time and seems overall happier. It is very rare for her to use rescue remedy.  She has not had any UTIs since her last visit and remains on d-mannose and probiotics. BMs are fine with Fleet's or Enemeeze, only prn since the addition of Miralax.   Anna still has upper arm weakness but perseveres to try to do simple tasks such as turn her room light off and eat orally. She eats maybe a half a quesadilla for lunch , as an example, and the residents of the [facility] eat together as a group for the evening meal.     ALLERGIES:        Allergies   Allergen Reactions     Ativan [Lorazepam] Other (See Comments)       Paradoxical reaction     Motrin [Ibuprofen] GI Disturbance       Pt is able to have motrin with food but not  on an empty stomache.      Pseudoephedrine           IMMUNIZATIONS:  Immunization History   Administered Date(s) Administered     DTAP (<7y) 1996, 1996, 1996, 06/11/1997     FLU 6-35 months 11/15/2005, 11/14/2006, 10/12/2012     Flu, Unspecified 1996, 11/04/1997, 10/02/1998, 11/15/2005, 11/14/2006     Hep B, Peds or Adolescent 1996, 1996, 01/20/1997     HepB-Adult 1996, 1996, 01/20/1997     Hib (PRP-T) 1996, 1996, 1996, 06/11/1997     Influenza (H1N1) 11/11/2009     Influenza (IIV3) PF 1996, 11/04/1997, 10/02/1998, 10/18/2007, 12/11/2008, 09/16/2009, 10/12/2012     Influenza Intranasal Vaccine 10/06/2011     Influenza Vaccine IM 3yrs+ 4 Valent IIV4 11/03/2015, 09/21/2016     Influenza Vaccine IM Ages 6-35 Months 4 Valent (PF) 11/03/2015     MMR 06/11/1997, 05/14/2001     Mantoux Tuberculin Skin Test 01/11/2018     OPV, trivalent, live 1996, 1996, 1996     OPV, unspecified 1996, 1996, 1996     Pneumo Conj 13-V (2010&after) 01/06/2012     Pneumococcal 23 valent 01/06/2012     Poliovirus, inactivated (IPV) 05/14/2001     TDAP Vaccine (Adacel) 08/30/2011     TRIHIBIT (DTAP/HIB, <7y) 1996, 1996, 1996     Varicella 03/23/1998, 08/22/2008   She receives the flu vaccine at the group home each season.    CURRENT MEDICATIONS:    Current Outpatient Medications:      acetaminophen (TYLENOL) 160 MG/5ML solution, 480 mg (15 mL) by G-tube every 4 hours as needed, Disp: , Rfl:      ARIPiprazole (ABILIFY) 1 MG/ML SOLN, 7.5 mg daily , Disp: , Rfl:      bacitracin ointment, Apply topically 2 times daily as needed for wound care, Disp: 14 g, Rfl: 0     calcium carbonate 1250 (500 CA) MG/5ML SUSP, Take 6 mLs (1,500 mg) by mouth 2 times daily (with meals) (Patient taking differently: 1,500 mg by Gastronomy tube route 2 times daily (with meals) ), Disp: 450 mL, Rfl: 12     cholecalciferol (VITAMIN  -D) 1000 UNITS  "capsule, Take 1 capsule (1,000 Units) by mouth daily, Disp: 30 capsule, Rfl: 11     D-Mannose POWD, 2 times daily 1 scoop - mixed with water, Disp: , Rfl:      diazepam (VALIUM) 5 MG/ML solution, 7.5 mg by Gastronomy tube route every hour as needed for seizures (Seizures lasting longer than 5 minutes) , Disp: , Rfl:      estradiol (ESTRACE) 1 MG tablet, Take 0.5 mg ( 1/2 tablet) daily., Disp: 15 tablet, Rfl: 2     hydrOXYzine (ATARAX) 10 MG/5ML syrup, Take 10 mg by mouth 2 times daily as needed for itching, Disp: , Rfl:      levETIRAcetam (KEPPRA) 100 MG/ML solution, 10 mL  in the AM and 15 ml in the PM, by gastrostomy tube., Disp: 750 mL, Rfl: 11     loratadine (CLARITIN) 10 MG tablet, Take 10 mg by mouth daily, Disp: , Rfl:      Nutritional Supplements (COMPLEAT PEDIATRIC PO), , Disp: , Rfl:      ondansetron (ZOFRAN) 4 MG/5ML solution, Take 5 mLs (4 mg) by mouth every 6 hours as needed for nausea or vomiting, Disp: 90 mL, Rfl: 0     ORDER FOR DME, Equipment being ordered: Other: chest strap for her tilt in space manual wheel chair Treatment Diagnosis: For safe transportation to home secondary to poor sitting balance in upright. CDG type Ia, seizures, and G-tube dependence., Disp: 1 Device, Rfl: 0     sertraline (ZOLOFT) 20 MG/ML (HIGH CONC) solution, 200 mg by Gastronomy tube route daily , Disp: , Rfl:      traZODone (DESYREL) 50 MG tablet, Crush one tablet and administer via G-tube 30 minutes prior to bedtime daily, Disp: , Rfl:      traZODone (DESYREL) 50 MG tablet, by Gastric Tube route At Bedtime, Disp: , Rfl:      UNABLE TO FIND, MEDICATION NAME: Stress relax (magnesium citrate) 380 mg at bedtime, Disp: , Rfl:      UNABLE TO FIND, MEDICATION NAME: Mood probiotic 1 capsule (3,000,000,000 CFUs) daily., Disp: , Rfl: \"Ultimate Laisha\"     Docusate Sodium (ENEMEEZ MINI RE), 3 times a week (MWF) to help with bowel movements, Disp: , Rfl:      MELATONIN PO, Take 3 mg by mouth At Bedtime, Disp: , Rfl:      UNABLE TO " FIND, MEDICATION NAME: Rescue Remedy Herbal Supplement - Used as needed., Disp: , Rfl:     PAST SURGICAL HISTORY:    Past Surgical History          Past Surgical History:   Procedure Laterality Date     ANESTHESIA OUT OF OR MRI 3T N/A 3/7/2017     Procedure: ANESTHESIA PEDS SEDATION MRI 3T;  Surgeon: GENERIC ANESTHESIA PROVIDER;  Location: [facility] PEDS SEDATION      BACK SURGERY         c1 decompression and fusionx3, scoliosis with instrumentation x1     ELECTRORETINOGRAM Bilateral 12/10/2014     ERG (Summers)     ENT SURGERY          ear tubes     EXAM UNDER ANESTHESIA EYE(S) Bilateral 12/10/2014     EUA (Areaux)     EXTRACTION(S) DENTAL N/A 12/10/2014     Procedure: EXTRACTION(S) DENTAL;  Surgeon: Hubert York DDS;  Location: [facility] OR     EYE SURGERY Bilateral 1996     BMR 6mm (Dru)      GI SURGERY         g tube     HC REPLACE GASTROSTOMY/CECOSTOMY TUBE PERCUTANEOUS N/A 12/10/2014     Procedure: REPLACE GASTROSTOMY TUBE, PERCUTANEOUS;  Surgeon: Phong Perea MD;  Location: [facility] OR     RECESSION RESECTION (REPAIR STRABISMUS) BILATERAL Bilateral 12/10/2014     BLR 11 (Areaux)     STRABISMUS SURGERY   1996     (1996) BMR 6 (Gonsales)     STRABISMUS SURGERY   2014     BLR 11 (Areaux) -             FAMILY HISTORY:    Family History           Family History   Problem Relation Age of Onset     Glaucoma Mother         no medications or surg to date     Glaucoma Maternal Grandmother         s/p surg, decreased VA      Amblyopia No family hx of       Strabismus No family hx of              SOCIAL HISTORY:        Social History   Substance Use Topics     Smoking status: Never Smoker     Smokeless tobacco: Never Used     Alcohol use No         Physical Exam:   /76 (BP Location: Right arm, Patient Position: Chair, Cuff Size: Adult Small)   Pulse 74   Temp 96.4  F (35.8  C) (Axillary)   Resp 28   SpO2 99%   Wt Readings from Last 3 Encounters:   11/29/18 31.3 kg (69 lb)   09/17/18 32.4 kg (71 lb 8 oz)   05/23/18  "34 kg (75 lb)     Ht Readings from Last 2 Encounters:   05/23/18 1.245 m (4' 1\")   11/22/17 1.245 m (4' 1\")     No height and weight on file for this encounter.      Sitting up in wheelchair, smiling with headphones on.  Easy respirations.  Arms and legs thin.Using hands to hold up iPad    Shoni-shin meridian therapy using matasushin and raking movements to bring down the Yang.  GV-20; Star treatment:.gentle raking along Du meridian of scalp moving posteriorly to anteriorly along midline, raking in outward fashion  Focus on SP and KI meridians for tonification; leg Yang in downward motion; also UE Kim surface downward motion.   Back Luci points along BL meridian     Labs and Tests:  None today      Assessment:  Sherly is a 23 year old female patient with complex medical hx now stable in group home.        Plan/Follow-up:        RTC 6 months, earlier prn.Can decide at that time if want to continue therapy.  Change to Florastor for Spring probiotic (changes about every 6 months).     Thank you for this consultation. Please do not hesitate to contact me with any questions or concerns.      I spent a total of 20 minutes face-to-face with Sherly Ramires during today's office visit.  Over 50% of this time was spent counseling the patient-family-staff and/or coordinating care regarding comfort.  See note for details.     Sherrie Fontana MD, CM  Medical Director Pediatric Integrative Health and Wellbeing, [facility]          "

## 2019-02-28 NOTE — LETTER
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. Dres behavior continues to be good, she is using her communication board, and continues to attend her Day Program. There had been difficulties with behavior at the program, but these seem to have resolved. Anna attends [employer] on Mondays and loves the games and other activities that they do. She attends Bible Study every other week. Leticia, her support staff, remarks how Anna now sings a bit during bath time and seems overall happier. It is very rare for her to use rescue remedy.  She has not had any UTIs since her last visit and remains on d-mannose and probiotics. BMs are fine with Fleet's or Enemeeze, only prn since the addition of Miralax.   Anna still has upper arm weakness but perseveres to try to do simple tasks such as turn her room light off and eat orally. She eats maybe a half a quesadilla for lunch , as an example, and the residents of the [facility] eat together as a group for the evening meal.     ALLERGIES:        Allergies   Allergen Reactions     Ativan [Lorazepam] Other (See Comments)       Paradoxical reaction      Motrin [Ibuprofen] GI Disturbance       Pt is able to have motrin with food but not on an empty stomache.      Pseudoephedrine           IMMUNIZATIONS:  Immunization History   Administered Date(s) Administered     DTAP (<7y) 1996, 1996, 1996, 06/11/1997     FLU 6-35 months 11/15/2005, 11/14/2006, 10/12/2012     Flu, Unspecified 1996, 11/04/1997, 10/02/1998, 11/15/2005, 11/14/2006     Hep B, Peds or Adolescent 1996, 1996, 01/20/1997     HepB-Adult 1996, 1996, 01/20/1997     Hib (PRP-T) 1996, 1996, 1996, 06/11/1997     Influenza (H1N1) 11/11/2009     Influenza (IIV3) PF 1996, 11/04/1997, 10/02/1998, 10/18/2007, 12/11/2008, 09/16/2009, 10/12/2012     Influenza Intranasal Vaccine 10/06/2011     Influenza Vaccine IM 3yrs+ 4 Valent IIV4 11/03/2015, 09/21/2016     Influenza Vaccine IM Ages 6-35 Months 4 Valent (PF) 11/03/2015     MMR 06/11/1997, 05/14/2001     Mantoux Tuberculin Skin Test 01/11/2018     OPV, trivalent, live 1996, 1996, 1996     OPV, unspecified 1996, 1996, 1996     Pneumo Conj 13-V (2010&after) 01/06/2012     Pneumococcal 23 valent 01/06/2012     Poliovirus, inactivated (IPV) 05/14/2001     TDAP Vaccine (Adacel) 08/30/2011     TRIHIBIT (DTAP/HIB, <7y) 1996, 1996, 1996     Varicella 03/23/1998, 08/22/2008   She receives the flu vaccine at the group home each season.    CURRENT MEDICATIONS:    Current Outpatient Medications:      acetaminophen (TYLENOL) 160 MG/5ML solution, 480 mg (15 mL) by G-tube every 4 hours as needed, Disp: , Rfl:      ARIPiprazole (ABILIFY) 1 MG/ML SOLN, 7.5 mg daily , Disp: , Rfl:      bacitracin ointment, Apply topically 2 times daily as needed for wound care, Disp: 14 g, Rfl: 0     calcium carbonate 1250 (500 CA) MG/5ML SUSP, Take 6 mLs (1,500 mg) by mouth 2 times daily (with meals) (Patient taking differently: 1,500 mg by Gastronomy tube route 2 times daily  "(with meals) ), Disp: 450 mL, Rfl: 12     cholecalciferol (VITAMIN  -D) 1000 UNITS capsule, Take 1 capsule (1,000 Units) by mouth daily, Disp: 30 capsule, Rfl: 11     D-Mannose POWD, 2 times daily 1 scoop - mixed with water, Disp: , Rfl:      diazepam (VALIUM) 5 MG/ML solution, 7.5 mg by Gastronomy tube route every hour as needed for seizures (Seizures lasting longer than 5 minutes) , Disp: , Rfl:      estradiol (ESTRACE) 1 MG tablet, Take 0.5 mg ( 1/2 tablet) daily., Disp: 15 tablet, Rfl: 2     hydrOXYzine (ATARAX) 10 MG/5ML syrup, Take 10 mg by mouth 2 times daily as needed for itching, Disp: , Rfl:      levETIRAcetam (KEPPRA) 100 MG/ML solution, 10 mL  in the AM and 15 ml in the PM, by gastrostomy tube., Disp: 750 mL, Rfl: 11     loratadine (CLARITIN) 10 MG tablet, Take 10 mg by mouth daily, Disp: , Rfl:      Nutritional Supplements (COMPLEAT PEDIATRIC PO), , Disp: , Rfl:      ondansetron (ZOFRAN) 4 MG/5ML solution, Take 5 mLs (4 mg) by mouth every 6 hours as needed for nausea or vomiting, Disp: 90 mL, Rfl: 0     ORDER FOR DME, Equipment being ordered: Other: chest strap for her tilt in space manual wheel chair Treatment Diagnosis: For safe transportation to home secondary to poor sitting balance in upright. CDG type Ia, seizures, and G-tube dependence., Disp: 1 Device, Rfl: 0     sertraline (ZOLOFT) 20 MG/ML (HIGH CONC) solution, 200 mg by Gastronomy tube route daily , Disp: , Rfl:      traZODone (DESYREL) 50 MG tablet, Crush one tablet and administer via G-tube 30 minutes prior to bedtime daily, Disp: , Rfl:      traZODone (DESYREL) 50 MG tablet, by Gastric Tube route At Bedtime, Disp: , Rfl:      UNABLE TO FIND, MEDICATION NAME: Stress relax (magnesium citrate) 380 mg at bedtime, Disp: , Rfl:      UNABLE TO FIND, MEDICATION NAME: Mood probiotic 1 capsule (3,000,000,000 CFUs) daily., Disp: , Rfl: \"Ultimate Laisha\"     Docusate Sodium (ENEMEEZ MINI RE), 3 times a week (MWF) to help with bowel movements, Disp: , " Rfl:      MELATONIN PO, Take 3 mg by mouth At Bedtime, Disp: , Rfl:      UNABLE TO FIND, MEDICATION NAME: Rescue Remedy Herbal Supplement - Used as needed., Disp: , Rfl:     PAST SURGICAL HISTORY:    Past Surgical History          Past Surgical History:   Procedure Laterality Date     ANESTHESIA OUT OF OR MRI 3T N/A 3/7/2017     Procedure: ANESTHESIA PEDS SEDATION MRI 3T;  Surgeon: GENERIC ANESTHESIA PROVIDER;  Location: [facility] PEDS SEDATION      BACK SURGERY         c1 decompression and fusionx3, scoliosis with instrumentation x1     ELECTRORETINOGRAM Bilateral 12/10/2014     ERG ([facility])     ENT SURGERY          ear tubes     EXAM UNDER ANESTHESIA EYE(S) Bilateral 12/10/2014     EUA ([facility])     EXTRACTION(S) DENTAL N/A 12/10/2014     Procedure: EXTRACTION(S) DENTAL;  Surgeon: Hubert York DDS;  Location: [facility] OR     EYE SURGERY Bilateral 1996     BMR 6mm ([place])      GI SURGERY         g tube     HC REPLACE GASTROSTOMY/CECOSTOMY TUBE PERCUTANEOUS N/A 12/10/2014     Procedure: REPLACE GASTROSTOMY TUBE, PERCUTANEOUS;  Surgeon: Phong Perea MD;  Location: [facility] OR     RECESSION RESECTION (REPAIR STRABISMUS) BILATERAL Bilateral 12/10/2014     BLR 11 ([place])     STRABISMUS SURGERY   1996     (1996) BMR 6 ([place])     STRABISMUS SURGERY   2014     BLR 11 ([facility]) -             FAMILY HISTORY:    Family History           Family History   Problem Relation Age of Onset     Glaucoma Mother         no medications or surg to date     Glaucoma Maternal Grandmother         s/p surg, decreased VA      Amblyopia No family hx of       Strabismus No family hx of              SOCIAL HISTORY:        Social History   Substance Use Topics     Smoking status: Never Smoker     Smokeless tobacco: Never Used     Alcohol use No         Physical Exam:   /76 (BP Location: Right arm, Patient Position: Chair, Cuff Size: Adult Small)   Pulse 74   Temp 96.4  F (35.8  C) (Axillary)   Resp 28   SpO2 99%   Wt Readings from Last 3  "Encounters:   11/29/18 31.3 kg (69 lb)   09/17/18 32.4 kg (71 lb 8 oz)   05/23/18 34 kg (75 lb)     Ht Readings from Last 2 Encounters:   05/23/18 1.245 m (4' 1\")   11/22/17 1.245 m (4' 1\")     No height and weight on file for this encounter.      Sitting up in wheelchair, smiling with headphones on.  Easy respirations.  Arms and legs thin.Using hands to hold up iPad    Shoni-shin meridian therapy using matasushin and raking movements to bring down the Yang.  GV-20; Star treatment:.gentle raking along Du meridian of scalp moving posteriorly to anteriorly along midline, raking in outward fashion  Focus on SP and KI meridians for tonification; leg Yang in downward motion; also UE Kim surface downward motion.   Back Luci points along BL meridian     Labs and Tests:  None today      Assessment:  Sherly is a 23 year old female patient with complex medical hx now stable in group home.        Plan/Follow-up:        RTC 6 months, earlier prn.Can decide at that time if want to continue therapy.  Change to Florastor for Spring probiotic (changes about every 6 months).     Thank you for this consultation. Please do not hesitate to contact me with any questions or concerns.      I spent a total of 20 minutes face-to-face with Sherly Ramires during today's office visit.  Over 50% of this time was spent counseling the patient-family-staff and/or coordinating care regarding comfort.  See note for details.     Sherrie Fontana MD, CM  Medical Director Pediatric Integrative Health and Wellbeing, [facility]     "

## 2019-05-22 DIAGNOSIS — G40.209 COMPLEX PARTIAL SEIZURE EVOLVING TO GENERALIZED SEIZURE (H): Primary | ICD-10-CM

## 2019-05-23 RX ORDER — LEVETIRACETAM 100 MG/ML
SOLUTION ORAL
Qty: 775 ML | Refills: 1 | Status: SHIPPED | OUTPATIENT
Start: 2019-05-23 | End: 2019-05-29

## 2019-05-29 ENCOUNTER — OFFICE VISIT (OUTPATIENT)
Dept: NEUROLOGY | Facility: CLINIC | Age: 24
End: 2019-05-29
Payer: COMMERCIAL

## 2019-05-29 DIAGNOSIS — G40.209 COMPLEX PARTIAL SEIZURE EVOLVING TO GENERALIZED SEIZURE (H): Primary | ICD-10-CM

## 2019-05-29 RX ORDER — LEVETIRACETAM 100 MG/ML
SOLUTION ORAL
Qty: 775 ML | Refills: 11 | Status: SHIPPED | OUTPATIENT
Start: 2019-05-29 | End: 2020-04-29

## 2019-05-29 NOTE — LETTER
2019     RE: Sherly Ramires  : 1995   MRN: [id number]      Dear Colleague,    Thank you for referring your patient, Sherly Ramires, to the [facility] EPILEPSY CARE at [facility]. Please see a copy of my visit note below.    [facility]:  RETURN VISIT         Service Date: 2019     HISTORY:  Ms. 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      Ms. Ramires presents with staff from [facility] who knows her well. She has had no seizures since her last visit. Her general health has been better this past year with no hospitalizations. She still acts out sometimes when she is uncomfortable with new staff, but this has decreased in frequency and severity. Otherwise mood seems to be stable.     Ictal semiology-history:   The patient has had essentially 3 types of seizures in her lifetime, which have been responsive to levetiracetam in the case of the most recent seizures.  These essentially occurred as generalized atonic seizures in early childhood and later as complex partial seizures and generalized tonic-clonic seizures.      The patient had generalized atonic seizures which occurred approximately 12 times, usually in association with a febrile illness or other physiological stressors, between the ages of 3 and 6 years.  Her mother was the primary witnesses of these events and noted that the patient suddenly became unresponsive, usually while lying in her crib and when she moves the patient's limbs, she noted generalized flaccidity.  She was told that these were generalized atonic seizures and the patient was given rectal Diastat to prevent a cluster of these seizures.  She did not receive chronic antiepileptic drug therapy at this time.  The patient had essentially no seizures that  were definitely witnessed between the ages of 6 and 16 years.      At 16 years of age, the patient had her first generalized tonic-clonic seizure witnessed by her mother.  She had 3 of these on the day of onset, a single grand mal seizure later that year and then a single grand mal seizure about 1 year after the first seizures.  All of these were associated with sleep deprivation.  Her mother witnessed her to suddenly extend her limbs stiffly with truncal extension followed by generalized rhythmic jerking for a minute or 2.  She did not have tongue biting.      It appears that over several years after the grand mal seizures began, there was a question with various care providers of brief staring spells.  Her mother witnessed a definite staring spell with essentially no movement and no responsiveness, but with diffuse stiffness for a minute or 2.  This occurred in , in association with sleep deprivation.  Subsequently, no one has seen any definite staring spell seizures.      The patient reportedly has no other type of paroxysmal behavioral alterations.     Epilepsy-seizure predispositions:   With early developmental delay, the patient was evaluated and eventually diagnosed with congenital disorder of glycosylation type 1.  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  She has had slow language development and has learned to communicate by producing single linguistic sound sequences which are not complete intelligible words, but are accompanied by gesturing to supplement the vocalization.  In this manner the patient is able to make a number of requests of people who know her well.  Her mother thinks that she has much greater comprehension of speech compared with her own speech production.      The patient has no family history of epilepsy or seizures.  She has no history of gestational or  injury, febrile convulsions,  developmental delay, stroke, meningitis, encephalitis, significant head injury, or other epileptic predispositions than CDG type 1a.     Laboratory evaluations:   The patient has had brain imaging and electroencephalography at a number of different clinics in [facility] and [place].  The most recent brain MRI was performed at [facility] on 06/07/2013, and this was reported to show deformities at the craniocervical junction consistent with reported surgical procedures, marked hypoplasia of the cerebellar vermis and cerebellar hemispheres (unchanged) and myelomalacia of adjacent portions of the superior cervical spinal cord, with limited T2 signal alterations in subcortical white matter.      Notes from her earlier pediatric neurologist, Dr. Gus Ny indicate that she had definite right frontal lobe interictal spikes on electroencephalograms in 2003 and 2004.     Epilepsy therapeutics:   The patient was treated with levetiracetam following her first grand mal seizure at 16 years of age.  She has continued on this since then, with upward dose adjustments a number of years ago.  She has not been noted to have adverse effects of levetiracetam.  She has not been on other chronic antiepileptic drugs therapies.     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  Her mother thinks that she was twice accidentally dropped or fell a small distance when she was about 6 years of age, once sliding often an adaptive tricycle onto the floor and once collapsing down out of the hands of a person who was holding her in an erect position.  It was at around this time that the patient was noted to have bilateral leg weakness, which progressed at an early age, leaving her with a spastic quadriplegia involving legs much more than arms.  Now over the last 6 months she appears to have had progression  in arm weakness, however, probably involving the left arm more than the right arm.  She appears clearly weaker in her assistance with transfers, although she is able to readily manipulate light objects such that she can feed herself and use coloring crayons.  With this myelopathy, she has had complete urinary and fecal incontinence, although possibly these problems began before other manifestations of myelopathy.  She seems to have some feeling in her feet.  Her mother is planning to pursue evaluation to determine whether further cervical spine surgery is indicated.     PAST MEDICAL-SURGICAL HISTORY:    1.  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     2.  Congenital cervical spine malformations, possibly complicated by early cervical trauma, with spastic quadriparesis; status post occipital-C2 fusion and C1 laminectomy (2003); status post foramen magnum decompression and C1 decompression, with dorsal [id number] fusion (2003); status post additional decompression and stabilization procedure (2004).   3.  Status post Burns rojelio procedure for kyphoscoliosis (2007).   4.  Status post gastrostomy tube placement and multiple replacements.   5.  Status post 2 procedures for strabismus.     FAMILY HISTORY:  There is no family history of epilepsy, seizures or other neurological conditions than migraine headaches in both parents.     PERSONAL AND SOCIAL HISTORY:  The patient lived with her parents and siblings for most of her life and had special education classes for much of this time.  More recently she moved into a specialized group home with 3 other highly disabled clients and appears to be doing well there.  She reportedly is able to swallow safely based on repeated swallowing studies, but has very little  appetite, so receives most of her nutrition through the gastrostomy tube.  She does assist with some with activities of daily living.      REVIEW OF SYSTEMS:  The patient reportedly has not been evaluated for peripheral neuropathy.   She has no history of rashes and easy bruising.  The remainder of a 14-system symptom review was negative or unobtainable, except as noted above.       MEDICATIONS:  Levetiracetam solution 1000 mg in the morning and 1500 mg in the evening per gastrostomy, and other medications as per the electronic medical record.     PHYSICAL EXAMINATION:    Cardiac: RRR  Pulm: CTAB  Alert, tracks. Does not follow commands. Communicates via single words. Skull and cervical deformities consistent with surgeries. Wearing soft collar. Pupils equal, round. BLE antigravity. Face symmetric. DTRs 2+ symmetric in biceps, brachioradialis, patellar, and Achilles. Appeared somewhat uncomfortable with examination.    IMPRESSION:    The patient continues to be free of seizures on levetiracetam monotherapy.  On 04/19/2017, she had an outpatient 3-hour video-EEG recording at [facility], which showed ongoing generalized theta-delta slowing, with frequent brief bursts of high amplitude bifrontocentral delta slowing, but no epileptiform abnormalities.  She was reported to have had right frontal spikes on prior interictal EEG recordings.  The levetiracetam level was 38.0 mcg per mL in 2017.     Her weakness is reportedly stable. She has previously been seen again by surgeon in [place] who recommended soft collar without further intervention.    PLAN:    1.  Continue levetiracetam at 1000/1500 mg daily.   2.  Return visit in approximately 12 months.     Patient seen and discussed with Dr. Knott.    Yovanny Warner MD on 5/29/2019 at 4:56 PM  Neurology PGY3  [id number]    Report Prepared By: Yovanny Warner MD, Neurology Resident   I agree with the findings and plan of care as documented.  I personally examined the patient, and discussed  our epilepsy diagnostic impressions and therapeutic recommendations with the patient.  She was agreeable to this plan.      I spent 15 minutes in this patient care, more than 50% of which consisted of counseling and coordinating care.        Nhan Knott M.D.   Professor of Neurology

## 2019-05-29 NOTE — PROGRESS NOTES
[facility]  Epilepsy Clinic:  RETURN VISIT         Service Date: 05/29/2019     HISTORY:  Ms. 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      Ms. Ramires presents with staff from [facility] who knows her well. She has had no seizures since her last visit. Her general health has been better this past year with no hospitalizations. She still acts out sometimes when she is uncomfortable with new staff, but this has decreased in frequency and severity. Otherwise mood seems to be stable.     Ictal semiology-history:   The patient has had essentially 3 types of seizures in her lifetime, which have been responsive to levetiracetam in the case of the most recent seizures.  These essentially occurred as generalized atonic seizures in early childhood and later as complex partial seizures and generalized tonic-clonic seizures.      The patient had generalized atonic seizures which occurred approximately 12 times, usually in association with a febrile illness or other physiological stressors, between the ages of 3 and 6 years.  Her mother was the primary witnesses of these events and noted that the patient suddenly became unresponsive, usually while lying in her crib and when she moves the patient's limbs, she noted generalized flaccidity.  She was told that these were generalized atonic seizures and the patient was given rectal Diastat to prevent a cluster of these seizures.  She did not receive chronic antiepileptic drug therapy at this time.  The patient had essentially no seizures that were definitely witnessed between the ages of 6 and 16 years.      At 16 years of age, the patient had her first generalized tonic-clonic seizure witnessed by her mother.  She had 3 of these on the day of onset, a single grand mal seizure later that year and then a single  grand mal seizure about 1 year after the first seizures.  All of these were associated with sleep deprivation.  Her mother witnessed her to suddenly extend her limbs stiffly with truncal extension followed by generalized rhythmic jerking for a minute or 2.  She did not have tongue biting.      It appears that over several years after the grand mal seizures began, there was a question with various care providers of brief staring spells.  Her mother witnessed a definite staring spell with essentially no movement and no responsiveness, but with diffuse stiffness for a minute or 2.  This occurred in , in association with sleep deprivation.  Subsequently, no one has seen any definite staring spell seizures.      The patient reportedly has no other type of paroxysmal behavioral alterations.     Epilepsy-seizure predispositions:   With early developmental delay, the patient was evaluated and eventually diagnosed with congenital disorder of glycosylation type 1.  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  She has had slow language development and has learned to communicate by producing single linguistic sound sequences which are not complete intelligible words, but are accompanied by gesturing to supplement the vocalization.  In this manner the patient is able to make a number of requests of people who know her well.  Her mother thinks that she has much greater comprehension of speech compared with her own speech production.      The patient has no family history of epilepsy or seizures.  She has no history of gestational or  injury, febrile convulsions, developmental delay, stroke, meningitis, encephalitis, significant head injury, or other epileptic predispositions than CDG type 1a.     Laboratory evaluations:   The patient has had brain imaging and electroencephalography at a number of different clinics in [facility]  [place] and [place].  The most recent brain MRI was performed at [facility] on 06/07/2013, and this was reported to show deformities at the craniocervical junction consistent with reported surgical procedures, marked hypoplasia of the cerebellar vermis and cerebellar hemispheres (unchanged) and myelomalacia of adjacent portions of the superior cervical spinal cord, with limited T2 signal alterations in subcortical white matter.      Notes from her earlier pediatric neurologist, Dr. Gus Ny indicate that she had definite right frontal lobe interictal spikes on electroencephalograms in 2003 and 2004.     Epilepsy therapeutics:   The patient was treated with levetiracetam following her first grand mal seizure at 16 years of age.  She has continued on this since then, with upward dose adjustments a number of years ago.  She has not been noted to have adverse effects of levetiracetam.  She has not been on other chronic antiepileptic drugs therapies.     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  Her mother thinks that she was twice accidentally dropped or fell a small distance when she was about 6 years of age, once sliding often an adaptive tricycle onto the floor and once collapsing down out of the hands of a person who was holding her in an erect position.  It was at around this time that the patient was noted to have bilateral leg weakness, which progressed at an early age, leaving her with a spastic quadriplegia involving legs much more than arms.  Now over the last 6 months she appears to have had progression in arm weakness, however, probably involving the left arm more than the right arm.  She appears clearly weaker in her assistance with transfers, although she is able to readily manipulate light objects such that she can feed herself and use coloring crayons.  With this  myelopathy, she has had complete urinary and fecal incontinence, although possibly these problems began before other manifestations of myelopathy.  She seems to have some feeling in her feet.  Her mother is planning to pursue evaluation to determine whether further cervical spine surgery is indicated.     PAST MEDICAL-SURGICAL HISTORY:    1.  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     2.  Congenital cervical spine malformations, possibly complicated by early cervical trauma, with spastic quadriparesis; status post occipital-C2 fusion and C1 laminectomy (2003); status post foramen magnum decompression and C1 decompression, with dorsal [id number] fusion (2003); status post additional decompression and stabilization procedure (2004).   3.  Status post Burns rojelio procedure for kyphoscoliosis (2007).   4.  Status post gastrostomy tube placement and multiple replacements.   5.  Status post 2 procedures for strabismus.     FAMILY HISTORY:  There is no family history of epilepsy, seizures or other neurological conditions than migraine headaches in both parents.     PERSONAL AND SOCIAL HISTORY:  The patient lived with her parents and siblings for most of her life and had special education classes for much of this time.  More recently she moved into a specialized group home with 3 other highly disabled clients and appears to be doing well there.  She reportedly is able to swallow safely based on repeated swallowing studies, but has very little appetite, so receives most of her nutrition through the gastrostomy tube.  She does assist with some with activities of daily living.      REVIEW OF SYSTEMS:  The patient reportedly has not been evaluated for peripheral neuropathy.   She has no history of rashes and easy  bruising.  The remainder of a 14-system symptom review was negative or unobtainable, except as noted above.       MEDICATIONS:  Levetiracetam solution 1000 mg in the morning and 1500 mg in the evening per gastrostomy, and other medications as per the electronic medical record.     PHYSICAL EXAMINATION:    Cardiac: RRR  Pulm: CTAB  Alert, tracks. Does not follow commands. Communicates via single words. Skull and cervical deformities consistent with surgeries. Wearing soft collar. Pupils equal, round. BLE antigravity. Face symmetric. DTRs 2+ symmetric in biceps, brachioradialis, patellar, and Achilles. Appeared somewhat uncomfortable with examination.    IMPRESSION:    The patient continues to be free of seizures on levetiracetam monotherapy.  On 04/19/2017, she had an outpatient 3-hour video-EEG recording at [facility], which showed ongoing generalized theta-delta slowing, with frequent brief bursts of high amplitude bifrontocentral delta slowing, but no epileptiform abnormalities.  She was reported to have had right frontal spikes on prior interictal EEG recordings.  The levetiracetam level was 38.0 mcg per mL in 2017.     Her weakness is reportedly stable. She has previously been seen again by surgeon in [place] who recommended soft collar without further intervention.    PLAN:    1.  Continue levetiracetam at 1000/1500 mg daily.   2.  Return visit in approximately 12 months.     Patient seen and discussed with Dr. Knott.    Yovanny Warner MD on 5/29/2019 at 4:56 PM  Neurology PGY3  [id number]    Report Prepared By: Yovanny Warner MD, Neurology Resident   I agree with the findings and plan of care as documented.  I personally examined the patient, and discussed our epilepsy diagnostic impressions and therapeutic recommendations with the patient.  She was agreeable to this plan.      I spent 15 minutes in this patient care, more than 50% of which consisted of counseling and coordinating care.        Nhan Knott M.D.    Professor of Neurology

## 2019-05-31 ENCOUNTER — HOME INFUSION (PRE-WILLOW HOME INFUSION) (OUTPATIENT)
Dept: PHARMACY | Facility: CLINIC | Age: 24
End: 2019-05-31

## 2019-06-05 NOTE — PROGRESS NOTES
This is a recent snapshot of the patient's [facility] medical record.  For current drug dose and complete information and questions, call [phone] or In Basket pool, fv home infusion ([id number])  CSN Number:  [id number]

## 2019-06-07 ENCOUNTER — HOME INFUSION (PRE-WILLOW HOME INFUSION) (OUTPATIENT)
Dept: PHARMACY | Facility: CLINIC | Age: 24
End: 2019-06-07

## 2019-06-10 NOTE — PROGRESS NOTES
This is a recent snapshot of the patient's [facility] medical record.  For current drug dose and complete information and questions, call [phone] or In [facility] pool, fv home infusion ([id number])  CSN Number:  [id number]

## 2019-07-03 ENCOUNTER — OFFICE VISIT (OUTPATIENT)
Dept: PEDIATRIC HEMATOLOGY/ONCOLOGY | Facility: CLINIC | Age: 24
End: 2019-07-03
Attending: PEDIATRICS
Payer: COMMERCIAL

## 2019-07-03 VITALS — TEMPERATURE: 96.8 F

## 2019-07-03 DIAGNOSIS — D68.9 COAGULOPATHY (H): Chronic | ICD-10-CM

## 2019-07-03 DIAGNOSIS — E74.89 CDG (CONGENITAL DISORDER OF GLYCOSYLATION) (H): Primary | ICD-10-CM

## 2019-07-03 NOTE — LETTER
7/3/2019      RE: Sherly Ramires  [address] [facility] [address]       Pediatric Hematology Clinic Note     CC: Coagulopathy and hemostatic issues related to glycosylation.      HPI: Sherly is a 24-year-old female with a complex ongoing medical history.  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      Since our last visit, Sherly has not had any major changes in her health. She continues on a small estrogen supplement to aid her osteopenia. Sherly has had no abnormal nose bleeds or bruising. Sheryl  is not having clots in her legs or chest, no symptoms of stroke or asymmetric movements.      Sherly is currently taking 0.5mg of Estradiol daily.         Past Medical History: Sherly was identified as having a congenital disorder of glycosylation.  She has continued to have issues with speech and developmental delay and at this point has transitioned to a group home.  She has difficulties with mobility with joint contractures.  She had cervical spine surgery and also has issues with mobility in her neck and thus usually is in some sort of neck support.  She is usually in a wheelchair.         Social History: Sherly is currently living in a group home. She has a care attendant accompanying her to the clinic today.         Family History: In reviewing her medical records, I do not find information on anyone who has had a prior metabolic disorder.  There is mention made of glaucoma.         ROS:  Complete 10 point review of systems reviewed and otherwise negative aside from statements mentioned in the HPI.         Allergies:         Allergies as of 11/29/2018 - Vlad as Reviewed 11/29/2018   Allergen Reaction Noted     Ativan  [lorazepam] Other (See Comments) 02/18/2015     Pseudoephedrine   02/16/2017         Medications:  Active Medications          Current Outpatient Prescriptions   Medication Sig Dispense Refill     acetaminophen (TYLENOL) 160 MG/5ML solution 480 mg (15 mL) by G-tube every 4 hours as needed         ARIPiprazole (ABILIFY) 1 MG/ML SOLN 7.5 mg daily          bacitracin ointment Apply topically 2 times daily as needed for wound care 14 g 0     calcium carbonate 1250 (500 CA) MG/5ML SUSP Take 6 mLs (1,500 mg) by mouth 2 times daily (with meals) (Patient taking differently: 1,500 mg by Gastronomy tube route 2 times daily (with meals) ) 450 mL 12     D-Mannose POWD 2 times daily 1 scoop - mixed with water         diazepam (VALIUM) 5 MG/ML solution 7.5 mg by Gastronomy tube route every hour as needed for seizures (Seizures lasting longer than 5 minutes)          Docusate Sodium (ENEMEEZ MINI RE) 3 times a week (MWF) to help with bowel movements         estradiol (ESTRACE) 1 MG tablet Take 0.5 mg ( 1/2 tablet) daily. 15 tablet 2     HERBALS Bach Rescue Remedy Spray- 1-3 sprays, to tongue or inside mouth, prn, anxiety.  Not to exceed threes times daily. 1 each 11     levETIRAcetam (KEPPRA) 100 MG/ML solution 10 mL  in the AM and 15 ml in the PM, by gastrostomy tube. 750 mL 11     MELATONIN PO Take 3 mg by mouth At Bedtime         Nutritional Supplements (COMPLEAT PEDIATRIC PO)           ondansetron (ZOFRAN) 4 MG/5ML solution Take 5 mLs (4 mg) by mouth every 6 hours as needed for nausea or vomiting 90 mL 0     ORDER FOR DME Equipment being ordered: Other: chest strap for her tilt in space manual wheel chair  Treatment Diagnosis: For safe transportation to home secondary to poor sitting balance in upright. CDG type Ia, seizures, and G-tube dependence. 1 Device 0     sertraline (ZOLOFT) 20 MG/ML (HIGH CONC) solution 200 mg by Gastronomy tube route daily          traZODone (DESYREL) 50 MG tablet Crush one tablet and administer via  G-tube 30 minutes prior to bedtime daily         traZODone (DESYREL) 50 MG tablet by Gastric Tube route At Bedtime         UNABLE TO FIND MEDICATION NAME: Rescue Remedy Herbal Supplement - Used as needed.         UNABLE TO FIND MEDICATION NAME: Stress relax (magnesium citrate) 380 mg at bedtime         UNABLE TO FIND MEDICATION NAME: Mood probiotic 1 capsule (3,000,000,000 CFUs) daily.         cholecalciferol (VITAMIN  -D) 1000 UNITS capsule Take 1 capsule (1,000 Units) by mouth daily (Patient not taking: Reported on 11/29/2018) 30 capsule 11           Vitals:  Vitals: Temp 96.8  F (36  C) (Axillary)   BMI= There is no height or weight on file to calculate BMI.     Physical Exam:  Physical Exam  limited because of her position in wheelchair  Constitutional: She is oriented to person and place; time is difficult to assess.  She is in no distress. She seems quite thin today  Head: Normocephalic and atraumatic.   Eyes: Conjunctivae are normal.   Neurological: She is alert and oriented to person and place, and time.  Skin: Skin is warm and dry.   Extremities: Seem thinner but symmetric, no edema, no bruising        A/P:     We previously screened Juan for her hemostatic balance on her increased estrogen supplement, Per her prior lab results, she remains slightly sticky but not excessively so. Wiith no increased risk of stasis because of additional immobilization and no symptoms of stasis/thrombosis, we will not do any labwork on Anna this time. Follow-up can be yearly at a time convenient to potentially coordinate Hematology with other appointments.    Azra Mays MD, MS Azra Mays MD

## 2019-07-03 NOTE — NURSING NOTE
Chief Complaint   Patient presents with     RECHECK     Patient is here today for Congenital disorder of glycosylation  follow up     Temp 96.8  F (36  C) (Axillary)     Margarita Rojas LPN  July 3, 2019

## 2019-07-21 PROBLEM — D68.9 COAGULOPATHY (H): Chronic | Status: ACTIVE | Noted: 2017-07-21

## 2019-07-21 NOTE — PROGRESS NOTES
Pediatric Hematology Clinic Note     CC: Coagulopathy and hemostatic issues related to glycosylation.      HPI: Sherly is a 24-year-old female with a complex ongoing medical history.  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      Since our last visit, Sherly has not had any major changes in her health. She continues on a small estrogen supplement to aid her osteopenia. Sherly has had no abnormal nose bleeds or bruising. Sherly is not having clots in her legs or chest, no symptoms of stroke or asymmetric movements.      Sherly is currently taking 0.5mg of Estradiol daily.         Past Medical History: Sherly was identified as having a congenital disorder of glycosylation.  She has continued to have issues with speech and developmental delay and at this point has transitioned to a group home.  She has difficulties with mobility with joint contractures.  She had cervical spine surgery and also has issues with mobility in her neck and thus usually is in some sort of neck support.  She is usually in a wheelchair.         Social History: Sherly is currently living in a group home. She has a care attendant accompanying her to the clinic today.         Family History: In reviewing her medical records, I do not find information on anyone who has had a prior metabolic disorder.  There is mention made of glaucoma.         ROS:  Complete 10 point review of systems reviewed and otherwise negative aside from statements mentioned in the HPI.         Allergies:         Allergies as of 11/29/2018 - Vlad as Reviewed 11/29/2018   Allergen Reaction Noted     Ativan [lorazepam] Other (See Comments) 02/18/2015     Pseudoephedrine   02/16/2017          Medications:  Active Medications          Current Outpatient Prescriptions   Medication Sig Dispense Refill     acetaminophen (TYLENOL) 160 MG/5ML solution 480 mg (15 mL) by G-tube every 4 hours as needed         ARIPiprazole (ABILIFY) 1 MG/ML SOLN 7.5 mg daily          bacitracin ointment Apply topically 2 times daily as needed for wound care 14 g 0     calcium carbonate 1250 (500 CA) MG/5ML SUSP Take 6 mLs (1,500 mg) by mouth 2 times daily (with meals) (Patient taking differently: 1,500 mg by Gastronomy tube route 2 times daily (with meals) ) 450 mL 12     D-Mannose POWD 2 times daily 1 scoop - mixed with water         diazepam (VALIUM) 5 MG/ML solution 7.5 mg by Gastronomy tube route every hour as needed for seizures (Seizures lasting longer than 5 minutes)          Docusate Sodium (ENEMEEZ MINI RE) 3 times a week (MWF) to help with bowel movements         estradiol (ESTRACE) 1 MG tablet Take 0.5 mg ( 1/2 tablet) daily. 15 tablet 2     HERBALS Bach Rescue Remedy Spray- 1-3 sprays, to tongue or inside mouth, prn, anxiety.  Not to exceed threes times daily. 1 each 11     levETIRAcetam (KEPPRA) 100 MG/ML solution 10 mL  in the AM and 15 ml in the PM, by gastrostomy tube. 750 mL 11     MELATONIN PO Take 3 mg by mouth At Bedtime         Nutritional Supplements (COMPLEAT PEDIATRIC PO)           ondansetron (ZOFRAN) 4 MG/5ML solution Take 5 mLs (4 mg) by mouth every 6 hours as needed for nausea or vomiting 90 mL 0     ORDER FOR DME Equipment being ordered: Other: chest strap for her tilt in space manual wheel chair  Treatment Diagnosis: For safe transportation to home secondary to poor sitting balance in upright. CDG type Ia, seizures, and G-tube dependence. 1 Device 0     sertraline (ZOLOFT) 20 MG/ML (HIGH CONC) solution 200 mg by Gastronomy tube route daily          traZODone (DESYREL) 50 MG tablet Crush one tablet and administer via G-tube 30 minutes prior to bedtime daily         traZODone (DESYREL) 50 MG  tablet by Gastric Tube route At Bedtime         UNABLE TO FIND MEDICATION NAME: Rescue Remedy Herbal Supplement - Used as needed.         UNABLE TO FIND MEDICATION NAME: Stress relax (magnesium citrate) 380 mg at bedtime         UNABLE TO FIND MEDICATION NAME: Mood probiotic 1 capsule (3,000,000,000 CFUs) daily.         cholecalciferol (VITAMIN  -D) 1000 UNITS capsule Take 1 capsule (1,000 Units) by mouth daily (Patient not taking: Reported on 11/29/2018) 30 capsule 11           Vitals:  Vitals: Temp 96.8  F (36  C) (Axillary)   BMI= There is no height or weight on file to calculate BMI.     Physical Exam:  Physical Exam  limited because of her position in wheelchair  Constitutional: She is oriented to person and place; time is difficult to assess.  She is in no distress. She seems quite thin today  Head: Normocephalic and atraumatic.   Eyes: Conjunctivae are normal.   Neurological: She is alert and oriented to person and place, and time.  Skin: Skin is warm and dry.   Extremities: Seem thinner but symmetric, no edema, no bruising        A/P:     We previously screened Jaun for her hemostatic balance on her increased estrogen supplement, Per her prior lab results, she remains slightly sticky but not excessively so. Wiith no increased risk of stasis because of additional immobilization and no symptoms of stasis/thrombosis, we will not do any labwork on Anna this time. Follow-up can be yearly at a time convenient to potentially coordinate Hematology with other appointments.    Azra Mays MD, MS

## 2019-08-04 ENCOUNTER — HOME INFUSION (PRE-WILLOW HOME INFUSION) (OUTPATIENT)
Dept: PHARMACY | Facility: CLINIC | Age: 24
End: 2019-08-04

## 2019-08-05 NOTE — PROGRESS NOTES
This is a recent snapshot of the patient's [facility] medical record.  For current drug dose and complete information and questions, call [phone] or In Basket pool, fv home infusion ([id number])  CSN Number:  [id number]

## 2019-08-29 ENCOUNTER — OFFICE VISIT (OUTPATIENT)
Dept: CONSULT | Facility: CLINIC | Age: 24
End: 2019-08-29
Attending: PEDIATRICS
Payer: COMMERCIAL

## 2019-08-29 VITALS
OXYGEN SATURATION: 100 % | SYSTOLIC BLOOD PRESSURE: 102 MMHG | DIASTOLIC BLOOD PRESSURE: 66 MMHG | HEART RATE: 72 BPM | RESPIRATION RATE: 20 BRPM | TEMPERATURE: 96.3 F

## 2019-08-29 DIAGNOSIS — E74.89 CONGENITAL DISORDER OF GLYCOSYLATION ASSOCIATED WITH MUTATION IN PMM2 GENE (H): ICD-10-CM

## 2019-08-29 DIAGNOSIS — E74.89 CONGENITAL DISORDER OF GLYCOSYLATION (CDG) (H): Primary | ICD-10-CM

## 2019-08-29 DIAGNOSIS — R29.898 MUSCLE FUNCTION LOSS: ICD-10-CM

## 2019-08-29 DIAGNOSIS — F41.9 ANXIETY: ICD-10-CM

## 2019-08-29 DIAGNOSIS — Z87.440 PERSONAL HISTORY OF URINARY TRACT INFECTION: ICD-10-CM

## 2019-08-29 DIAGNOSIS — M81.0 OSTEOPOROSIS WITHOUT CURRENT PATHOLOGICAL FRACTURE, UNSPECIFIED OSTEOPOROSIS TYPE: ICD-10-CM

## 2019-08-29 PROCEDURE — G0463 HOSPITAL OUTPT CLINIC VISIT: HCPCS | Mod: ZF

## 2019-08-29 RX ORDER — FLUORIDE TOOTHPASTE
TOOTHPASTE DENTAL 2 TIMES DAILY
COMMUNITY

## 2019-08-29 RX ORDER — POLYETHYLENE GLYCOL 3350 17 G/17G
1 POWDER, FOR SOLUTION ORAL DAILY
COMMUNITY

## 2019-08-29 NOTE — LETTER
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 She had the opportunity  to go to camp this summer again and loved that experience.    Anna continues to attend the Day Program with her housemates and has had overall calm behavior, unless she has a new provider or someone is being trained. The first step is to try the Rescue Remedy spray, and only use the hydroxyzine if that is not sufficient. This did happen once last week, but is now rare. Anna is sleeping well with the trazodone.    There have been no UTIs and Anna continues on her probiotic and the D-mannose. BMs are every day to every other day and never goes longer than that or staff give her an enema/Enemeeze.    With respect to muscle function and ADLs, Anna loves to turn off her own light when leaving her room, but now needs assistance to elevate her arm to get to light switch.     ALLERGIES:        Allergies   Allergen Reactions     Ativan [Lorazepam] Other (See Comments)       Paradoxical reaction     Motrin [Ibuprofen] GI Disturbance       Pt is able to have motrin with food but not on an empty stomache.      Pseudoephedrine            IMMUNIZATIONS:  Immunization History   Administered Date(s) Administered     DTAP (<7y) 1996, 1996, 1996, 06/11/1997     FLU 6-35 months 11/15/2005, 11/14/2006, 10/12/2012     Flu, Unspecified 1996, 11/04/1997, 10/02/1998, 11/15/2005, 11/14/2006     Hep B, Peds or Adolescent 1996, 1996, 01/20/1997     HepB-Adult 1996, 1996, 01/20/1997     Hib (PRP-T) 1996, 1996, 1996, 06/11/1997     Influenza (H1N1) 11/11/2009     Influenza (IIV3) PF 1996, 11/04/1997, 10/02/1998, 10/18/2007, 12/11/2008, 09/16/2009, 10/12/2012     Influenza Intranasal Vaccine 10/06/2011     Influenza Vaccine IM > 6 months Valent IIV4 11/03/2015, 09/21/2016     Influenza Vaccine IM Ages 6-35 Months 4 Valent (PF) 11/03/2015     MMR 06/11/1997, 05/14/2001     Mantoux Tuberculin Skin Test 01/11/2018     OPV, trivalent, live 1996, 1996, 1996     OPV, unspecified 1996, 1996, 1996     Pneumo Conj 13-V (2010&after) 01/06/2012     Pneumococcal 23 valent 01/06/2012     Poliovirus, inactivated (IPV) 05/14/2001     TDAP Vaccine (Adacel) 08/30/2011     TRIHIBIT (DTAP/HIB, <7y) 1996, 1996, 1996     Varicella 03/23/1998, 08/22/2008   She receives the flu vaccine at the group home each season.    CURRENT MEDICATIONS:    Current Outpatient Medications:      ARIPiprazole (ABILIFY) 1 MG/ML SOLN, 7.5 mg daily , Disp: , Rfl:      artificial saliva (BIOTENE DRY MOUTHWASH) LIQD liquid, Swish and spit in mouth 4 times daily as needed for dry mouth, Disp: , Rfl:      calcium carbonate 1250 (500 CA) MG/5ML SUSP, Take 6 mLs (1,500 mg) by mouth 2 times daily (with meals) (Patient taking differently: 1,500 mg by Gastronomy tube route 2 times daily (with meals) ), Disp: 450 mL, Rfl: 12     D-Mannose POWD, 2 times daily 1 scoop - mixed with water, Disp: , Rfl:      diazepam (VALIUM) 5 MG/ML solution, 7.5 mg by Gastronomy tube route every hour as  needed for seizures (Seizures lasting longer than 5 minutes) , Disp: , Rfl:      estradiol (ESTRACE) 1 MG tablet, Take 0.5 mg ( 1/2 tablet) daily., Disp: 15 tablet, Rfl: 2     hydrOXYzine (ATARAX) 10 MG/5ML syrup, Take 10 mg by mouth 2 times daily as needed for itching, Disp: , Rfl:      levETIRAcetam (KEPPRA) 100 MG/ML solution, GIVE 10ML (1000MG) PER G-TUBE EVERY MORNING;GIVE 15ML (1500MG) PER G-TUBE EVERY EVENING, Disp: 775 mL, Rfl: 11     loratadine (CLARITIN) 10 MG tablet, Take 10 mg by mouth daily, Disp: , Rfl:      ondansetron (ZOFRAN) 4 MG/5ML solution, Take 5 mLs (4 mg) by mouth every 6 hours as needed for nausea or vomiting, Disp: 90 mL, Rfl: 0     ORDER FOR DME, Equipment being ordered: Other: chest strap for her tilt in space manual wheel chair Treatment Diagnosis: For safe transportation to home secondary to poor sitting balance in upright. CDG type Ia, seizures, and G-tube dependence., Disp: 1 Device, Rfl: 0     polyethylene glycol (MIRALAX/GLYCOLAX) packet, Take 1 packet by mouth daily, Disp: , Rfl:      sertraline (ZOLOFT) 20 MG/ML (HIGH CONC) solution, 200 mg by Gastronomy tube route daily , Disp: , Rfl:      traZODone (DESYREL) 50 MG tablet, by Gastric Tube route At Bedtime, Disp: , Rfl:      UNABLE TO FIND, MEDICATION NAME: Rescue Remedy Herbal Supplement - Used as needed., Disp: , Rfl:      UNABLE TO FIND, MEDICATION NAME: Stress relax (magnesium citrate) 380 mg at bedtime, Disp: , Rfl:      UNABLE TO FIND, MEDICATION NAME: Mood probiotic 1 capsule (3,000,000,000 CFUs) daily., Disp: , Rfl:      acetaminophen (TYLENOL) 160 MG/5ML solution, 480 mg (15 mL) by G-tube every 4 hours as needed, Disp: , Rfl:      bacitracin ointment, Apply topically 2 times daily as needed for wound care, Disp: 14 g, Rfl: 0     cholecalciferol (VITAMIN  -D) 1000 UNITS capsule, Take 1 capsule (1,000 Units) by mouth daily (Patient not taking: Reported on 8/29/2019), Disp: 30 capsule, Rfl: 11     Docusate Sodium (ENEMEEZ  MINI RE), 3 times a week (MWF) to help with bowel movements, Disp: , Rfl:      MELATONIN PO, Take 3 mg by mouth At Bedtime, Disp: , Rfl:      Nutritional Supplements (COMPLEAT PEDIATRIC PO), , Disp: , Rfl:      traZODone (DESYREL) 50 MG tablet, Crush one tablet and administer via G-tube 30 minutes prior to bedtime daily, Disp: , Rfl:       PAST SURGICAL HISTORY:    Past Surgical History          Past Surgical History:   Procedure Laterality Date     ANESTHESIA OUT OF OR MRI 3T N/A 3/7/2017     Procedure: ANESTHESIA PEDS SEDATION MRI 3T;  Surgeon: GENERIC ANESTHESIA PROVIDER;  [facility]      BACK SURGERY         c1 decompression and fusionx3, scoliosis with instrumentation x1     ELECTRORETINOGRAM Bilateral 12/10/2014     ERG ([facility]mers)     ENT SURGERY          ear tubes     EXAM UNDER ANESTHESIA EYE(S) Bilateral 12/10/2014     EUA (Areaux)     EXTRACTION(S) DENTAL N/A 12/10/2014     Procedure: EXTRACTION(S) DENTAL;  Surgeon: Hubert York DDS;  [facility]     EYE SURGERY Bilateral 1996     BMR 6mm (Dru)      GI SURGERY         g tube     HC REPLACE GASTROSTOMY/CECOSTOMY TUBE PERCUTANEOUS N/A 12/10/2014     Procedure: REPLACE GASTROSTOMY TUBE, PERCUTANEOUS;  Surgeon: Phong Perea MD;  [facility]     RECESSION RESECTION (REPAIR STRABISMUS) BILATERAL Bilateral 12/10/2014     BLR 11 (Areaux)     STRABISMUS SURGERY   1996     (1996) BMR 6 (Gonsales)     STRABISMUS SURGERY   2014     BLR 11 (Areaux) -             FAMILY HISTORY:    Family History           Family History   Problem Relation Age of Onset     Glaucoma Mother         no medications or surg to date     Glaucoma Maternal Grandmother         s/p surg, decreased [place]      Amblyopia No family hx of       Strabismus No family hx of              Physical Exam:   /66 (BP Location: Right arm, Patient Position: Chair, Cuff Size: Adult Small)   Pulse 72   Temp 96.3  F (35.7  C) (Axillary)   Resp 20   SpO2 100%   Wt  "Readings from Last 3 Encounters:   11/29/18 31.3 kg (69 lb)   09/17/18 32.4 kg (71 lb 8 oz)   05/23/18 34 kg (75 lb)     Ht Readings from Last 2 Encounters:   05/23/18 1.245 m (4' 1\")   11/22/17 1.245 m (4' 1\")     TCM tongue: tongue body long, thin; thin white coating, visible ST crack    Sitting up in wheelchair, smiling, calm   Resp: Easy respirations, lungs CTA.; no murmur heard today.  Abdomen: soft, rounded, GT site: clean, dry, intact.  Arms and legs very thin, with visible tendons.     Shoni-cárdenas meridian therapy using matasushin and raking movements to bring down the Kim.  GV-20; Star treatment: accessed Du meridian of scalp moving posteriorly to anteriorly along midline, raking with fingers in outward fashion  Leg Kim in downward motion; also UE Kim surface downward motion.      Labs and Tests:  None today      Assessment:  Sherly is a 24 year old female patient with complex medical hx now stable in group home. Continued loss of muscle mass and strength as anticipated wth her disease process.         Plan/Follow-up:  RTC 6 months, earlier prn.Can decide at that time if want to continue therapy.  Will change probiotic at that time.      Thank you for this consultation. Please do not hesitate to contact me with any questions or concerns.      I spent a total of 15 minutes face-to-face with Sherly Ramires during today's office visit.  Over 50% of this time was spent counseling the patient-family-staff and/or coordinating care regarding encounter for herb and supplements, avoiding UTIs, managing anxiety. See note for details.     Sherrie Fontana MD, CM  Medical Director Pediatric Integrative Health and Wellbeing, [facility]          "

## 2019-08-29 NOTE — PROGRESS NOTES
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. She had the opportunity  to go to [place] this summer again and loved that experience.    Anna continues to attend the Day Program with her housemates and has had overall calm behavior, unless she has a new provider or someone is being trained. The first step is to try the Rescue Remedy spray, and only use the hydroxyzine if that is not sufficient. This did happen once last week, but is now rare. Anna is sleeping well with the trazodone.    There have been no UTIs and Anna continues on her probiotic and the D-mannose. BMs are every day to every other day and never goes longer than that or staff give her an enema/Enemeeze.    With respect to muscle function and ADLs, Anna loves to turn off her own light when leaving her room, but now needs assistance to elevate her arm to get to light switch.     ALLERGIES:        Allergies   Allergen Reactions     Ativan [Lorazepam] Other (See Comments)       Paradoxical reaction     Motrin [Ibuprofen] GI Disturbance       Pt is able to have motrin with food but not on an empty stomache.      Pseudoephedrine           IMMUNIZATIONS:  Immunization History   Administered Date(s) Administered     DTAP (<7y)  1996, 1996, 1996, 06/11/1997     FLU 6-35 months 11/15/2005, 11/14/2006, 10/12/2012     Flu, Unspecified 1996, 11/04/1997, 10/02/1998, 11/15/2005, 11/14/2006     Hep B, Peds or Adolescent 1996, 1996, 01/20/1997     HepB-Adult 1996, 1996, 01/20/1997     Hib (PRP-T) 1996, 1996, 1996, 06/11/1997     Influenza (H1N1) 11/11/2009     Influenza (IIV3) PF 1996, 11/04/1997, 10/02/1998, 10/18/2007, 12/11/2008, 09/16/2009, 10/12/2012     Influenza Intranasal Vaccine 10/06/2011     Influenza Vaccine IM > 6 months Valent IIV4 11/03/2015, 09/21/2016     Influenza Vaccine IM Ages 6-35 Months 4 Valent (PF) 11/03/2015     MMR 06/11/1997, 05/14/2001     Mantoux Tuberculin Skin Test 01/11/2018     OPV, trivalent, live 1996, 1996, 1996     OPV, unspecified 1996, 1996, 1996     Pneumo Conj 13-V (2010&after) 01/06/2012     Pneumococcal 23 valent 01/06/2012     Poliovirus, inactivated (IPV) 05/14/2001     TDAP Vaccine (Adacel) 08/30/2011     TRIHIBIT (DTAP/HIB, <7y) 1996, 1996, 1996     Varicella 03/23/1998, 08/22/2008   She receives the flu vaccine at the group home each season.    CURRENT MEDICATIONS:    Current Outpatient Medications:      ARIPiprazole (ABILIFY) 1 MG/ML SOLN, 7.5 mg daily , Disp: , Rfl:      artificial saliva (BIOTENE DRY MOUTHWASH) LIQD liquid, Swish and spit in mouth 4 times daily as needed for dry mouth, Disp: , Rfl:      calcium carbonate 1250 (500 CA) MG/5ML SUSP, Take 6 mLs (1,500 mg) by mouth 2 times daily (with meals) (Patient taking differently: 1,500 mg by Gastronomy tube route 2 times daily (with meals) ), Disp: 450 mL, Rfl: 12     D-Mannose POWD, 2 times daily 1 scoop - mixed with water, Disp: , Rfl:      diazepam (VALIUM) 5 MG/ML solution, 7.5 mg by Gastronomy tube route every hour as needed for seizures (Seizures lasting longer than 5 minutes) , Disp: , Rfl:      estradiol  (ESTRACE) 1 MG tablet, Take 0.5 mg ( 1/2 tablet) daily., Disp: 15 tablet, Rfl: 2     hydrOXYzine (ATARAX) 10 MG/5ML syrup, Take 10 mg by mouth 2 times daily as needed for itching, Disp: , Rfl:      levETIRAcetam (KEPPRA) 100 MG/ML solution, GIVE 10ML (1000MG) PER G-TUBE EVERY MORNING;GIVE 15ML (1500MG) PER G-TUBE EVERY EVENING, Disp: 775 mL, Rfl: 11     loratadine (CLARITIN) 10 MG tablet, Take 10 mg by mouth daily, Disp: , Rfl:      ondansetron (ZOFRAN) 4 MG/5ML solution, Take 5 mLs (4 mg) by mouth every 6 hours as needed for nausea or vomiting, Disp: 90 mL, Rfl: 0     ORDER FOR DME, Equipment being ordered: Other: chest strap for her tilt in space manual wheel chair Treatment Diagnosis: For safe transportation to home secondary to poor sitting balance in upright. CDG type Ia, seizures, and G-tube dependence., Disp: 1 Device, Rfl: 0     polyethylene glycol (MIRALAX/GLYCOLAX) packet, Take 1 packet by mouth daily, Disp: , Rfl:      sertraline (ZOLOFT) 20 MG/ML (HIGH CONC) solution, 200 mg by Gastronomy tube route daily , Disp: , Rfl:      traZODone (DESYREL) 50 MG tablet, by Gastric Tube route At Bedtime, Disp: , Rfl:      UNABLE TO FIND, MEDICATION NAME: Rescue Remedy Herbal Supplement - Used as needed., Disp: , Rfl:      UNABLE TO FIND, MEDICATION NAME: Stress relax (magnesium citrate) 380 mg at bedtime, Disp: , Rfl:      UNABLE TO FIND, MEDICATION NAME: Mood probiotic 1 capsule (3,000,000,000 CFUs) daily., Disp: , Rfl:      acetaminophen (TYLENOL) 160 MG/5ML solution, 480 mg (15 mL) by G-tube every 4 hours as needed, Disp: , Rfl:      bacitracin ointment, Apply topically 2 times daily as needed for wound care, Disp: 14 g, Rfl: 0     cholecalciferol (VITAMIN  -D) 1000 UNITS capsule, Take 1 capsule (1,000 Units) by mouth daily (Patient not taking: Reported on 8/29/2019), Disp: 30 capsule, Rfl: 11     Docusate Sodium (ENEMEEZ MINI RE), 3 times a week (MWF) to help with bowel movements, Disp: , Rfl:      MELATONIN PO,  Take 3 mg by mouth At Bedtime, Disp: , Rfl:      Nutritional Supplements (COMPLEAT PEDIATRIC PO), , Disp: , Rfl:      traZODone (DESYREL) 50 MG tablet, Crush one tablet and administer via G-tube 30 minutes prior to bedtime daily, Disp: , Rfl:       PAST SURGICAL HISTORY:    Past Surgical History          Past Surgical History:   Procedure Laterality Date     ANESTHESIA OUT OF OR MRI 3T N/A 3/7/2017     Procedure: ANESTHESIA PEDS SEDATION MRI 3T;  Surgeon: GENERIC ANESTHESIA PROVIDER;  [facility]      BACK SURGERY         c1 decompression and fusionx3, scoliosis with instrumentation x1     ELECTRORETINOGRAM Bilateral 12/10/2014     ERG ([facility]mers)     ENT SURGERY          ear tubes     EXAM UNDER ANESTHESIA EYE(S) Bilateral 12/10/2014     EUA (Areaux)     EXTRACTION(S) DENTAL N/A 12/10/2014     Procedure: EXTRACTION(S) DENTAL;  Surgeon: Hubert York DDS;  [facility]     EYE SURGERY Bilateral 1996     BMR 6mm (Dru)      GI SURGERY         g tube     HC REPLACE GASTROSTOMY/CECOSTOMY TUBE PERCUTANEOUS N/A 12/10/2014     Procedure: REPLACE GASTROSTOMY TUBE, PERCUTANEOUS;  Surgeon: Phong Perea MD;  [facility]     RECESSION RESECTION (REPAIR STRABISMUS) BILATERAL Bilateral 12/10/2014     BLR 11 (Areaux)     STRABISMUS SURGERY   1996     (1996) BMR 6 (Gonsales)     STRABISMUS SURGERY   2014     BLR 11 (Areaux) -             FAMILY HISTORY:    Family History           Family History   Problem Relation Age of Onset     Glaucoma Mother         no medications or surg to date     Glaucoma Maternal Grandmother         s/p surg, decreased [place]      Amblyopia No family hx of       Strabismus No family hx of              Physical Exam:   /66 (BP Location: Right arm, Patient Position: Chair, Cuff Size: Adult Small)   Pulse 72   Temp 96.3  F (35.7  C) (Axillary)   Resp 20   SpO2 100%   Wt Readings from Last 3 Encounters:   11/29/18 31.3 kg (69 lb)   09/17/18 32.4 kg (71 lb 8 oz)  "  05/23/18 34 kg (75 lb)     Ht Readings from Last 2 Encounters:   05/23/18 1.245 m (4' 1\")   11/22/17 1.245 m (4' 1\")     TCM tongue: tongue body long, thin; thin white coating, visible ST crack    Sitting up in wheelchair, smiling, calm   Resp: Easy respirations, lungs CTA.; no murmur heard today.  Abdomen: soft, rounded, GT site: clean, dry, intact.  Arms and legs very thin, with visible tendons.     Shoni-cárdenas meridian therapy using matasushin and raking movements to bring down the Kim.  GV-20; Star treatment: accessed Du meridian of scalp moving posteriorly to anteriorly along midline, raking with fingers in outward fashion  Leg Kim in downward motion; also UE Kim surface downward motion.      Labs and Tests:  None today      Assessment:  Sherly is a 24 year old female patient with complex medical hx now stable in group home. Continued loss of muscle mass and strength as anticipated wth her disease process.         Plan/Follow-up:  RTC 6 months, earlier prn.Can decide at that time if want to continue therapy.  Will change probiotic at that time.      Thank you for this consultation. Please do not hesitate to contact me with any questions or concerns.      I spent a total of 15 minutes face-to-face with Sherly Ramires during today's office visit.  Over 50% of this time was spent counseling the patient-family-staff and/or coordinating care regarding encounter for herb and supplements, avoiding UTIs, managing anxiety. See note for details.     Sherrie Fontana MD, CM  Medical Director Pediatric Integrative Health and Wellbeing, [facility]          "

## 2019-08-29 NOTE — NURSING NOTE
Chief Complaint   Patient presents with     RECHECK     Patient is here today for a follow up visit with [facility]     /66 (BP Location: Right arm, Patient Position: Chair, Cuff Size: Adult Small)   Pulse 72   Temp 96.3  F (35.7  C) (Axillary)   Resp 20   SpO2 100%     Shante Freeman CMA   August 29, 2019

## 2019-09-18 ENCOUNTER — OFFICE VISIT (OUTPATIENT)
Dept: PEDIATRICS | Facility: CLINIC | Age: 24
End: 2019-09-18
Attending: PEDIATRICS
Payer: COMMERCIAL

## 2019-09-18 VITALS
SYSTOLIC BLOOD PRESSURE: 106 MMHG | WEIGHT: 68 LBS | HEIGHT: 55 IN | HEART RATE: 84 BPM | DIASTOLIC BLOOD PRESSURE: 72 MMHG | BODY MASS INDEX: 15.73 KG/M2

## 2019-09-18 DIAGNOSIS — E74.89 CONGENITAL DISORDER OF GLYCOSYLATION (CDG) (H): Primary | ICD-10-CM

## 2019-09-18 DIAGNOSIS — E74.89 CONGENITAL DISORDER OF GLYCOSYLATION ASSOCIATED WITH MUTATION IN PMM2 GENE (H): Primary | ICD-10-CM

## 2019-09-18 PROCEDURE — G0463 HOSPITAL OUTPT CLINIC VISIT: HCPCS | Mod: ZF

## 2019-09-18 ASSESSMENT — PAIN SCALES - GENERAL: PAINLEVEL: NO PAIN (0)

## 2019-09-18 ASSESSMENT — MIFFLIN-ST. JEOR: SCORE: 810.33

## 2019-09-18 NOTE — LETTER
2019      RE: Sherly Ramires  [address] [facility] [address]       Adult Metabolism Clinic Return Visit  Name:  Sherly Ramires  :   1995  MRN:   [id number]  Date of Visit: 2019  PCP: Leeanne Mustafa    Immunization status is: up to date.  Managing Metabolic Center(s):  [facility].    Chief Complaint:  Ms. Sherly Ramires is a 24 year old female whom I saw in follow up in Metabolism Clinic for congenital disorder of glycosylation and history of fracture and muscle weakness. Ms. Ramires was accompanied to this visit by her  group home primary staff and her mother.     Assessment:    Ms. 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     Plan:    1. Testing ordered at this visit:     Orders Placed This Encounter   Procedures     Xray Dexa Body Composition     Dexa hip/pelvis/spine*     Vitamin D2 + D3, 25 Hydroxy     T4 free     Osteocalcin     Insulin-Like Growth Factor 1 Ped     IGFBP3     Adrenal corticotropin     Cortisol serum AM     2. Medications: No specific medications related to her metabolic disorder    3.  Goal weight ~75lbs, can increase formula 3-5 cans per week to achieve this slowly over time.    4.  Recommend obtaining Dexa scan. Continue using wheelchair stander to encourage bone health. Consider increasing estrogen dose, will discuss with Hematology first.     5.  Follow up with Dr. Mays with Hematology annually    6. Continue to observe emergency precautions as previously discussed.  Our on-call metabolic service is available 24 hours/day by calling the page  ([phone]) and asking for the Genetics and Metabolism doctor on call.    7. Return to the Adult Metabolism Clinic in 6 months for follow-up.       ----------  Interval:  Anna is accompanied to clinic by her mother and group home primary  "staff. Sherly has had no fractures since her last visit on 5/23/18.  She was seen Dr. Mays with Hematology on 7/3/2019 and is taking Estradiol 0.5mg and aspirin daily. Sherly's history of fractures, weakness of the spine and ovarian failure and lack of ambulation places her at high risk for progressive osteopenia and osteoporosis     Over the past year she has become progressively weaker. She needs assistance with moving her arms up to turn off a light. No other new illnesses, hospitalizations, surgeries.  Not currently receiving any specific therapies.     Mother has concerns about what should Anna's ideal weight be. She has been living in a group home for the past 3 years, prior to moving was intermittently around 80-90lbs, and then most recently was 68lbs. She takes a whole-food formula, Pediatric Compleat, occasionally eats by mouth also.     Mother would also like to discuss outlook for Anna, and what things family and group home should \"push\" Anna with foot braces, stander for wheelchair for bone damage, feeding herself, etc.     She follows Dr. Correa and it seems that sees although her retinal activity is extict. Her neurologist is Nhan Knott with [facility].       I have reviewed Sherly s past medical history, family history, social history, medications and allergies as documented in the patient's electronic medical record.  There were no additional findings except as noted.      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 She had an apneic episode at four days old and went home on apnea and bradycardia monitors. She was seen at three months of age due to maternal concerns of strabismus, fat pads on her knuckles, and developmental delay. She was diagnosed with congenital disorder of glycosylation at six months of age.  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   She had abnormal thyroid functions and was treated with Synthroid for six months. I The abnormal thyroid function tests were attributed to an abnormal thyroglobulin and not true thyroid hormone deficiency and the replacement was stopped. She has also had spinal stenosis with lower extremity weakness. Symptoms began in October 2002, diagnosed in 2003, and has undergone surgical repair. She developed seizure in 2013. She has anxiety, OCD-like tendencies, and muscle contractures.     One occurred when she stuck her leg out and hit a door. It was a spiral fracture of the lower leg. Another occurred when her 4 year old brother fell on her and broke her lower leg.     She has previously had normal PTH and DXA (when corrected for age). 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       I have reviewed Sherly s past medical history, family history, social history, medications and allergies as documented in the patient's  electronic medical record.  There were no additional findings except as noted.     Review of Systems:   Constitional: negative  Eyes: negative - reportedly poor per last   Ears/Nose/Throat: negative - normal hearing  Respiratory: negative  Cardiovascular: negative  Gastrointestinal: negative  Genitourinary: negative  Hematologic/Lymphatic: negative  Allergy/Immunologic: negative - no drug allergies  Musculoskeletal: see above  Endocrine: At risk for osteoporosis and fractures given immobility.   Integument: negative  Neurologic: negative  Psychiatric: anxiety, well controlled currently, uses a rescue remedy (natural herb supplements) through [facility] to help with any acute concerns and this works well     Nutrition History:  - Sees dietician, has switched to lower calorie formula per recs from dietician and endo as she had gained some weight, this has helped  - Occasionally eats lunch instead of formula     Medications:  Current Outpatient Medications   Medication Sig Dispense Refill     acetaminophen (TYLENOL) 160 MG/5ML solution 480 mg (15 mL) by G-tube every 4 hours as needed       ARIPiprazole (ABILIFY) 1 MG/ML SOLN 7.5 mg daily        artificial saliva (BIOTENE DRY MOUTHWASH) LIQD liquid Swish and spit in mouth 4 times daily as needed for dry mouth       bacitracin ointment Apply topically 2 times daily as needed for wound care 14 g 0     calcium carbonate 1250 (500 CA) MG/5ML SUSP Take 6 mLs (1,500 mg) by mouth 2 times daily (with meals) (Patient taking differently: 1,500 mg by Gastronomy tube route 2 times daily (with meals) ) 450 mL 12     D-Mannose POWD 2 times daily 1 scoop - mixed with water       diazepam (VALIUM) 5 MG/ML solution 7.5 mg by Gastronomy tube route every hour as needed for seizures (Seizures lasting longer than 5 minutes)        estradiol (ESTRACE) 1 MG tablet Take 0.5 mg ( 1/2 tablet) daily. 15 tablet 2     hydrOXYzine (ATARAX) 10 MG/5ML syrup Take 10 mg by mouth 2 times daily as  "needed for itching       levETIRAcetam (KEPPRA) 100 MG/ML solution GIVE 10ML (1000MG) PER G-TUBE EVERY MORNING;GIVE 15ML (1500MG) PER G-TUBE EVERY EVENING 775 mL 11     loratadine (CLARITIN) 10 MG tablet Take 10 mg by mouth daily       Nutritional Supplements (COMPLEAT PEDIATRIC PO)        ondansetron (ZOFRAN) 4 MG/5ML solution Take 5 mLs (4 mg) by mouth every 6 hours as needed for nausea or vomiting 90 mL 0     ORDER FOR DME Equipment being ordered: Other: chest strap for her tilt in space manual wheel chair  Treatment Diagnosis: For safe transportation to home secondary to poor sitting balance in upright. CDG type Ia, seizures, and G-tube dependence. 1 Device 0     polyethylene glycol (MIRALAX/GLYCOLAX) packet Take 1 packet by mouth daily       sertraline (ZOLOFT) 20 MG/ML (HIGH CONC) solution 200 mg by Gastronomy tube route daily        traZODone (DESYREL) 50 MG tablet by Gastric Tube route At Bedtime       UNABLE TO FIND MEDICATION NAME: Rescue Remedy Herbal Supplement - Used as needed.       UNABLE TO FIND MEDICATION NAME: Stress relax (magnesium citrate) 300 mg at bedtime       UNABLE TO FIND MEDICATION NAME: Mood probiotic 1 capsule (3,000,000,000 CFUs) daily.       cholecalciferol (VITAMIN  -D) 1000 UNITS capsule Take 1 capsule (1,000 Units) by mouth daily (Patient not taking: Reported on 8/29/2019) 30 capsule 11     Docusate Sodium (ENEMEEZ MINI RE) 3 times a week (MWF) to help with bowel movements       MELATONIN PO Take 3 mg by mouth At Bedtime       traZODone (DESYREL) 50 MG tablet Crush one tablet and administer via G-tube 30 minutes prior to bedtime daily     - Natural rescue anxiety regimen      Allergies:  Allergies   Allergen Reactions     Ativan [Lorazepam] Other (See Comments)     Paradoxical reaction     Pseudoephedrine      Physical Examination:  Blood pressure 106/72, pulse 84, height 4' 1\" (124.5 cm), weight 68 lb (30.8 kg), head circumference 52.8 cm (20.79\"), not currently " breastfeeding.  Body surface area is 1.03 meters squared.    General: Alert, sitting in wheel chair, has neck brace in place. Engaged with group home staff but primarily avoids interactions with me.   Head and Neck:  She had hair of normal texture and distribution and her head was proportionate in appearance.The neck was supple without lymphadenopathy.    Eyes:  The pupils were equal, round, and reacted to light.   The conjunctivae were clear.   Ears:  Her ears were normal in architecture and placement.   Nose: The nose was clear.    Mouth and Throat: Unwilling to open mouth therefore unable to examine.   Respiratory: CTAB from anterior position, not willing to sit forward in chair for posterior examination     CV:  RRR, no murmurs  GI: Patient would not allow abdominal exam.     : Deferred a  examination.   Musculoskeletal: Thin upper and lower extremities.   Neurologic: Developmentally delayed.  One word, slow speech, which is difficult to understand. Withdrew while attempting to test tone therefore unable to examine.   Integument: Wounds on bilateral forearms.     ]  Hellen Harrell MD  [facility] Pediatrics PGY3      CC  SELF, REFERRED    Copy to patient  DELIO WHITFIELD (MOM/GUARDIAN) RYAN WHITFIELD (DAD/GUARDIAN)  C/O MT JUAN MANUEL [facility]  [address]  [facility] [address]

## 2019-09-18 NOTE — PROGRESS NOTES
Adult Metabolism Clinic Return Visit  Name:  Sherly Ramires  :   1995  MRN:   [id number]  Date of Visit: 2019  PCP: Leeanne Mustafa    Immunization status is: up to date.  Managing Metabolic Center(s):  [facility] Adult Metabolism [facility].    Chief Complaint:  Ms. Sherly Raimres is a 24 year old female whom I saw in follow up in Metabolism Clinic for congenital disorder of glycosylation and history of fracture and muscle weakness. Ms. Ramires was accompanied to this visit by her  group home primary staff and her mother.     Assessment:    Ms. 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     Plan:    1. Testing ordered at this visit:     Orders Placed This Encounter   Procedures     Xray Dexa Body Composition     Dexa hip/pelvis/spine*     Vitamin D2 + D3, 25 Hydroxy     T4 free     Osteocalcin     Insulin-Like Growth Factor 1 Ped     IGFBP3     Adrenal corticotropin     Cortisol serum AM     2. Medications: No specific medications related to her metabolic disorder    3.  Goal weight ~75lbs, can increase formula 3-5 cans per week to achieve this slowly over time.    4.  Recommend obtaining Dexa scan. Continue using wheelchair stander to encourage bone health. Consider increasing estrogen dose, will discuss with Hematology first.     5.  Follow up with Dr. Mays with Hematology annually    6. Continue to observe emergency precautions as previously discussed.  Our on-call metabolic service is available 24 hours/day by calling the page  ([phone]) and asking for the Genetics and Metabolism doctor on call.    7. Return to the Adult Metabolism Clinic in 6 months for follow-up.       ----------  Interval:  Anna is accompanied to clinic by her mother and group home primary staff. Sherly has had no fractures since her last visit on 18.  She was seen Dr. Mays  "with Hematology on 7/3/2019 and is taking Estradiol 0.5mg and aspirin daily. Sherly's history of fractures, weakness of the spine and ovarian failure and lack of ambulation places her at high risk for progressive osteopenia and osteoporosis     Over the past year she has become progressively weaker. She needs assistance with moving her arms up to turn off a light. No other new illnesses, hospitalizations, surgeries.  Not currently receiving any specific therapies.     Mother has concerns about what should Anna's ideal weight be. She has been living in a group home for the past 3 years, prior to moving was intermittently around 80-90lbs, and then most recently was 68lbs. She takes a whole-food formula, Pediatric Compleat, occasionally eats by mouth also.     Mother would also like to discuss outlook for Anna, and what things family and group home should \"push\" Anna with foot braces, stander for wheelchair for bone damage, feeding herself, etc.     She follows Dr. Correa and it seems that sees although her retinal activity is extict. Her neurologist is Nhan Knott with [facility].       I have reviewed Sherly s past medical history, family history, social history, medications and allergies as documented in the patient's electronic medical record.  There were no additional findings except as noted.      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 She had an apneic episode at four days old and went home on apnea and bradycardia monitors. She was seen at three months of age due to maternal concerns of strabismus, fat pads on her knuckles, and developmental delay. She was diagnosed with congenital disorder of glycosylation at six months of age.  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   She had abnormal thyroid functions and was treated with Synthroid for six months. I The abnormal thyroid function tests were attributed to an abnormal thyroglobulin and not true thyroid hormone deficiency and the replacement was stopped. She has also had spinal stenosis with lower extremity weakness. Symptoms began in October 2002, diagnosed in 2003, and has undergone surgical repair. She developed seizure in 2013. She has anxiety, OCD-like tendencies, and muscle contractures.     One occurred when she stuck her leg out and hit a door. It was a spiral fracture of the lower leg. Another occurred when her 4 year old brother fell on her and broke her lower leg.     She has previously had normal PTH and DXA (when corrected for age). 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       I have reviewed Sherly s past medical history, family history, social history, medications and allergies as documented in the patient's electronic medical record.  There were no additional findings except as noted.     Review of Systems:    Constitional: negative  Eyes: negative - reportedly poor per last   Ears/Nose/Throat: negative - normal hearing  Respiratory: negative  Cardiovascular: negative  Gastrointestinal: negative  Genitourinary: negative  Hematologic/Lymphatic: negative  Allergy/Immunologic: negative - no drug allergies  Musculoskeletal: see above  Endocrine: At risk for osteoporosis and fractures given immobility.   Integument: negative  Neurologic: negative  Psychiatric: anxiety, well controlled currently, uses a rescue remedy (natural herb supplements) through [facility] to help with any acute concerns and this works well     Nutrition History:  - Sees dietician, has switched to lower calorie formula per recs from dietician and endo as she had gained some weight, this has helped  - Occasionally eats lunch instead of formula     Medications:  Current Outpatient Medications   Medication Sig Dispense Refill     acetaminophen (TYLENOL) 160 MG/5ML solution 480 mg (15 mL) by G-tube every 4 hours as needed       ARIPiprazole (ABILIFY) 1 MG/ML SOLN 7.5 mg daily        artificial saliva (BIOTENE DRY MOUTHWASH) LIQD liquid Swish and spit in mouth 4 times daily as needed for dry mouth       bacitracin ointment Apply topically 2 times daily as needed for wound care 14 g 0     calcium carbonate 1250 (500 CA) MG/5ML SUSP Take 6 mLs (1,500 mg) by mouth 2 times daily (with meals) (Patient taking differently: 1,500 mg by Gastronomy tube route 2 times daily (with meals) ) 450 mL 12     D-Mannose POWD 2 times daily 1 scoop - mixed with water       diazepam (VALIUM) 5 MG/ML solution 7.5 mg by Gastronomy tube route every hour as needed for seizures (Seizures lasting longer than 5 minutes)        estradiol (ESTRACE) 1 MG tablet Take 0.5 mg ( 1/2 tablet) daily. 15 tablet 2     hydrOXYzine (ATARAX) 10 MG/5ML syrup Take 10 mg by mouth 2 times daily as needed for itching       levETIRAcetam (KEPPRA) 100 MG/ML solution GIVE 10ML (1000MG) PER G-TUBE  "EVERY MORNING;GIVE 15ML (1500MG) PER G-TUBE EVERY EVENING 775 mL 11     loratadine (CLARITIN) 10 MG tablet Take 10 mg by mouth daily       Nutritional Supplements (COMPLEAT PEDIATRIC PO)        ondansetron (ZOFRAN) 4 MG/5ML solution Take 5 mLs (4 mg) by mouth every 6 hours as needed for nausea or vomiting 90 mL 0     ORDER FOR DME Equipment being ordered: Other: chest strap for her tilt in space manual wheel chair  Treatment Diagnosis: For safe transportation to home secondary to poor sitting balance in upright. CDG type Ia, seizures, and G-tube dependence. 1 Device 0     polyethylene glycol (MIRALAX/GLYCOLAX) packet Take 1 packet by mouth daily       sertraline (ZOLOFT) 20 MG/ML (HIGH CONC) solution 200 mg by Gastronomy tube route daily        traZODone (DESYREL) 50 MG tablet by Gastric Tube route At Bedtime       UNABLE TO FIND MEDICATION NAME: Rescue Remedy Herbal Supplement - Used as needed.       UNABLE TO FIND MEDICATION NAME: Stress relax (magnesium citrate) 300 mg at bedtime       UNABLE TO FIND MEDICATION NAME: Mood probiotic 1 capsule (3,000,000,000 CFUs) daily.       cholecalciferol (VITAMIN  -D) 1000 UNITS capsule Take 1 capsule (1,000 Units) by mouth daily (Patient not taking: Reported on 8/29/2019) 30 capsule 11     Docusate Sodium (ENEMEEZ MINI RE) 3 times a week (MWF) to help with bowel movements       MELATONIN PO Take 3 mg by mouth At Bedtime       traZODone (DESYREL) 50 MG tablet Crush one tablet and administer via G-tube 30 minutes prior to bedtime daily     - Natural rescue anxiety regimen      Allergies:  Allergies   Allergen Reactions     Ativan [Lorazepam] Other (See Comments)     Paradoxical reaction     Pseudoephedrine      Physical Examination:  Blood pressure 106/72, pulse 84, height 4' 1\" (124.5 cm), weight 68 lb (30.8 kg), head circumference 52.8 cm (20.79\"), not currently breastfeeding.  Body surface area is 1.03 meters squared.    General: Alert, sitting in wheel chair, has neck brace " in place. Engaged with group home staff but primarily avoids interactions with me.   Head and Neck:  She had hair of normal texture and distribution and her head was proportionate in appearance.The neck was supple without lymphadenopathy.    Eyes:  The pupils were equal, round, and reacted to light.   The conjunctivae were clear.   Ears:  Her ears were normal in architecture and placement.   Nose: The nose was clear.    Mouth and Throat: Unwilling to open mouth therefore unable to examine.   Respiratory: CTAB from anterior position, not willing to sit forward in chair for posterior examination     CV:  RRR, no murmurs  GI: Patient would not allow abdominal exam.     : Deferred a  examination.   Musculoskeletal: Thin upper and lower extremities.   Neurologic: Developmentally delayed.  One word, slow speech, which is difficult to understand. Withdrew while attempting to test tone therefore unable to examine.   Integument: Wounds on bilateral forearms.     ]  Hellen Harrell MD  [facility] Pediatrics PGY3      CC  SELF, REFERRED    Copy to patient  DELIO WHITFIELD (MOM/GUARDIAN) RYAN WHITFIELD (DAD/GUARDIAN)  C/O [facility]  [address] [facility] [address]

## 2019-09-18 NOTE — NURSING NOTE
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

## 2019-09-18 NOTE — PATIENT INSTRUCTIONS
Genetics  [facility]     Contact our nurse coordinator at [phone] or send a Remember The Member message for any non-urgent general or medical questions.     If you had genetic testing and have further questions, please contact the genetic counselor who saw you during your visit.    To schedule appointments:  Pediatric Call Center for [facility]: [phone]  Neuropsychology [phone]  Radiology/ Imaging/Echocardiogram: [phone]   Services:   [phone]     Please consider signing up for [email or web address] for easy and confidential communication. Please sign up at the clinic  or go to [email or web address].

## 2019-10-21 ENCOUNTER — TRANSFERRED RECORDS (OUTPATIENT)
Dept: HEALTH INFORMATION MANAGEMENT | Facility: CLINIC | Age: 24
End: 2019-10-21

## 2019-10-21 LAB
ALT SERPL-CCNC: 22 U/L (ref 0–55)
AST SERPL-CCNC: 24 U/L (ref 10–40)
CREAT SERPL-MCNC: 0.34 MG/DL (ref 0.55–1.02)
GFR SERPL CREATININE-BSD FRML MDRD: >60 ML/MIN/1.73M2
GLUCOSE SERPL-MCNC: 81 MG/DL (ref 70–100)
POTASSIUM SERPL-SCNC: 4 MMOL/L (ref 3.5–5.1)
TSH SERPL-ACNC: 4 UIU/ML (ref 0.3–4.5)

## 2019-10-25 ENCOUNTER — APPOINTMENT (OUTPATIENT)
Dept: GENERAL RADIOLOGY | Facility: CLINIC | Age: 24
DRG: 193 | End: 2019-10-25
Attending: EMERGENCY MEDICINE
Payer: COMMERCIAL

## 2019-10-25 ENCOUNTER — HOSPITAL ENCOUNTER (INPATIENT)
Facility: CLINIC | Age: 24
LOS: 5 days | Discharge: HOME | DRG: 193 | End: 2019-10-30
Attending: EMERGENCY MEDICINE | Admitting: INTERNAL MEDICINE
Payer: COMMERCIAL

## 2019-10-25 DIAGNOSIS — R19.7 DIARRHEA, UNSPECIFIED TYPE: Primary | ICD-10-CM

## 2019-10-25 DIAGNOSIS — J18.9 PNEUMONIA OF RIGHT UPPER LOBE DUE TO INFECTIOUS ORGANISM: ICD-10-CM

## 2019-10-25 LAB
ALBUMIN SERPL-MCNC: 2.4 G/DL (ref 3.4–5)
ALP SERPL-CCNC: 112 U/L (ref 40–150)
ALT SERPL W P-5'-P-CCNC: 51 U/L (ref 0–50)
ANION GAP SERPL CALCULATED.3IONS-SCNC: 3 MMOL/L (ref 3–14)
AST SERPL W P-5'-P-CCNC: 27 U/L (ref 0–45)
BASOPHILS # BLD AUTO: 0 10E9/L (ref 0–0.2)
BASOPHILS NFR BLD AUTO: 0.2 %
BILIRUB SERPL-MCNC: 0.2 MG/DL (ref 0.2–1.3)
BUN SERPL-MCNC: 11 MG/DL (ref 7–30)
CALCIUM SERPL-MCNC: 8.6 MG/DL (ref 8.5–10.1)
CHLORIDE SERPL-SCNC: 100 MMOL/L (ref 94–109)
CO2 SERPL-SCNC: 33 MMOL/L (ref 20–32)
CREAT SERPL-MCNC: 0.26 MG/DL (ref 0.52–1.04)
DIFFERENTIAL METHOD BLD: ABNORMAL
EOSINOPHIL # BLD AUTO: 0 10E9/L (ref 0–0.7)
EOSINOPHIL NFR BLD AUTO: 0 %
ERYTHROCYTE [DISTWIDTH] IN BLOOD BY AUTOMATED COUNT: 12.6 % (ref 10–15)
FLUAV+FLUBV RNA SPEC QL NAA+PROBE: NEGATIVE
FLUAV+FLUBV RNA SPEC QL NAA+PROBE: NEGATIVE
GFR SERPL CREATININE-BSD FRML MDRD: >90 ML/MIN/{1.73_M2}
GLUCOSE BLDC GLUCOMTR-MCNC: 101 MG/DL (ref 70–99)
GLUCOSE SERPL-MCNC: 97 MG/DL (ref 70–99)
HCT VFR BLD AUTO: 40.3 % (ref 35–47)
HGB BLD-MCNC: 13.2 G/DL (ref 11.7–15.7)
IMM GRANULOCYTES # BLD: 0 10E9/L (ref 0–0.4)
IMM GRANULOCYTES NFR BLD: 0.5 %
LACTATE BLD-SCNC: 1.2 MMOL/L (ref 0.7–2)
LIPASE SERPL-CCNC: 109 U/L (ref 73–393)
LYMPHOCYTES # BLD AUTO: 0.7 10E9/L (ref 0.8–5.3)
LYMPHOCYTES NFR BLD AUTO: 10.4 %
MCH RBC QN AUTO: 31.1 PG (ref 26.5–33)
MCHC RBC AUTO-ENTMCNC: 32.8 G/DL (ref 31.5–36.5)
MCV RBC AUTO: 95 FL (ref 78–100)
MONOCYTES # BLD AUTO: 0.7 10E9/L (ref 0–1.3)
MONOCYTES NFR BLD AUTO: 11.1 %
NEUTROPHILS # BLD AUTO: 5.1 10E9/L (ref 1.6–8.3)
NEUTROPHILS NFR BLD AUTO: 77.8 %
NRBC # BLD AUTO: 0 10*3/UL
NRBC BLD AUTO-RTO: 0 /100
PLATELET # BLD AUTO: 106 10E9/L (ref 150–450)
POTASSIUM SERPL-SCNC: 3.8 MMOL/L (ref 3.4–5.3)
PROT SERPL-MCNC: 6.3 G/DL (ref 6.8–8.8)
RBC # BLD AUTO: 4.25 10E12/L (ref 3.8–5.2)
RSV RNA SPEC NAA+PROBE: NEGATIVE
SODIUM SERPL-SCNC: 136 MMOL/L (ref 133–144)
SPECIMEN SOURCE: NORMAL
WBC # BLD AUTO: 6.5 10E9/L (ref 4–11)

## 2019-10-25 PROCEDURE — 83605 ASSAY OF LACTIC ACID: CPT | Performed by: INTERNAL MEDICINE

## 2019-10-25 PROCEDURE — 25800030 ZZH RX IP 258 OP 636: Performed by: STUDENT IN AN ORGANIZED HEALTH CARE EDUCATION/TRAINING PROGRAM

## 2019-10-25 PROCEDURE — 96361 HYDRATE IV INFUSION ADD-ON: CPT

## 2019-10-25 PROCEDURE — 25000132 ZZH RX MED GY IP 250 OP 250 PS 637: Performed by: NURSE PRACTITIONER

## 2019-10-25 PROCEDURE — 00000146 ZZHCL STATISTIC GLUCOSE BY METER IP

## 2019-10-25 PROCEDURE — 99285 EMERGENCY DEPT VISIT HI MDM: CPT | Mod: 25

## 2019-10-25 PROCEDURE — 25800025 ZZH RX 258: Performed by: EMERGENCY MEDICINE

## 2019-10-25 PROCEDURE — 83605 ASSAY OF LACTIC ACID: CPT | Performed by: EMERGENCY MEDICINE

## 2019-10-25 PROCEDURE — 99207 ZZC APP CREDIT; MD BILLING SHARED VISIT: CPT | Performed by: NURSE PRACTITIONER

## 2019-10-25 PROCEDURE — 96365 THER/PROPH/DIAG IV INF INIT: CPT

## 2019-10-25 PROCEDURE — 80053 COMPREHEN METABOLIC PANEL: CPT | Performed by: EMERGENCY MEDICINE

## 2019-10-25 PROCEDURE — 83690 ASSAY OF LIPASE: CPT | Performed by: EMERGENCY MEDICINE

## 2019-10-25 PROCEDURE — 71045 X-RAY EXAM CHEST 1 VIEW: CPT

## 2019-10-25 PROCEDURE — 25000128 H RX IP 250 OP 636: Performed by: EMERGENCY MEDICINE

## 2019-10-25 PROCEDURE — 87040 BLOOD CULTURE FOR BACTERIA: CPT | Performed by: EMERGENCY MEDICINE

## 2019-10-25 PROCEDURE — 36415 COLL VENOUS BLD VENIPUNCTURE: CPT | Performed by: INTERNAL MEDICINE

## 2019-10-25 PROCEDURE — 85025 COMPLETE CBC W/AUTO DIFF WBC: CPT | Performed by: EMERGENCY MEDICINE

## 2019-10-25 PROCEDURE — 83605 ASSAY OF LACTIC ACID: CPT

## 2019-10-25 PROCEDURE — 12000001 ZZH R&B MED SURG/OB UMMC

## 2019-10-25 PROCEDURE — 99285 EMERGENCY DEPT VISIT HI MDM: CPT | Mod: GC | Performed by: EMERGENCY MEDICINE

## 2019-10-25 PROCEDURE — 99223 1ST HOSP IP/OBS HIGH 75: CPT | Mod: AI | Performed by: INTERNAL MEDICINE

## 2019-10-25 PROCEDURE — 96366 THER/PROPH/DIAG IV INF ADDON: CPT

## 2019-10-25 PROCEDURE — 96368 THER/DIAG CONCURRENT INF: CPT

## 2019-10-25 PROCEDURE — 87631 RESP VIRUS 3-5 TARGETS: CPT | Performed by: EMERGENCY MEDICINE

## 2019-10-25 PROCEDURE — 25000128 H RX IP 250 OP 636: Performed by: STUDENT IN AN ORGANIZED HEALTH CARE EDUCATION/TRAINING PROGRAM

## 2019-10-25 RX ORDER — NALOXONE HYDROCHLORIDE 0.4 MG/ML
.1-.4 INJECTION, SOLUTION INTRAMUSCULAR; INTRAVENOUS; SUBCUTANEOUS
Status: DISCONTINUED | OUTPATIENT
Start: 2019-10-25 | End: 2019-10-30 | Stop reason: HOSPADM

## 2019-10-25 RX ORDER — CEFTRIAXONE 1 G/1
1 INJECTION, POWDER, FOR SOLUTION INTRAMUSCULAR; INTRAVENOUS EVERY 24 HOURS
Status: DISCONTINUED | OUTPATIENT
Start: 2019-10-26 | End: 2019-10-29 | Stop reason: ALTCHOICE

## 2019-10-25 RX ORDER — AZITHROMYCIN 200 MG/5ML
200 POWDER, FOR SUSPENSION ORAL DAILY
Status: DISCONTINUED | OUTPATIENT
Start: 2019-10-27 | End: 2019-10-25

## 2019-10-25 RX ORDER — POLYETHYLENE GLYCOL 3350 17 G/17G
17 POWDER, FOR SOLUTION ORAL DAILY
Status: DISCONTINUED | OUTPATIENT
Start: 2019-10-26 | End: 2019-10-30 | Stop reason: HOSPADM

## 2019-10-25 RX ORDER — AZITHROMYCIN 250 MG/1
250 TABLET, FILM COATED ORAL DAILY
Status: DISCONTINUED | OUTPATIENT
Start: 2019-10-26 | End: 2019-10-25

## 2019-10-25 RX ORDER — LEVETIRACETAM 100 MG/ML
1500 SOLUTION ORAL AT BEDTIME
Status: DISCONTINUED | OUTPATIENT
Start: 2019-10-25 | End: 2019-10-30 | Stop reason: HOSPADM

## 2019-10-25 RX ORDER — ONDANSETRON 2 MG/ML
4 INJECTION INTRAMUSCULAR; INTRAVENOUS EVERY 6 HOURS PRN
Status: DISCONTINUED | OUTPATIENT
Start: 2019-10-25 | End: 2019-10-30 | Stop reason: HOSPADM

## 2019-10-25 RX ORDER — AZITHROMYCIN 200 MG/5ML
200 POWDER, FOR SUSPENSION ORAL DAILY
Status: DISCONTINUED | OUTPATIENT
Start: 2019-10-27 | End: 2019-10-30

## 2019-10-25 RX ORDER — TRAZODONE HYDROCHLORIDE 50 MG/1
50 TABLET, FILM COATED ORAL AT BEDTIME
Status: DISCONTINUED | OUTPATIENT
Start: 2019-10-25 | End: 2019-10-30 | Stop reason: HOSPADM

## 2019-10-25 RX ORDER — ACETAMINOPHEN 160 MG/5ML
480 LIQUID ORAL EVERY 4 HOURS
Status: DISCONTINUED | OUTPATIENT
Start: 2019-10-25 | End: 2019-10-25

## 2019-10-25 RX ORDER — IBUPROFEN 100 MG/5ML
200 SUSPENSION, ORAL (FINAL DOSE FORM) ORAL EVERY 6 HOURS
Status: COMPLETED | OUTPATIENT
Start: 2019-10-25 | End: 2019-10-28

## 2019-10-25 RX ORDER — PROCHLORPERAZINE MALEATE 5 MG
10 TABLET ORAL EVERY 6 HOURS PRN
Status: DISCONTINUED | OUTPATIENT
Start: 2019-10-25 | End: 2019-10-25

## 2019-10-25 RX ORDER — LEVETIRACETAM 100 MG/ML
1000 SOLUTION ORAL DAILY
Status: DISCONTINUED | OUTPATIENT
Start: 2019-10-26 | End: 2019-10-30 | Stop reason: HOSPADM

## 2019-10-25 RX ORDER — SERTRALINE HYDROCHLORIDE 20 MG/ML
200 SOLUTION ORAL DAILY
Status: DISCONTINUED | OUTPATIENT
Start: 2019-10-26 | End: 2019-10-30 | Stop reason: HOSPADM

## 2019-10-25 RX ORDER — FLUORIDE TOOTHPASTE
30 TOOTHPASTE DENTAL 4 TIMES DAILY PRN
Status: DISCONTINUED | OUTPATIENT
Start: 2019-10-25 | End: 2019-10-30 | Stop reason: HOSPADM

## 2019-10-25 RX ORDER — D-MANNOSE 99 %
1 POWDER (GRAM) MISCELLANEOUS 2 TIMES DAILY
Status: DISCONTINUED | OUTPATIENT
Start: 2019-10-25 | End: 2019-10-30 | Stop reason: HOSPADM

## 2019-10-25 RX ORDER — CEFTRIAXONE 1 G/1
1 INJECTION, POWDER, FOR SOLUTION INTRAMUSCULAR; INTRAVENOUS ONCE
Status: COMPLETED | OUTPATIENT
Start: 2019-10-25 | End: 2019-10-25

## 2019-10-25 RX ORDER — ONDANSETRON 4 MG/1
4 TABLET, ORALLY DISINTEGRATING ORAL EVERY 6 HOURS PRN
Status: DISCONTINUED | OUTPATIENT
Start: 2019-10-25 | End: 2019-10-30 | Stop reason: HOSPADM

## 2019-10-25 RX ORDER — ARIPIPRAZOLE ORAL 1 MG/ML
7.5 SOLUTION ORAL DAILY
Status: DISCONTINUED | OUTPATIENT
Start: 2019-10-26 | End: 2019-10-30 | Stop reason: HOSPADM

## 2019-10-25 RX ORDER — LIDOCAINE 40 MG/G
CREAM TOPICAL
Status: DISCONTINUED | OUTPATIENT
Start: 2019-10-25 | End: 2019-10-30 | Stop reason: HOSPADM

## 2019-10-25 RX ORDER — PROCHLORPERAZINE 25 MG
25 SUPPOSITORY, RECTAL RECTAL EVERY 12 HOURS PRN
Status: DISCONTINUED | OUTPATIENT
Start: 2019-10-25 | End: 2019-10-25

## 2019-10-25 RX ORDER — ESTRADIOL 0.5 MG/1
0.5 TABLET ORAL DAILY
Status: DISCONTINUED | OUTPATIENT
Start: 2019-10-26 | End: 2019-10-30 | Stop reason: HOSPADM

## 2019-10-25 RX ADMIN — AZITHROMYCIN MONOHYDRATE 400 MG: 500 INJECTION, POWDER, LYOPHILIZED, FOR SOLUTION INTRAVENOUS at 16:06

## 2019-10-25 RX ADMIN — SODIUM CHLORIDE 1000 ML: 9 INJECTION, SOLUTION INTRAVENOUS at 13:32

## 2019-10-25 RX ADMIN — TRAZODONE HYDROCHLORIDE 50 MG: 50 TABLET ORAL at 21:38

## 2019-10-25 RX ADMIN — CEFTRIAXONE SODIUM 1 G: 1 INJECTION, POWDER, FOR SOLUTION INTRAMUSCULAR; INTRAVENOUS at 15:29

## 2019-10-25 RX ADMIN — Medication 1 MG: at 21:38

## 2019-10-25 RX ADMIN — DEXTROSE AND SODIUM CHLORIDE 40 ML/HR: 10; .45 INJECTION, SOLUTION INTRAVENOUS at 14:58

## 2019-10-25 ASSESSMENT — ENCOUNTER SYMPTOMS
FEVER: 1
ABDOMINAL PAIN: 0
SORE THROAT: 1
MYALGIAS: 0
COUGH: 1

## 2019-10-25 NOTE — ED TRIAGE NOTES
Per the GH worker brining the Pt in the Pt has been having increasing URI signs and symptoms for the past few days.  Pt was seen at the clinic today and noted to have a temp of 101 and a HR in the 100s and was sent here for further evaluation.

## 2019-10-25 NOTE — ED NOTES
[facility], [place]   ED Nurse to Floor Handoff     Sherly Ramires is a 24 year old female who speaks English and lives with others,  in a group home  They arrived in the ED by ambulance from home    ED Chief Complaint: URI    ED Dx;   Final diagnoses:   Pneumonia of right upper lobe due to infectious organism (H)         Needed?: No    Allergies:   Allergies   Allergen Reactions     Ativan [Lorazepam] Other (See Comments)     Paradoxical reaction     Pseudoephedrine    .  Past Medical Hx:   Past Medical History:   Diagnosis Date     Anxiety      Global developmental delay      Hypoglycemia      Metabolic disease     congenital disorder of glycosilation     Scoliosis      Seizures (H)     last at age 8 before today     Strabismus       Baseline Mental status: other pt has genetic condition and only speaks a few words and yes/no  Current Mental Status changes: at basesline    Infection present or suspected this encounter: yes respiratory  Sepsis suspected: No  Isolation type: Contact     Activity level - Baseline/Home:  Total Care  Activity Level - Current:   Total Care    Bariatric equipment needed?: No    In the ED these meds were given:   Medications   dextrose 10% and 0.45% sodium chloride infusion (75 mL/hr Intravenous Rate/Dose Change 10/25/19 1513)   azithromycin (ZITHROMAX) 400 mg in sodium chloride 0.9 % 250 mL intermittent infusion (400 mg Intravenous Incomplete 10/25/19 1606)   cefTRIAXone (ROCEPHIN) 1 g vial to attach to  mL bag for ADULTS or NS 50 mL bag for PEDS (1 g Intravenous New Bag 10/25/19 1529)       Drips running?  Yes    Home pump  No    Current LDAs  Peripheral IV 10/25/19 Right Upper forearm (Active)   Number of days: 0       Peripheral IV 10/25/19 Left Upper forearm (Active)   Site Assessment WDL 10/25/2019  1:51 PM   Line Status Saline locked 10/25/2019  1:51 PM   Phlebitis Scale 0-->no symptoms 10/25/2019  1:51 PM   Infiltration Scale 0  10/25/2019  1:51 PM   Infiltration Site Treatment Method  None 10/25/2019  1:51 PM   Extravasation? No 10/25/2019  1:51 PM   Dressing Intervention New dressing  10/25/2019  1:51 PM   Number of days: 0       PICC Double Lumen 05/27/16 Right Basilic (Active)   Number of days: 1246       Gastrostomy/Enterostomy Gastrojejunostomy LLQ 1 18 fr  (Active)   Number of days: 787       Incision/Surgical Site 12/10/14 Bilateral Mouth (Active)   Number of days: 1780       Incision/Surgical Site 12/10/14 Bilateral Eye (Active)   Number of days: 1780       Incision/Surgical Site 12/10/14 Left Abdomen (Active)   Number of days: 1780       Labs results: Labs Ordered and Resulted from Time of ED Arrival Up to the Time of Departure from the ED - No data to display    Imaging Studies:   Recent Results (from the past 24 hour(s))   XR Chest Port 1 View    Narrative    CHEST ONE VIEW PORTABLE   10/25/2019 2:35 PM     HISTORY: Fever, cough.    COMPARISON: 3/1/2015      Impression    IMPRESSION: Abnormal airspace opacity projected over the right upper  lobe suggestive of right upper lobe pneumonia. The left lung is clear.  No pneumothorax or pleural effusion. Multilevel thoracic vertebral  hardware noted.    CASSIDY ROJAS MD       Recent vital signs:   /66   Pulse 128   Temp 98.2  F (36.8  C) (Axillary)   Resp 25   Wt 35.9 kg (79 lb 3.2 oz)   SpO2 95%   BMI 23.19 kg/m              Cardiac Rhythm: Tachycardia  Pt needs tele? Yes  Skin/wound Issues: None    Code Status: Full Code    Pain control: good    Nausea control: pt had none    Abnormal labs/tests/findings requiring intervention: see results review    Family present during ED course? Yes   Family Comments/Social Situation comments: pt lives in a group home and caregiver was present for several hours.  Mom was flying out of town and came directly to hospital from airport and is currently at bedside.  She plans to stay with patient.    Tasks needing completion:  None    Bethany Guidry RN  [facility]   [phone] [facility] ED  [phone] East ED

## 2019-10-26 LAB
GLUCOSE BLDC GLUCOMTR-MCNC: 109 MG/DL (ref 70–99)
GLUCOSE BLDC GLUCOMTR-MCNC: 110 MG/DL (ref 70–99)
GLUCOSE BLDC GLUCOMTR-MCNC: 128 MG/DL (ref 70–99)
GLUCOSE BLDC GLUCOMTR-MCNC: 88 MG/DL (ref 70–99)
LACTATE BLD-SCNC: 0.6 MMOL/L (ref 0.7–2)

## 2019-10-26 PROCEDURE — 94640 AIRWAY INHALATION TREATMENT: CPT | Mod: 76

## 2019-10-26 PROCEDURE — 25000132 ZZH RX MED GY IP 250 OP 250 PS 637: Performed by: NURSE PRACTITIONER

## 2019-10-26 PROCEDURE — 25000132 ZZH RX MED GY IP 250 OP 250 PS 637: Performed by: HOSPITALIST

## 2019-10-26 PROCEDURE — 40000275 ZZH STATISTIC RCP TIME EA 10 MIN

## 2019-10-26 PROCEDURE — 25000125 ZZHC RX 250: Performed by: HOSPITALIST

## 2019-10-26 PROCEDURE — 25800030 ZZH RX IP 258 OP 636: Performed by: NURSE PRACTITIONER

## 2019-10-26 PROCEDURE — 12000001 ZZH R&B MED SURG/OB UMMC

## 2019-10-26 PROCEDURE — 99233 SBSQ HOSP IP/OBS HIGH 50: CPT | Performed by: HOSPITALIST

## 2019-10-26 PROCEDURE — 25000125 ZZHC RX 250

## 2019-10-26 PROCEDURE — 00000146 ZZHCL STATISTIC GLUCOSE BY METER IP

## 2019-10-26 PROCEDURE — 94640 AIRWAY INHALATION TREATMENT: CPT

## 2019-10-26 PROCEDURE — 25000128 H RX IP 250 OP 636: Performed by: NURSE PRACTITIONER

## 2019-10-26 RX ORDER — ALBUTEROL SULFATE 0.83 MG/ML
2.5 SOLUTION RESPIRATORY (INHALATION) 3 TIMES DAILY
Status: DISCONTINUED | OUTPATIENT
Start: 2019-10-27 | End: 2019-10-29

## 2019-10-26 RX ORDER — ALBUTEROL SULFATE 5 MG/ML
2.5 SOLUTION RESPIRATORY (INHALATION) 3 TIMES DAILY
Status: DISCONTINUED | OUTPATIENT
Start: 2019-10-26 | End: 2019-10-26

## 2019-10-26 RX ORDER — ALBUTEROL SULFATE 0.83 MG/ML
SOLUTION RESPIRATORY (INHALATION)
Status: COMPLETED
Start: 2019-10-26 | End: 2019-10-26

## 2019-10-26 RX ADMIN — LEVETIRACETAM 1500 MG: 100 SOLUTION ORAL at 00:18

## 2019-10-26 RX ADMIN — IBUPROFEN 200 MG: 200 SUSPENSION ORAL at 11:19

## 2019-10-26 RX ADMIN — LEVETIRACETAM 1500 MG: 100 SOLUTION ORAL at 17:27

## 2019-10-26 RX ADMIN — IBUPROFEN 200 MG: 200 SUSPENSION ORAL at 17:22

## 2019-10-26 RX ADMIN — ALBUTEROL SULFATE 2.5 MG: 2.5 SOLUTION RESPIRATORY (INHALATION) at 11:35

## 2019-10-26 RX ADMIN — ACETAMINOPHEN 480 MG: 325 SOLUTION ORAL at 03:28

## 2019-10-26 RX ADMIN — ALBUTEROL SULFATE 2.5 MG: 2.5 SOLUTION RESPIRATORY (INHALATION) at 19:56

## 2019-10-26 RX ADMIN — Medication 1 SCOOP: at 20:07

## 2019-10-26 RX ADMIN — ACETAMINOPHEN 480 MG: 325 SOLUTION ORAL at 20:06

## 2019-10-26 RX ADMIN — IBUPROFEN 200 MG: 200 SUSPENSION ORAL at 00:18

## 2019-10-26 RX ADMIN — ALBUTEROL SULFATE 2.5 MG: 2.5 SOLUTION RESPIRATORY (INHALATION) at 13:58

## 2019-10-26 RX ADMIN — AZITHROMYCIN MONOHYDRATE 400 MG: 500 INJECTION, POWDER, LYOPHILIZED, FOR SOLUTION INTRAVENOUS at 11:19

## 2019-10-26 RX ADMIN — ACETAMINOPHEN 480 MG: 325 SOLUTION ORAL at 14:46

## 2019-10-26 RX ADMIN — ARIPIPRAZOLE 7.5 MG: 1 SOLUTION ORAL at 09:07

## 2019-10-26 RX ADMIN — ESTRADIOL 0.5 MG: 0.5 TABLET ORAL at 09:07

## 2019-10-26 RX ADMIN — CEFTRIAXONE 1 G: 1 INJECTION, POWDER, FOR SOLUTION INTRAMUSCULAR; INTRAVENOUS at 12:50

## 2019-10-26 RX ADMIN — IBUPROFEN 200 MG: 200 SUSPENSION ORAL at 05:15

## 2019-10-26 RX ADMIN — LEVETIRACETAM 1000 MG: 100 SOLUTION ORAL at 09:06

## 2019-10-26 RX ADMIN — ACETAMINOPHEN 480 MG: 325 SOLUTION ORAL at 09:06

## 2019-10-26 RX ADMIN — Medication 1 SCOOP: at 09:36

## 2019-10-26 RX ADMIN — TRAZODONE HYDROCHLORIDE 50 MG: 50 TABLET ORAL at 17:25

## 2019-10-26 RX ADMIN — SERTRALINE HYDROCHLORIDE 200 MG: 20 SOLUTION ORAL at 09:06

## 2019-10-26 ASSESSMENT — ACTIVITIES OF DAILY LIVING (ADL)
ADLS_ACUITY_SCORE: 45
ADLS_ACUITY_SCORE: 33
ADLS_ACUITY_SCORE: 33
ADLS_ACUITY_SCORE: 45

## 2019-10-26 NOTE — PROGRESS NOTES
Pt arrived to floor via stretcher, mother arrived soon after, mother has multiple concerns, writer addressed as much as writer could and triage doctor was paged.Writer settled pt and gave report to on coming staff.

## 2019-10-26 NOTE — H&P
[facility], [place]    History and Physical - Hospitalist Service, Gold Night       Date of Admission:  10/25/2019    Assessment & Plan   Sherly Ramires is a 24 year old woman at baseline the patient is bedbound.  admitted on 10/25/2019. She has a history of congenital disorder of glycosylation type 1a, severe developmental delay and seizures who is admitted with right upper lobe PNA.    1) Right upper lobe pneumonia - Presents with cold symptoms ongoing since 10/22.  CXR today shows RUL opacity concerning for PNA.  - Started on ceftriaxone and azithromycin in the ED, will continue.  Given patient's size will go with pediatric dosing of azithromycin - 400 mg IV on day one and two, 200 mg oral afterwards.  - Scheduled Tylenol and ibuprofen for fever suppression given history of CDG1a  - Patient has a history of third spacing fluids.  For now we will continue to hydrate with her normal tube feeds and water.  If she requires aggressive IV hydration would refer to care plan and consider giving albumin.  -Genetics main concern for her would be hypoglycemia in the setting of acute illness.  Will check blood sugars before meals, at bedtime and overnight.  Low threshold to start D10 half-normal saline.    2) History of CDG1a - The patient has a history of severe developmental delay, seizures, fractures and malnutrition requiring G-tube placement secondary to her congenital disorder  -Consult genetics service  - As above, avoid fevers and caution with IV fluids    3) History of seizure  -Continue home Keppra    4) History of malnutrition  -Continue home tube feeds.  -Patient is on Nestlé complete and takes 4 cans over the course of the day with each can mixed with 200 cc of water and given as a bolus.  She then takes another can mixed with 300 cc of water and this is run overnight at 40 cc/h.  We do not appear to have this in stock, but family will leave us one can for overnight.  Would check  with nutrition in the a.m. to ensure that we can provide this or find a substitute.      Diet: Regular diet  DVT Prophylaxis: Mobility unchanged, not needed  Emerson Catheter: not present  Code Status: Full    Disposition Plan   Expected discharge: 4 - 7 days, recommended to prior living arrangement once infection treated.  Entered: KIMBERLY Garcia CNP 10/25/2019, 7:33 PM     The patient's care was discussed with the Attending Physician, Dr. Pettit.    KIMBERLY Garcia CNP  [facility], [place]  Pager: [phone]  Please see sticky note for cross cover information  ______________________________________________________________________    Chief Complaint   Pneumonia    History is obtained from the patient's parent(s)    History of Present Illness   Sherly Ramires is a 24 year old woman admitted on 10/25/2019. She has a history of congenital disorder of glycosylation type 1a, severe developmental delay and seizures who is admitted with right upper lobe PNA.    The patient has severe developmental delay and cannot provide history.  I spoke with her mother.    Her mother reports the patient has been sick with cold-like symptoms for the past 3 days.  The patient has not been sleeping well and taking very little oral intake which is unusual for her.  Today she took the patient in to urgent care where she was then referred into the emergency department.  There she was diagnosed with pneumonia based off of chest x-ray and started on ceftriaxone and azithromycin and transferred here.    The patient has a history of glycosylation type Ia mostly complicated by developmental delay, seizures, malnutrition requiring a G tube as well as broken bones.  Family also reports a history of sleep deprivation seizures and are particularly worried that this could be a possibility given her acute illness and lack of sleep over the past several days.    At baseline the patient is bedbound.  She is  able to take small amounts by mouth but is primarily dependent on her G-tube for feedings.  She is able to engage and smile at people but cannot speak or generally make her needs known.      Review of Systems    Review of systems not obtained due to patient factors - mental status    Past Medical History    I have reviewed this patient's medical history and updated it with pertinent information if needed.   Past Medical History:   Diagnosis Date     Anxiety      Global developmental delay      Hypoglycemia      Metabolic disease     congenital disorder of glycosilation     Scoliosis      Seizures (H)     last at age 8 before today     Strabismus        Past Surgical History   I have reviewed this patient's surgical history and updated it with pertinent information if needed.  Past Surgical History:   Procedure Laterality Date     ANESTHESIA OUT OF OR MRI 3T N/A 3/7/2017    Procedure: ANESTHESIA PEDS SEDATION MRI 3T;  Surgeon: GENERIC ANESTHESIA PROVIDER;  Location: [facility] PEDS SEDATION      BACK SURGERY      c1 decompression and fusionx3, scoliosis with instrumentation x1     ELECTRORETINOGRAM Bilateral 12/10/2014    ERG ([place])     ENT SURGERY       ear tubes     EXAM UNDER ANESTHESIA EYE(S) Bilateral 12/10/2014    EUA ([place])     EXTRACTION(S) DENTAL N/A 12/10/2014    Procedure: EXTRACTION(S) DENTAL;  Surgeon: Hubert York DDS;  Location: [facility] OR     EYE SURGERY Bilateral 1996    BMR 6mm ([place])      GI SURGERY      g tube     HC REPLACE GASTROSTOMY/CECOSTOMY TUBE PERCUTANEOUS N/A 12/10/2014    Procedure: REPLACE GASTROSTOMY TUBE, PERCUTANEOUS;  Surgeon: Phong Perea MD;  Location: [facility] OR     RECESSION RESECTION (REPAIR STRABISMUS) BILATERAL Bilateral 12/10/2014    BLR 11 ([place])     STRABISMUS SURGERY  1996    (1996) BMR 6 ([place])     STRABISMUS SURGERY  2014    BLR 11 ([place]) -        Social History   I have reviewed this patient's social history and updated it with pertinent information if  needed.  Social History     Tobacco Use     Smoking status: Never Smoker     Smokeless tobacco: Never Used   Substance Use Topics     Alcohol use: No     Drug use: No       Family History   I have reviewed this patient's family history and updated it with pertinent information if needed.   Family History   Problem Relation Age of Onset     Glaucoma Mother         no medications or surg to date     Glaucoma Maternal Grandmother         s/p surg, decreased [facility]      Amblyopia No family hx of      Strabismus No family hx of      Prior to Admission Medications   Prior to Admission Medications   Prescriptions Last Dose Informant Patient Reported? Taking?   ARIPiprazole (ABILIFY) 1 MG/ML SOLN   Yes No   Si.5 mg daily    D-Mannose POWD   Yes No   Si times daily 1 scoop - mixed with water   Docusate Sodium (ENEMEEZ MINI RE)   Yes No   Sig: 3 times a week (MWF) to help with bowel movements   MELATONIN PO   Yes No   Sig: Take 3 mg by mouth At Bedtime   Nutritional Supplements (COMPLEAT PEDIATRIC PO)   Yes No   ORDER FOR DME   No No   Sig: Equipment being ordered: Other: chest strap for her tilt in space manual wheel chair  Treatment Diagnosis: For safe transportation to home secondary to poor sitting balance in upright. CDG type Ia, seizures, and G-tube dependence.   UNABLE TO FIND   Yes No   Sig: MEDICATION NAME: Stress relax (magnesium citrate) 300 mg at bedtime   UNABLE TO FIND   Yes No   Sig: MEDICATION NAME: Mood probiotic 1 capsule (3,000,000,000 CFUs) daily.   UNABLE TO FIND   Yes No   Sig: MEDICATION NAME: Rescue Remedy Herbal Supplement - Used as needed.   acetaminophen (TYLENOL) 160 MG/5ML solution   Yes No   Si mg (15 mL) by G-tube every 4 hours as needed   artificial saliva (BIOTENE DRY MOUTHWASH) LIQD liquid   Yes No   Sig: Swish and spit in mouth 4 times daily as needed for dry mouth   bacitracin ointment   No No   Sig: Apply topically 2 times daily as needed for wound care   calcium carbonate 1250  (500 CA) MG/5ML SUSP   No No   Sig: Take 6 mLs (1,500 mg) by mouth 2 times daily (with meals)   Patient taking differently: 1,500 mg by Gastronomy tube route 2 times daily (with meals)    cholecalciferol (VITAMIN  -D) 1000 UNITS capsule   No No   Sig: Take 1 capsule (1,000 Units) by mouth daily   Patient not taking: Reported on 2019   diazepam (VALIUM) 5 MG/ML solution   Yes No   Si.5 mg by Gastronomy tube route every hour as needed for seizures (Seizures lasting longer than 5 minutes)    estradiol (ESTRACE) 1 MG tablet   No No   Sig: Take 0.5 mg ( 1/2 tablet) daily.   hydrOXYzine (ATARAX) 10 MG/5ML syrup   Yes No   Sig: Take 10 mg by mouth 2 times daily as needed for itching   levETIRAcetam (KEPPRA) 100 MG/ML solution   No No   Sig: GIVE 10ML (1000MG) PER G-TUBE EVERY MORNING;GIVE 15ML (1500MG) PER G-TUBE EVERY EVENING   loratadine (CLARITIN) 10 MG tablet   Yes No   Sig: Take 10 mg by mouth daily   ondansetron (ZOFRAN) 4 MG/5ML solution   No No   Sig: Take 5 mLs (4 mg) by mouth every 6 hours as needed for nausea or vomiting   polyethylene glycol (MIRALAX/GLYCOLAX) packet   Yes No   Sig: Take 1 packet by mouth daily   sertraline (ZOLOFT) 20 MG/ML (HIGH CONC) solution   Yes No   Si mg by Gastronomy tube route daily    traZODone (DESYREL) 50 MG tablet   Yes No   Sig: by Gastric Tube route At Bedtime   traZODone (DESYREL) 50 MG tablet   Yes No   Sig: Crush one tablet and administer via G-tube 30 minutes prior to bedtime daily      Facility-Administered Medications: None     Allergies   Allergies   Allergen Reactions     Ativan [Lorazepam] Other (See Comments)     Paradoxical reaction     Pseudoephedrine        Physical Exam   Vital Signs: Temp: 98.7  F (37.1  C) Temp src: Axillary BP: 115/77 Pulse: 125 Heart Rate: 118 Resp: 28 SpO2: 96 % O2 Device: None (Room air)    Weight: 72 lbs 4.97 oz    Physical Exam   Constitutional:   Young, thin woman lying in bed making limited eye contact.   Head: Slightly  microcephalic   Eyes: Conjunctivae are normal.   Cardiovascular: Regular rate and rhythm without murmurs or gallops  Pulmonary/Chest: Clear to auscultation bilaterally, with no wheezes or retractions. No respiratory distress.  GI: Soft with good bowel sounds.  Non-tender, non-distended, with no guarding, no rebound, no peritoneal signs.  G tube in place.  Musculoskeletal:  No edema or clubbing.  Contractures present   Skin: Skin is warm and dry. No rash noted.   Neurological: Moves limbs purposefully, makes eye contact and smiles with prompting.  Psychiatric:  Unable to assess      Data   Data reviewed today: I reviewed all medications, new labs and imaging results over the last 24 hours. I personally reviewed her labs and imaging.

## 2019-10-26 NOTE — PLAN OF CARE
/58   Pulse 116   Temp 96.8  F (36  C) (Axillary)   Resp 28   Wt 32.8 kg (72 lb 5 oz)   SpO2 99%   BMI 21.17 kg/m      Care from: 10/25 1900 - 10/26 0730      VS & Pain: VSS ex tachycardic, tachypnea, on 5 LPM overnight, showed no physiological s/s of pain. Slept well, easy to arouse     Neuro: Alert, CHENCHO orientation due to cognitive delayed, difficulty speaking and understanding; speaks incomprehensible sounds; calm & flat affect; inconsistent in following command. BUE motor strength: 3, BLE motor strength: 2.      Respiratory: tachypnea, infrequent weak congested cough, fine crackles noted in BLL    Cardiac: tachycardic    Peripheral neurovascular: baseline of decreased sensation present in BLE per mother    GI/: fecal and urinary incontinence    Nutrition: combination reg diet, total feed, tube feed runs at 40 mL/hr 10/25 2000 - 10/26 0700     Skin: WDL ex L PIV & scabbing    Musculoskeletal: active ROM severely impaired in all extremities     Lines: L PIV is WDL and saline locked    Activity: bedbound, assist of 2, mechanical lift, total care    Events this shift: Completed admission checklist. Administered all scheduled medications. Administered tube feed. Turned/repositioned q2h.    Plan: Continue to monitor and follow poc.

## 2019-10-26 NOTE — PROGRESS NOTES
[facility], [facility] Progress Note - Hospitalist Service, Gold 9       Date of Admission:  10/25/2019  Assessment & Plan   Sherly Ramires is a 24 year old woman with a history of congenital disorder of glycosylation type 1a, severe developmental delay and seizures who is admitted with right upper lobe PNA.     1) Right upper lobe pneumonia - Presents with cold symptoms ongoing since 10/22.  CXR today shows RUL opacity concerning for PNA.  - Started on ceftriaxone and azithromycin in the ED, will continue.  Given patient's size will go with pediatric dosing of azithromycin - 400 mg IV on day one and two, 200 mg oral afterwards.  - Scheduled Tylenol and ibuprofen for fever suppression given history of CDG1a  - Patient has a history of third spacing fluids.  For now we will continue to hydrate with her normal tube feeds and water.  If she requires aggressive IV hydration would refer to care plan and consider giving albumin.  -Genetics main concern for her would be hypoglycemia in the setting of acute illness.  Will check blood sugars twice daily today.  Low threshold to start D10 half-normal saline.     2) History of CDG1a - The patient has a history of severe developmental delay, seizures, fractures and malnutrition requiring G-tube placement secondary to her congenital disorder  -Consult genetics service - discussed with Dr. Duff at the bedside  - As above, avoid fevers and caution with IV fluids     3) History of seizure  -Continue home Keppra     4) History of malnutrition  -Continue home tube feeds.  -Patient is on Nestlé complete and takes 4 cans over the course of the day with each can mixed with 200 cc of water and given as a bolus.  She then takes another can mixed with 300 cc of water and this is run overnight at 40 cc/h.     Diet: Pediatric Formula Bolus Feeding: QID Other - Specify; Nestle Compleat; Gastrostomy/PEG tube; 1; can(s); Feedings per day; 4; 7:00 AM;  10:00 AM; 2:00 PM; 5:00 PM; Give over: 1; hours; Mix 200 ccs water with tube feed container and give at 400 ccs/h...  Pediatric Formula Drip Feeding: Specified Time Other - Specify; Nestle Compleat; Gastrostomy/PEG tube; Rate: 40; Rate Units: mL/hr; From: 8:04 PM; To: 7:04 AM; Mix one can with 300 ccs of water and run at 40 ccs/hr overnight  Combination Diet Regular Diet Adult    DVT Prophylaxis: Low Risk st baseline mobility  Emerson Catheter: not present  Code Status: Full Code      Disposition Plan   Expected discharge: 2 - 3 days, recommended to prior living arrangement once antibiotic plan established and O2 use less than 0 liters/minute.  Entered: Hong Braun MD 10/26/2019, 12:23 PM       The patient's care was discussed with the Bedside Nurse, Patient and Patient's Family.    Hong Braun MD  Hospitalist Service, [address], [place]  Pager: [phone]  Please see sticky note for cross cover information  ______________________________________________________________________    Interval History   Stable overnight without acute events. Tolerating oxygen well with good sats. Cough is weak. Was able to sleep a little bit. No fevers or chills overnight.    Data reviewed today: I reviewed all medications, new labs and imaging results over the last 24 hours. I personally reviewed no images or EKG's today.    Physical Exam   Vital Signs: Temp: 96.8  F (36  C) Temp src: Axillary BP: 101/58 Pulse: 116 Heart Rate: 109 Resp: 28 SpO2: 99 % O2 Device: Oxymask Oxygen Delivery: 2 LPM  Weight: 72 lbs 4.97 oz  GEN: Small stature, resting in bed  CHEST: CTA B, no accessory muscles, no crackles or wheezes  CV: RRR  ABD: soft, thin, nt/nd.  EXT: no edema

## 2019-10-27 ENCOUNTER — APPOINTMENT (OUTPATIENT)
Dept: ULTRASOUND IMAGING | Facility: CLINIC | Age: 24
DRG: 193 | End: 2019-10-27
Attending: HOSPITALIST
Payer: COMMERCIAL

## 2019-10-27 LAB
GLUCOSE BLDC GLUCOMTR-MCNC: 96 MG/DL (ref 70–99)
LACTATE BLD-SCNC: 1.5 MMOL/L (ref 0.7–2)

## 2019-10-27 PROCEDURE — 99233 SBSQ HOSP IP/OBS HIGH 50: CPT | Performed by: HOSPITALIST

## 2019-10-27 PROCEDURE — 93971 EXTREMITY STUDY: CPT | Mod: RT

## 2019-10-27 PROCEDURE — 00000146 ZZHCL STATISTIC GLUCOSE BY METER IP

## 2019-10-27 PROCEDURE — 94640 AIRWAY INHALATION TREATMENT: CPT | Mod: 76

## 2019-10-27 PROCEDURE — 12000001 ZZH R&B MED SURG/OB UMMC

## 2019-10-27 PROCEDURE — 83605 ASSAY OF LACTIC ACID: CPT

## 2019-10-27 PROCEDURE — 25000132 ZZH RX MED GY IP 250 OP 250 PS 637: Performed by: HOSPITALIST

## 2019-10-27 PROCEDURE — 93971 EXTREMITY STUDY: CPT | Mod: LT

## 2019-10-27 PROCEDURE — 25000128 H RX IP 250 OP 636: Performed by: NURSE PRACTITIONER

## 2019-10-27 PROCEDURE — 25000132 ZZH RX MED GY IP 250 OP 250 PS 637: Performed by: NURSE PRACTITIONER

## 2019-10-27 PROCEDURE — 40000275 ZZH STATISTIC RCP TIME EA 10 MIN

## 2019-10-27 PROCEDURE — 94640 AIRWAY INHALATION TREATMENT: CPT

## 2019-10-27 PROCEDURE — 25000125 ZZHC RX 250

## 2019-10-27 RX ORDER — LACTOBACILLUS RHAMNOSUS GG 10B CELL
1 CAPSULE ORAL 2 TIMES DAILY
Status: DISCONTINUED | OUTPATIENT
Start: 2019-10-27 | End: 2019-10-30 | Stop reason: HOSPADM

## 2019-10-27 RX ADMIN — ACETAMINOPHEN 480 MG: 325 SOLUTION ORAL at 19:49

## 2019-10-27 RX ADMIN — LEVETIRACETAM 1500 MG: 100 SOLUTION ORAL at 19:40

## 2019-10-27 RX ADMIN — ACETAMINOPHEN 480 MG: 325 SOLUTION ORAL at 02:38

## 2019-10-27 RX ADMIN — TRAZODONE HYDROCHLORIDE 50 MG: 50 TABLET ORAL at 17:38

## 2019-10-27 RX ADMIN — IBUPROFEN 200 MG: 200 SUSPENSION ORAL at 23:54

## 2019-10-27 RX ADMIN — AZITHROMYCIN 200 MG: 1200 POWDER, FOR SUSPENSION ORAL at 08:43

## 2019-10-27 RX ADMIN — ALBUTEROL SULFATE 2.5 MG: 2.5 SOLUTION RESPIRATORY (INHALATION) at 13:16

## 2019-10-27 RX ADMIN — LEVETIRACETAM 1000 MG: 100 SOLUTION ORAL at 08:43

## 2019-10-27 RX ADMIN — Medication 1 MG: at 19:40

## 2019-10-27 RX ADMIN — ALBUTEROL SULFATE 2.5 MG: 2.5 SOLUTION RESPIRATORY (INHALATION) at 20:45

## 2019-10-27 RX ADMIN — ARIPIPRAZOLE 7.5 MG: 1 SOLUTION ORAL at 08:44

## 2019-10-27 RX ADMIN — IBUPROFEN 200 MG: 200 SUSPENSION ORAL at 00:27

## 2019-10-27 RX ADMIN — ACETAMINOPHEN 480 MG: 325 SOLUTION ORAL at 13:55

## 2019-10-27 RX ADMIN — IBUPROFEN 200 MG: 200 SUSPENSION ORAL at 12:08

## 2019-10-27 RX ADMIN — SERTRALINE HYDROCHLORIDE 200 MG: 20 SOLUTION ORAL at 08:43

## 2019-10-27 RX ADMIN — IBUPROFEN 200 MG: 200 SUSPENSION ORAL at 05:22

## 2019-10-27 RX ADMIN — Medication 1 CAPSULE: at 19:41

## 2019-10-27 RX ADMIN — ACETAMINOPHEN 480 MG: 325 SOLUTION ORAL at 08:44

## 2019-10-27 RX ADMIN — Medication 1 CAPSULE: at 12:09

## 2019-10-27 RX ADMIN — Medication 1 SCOOP: at 08:43

## 2019-10-27 RX ADMIN — ESTRADIOL 0.5 MG: 0.5 TABLET ORAL at 08:44

## 2019-10-27 RX ADMIN — CEFTRIAXONE 1 G: 1 INJECTION, POWDER, FOR SOLUTION INTRAMUSCULAR; INTRAVENOUS at 12:08

## 2019-10-27 RX ADMIN — Medication 1 SCOOP: at 19:40

## 2019-10-27 RX ADMIN — IBUPROFEN 200 MG: 200 SUSPENSION ORAL at 17:38

## 2019-10-27 RX ADMIN — ALBUTEROL SULFATE 2.5 MG: 2.5 SOLUTION RESPIRATORY (INHALATION) at 07:41

## 2019-10-27 ASSESSMENT — ACTIVITIES OF DAILY LIVING (ADL)
ADLS_ACUITY_SCORE: 45

## 2019-10-27 NOTE — PLAN OF CARE
BP 93/62 (BP Location: Right arm)   Pulse 116   Temp 97.5  F (36.4  C) (Axillary)   Resp 20   Wt 34.6 kg (76 lb 4.8 oz)   SpO2 98%   BMI 22.34 kg/m      Shift: [id number]   Reason for Admission: Pneumonia on IV Rocephin    VS: VSS on 1L oxymask- tachycardic   Neuros: Cognitive delay, nonverbal, able to understand simple questions.  GI/: 3 loose stools this shift. Incontinent of urine and stool  Diet: Reg diet. TF bolus per home routine.  Drains/Lines: PIV SL. GT clamped in between feeds.  Activity: A2 + lift. Turn q4h per family request.   Pain/Nausea: Denies both per mother  Respiratory: Tachypnea. Try and wean off O2 tonight if possible  Social: Mother at bedside   New this shift: US on LUE d/t swelling in hand- neg. Result. Lactobacillus added to medication list.  Plan of care: Possible discharge tomorrow if weaned off O2. Will continue to monitor and follow POC.

## 2019-10-27 NOTE — PROGRESS NOTES
[facility], [facility] Progress Note - Hospitalist Service, Gold 9       Date of Admission:  10/25/2019  Assessment & Plan   Sherly Ramires is a 24 year old woman with a history of congenital disorder of glycosylation type 1a, severe developmental delay and seizures who is admitted with right upper lobe PNA.     1) Right upper lobe pneumonia - Presents with cold symptoms ongoing since 10/22.  CXR today shows RUL opacity concerning for PNA.  - Started on ceftriaxone and azithromycin in the ED, will continue.  Given patient's size will go with pediatric dosing of azithromycin - 400 mg IV on day one and two, 200 mg oral afterwards. Plan to switch to all orals in the am  - Scheduled Tylenol and ibuprofen for fever suppression given history of CDG1a  - Patient has a history of third spacing fluids.  For now we will continue to hydrate with her normal tube feeds and water.  If she requires aggressive IV hydration would refer to care plan and consider giving albumin.  -Genetics main concern for her would be hypoglycemia in the setting of acute illness.  Will check blood sugars twice daily today.  Low threshold to start D10 half-normal saline.     2) History of CDG1a - The patient has a history of severe developmental delay, seizures, fractures and malnutrition requiring G-tube placement secondary to her congenital disorder  -Consult genetics service - discussed with Dr. Duff at the bedside  - As above, avoid fevers and caution with IV fluids     3) History of seizure  -Continue home Keppra     4) History of malnutrition  -Continue home tube feeds.  -Patient is on Nestlé complete and takes 4 cans over the course of the day with each can mixed with 200 cc of water and given as a bolus.  She then takes   another can mixed with 300 cc of water and this is run overnight at 40 cc/h.     5) Left hand swelling: will r/o DVT with US today - less likely - is likely from malpositioning during  sleep    Diet: Pediatric Formula Bolus Feeding: QID Other - Specify; Nestle Compleat; Gastrostomy/PEG tube; 1; can(s); Feedings per day; 4; 7:00 AM; 10:00 AM; 2:00 PM; 5:00 PM; Give over: 1; hours; Mix 200 ccs water with tube feed container and give at 400 ccs/h...  Pediatric Formula Drip Feeding: Specified Time Other - Specify; Nestle Compleat; Gastrostomy/PEG tube; Rate: 40; Rate Units: mL/hr; From: 8:04 PM; To: 7:04 AM; Mix one can with 300 ccs of water and run at 40 ccs/hr overnight  Combination Diet Regular Diet Adult    DVT Prophylaxis: Low Risk st baseline mobility  Emerson Catheter: not present  Code Status: Full Code      Disposition Plan   Expected discharge: Tomorrow, recommended to prior living arrangement once antibiotic plan established and O2 use less than 0 liters/minute.  Entered: Hong Braun MD 10/27/2019, 12:20 PM       The patient's care was discussed with the Bedside Nurse, Patient and Patient's Family.    Hong Braun MD  Hospitalist Service, [address], [place]  Pager: [phone]  Please see sticky note for cross cover information  ______________________________________________________________________    Interval History   Stable overnight without acute events. Tolerating oxygen well with good sats. Still weak but slept better and with better energy today. No fevers or chills.    Data reviewed today: I reviewed all medications, new labs and imaging results over the last 24 hours. I personally reviewed no images or EKG's today.    Physical Exam   Vital Signs: Temp: 97.5  F (36.4  C) Temp src: Axillary BP: 93/62   Heart Rate: 98 Resp: 20 SpO2: 94 % O2 Device: Oxi Plus Oxygen Delivery: 2 LPM  Weight: 76 lbs 4.8 oz  GEN: Small stature, resting in bed  CHEST: CTA B, no accessory muscles, no crackles or wheezes  CV: RRR  ABD: soft, thin, nt/nd.  EXT: L hand is slightly swollen compared to right

## 2019-10-27 NOTE — PLAN OF CARE
BP 93/62 (BP Location: Right arm)   Pulse 116   Temp 97.5  F (36.4  C) (Axillary)   Resp 20   Wt 34.6 kg (76 lb 4.8 oz)   SpO2 91%   BMI 22.34 kg/m      Care from: 10/26 1900 - 10/27 0730       VS & Pain: VSS ex tachycardic, tachypnea, on 2 LPM, showed no physiological s/s of pain. Slept through the night, easy to arouse.     Neuro: Alert, CHENCHO orientation due to cognitive delayed, difficulty speaking and understanding; speaks incomprehensible sounds; calm & cooperative; inconsistent in following command. BUE motor strength: 3, BLE motor strength: 2.      Respiratory: tachypnea, infrequent weak congested cough, fine crackles noted in BLL    Cardiac: tachycardic    Peripheral neurovascular: baseline of decreased sensation present in BLE per mother    GI/: fecal and urinary incontinence. Unmeasured urine occurrence x2 & BM x1 this shift.    Nutrition: combination reg diet, total feed, tube feed ran at 40 mL/hr overnight, administered bolus feed at 0700 running at 400 mL/hr    Skin: WDL ex L PIV & scabbing    Musculoskeletal: active ROM severely impaired in all extremities     Lines: L PIV is WDL and saline locked    Activity: bedbound, assist of 2, mechanical lift, total care    Events this shift: Provided perineal care. Administered all scheduled medications. Administered tube feed. Turned/repositioned q4h per mother's request, refused q2h.     Plan: Continue to monitor and follow poc.

## 2019-10-27 NOTE — PROGRESS NOTES
Assumed cares [id number]: Pt alert and laying in bed. Brother at bedside and attentive to cares. Started TF bolus. Given med per G tube. G tube WDL. Had one medium BM. Pt does not appear to be in pain or nauseous. Pt's mother will return later tonight to remain at bedside. Continue to monitor.

## 2019-10-27 NOTE — PLAN OF CARE
[id number]: Pt's AVSS. Heart rate 116. O2 sats 97% on 1L via nasal cannula. Pt with weak non productive cough. On scheduled nebs. Scheduled meds given and trazodone given earlier per pt's mom's request. All meds given via G tube. Feeding bolus given around 1740 over 2 hours. Pt had one popsicle. Incontinent of urine and loose brown stool X 1. Repositioned as pt's mom allowed. PIV saline locked. Continue with plan of care.

## 2019-10-28 LAB — LACTATE BLD-SCNC: 0.8 MMOL/L (ref 0.7–2)

## 2019-10-28 PROCEDURE — 12000001 ZZH R&B MED SURG/OB UMMC

## 2019-10-28 PROCEDURE — 40000275 ZZH STATISTIC RCP TIME EA 10 MIN

## 2019-10-28 PROCEDURE — 99233 SBSQ HOSP IP/OBS HIGH 50: CPT | Performed by: HOSPITALIST

## 2019-10-28 PROCEDURE — 94640 AIRWAY INHALATION TREATMENT: CPT | Mod: 76

## 2019-10-28 PROCEDURE — 94640 AIRWAY INHALATION TREATMENT: CPT

## 2019-10-28 PROCEDURE — 83605 ASSAY OF LACTIC ACID: CPT

## 2019-10-28 PROCEDURE — 25000125 ZZHC RX 250

## 2019-10-28 PROCEDURE — 25000128 H RX IP 250 OP 636: Performed by: NURSE PRACTITIONER

## 2019-10-28 PROCEDURE — 25000132 ZZH RX MED GY IP 250 OP 250 PS 637: Performed by: HOSPITALIST

## 2019-10-28 PROCEDURE — 25000132 ZZH RX MED GY IP 250 OP 250 PS 637: Performed by: NURSE PRACTITIONER

## 2019-10-28 RX ADMIN — Medication 1 SCOOP: at 09:06

## 2019-10-28 RX ADMIN — IBUPROFEN 200 MG: 200 SUSPENSION ORAL at 05:37

## 2019-10-28 RX ADMIN — ACETAMINOPHEN 480 MG: 325 SOLUTION ORAL at 02:45

## 2019-10-28 RX ADMIN — ALBUTEROL SULFATE 2.5 MG: 2.5 SOLUTION RESPIRATORY (INHALATION) at 14:18

## 2019-10-28 RX ADMIN — CEFTRIAXONE 1 G: 1 INJECTION, POWDER, FOR SOLUTION INTRAMUSCULAR; INTRAVENOUS at 11:58

## 2019-10-28 RX ADMIN — ARIPIPRAZOLE 7.5 MG: 1 SOLUTION ORAL at 08:52

## 2019-10-28 RX ADMIN — TRAZODONE HYDROCHLORIDE 50 MG: 50 TABLET ORAL at 19:14

## 2019-10-28 RX ADMIN — IBUPROFEN 200 MG: 200 SUSPENSION ORAL at 11:58

## 2019-10-28 RX ADMIN — IBUPROFEN 200 MG: 200 SUSPENSION ORAL at 20:51

## 2019-10-28 RX ADMIN — Medication 1 CAPSULE: at 08:52

## 2019-10-28 RX ADMIN — ACETAMINOPHEN 480 MG: 325 SOLUTION ORAL at 22:26

## 2019-10-28 RX ADMIN — Medication 1 CAPSULE: at 19:14

## 2019-10-28 RX ADMIN — ALBUTEROL SULFATE 2.5 MG: 2.5 SOLUTION RESPIRATORY (INHALATION) at 07:54

## 2019-10-28 RX ADMIN — SERTRALINE HYDROCHLORIDE 200 MG: 20 SOLUTION ORAL at 08:52

## 2019-10-28 RX ADMIN — LEVETIRACETAM 1000 MG: 100 SOLUTION ORAL at 08:52

## 2019-10-28 RX ADMIN — AZITHROMYCIN 200 MG: 1200 POWDER, FOR SUSPENSION ORAL at 08:52

## 2019-10-28 RX ADMIN — ESTRADIOL 0.5 MG: 0.5 TABLET ORAL at 08:53

## 2019-10-28 RX ADMIN — ALBUTEROL SULFATE 2.5 MG: 2.5 SOLUTION RESPIRATORY (INHALATION) at 19:12

## 2019-10-28 RX ADMIN — LEVETIRACETAM 1500 MG: 100 SOLUTION ORAL at 19:14

## 2019-10-28 RX ADMIN — ACETAMINOPHEN 480 MG: 325 SOLUTION ORAL at 16:01

## 2019-10-28 RX ADMIN — Medication 1 SCOOP: at 19:15

## 2019-10-28 RX ADMIN — ACETAMINOPHEN 480 MG: 325 SOLUTION ORAL at 08:51

## 2019-10-28 ASSESSMENT — ACTIVITIES OF DAILY LIVING (ADL)
ADLS_ACUITY_SCORE: 45
ADLS_ACUITY_SCORE: 44
ADLS_ACUITY_SCORE: 45
ADLS_ACUITY_SCORE: 45

## 2019-10-28 NOTE — PROGRESS NOTES
[facility], [facility] Progress Note - Hospitalist Service, Gold 9       Date of Admission:  10/25/2019  Assessment & Plan   Sherly Ramires is a 24 year old woman with a history of congenital disorder of glycosylation type 1a, severe developmental delay and seizures who is admitted with right upper lobe PNA.     1) Right upper lobe pneumonia - Presents with cold symptoms ongoing since 10/22.  CXR today shows RUL opacity concerning for PNA.  - Started on ceftriaxone and azithromycin in the ED, will continue.  Given patient's size will go with pediatric dosing of azithromycin - 400 mg IV on day one and two, 200 mg oral afterwards. Plan to switch to all orals at discharge - would do a total of 5 days (3/5)  - Scheduled Tylenol and ibuprofen for fever suppression given history of CDG1a  - Patient has a history of third spacing fluids.  For now we will continue to hydrate with her normal tube feeds and water.  If she requires aggressive IV hydration would refer to care plan and consider giving albumin.  -Genetics main concern for her would be hypoglycemia in the setting of acute illness.  Will check blood sugars twice daily today.  Low threshold to start D10 half-normal saline.     2) History of CDG1a - The patient has a history of severe developmental delay, seizures, fractures and malnutrition requiring G-tube placement secondary to her congenital disorder  -Consult genetics service - discussed with Dr. Duff at the bedside  - As above, avoid fevers and caution with IV fluids     3) History of seizure  -Continue home Keppra     4) History of malnutrition  -Continue home tube feeds.  -Patient is on Nestlé complete and takes 4 cans over the course of the day with each can mixed with 200 cc of water and given as a bolus.  She then takes   another can mixed with 300 cc of water and this is run overnight at 40 cc/h.     5) Left hand swelling: no DVT per ultrasound - better  today    Diet: Pediatric Formula Bolus Feeding: QID Other - Specify; Nestle Compleat; Gastrostomy/PEG tube; 1; can(s); Feedings per day; 4; 7:00 AM; 10:00 AM; 2:00 PM; 5:00 PM; Give over: 1; hours; Mix 200 ccs water with tube feed container and give at 400 ccs/h...  Pediatric Formula Drip Feeding: Specified Time Other - Specify; Nestle Compleat; Gastrostomy/PEG tube; Rate: 40; Rate Units: mL/hr; From: 8:04 PM; To: 7:04 AM; Mix one can with 300 ccs of water and run at 40 ccs/hr overnight  Combination Diet Regular Diet Adult    DVT Prophylaxis: Low Risk st baseline mobility  Emerson Catheter: not present  Code Status: Full Code      Disposition Plan   Expected discharge: Tomorrow, recommended to prior living arrangement once antibiotic plan established and O2 use less than 0 liters/minute.  Entered: Hong Braun MD 10/28/2019, 11:40 AM       The patient's care was discussed with the Bedside Nurse, Patient and Patient's Family.    Hong Braun MD  Hospitalist Service, [address], [place]  Pager: [phone]  Please see sticky note for cross cover information  ______________________________________________________________________    Interval History   Stable overnight without acute events. Has been able to wean almost off of O2. Energy better. No fevers or chills. Breathing comfortably.    Data reviewed today: I reviewed all medications, new labs and imaging results over the last 24 hours. I personally reviewed no images or EKG's today.    Physical Exam   Vital Signs: Temp: 97.6  F (36.4  C) Temp src: Axillary BP: 96/52 Pulse: 98 Heart Rate: 85 Resp: 22 SpO2: 95 % O2 Device: Oxymask Oxygen Delivery: 1/2 LPM  Weight: 76 lbs 0 oz  GEN: Small stature, resting in bed  CHEST: CTA B, no accessory muscles, no crackles or wheezes  CV: RRR  ABD: soft, thin, nt/nd.  EXT: L hand is slightly swollen compared to right

## 2019-10-28 NOTE — PLAN OF CARE
Pt alert all day. Sitting up in wheelchair most of the day playing games with family and friends. Had good oral intake for breakfast and lunch so per her mothers request, TF boluses not given today. 200cc of water given after breakfast and lunch through Gtube. All meds given through Gtube. Pt stating well on room air throughout the day, goal to stat well overnight. Had 3 BMs. Continue to monitor.

## 2019-10-28 NOTE — PROVIDER NOTIFICATION
Paged G9 Attending at [phone]  LOW 5B 5-237 [place] [phone] mom would like MD to eval pt abd distension. Mom in room with pt now.    Attending MD came to  Examine pt and talk to mom and explained abd distension most likely due to GI tract adjusting to IV abx's, probiotic should help with that. No change in plan.

## 2019-10-28 NOTE — PLAN OF CARE
BP 96/52 (BP Location: Right arm)   Pulse 98   Temp 97.6  F (36.4  C) (Axillary)   Resp 22   Wt 34.5 kg (76 lb)   SpO2 95%   BMI 22.25 kg/m      Care from: 10/27 1900 - 10/28 0730       VS & Pain: VSS ex tachycardic, tachypnea, on room air while awake in the evening, on 1/2 LPM while sleeping overnight, showed no physiological s/s of pain. Slept through the night, easy to arouse.     Neuro: Alert, CHENCHO orientation due to cognitive delayed, difficulty speaking and understanding; speaks incomprehensible sounds; calm & cooperative; inconsistent in following command. BUE motor strength: 3, BLE motor strength: 2    Respiratory: tachypnea, infrequent weak congested cough, fine crackles noted in BLL    Cardiac: tachycardic    Peripheral neurovascular: baseline of decreased sensation present in BLE per mother    GI/: fecal and urinary incontinence. Unmeasured urine occurrence x1 & no BM this shift.    Nutrition: combination reg diet, total feed, tube feed ran at 40 mL/hr overnight, administered bolus feed at 0700 running at 400 mL/hr    Skin: WDL ex PIV & scabbing    Musculoskeletal: active ROM severely impaired in all extremities     Lines: L PIV is WDL and saline locked    Activity: bedrest, assist of 2, mechanical lift, total care    Events this shift: Administered all scheduled medications. Administered tube feed. Turned/repositioned q4h per mother's request, mother refused turn/reposition q2h.     Plan: Possible discharge this AM if weaned off O2. Continue to monitor and follow poc.

## 2019-10-28 NOTE — PROGRESS NOTES
CLINICAL NUTRITION SERVICES - ASSESSMENT NOTE     Nutrition Prescription    RECOMMENDATIONS FOR MDs/PROVIDERS TO ORDER:  Continue with current TF regimen, meets patients estimated needs     Malnutrition Status:    Non-severe malnutrition in the context of acute illness    Recommendations already ordered by Registered Dietitian (RD):  None        REASON FOR ASSESSMENT  Sherly Ramires is a/an 24 year old female assessed by the dietitian for Admission Nutrition Risk Screen for reduced oral intake over the last month and tube feeding or parenteral nutrition    Chart reviewed:  History of congenital disorder of glycosylation type 1a, severe developmental delay and seizures who is admitted with right upper lobe PNA.    NUTRITION HISTORY  Obtained from patients mom:   Patient eats PO of regular diet but receives main nutrition from G tube feedings at home.  Home feedings are cycle night feeding and then daytime feedings given over 1 hour QID. Mom works to balance her PO intake with volume of cans of TF daily, but maintains the same total fluids daily.     PO:   --Patient is on a regular diet at home. Receives bolus TF via G-tube if poor po intake at meal time. Usually eats well at meal time unless is ill.      TF:  Access: Gastrostomy tube  # On day time Bolus TF regimen and night time cycle feeds:     ---Nestle Compleat Bolus regimen during the day:   -- Via PE Tetra pack (250 ml), of Nestle Compleat formula @ 4 feedings per day ( 7:00 am, 10:00 am, 2:00 pm, 5:00 pm ).  --Can skip Bolus feeds if patient eats a full meal tray.   --Mix One Tetra pack ( 250 ml), Nestle Compleat + with 200 ml free water, infuse @ 400 ml/hr via pump QID during the day.     ---Nestle Compleat Cycle regimen over night:   --Via PEG: @ 40 ml/hr start at 8:00 pm -> Mix 1 Tetra pack ( 250 ml ), Nestle Compleat tube feed with 300 ml free water = infuse overnight @ 40 ml/hr until bag empty per home regimen.      --Regimen provides: (5 Carton of  "Compleat, 1250 ml/day, 2135 ml free water):   --( 1325 kcal /day ( 38 kcal /kg), 60 gm protein /day ( 1.7 gm protein/kg), 170 gm carb, 10 gm fiber and 1035 ml free water).   --Dosing wt: 35 kg admit wt       CURRENT NUTRITION ORDERS  Diet: Regular  Intake/Tolerance: Had good oral intake for breakfast and lunch so per her mothers request, TF boluses not given today.   --Patient received 200cc of free water after breakfast and lunch through Gtube. Per mom, patient does not drink much fluid and always gets free water flushes QID boluses @ 200 ml daily.     LABS  Renal function normal  Lytes normal   Bicarb: 33 (elevated)     MEDICATIONS  Medications reviewed    ANTHROPOMETRICS  Height: 0 cm (Data Unavailable) - 4'1\" ( 124.5 cm )   Most Recent Weight: 34.5 kg (76 lb) on 10/27/19   IBW: 35.47 kg ( 100% IBW)   BMI: 22.25 kg /m2 - Normal BMI  Weight History:   Wt Readings from Last 10 Encounters:   10/27/19 34.5 kg (76 lb)   09/18/19 30.8 kg (68 lb)   11/29/18 31.3 kg (69 lb)   09/17/18 32.4 kg (71 lb 8 oz)   05/23/18 34 kg (75 lb)   11/22/17 35.8 kg (78 lb 14.8 oz)   08/24/17 36.3 kg (80 lb)   06/22/17 38.6 kg (85 lb)   06/14/17 40.6 kg (89 lb 8 oz)   04/19/17 38.1 kg (84 lb)     Dosing Weight: 35 kg admit wt     ASSESSED NUTRITION NEEDS  Estimated Energy Needs: 875-1050 kcals/day (PO/EN: 25-30 kcal/kg)  Justification: Maintenance  Estimated Protein Needs: 35-53 grams protein/day (1-1.5 g/kg)  Justification: Maintenance  Estimated Fluid Needs: (1 mL/kcal)   Justification: Maintenance    PHYSICAL FINDINGS  See malnutrition section below.    MALNUTRITION  % Intake: No decreased intake noted  % Weight Loss: None noted  Subcutaneous Fat Loss: Facial region:  Mild   Muscle Loss: Upper arm (bicep, tricep) and Lower arm  (forearm):  Moderate  Fluid Accumulation/Edema: None noted  Malnutrition Diagnosis: Non-severe malnutrition in the context of acute illness    NUTRITION DIAGNOSIS  Predicted inadequate nutrient intake related to " Respiratory infection, may hinder patients ability to take sufficient PO/EN      INTERVENTIONS  Implementation  Nutrition education for nutrition relationship to health/disease , role of RD in nutrition POC     Goals  1. Weight maintenance throughout hospitalization  2. Meet >85% estimated kcal, protein needs through TF + PO intake     Monitoring/Evaluation  Progress toward goals will be monitored and evaluated per protocol.    Sae Jones RD/CAILIN  Pager [phone]

## 2019-10-29 LAB
ALBUMIN SERPL-MCNC: 2.1 G/DL (ref 3.4–5)
ALP SERPL-CCNC: 128 U/L (ref 40–150)
ALT SERPL W P-5'-P-CCNC: 43 U/L (ref 0–50)
ANION GAP SERPL CALCULATED.3IONS-SCNC: 2 MMOL/L (ref 3–14)
AST SERPL W P-5'-P-CCNC: 32 U/L (ref 0–45)
BILIRUB SERPL-MCNC: 0.2 MG/DL (ref 0.2–1.3)
BUN SERPL-MCNC: 13 MG/DL (ref 7–30)
CALCIUM SERPL-MCNC: 7.9 MG/DL (ref 8.5–10.1)
CHLORIDE SERPL-SCNC: 112 MMOL/L (ref 94–109)
CO2 SERPL-SCNC: 29 MMOL/L (ref 20–32)
CREAT SERPL-MCNC: 0.26 MG/DL (ref 0.52–1.04)
GFR SERPL CREATININE-BSD FRML MDRD: >90 ML/MIN/{1.73_M2}
GLUCOSE SERPL-MCNC: 105 MG/DL (ref 70–99)
POTASSIUM SERPL-SCNC: 3.8 MMOL/L (ref 3.4–5.3)
PROT SERPL-MCNC: 5.5 G/DL (ref 6.8–8.8)
SODIUM SERPL-SCNC: 143 MMOL/L (ref 133–144)

## 2019-10-29 PROCEDURE — 40000802 ZZH SITE CHECK

## 2019-10-29 PROCEDURE — 25000132 ZZH RX MED GY IP 250 OP 250 PS 637: Performed by: HOSPITALIST

## 2019-10-29 PROCEDURE — 99233 SBSQ HOSP IP/OBS HIGH 50: CPT | Performed by: INTERNAL MEDICINE

## 2019-10-29 PROCEDURE — P9047 ALBUMIN (HUMAN), 25%, 50ML: HCPCS | Performed by: INTERNAL MEDICINE

## 2019-10-29 PROCEDURE — 12000001 ZZH R&B MED SURG/OB UMMC

## 2019-10-29 PROCEDURE — 94640 AIRWAY INHALATION TREATMENT: CPT | Mod: 76

## 2019-10-29 PROCEDURE — 80053 COMPREHEN METABOLIC PANEL: CPT | Performed by: INTERNAL MEDICINE

## 2019-10-29 PROCEDURE — 40000141 ZZH STATISTIC PERIPHERAL IV START W/O US GUIDANCE

## 2019-10-29 PROCEDURE — 25000125 ZZHC RX 250

## 2019-10-29 PROCEDURE — 25000132 ZZH RX MED GY IP 250 OP 250 PS 637: Performed by: INTERNAL MEDICINE

## 2019-10-29 PROCEDURE — 94640 AIRWAY INHALATION TREATMENT: CPT

## 2019-10-29 PROCEDURE — 25000132 ZZH RX MED GY IP 250 OP 250 PS 637: Performed by: NURSE PRACTITIONER

## 2019-10-29 PROCEDURE — 25000128 H RX IP 250 OP 636: Performed by: INTERNAL MEDICINE

## 2019-10-29 PROCEDURE — 36415 COLL VENOUS BLD VENIPUNCTURE: CPT | Performed by: INTERNAL MEDICINE

## 2019-10-29 RX ORDER — FUROSEMIDE 20 MG
20 TABLET ORAL DAILY
Status: DISCONTINUED | OUTPATIENT
Start: 2019-10-29 | End: 2019-10-29

## 2019-10-29 RX ORDER — ALBUMIN (HUMAN) 12.5 G/50ML
12.5 SOLUTION INTRAVENOUS ONCE
Status: DISCONTINUED | OUTPATIENT
Start: 2019-10-29 | End: 2019-10-29

## 2019-10-29 RX ORDER — FUROSEMIDE 10 MG/ML
20 INJECTION INTRAMUSCULAR; INTRAVENOUS ONCE
Status: COMPLETED | OUTPATIENT
Start: 2019-10-29 | End: 2019-10-29

## 2019-10-29 RX ORDER — ALBUMIN (HUMAN) 12.5 G/50ML
18.75 SOLUTION INTRAVENOUS ONCE
Status: COMPLETED | OUTPATIENT
Start: 2019-10-29 | End: 2019-10-29

## 2019-10-29 RX ORDER — CEFDINIR 125 MG/5ML
300 POWDER, FOR SUSPENSION ORAL 2 TIMES DAILY
Status: COMPLETED | OUTPATIENT
Start: 2019-10-29 | End: 2019-10-29

## 2019-10-29 RX ADMIN — LEVETIRACETAM 1500 MG: 100 SOLUTION ORAL at 18:12

## 2019-10-29 RX ADMIN — CEFDINIR 300 MG: 125 POWDER, FOR SUSPENSION ORAL at 12:39

## 2019-10-29 RX ADMIN — ACETAMINOPHEN 480 MG: 325 SOLUTION ORAL at 14:34

## 2019-10-29 RX ADMIN — Medication 1 SCOOP: at 18:12

## 2019-10-29 RX ADMIN — ESTRADIOL 0.5 MG: 0.5 TABLET ORAL at 08:04

## 2019-10-29 RX ADMIN — TRAZODONE HYDROCHLORIDE 50 MG: 50 TABLET ORAL at 18:12

## 2019-10-29 RX ADMIN — ALBUMIN HUMAN 18.75 G: 0.25 SOLUTION INTRAVENOUS at 14:34

## 2019-10-29 RX ADMIN — ALBUTEROL SULFATE 2.5 MG: 2.5 SOLUTION RESPIRATORY (INHALATION) at 13:30

## 2019-10-29 RX ADMIN — CEFDINIR 300 MG: 125 POWDER, FOR SUSPENSION ORAL at 20:43

## 2019-10-29 RX ADMIN — LEVETIRACETAM 1000 MG: 100 SOLUTION ORAL at 08:04

## 2019-10-29 RX ADMIN — Medication 1 CAPSULE: at 18:12

## 2019-10-29 RX ADMIN — ACETAMINOPHEN 480 MG: 325 SOLUTION ORAL at 08:04

## 2019-10-29 RX ADMIN — Medication 1 SCOOP: at 08:04

## 2019-10-29 RX ADMIN — Medication 1 CAPSULE: at 08:04

## 2019-10-29 RX ADMIN — AZITHROMYCIN 200 MG: 1200 POWDER, FOR SUSPENSION ORAL at 08:04

## 2019-10-29 RX ADMIN — ARIPIPRAZOLE 7.5 MG: 1 SOLUTION ORAL at 08:04

## 2019-10-29 RX ADMIN — ALBUTEROL SULFATE 2.5 MG: 2.5 SOLUTION RESPIRATORY (INHALATION) at 07:49

## 2019-10-29 RX ADMIN — ACETAMINOPHEN 480 MG: 325 SOLUTION ORAL at 20:43

## 2019-10-29 RX ADMIN — SERTRALINE HYDROCHLORIDE 200 MG: 20 SOLUTION ORAL at 08:04

## 2019-10-29 RX ADMIN — FUROSEMIDE 20 MG: 10 INJECTION, SOLUTION INTRAVENOUS at 16:07

## 2019-10-29 ASSESSMENT — ACTIVITIES OF DAILY LIVING (ADL)
ADLS_ACUITY_SCORE: 44

## 2019-10-29 NOTE — PROVIDER NOTIFICATION
Paged G9 cross cover MD at [phone]  low 5B [phone] N.M. G9 Jos[place] RN [phone] mom is concerned pt is third spacing d/t 3lb weight increase since admission. please eval pt.     MD called writer back and asked writer to reassure mom that a small amount of third spacing is in WDL considering medications in the setting of CAP illness. Unless pt requires increase in O2 needs or VS changes, there is no change in poc. MD will let the day team know to eval pt in the AM. Writer relayed the above info to the mom and she understands the plan and is in agreement.

## 2019-10-29 NOTE — PLAN OF CARE
/82 (BP Location: Right arm)   Pulse 88   Temp 97.2  F (36.2  C) (Axillary)   Resp 20   Wt 34.6 kg (76 lb 4.8 oz)   SpO2 95%   BMI 22.34 kg/m       Care from: 2507-2208      VS & Pain: VSS ex tachycardic, on room air while awake in the morning, was on 1 LPM overnight, showed no physiological s/s of pain.    Neuro: Alert, CHENCHO orientation due to cognitive delayed, difficulty speaking and understanding; speaks incomprehensible sounds; calm & cooperative; inconsistent in following command. BUE motor strength: 3, BLE motor strength: 2    Respiratory: infrequent weak congested cough, fine crackles noted in BLL    Cardiac: tachycardic    Peripheral neurovascular: baseline of decreased sensation present in BLE per mother    GI/: fecal and urinary incontinence. Unmeasured urine occurrence x3 & BM x3 this shift, diarrhea per mother     Nutrition: reg diet, total feed, administered bolus feed at 0800, 1000, and 1400 running at 400 mL/hr    Skin: WDL ex PIV & scabbing    Musculoskeletal: active ROM severely impaired in all extremities     Lines: L PIV is WDL & infusing with Albumin    Activity: bedrest, assist of 2, mechanical lift, total care    Events this shift: Administered all scheduled medications. Administered tube feed. Turned/repositioned q4h per mother's request, refused turn/reposition q2h.     Plan: Administer Lasix after Albumin infusion. Possible discharge tomorrow. Continue to monitor and follow poc.

## 2019-10-29 NOTE — PLAN OF CARE
Lungs clear. Pt up in chair beginning of shift, then in bed rest of the evening. Pt's mother started tube feeds at 40ml/h at 20:00. Ibuprofen given late because it was not in the bin. Mom declined Q2h repositioning and does not want nursing staff to reposition pt at night so pt can sleep. Pt sating 94-95% on RA with continuous sat in place. Pt to discharge tomorrow if sats well on RA over night.

## 2019-10-29 NOTE — PROVIDER NOTIFICATION
Paged gold MD cross cover at [phone]  LOW 5B 5-237 N.M.G9 Joselyn RN [phone] mom says MD said this am he would discontinue the scheduled nebs as pt no longer needs them. if so, please dc neb.     MD discontinued neb

## 2019-10-29 NOTE — PLAN OF CARE
[facility] cares [id number]     /59 (BP Location: Right arm)   Pulse 119   Temp 97.3  F (36.3  C) (Axillary)   Resp 20   Wt 34.2 kg (75 lb 4.8 oz)   SpO2 97%   BMI 22.05 kg/m       Pain: No concerns for pain.  Neuro: Unable to assess. Cognitive delay.  Respiratory: O2 stats dropped to high 80's. 1L oxy plus max placed on patient.   Cardiac/Neurovascular: HR tachycardic between 110-120. Provider notified. No concerns.  GI/: Incontinent of urine and stool.   Nutrition: Inadequate intake. G-tube. 40ml/hr tube feed.  Activity: up w/2 using a lift.   Skin: No concerns.  Lines: PIV L arm (SL)  Events this shift: Patient mother did not want patient woken up at all throughout the night. Refused an assessment, repositioning, and medications.      Plan: Continue to monitor.

## 2019-10-29 NOTE — PROVIDER NOTIFICATION
Harry Pettit paged: Pt with elevated HR, recent 119. Sats around 88-87%,  1L O2 applied. No other symptoms noted.    Call back: No changes to poc at this time. Monitor.

## 2019-10-29 NOTE — PROVIDER NOTIFICATION
Paged G9 cross cover MD at [phone]  low 5B 5-237 N.M. G9 Joselyn RN [phone] FYI pt triggered sepsis protocol around 14:30. LA=0.8

## 2019-10-29 NOTE — CONSULTS
[facility], [place]    Genetics and MetabolismConsultation    Date of Admission:  10/25/2019    Assessment & Plan   Sherly Ramires is a 24 year old female with CDG1a who was admitted on 10/25/2019 for pneumonia. She is comfortable on 2L face mast O2, but with weak cough.  No current signs of edema on exam, and we were able to hold ff on IVF given she is tolerating her G-tube feeds well.     CDG1a:  Risk for 3rd spacing with significant hypoalbuminemia.  Try to hold off on IV fuids unless necessary.  If needed, or if edema and weigh gain present, may need IV albumin with lasix chaser.    Spent ~1 hour discussing with mother admission to adult side versus pediatrics side. Had conversation with my head of division, and with Cong Rai on pediatric side prior to admission for correct location.  Discussed with mother need for documentation of exemptions and need for formal approval rather.  Will follow up with her primary metabolic doctor and team about previous discussions.      Continue home G-tube feeds and home medications.    Pneumonia:  Continue antibiotics as per primary team.  May need increased pulmonary toilet with weak cough from hypotonia with disorder.  If need steroids, prefer nebulizer rather than systemic unless consult with metabolic team.    Plan discussed with primary team at bedside and after.        during the hospitalization, and tolerated her G tube feeds. Received albumin infusion x 1 yesterday for low albumin, weight gain, concern for third spacing. Mother is pleased with her progress.     Discharge once stable from the infectious point of view and tolerating home medications and feeding.    Omega Szymanski MD/PhD    Division of Genetics and Metabolism  [facility]    Reason for Consult   Reason for consult: I was asked by Dr. Braun to evaluate this patient for CDG1a and pneumonia.    Primary Care Physician   *[facility]  Services    Chief Complaint   CDG1a and pneumonia    History is obtained from the patient's parent(s)    History of Present Illness   Sherly Ramires is a 24 year old female who presents with known congenital disorder glycosylation type I A  which manifest as intellectual disability, growth restriction short stature and failure to thrive as well as significant hypotonia.  Major concerns that should be monitored during this visit would be coagulopathy and hypoalbuminemia with significant third spacing with previous hospitalizations.  Genetics metabolism was consulted to help with these concerns in the context of a recently diagnosed pneumonia.    The mother reports she had cold-like symptoms for about 3 days.  Little oral intake having to rely more up on the G-tube.  Taken to the urgent care and then transferred to the emergency department.  Chest x-ray showed a right upper lobe pneumonia and she was started on ceftriaxone and azithromycin with transfer to the hospital.  She currently is on 2 L oxygen by facemask's but is resting comfortably.  The mother reports that things are somewhat stable but is very unhappy they had to go admitted to the adult side rather than the pediatric side.  Mother was somewhat tearful during initial interview though we discussed that given her current situation she is in a good place and there is no equipment that we currently need that cannot be provided in adequate manner for her.    Sherly herself seems to have stabilized with the oxygen.  She was not uncomfortable overnight with no significant pain though she does continue to have a weak cough.  She is tolerating her feeds currently but is still not taking anything by mouth.  She is tolerating her antibiotics without complication.  No fevers, rash or other signs of systemic problems other than the respiratory issues.  Mother is appropriately concerned about risk for third spacing.  Albumin in the ER was 2.4 and she had no signs of  edema on admission.    Past Medical History    I have reviewed this patient's medical history and updated it with pertinent information if needed.   Past Medical History:   Diagnosis Date     Anxiety      Global developmental delay      Hypoglycemia      Metabolic disease     congenital disorder of glycosilation     Scoliosis      Seizures (H)     last at age 8 before today     Strabismus        Past Surgical History   I have reviewed this patient's surgical history and updated it with pertinent information if needed.  Past Surgical History:   Procedure Laterality Date     ANESTHESIA OUT OF OR MRI 3T N/A 3/7/2017    Procedure: ANESTHESIA PEDS SEDATION MRI 3T;  Surgeon: GENERIC ANESTHESIA PROVIDER;  Location: [facility] PEDS SEDATION      BACK SURGERY      c1 decompression and fusionx3, scoliosis with instrumentation x1     ELECTRORETINOGRAM Bilateral 12/10/2014    ERG ()     ENT SURGERY       ear tubes     EXAM UNDER ANESTHESIA EYE(S) Bilateral 12/10/2014    EUA (Areaux)     EXTRACTION(S) DENTAL N/A 12/10/2014    Procedure: EXTRACTION(S) DENTAL;  Surgeon: Hubert York DDS;  Location: [facility] OR     EYE SURGERY Bilateral     BMR 6mm (Dru)      GI SURGERY      g tube     HC REPLACE GASTROSTOMY/CECOSTOMY TUBE PERCUTANEOUS N/A 12/10/2014    Procedure: REPLACE GASTROSTOMY TUBE, PERCUTANEOUS;  Surgeon: Phong Perea MD;  Location: [facility] OR     RECESSION RESECTION (REPAIR STRABISMUS) BILATERAL Bilateral 12/10/2014    BLR 11 (Areaux)     STRABISMUS SURGERY      () BMR 6 (Gonsales)     STRABISMUS SURGERY      BLR 11 (Areaux) -        Prior to Admission Medications   Prior to Admission Medications   Prescriptions Last Dose Informant Patient Reported? Taking?   ARIPiprazole (ABILIFY) 1 MG/ML SOLN   Yes No   Si.5 mg daily    D-Mannose POWD   Yes No   Si times daily 1 scoop - mixed with water   Docusate Sodium (ENEMEEZ MINI RE)   Yes No   Sig: 3 times a week (MWF) to help with bowel movements    Nutritional Supplements (COMPLEAT PEDIATRIC PO)   Yes No   ORDER FOR DME   No No   Sig: Equipment being ordered: Other: chest strap for her tilt in space manual wheel chair  Treatment Diagnosis: For safe transportation to home secondary to poor sitting balance in upright. CDG type Ia, seizures, and G-tube dependence.   UNABLE TO FIND   Yes No   Sig: MEDICATION NAME: Stress relax (magnesium citrate) 300 mg at bedtime   UNABLE TO FIND   Yes No   Sig: MEDICATION NAME: Mood probiotic 1 capsule (3,000,000,000 CFUs) daily.   UNABLE TO FIND   Yes No   Sig: MEDICATION NAME: Rescue Remedy Herbal Supplement - Used as needed.   acetaminophen (TYLENOL) 160 MG/5ML solution   Yes No   Si mg (15 mL) by G-tube every 4 hours as needed   artificial saliva (BIOTENE DRY MOUTHWASH) LIQD liquid   Yes No   Sig: Swish and spit in mouth 4 times daily as needed for dry mouth   bacitracin ointment   No No   Sig: Apply topically 2 times daily as needed for wound care   calcium carbonate 1250 (500 CA) MG/5ML SUSP   No No   Sig: Take 6 mLs (1,500 mg) by mouth 2 times daily (with meals)   Patient taking differently: 1,500 mg by Gastronomy tube route 2 times daily (with meals)    cholecalciferol (VITAMIN  -D) 1000 UNITS capsule   No No   Sig: Take 1 capsule (1,000 Units) by mouth daily   Patient not taking: Reported on 2019   diazepam (VALIUM) 5 MG/ML solution   Yes No   Si.5 mg by Gastronomy tube route every hour as needed for seizures (Seizures lasting longer than 5 minutes)    estradiol (ESTRACE) 1 MG tablet   No No   Sig: Take 0.5 mg ( 1/2 tablet) daily.   hydrOXYzine (ATARAX) 10 MG/5ML syrup   Yes No   Sig: Take 10 mg by mouth 2 times daily as needed for itching   levETIRAcetam (KEPPRA) 100 MG/ML solution   No No   Sig: GIVE 10ML (1000MG) PER G-TUBE EVERY MORNING;GIVE 15ML (1500MG) PER G-TUBE EVERY EVENING   loratadine (CLARITIN) 10 MG tablet   Yes No   Sig: Take 10 mg by mouth daily   ondansetron (ZOFRAN) 4 MG/5ML solution    No No   Sig: Take 5 mLs (4 mg) by mouth every 6 hours as needed for nausea or vomiting   polyethylene glycol (MIRALAX/GLYCOLAX) packet   Yes No   Sig: Take 1 packet by mouth daily   sertraline (ZOLOFT) 20 MG/ML (HIGH CONC) solution   Yes No   Si mg by Gastronomy tube route daily    traZODone (DESYREL) 50 MG tablet   Yes No   Sig: by Gastric Tube route At Bedtime   traZODone (DESYREL) 50 MG tablet   Yes No   Sig: Crush one tablet and administer via G-tube 30 minutes prior to bedtime daily      Facility-Administered Medications: None     Allergies   Allergies   Allergen Reactions     Ativan [Lorazepam] Other (See Comments)     Paradoxical reaction     Pseudoephedrine        Social History   I have reviewed this patient's social history and updated it with pertinent information if needed. Sherly Ramires  reports that she has never smoked. She has never used smokeless tobacco. She reports that she does not drink alcohol or use drugs.    Family History   I have reviewed this patient's family history and updated it with pertinent information if needed.   Family History   Problem Relation Age of Onset     Glaucoma Mother         no medications or surg to date     Glaucoma Maternal Grandmother         s/p surg, decreased [facility]      Amblyopia No family hx of      Strabismus No family hx of        Review of Systems   The 10 point Review of Systems is negative other than noted in the HPI or here. Known cognitive impairment.  History of low albumin and risk for 3rd spacing with fluids.  Significant fracture risk with osteopenia.  Risk for coagulapathy.      Physical Exam   Vital Signs: Temp: 96.8  F (36  C) Temp src: Axillary BP: 101/58 Pulse: 116 Heart Rate: 109 Resp: 28 SpO2: 99 % O2 Device: Oxymask Oxygen Delivery: 2 LPM  Weight: 72 lbs 4.97 oz    Constitutional: This was a small, thin female who did not verbalize during the exam, but did have a social smile, who was resting comfortably in the bed with face mask oxygen  on.    Head and Neck:  She had hair of normal texture and distribution and her head was proportionate in appearance.    Eyes:  The pupils were equal, round.   The conjunctivae were clear.   Nose: The nose was clear.    Mouth and Throat: Mucous membranes were moist  Respiratory: The chest had some minor crackles on right, with transmitted upper airway sounds on left.  Good air movement.    Cardiovascular:  On examination of the heart, the rhythm was regular and there was no murmur.  The peripheral pulses were normal.  No peripheral tibial or pedal edema.  Gastrointestinal: The abdomen was soft and had normal bowel sounds. G-tube site clean, dry with no granulation tissue.  : I deferred a  examination.   Musculoskeletal: Thin extremities.  Moved all limbs without apparent pain.   Neurologic: The neurologic exam was non-focal with known cognitive impairment, at baseline per family at bedside.  Brisk deep tendon reflexes.    Integument: The skin was normal with no rashes or unusual pigmentation in extremities or abdomen        Data   -Data reviewed today: All pertinent laboratory and imaging results from this encounter were reviewed. I personally reviewed   The labs and the following CXR from 10/25/19    IMPRESSION: Abnormal airspace opacity projected over the right upper  lobe suggestive of right upper lobe pneumonia. The left lung is clear.  No pneumothorax or pleural effusion. Multilevel thoracic vertebral  hardware noted.    Component      Latest Ref Rng & Units 10/25/2019 10/25/2019 10/25/2019           1:34 PM  1:39 PM 10:30 PM   WBC      4.0 - 11.0 10e9/L 6.5     RBC Count      3.8 - 5.2 10e12/L 4.25     Hemoglobin      11.7 - 15.7 g/dL 13.2     Hematocrit      35.0 - 47.0 % 40.3     MCV      78 - 100 fl 95     MCH      26.5 - 33.0 pg 31.1     MCHC      31.5 - 36.5 g/dL 32.8     RDW      10.0 - 15.0 % 12.6     Platelet Count      150 - 450 10e9/L 106 (L)     Diff Method       Automated Method     %  Neutrophils      % 77.8     % Lymphocytes      % 10.4     % Monocytes      % 11.1     % Eosinophils      % 0.0     % Basophils      % 0.2     % Immature Granulocytes      % 0.5     Nucleated RBCs      0 /100 0     Absolute Neutrophil      1.6 - 8.3 10e9/L 5.1     Absolute Lymphocytes      0.8 - 5.3 10e9/L 0.7 (L)     Absolute Monocytes      0.0 - 1.3 10e9/L 0.7     Absolute Eosinophils      0.0 - 0.7 10e9/L 0.0     Absolute Basophils      0.0 - 0.2 10e9/L 0.0     Abs Immature Granulocytes      0 - 0.4 10e9/L 0.0     Absolute Nucleated RBC       0.0     Sodium      133 - 144 mmol/L 136     Potassium      3.4 - 5.3 mmol/L 3.8     Chloride      94 - 109 mmol/L 100     Carbon Dioxide      20 - 32 mmol/L 33 (H)     Anion Gap      3 - 14 mmol/L 3     Glucose      70 - 99 mg/dL 97  101 (H)   Urea Nitrogen      7 - 30 mg/dL 11     Creatinine      0.52 - 1.04 mg/dL 0.26 (L)     GFR Estimate      >60 mL/min/1.73:m2 >90     GFR Estimate If Black      >60 mL/min/1.73:m2 >90     Calcium      8.5 - 10.1 mg/dL 8.6     Bilirubin Total      0.2 - 1.3 mg/dL 0.2     Albumin      3.4 - 5.0 g/dL 2.4 (L)     Protein Total      6.8 - 8.8 g/dL 6.3 (L)     Alkaline Phosphatase      40 - 150 U/L 112     ALT      0 - 50 U/L 51 (H)     AST      0 - 45 U/L 27     Lipase      73 - 393 U/L 109     Lactic Acid      0.7 - 2.0 mmol/L  1.2      Component      Latest Ref Rng & Units 10/26/2019 10/26/2019           3:19 AM  9:17 AM   WBC      4.0 - 11.0 10e9/L     RBC Count      3.8 - 5.2 10e12/L     Hemoglobin      11.7 - 15.7 g/dL     Hematocrit      35.0 - 47.0 %     MCV      78 - 100 fl     MCH      26.5 - 33.0 pg     MCHC      31.5 - 36.5 g/dL     RDW      10.0 - 15.0 %     Platelet Count      150 - 450 10e9/L     Diff Method           % Neutrophils      %     % Lymphocytes      %     % Monocytes      %     % Eosinophils      %     % Basophils      %     % Immature Granulocytes      %     Nucleated RBCs      0 /100     Absolute Neutrophil      1.6  - 8.3 10e9/L     Absolute Lymphocytes      0.8 - 5.3 10e9/L     Absolute Monocytes      0.0 - 1.3 10e9/L     Absolute Eosinophils      0.0 - 0.7 10e9/L     Absolute Basophils      0.0 - 0.2 10e9/L     Abs Immature Granulocytes      0 - 0.4 10e9/L     Absolute Nucleated RBC           Sodium      133 - 144 mmol/L     Potassium      3.4 - 5.3 mmol/L     Chloride      94 - 109 mmol/L     Carbon Dioxide      20 - 32 mmol/L     Anion Gap      3 - 14 mmol/L     Glucose      70 - 99 mg/dL 88 109 (H)   Urea Nitrogen      7 - 30 mg/dL     Creatinine      0.52 - 1.04 mg/dL     GFR Estimate      >60 mL/min/1.73:m2     GFR Estimate If Black      >60 mL/min/1.73:m2     Calcium      8.5 - 10.1 mg/dL     Bilirubin Total      0.2 - 1.3 mg/dL     Albumin      3.4 - 5.0 g/dL     Protein Total      6.8 - 8.8 g/dL     Alkaline Phosphatase      40 - 150 U/L     ALT      0 - 50 U/L     AST      0 - 45 U/L     Lipase      73 - 393 U/L     Lactic Acid      0.7 - 2.0 mmol/L

## 2019-10-29 NOTE — PROGRESS NOTES
[facility], [facility] Progress Note - Hospitalist Service, [place]       Date of Admission:  10/25/2019  Assessment & Plan   Sherly Ramires is a 24 year old woman with a history of congenital disorder of glycosylation type 1a, severe developmental delay and seizures who is admitted with right upper lobe PNA.     # Right upper lobe pneumonia   # Mild respiratory failure with Hypoxia   Presents with cold symptoms ongoing since 10/22.  CXR on admission shows RUL opacity concerning for PNA.  - Started on ceftriaxone and azithromycin, switched to cefdinir + azithro x5 days   - Scheduled Tylenol and ibuprofen for fever suppression given history of CDG1a  - Wean off O2 as tolerated    # Fluid overload   Patient has a history of third spacing fluids, has been hydrated in addition to TF with 4 lb weight gain.  - Discussed with Metabolic doctor, recommended Alb with Lasix  - Genetics main concern for her would be hypoglycemia in the setting of acute illness. Will check blood sugars twice daily today.  Low threshold to start D10 half-normal saline.     # History of CDG1a - The patient has a history of severe developmental delay, seizures, fractures and malnutrition requiring G-tube placement secondary to her congenital disorder  -Consult genetics service - discussed with Dr. Duff  - As above, avoid fevers and caution with IV fluids     # History of seizure  -Continue home Keppra     # History of malnutrition  - Continue home tube feeds.  - Patient is on Nestlé complete and takes 4 cans over the course of the day with each can mixed with 200 cc of water and given as a bolus.  She then takes   another can mixed with 300 cc of water and this is run overnight at 40 cc/h.     # Left hand swelling: no DVT per ultrasound - better today    Diet: Pediatric Formula Bolus Feeding: QID Other - Specify; Nestle Compleat; Gastrostomy/PEG tube; 1; can(s); Feedings per day; 4; 7:00 AM; 10:00 AM; 2:00  PM; 5:00 PM; Give over: 1; hours; Mix 200 ccs water with tube feed container and give at 400 ccs/h...  Pediatric Formula Drip Feeding: Specified Time Other - Specify; Nestle Compleat; Gastrostomy/PEG tube; Rate: 40; Rate Units: mL/hr; From: 8:04 PM; To: 7:04 AM; Mix one can with 300 ccs of water and run at 40 ccs/hr overnight  Combination Diet Regular Diet Adult    DVT Prophylaxis: Low Risk st baseline mobility  Emerson Catheter: not present  Code Status: Full Code      Disposition Plan   Expected discharge: Tomorrow, recommended to prior living arrangement once antibiotic plan established and O2 use less than 0 liters/minute.  Entered: René Cesar MD 10/29/2019, 2:11 PM       The patient's care was discussed with the Bedside Nurse, Patient and Patient's Family.    René Cesar MD  Hospitalist Service, [address], [place]  Pager: [phone]  Please see sticky note for cross cover information  ______________________________________________________________________    Interval History   No acute events overnight, required nocturnal O2, no fever or chills. Mother does report 4 lb weight gain    Data reviewed today: I reviewed all medications, new labs and imaging results over the last 24 hours. I personally reviewed no images or EKG's today.    Physical Exam   Vital Signs: Temp: 96.3  F (35.7  C) Temp src: Axillary BP: 105/67 Pulse: 88 Heart Rate: 115 Resp: 20 SpO2: 98 % O2 Device: None (Room air) Oxygen Delivery: 1 LPM  Weight: 76 lbs 4.8 oz  GEN: Small stature, resting in bed  CHEST: CTA B, no accessory muscles, no crackles or wheezes  CV: RRR  ABD: soft, thin, nt/nd.  EXT: L hand is slightly swollen compared to right

## 2019-10-30 ENCOUNTER — HOME INFUSION (PRE-WILLOW HOME INFUSION) (OUTPATIENT)
Dept: PHARMACY | Facility: CLINIC | Age: 24
End: 2019-10-30

## 2019-10-30 VITALS
DIASTOLIC BLOOD PRESSURE: 70 MMHG | OXYGEN SATURATION: 95 % | BODY MASS INDEX: 20.03 KG/M2 | TEMPERATURE: 98.3 F | WEIGHT: 68.4 LBS | RESPIRATION RATE: 20 BRPM | HEART RATE: 69 BPM | SYSTOLIC BLOOD PRESSURE: 111 MMHG

## 2019-10-30 LAB
ALBUMIN SERPL-MCNC: 2.8 G/DL (ref 3.4–5)
ALP SERPL-CCNC: 121 U/L (ref 40–150)
ALT SERPL W P-5'-P-CCNC: 44 U/L (ref 0–50)
ANION GAP SERPL CALCULATED.3IONS-SCNC: 6 MMOL/L (ref 3–14)
AST SERPL W P-5'-P-CCNC: 35 U/L (ref 0–45)
BILIRUB SERPL-MCNC: 0.5 MG/DL (ref 0.2–1.3)
BUN SERPL-MCNC: 15 MG/DL (ref 7–30)
CALCIUM SERPL-MCNC: 8.5 MG/DL (ref 8.5–10.1)
CHLORIDE SERPL-SCNC: 109 MMOL/L (ref 94–109)
CO2 SERPL-SCNC: 27 MMOL/L (ref 20–32)
CREAT SERPL-MCNC: 0.28 MG/DL (ref 0.52–1.04)
GFR SERPL CREATININE-BSD FRML MDRD: >90 ML/MIN/{1.73_M2}
GLUCOSE SERPL-MCNC: 107 MG/DL (ref 70–99)
POTASSIUM SERPL-SCNC: 3.7 MMOL/L (ref 3.4–5.3)
PROT SERPL-MCNC: 5.9 G/DL (ref 6.8–8.8)
SODIUM SERPL-SCNC: 142 MMOL/L (ref 133–144)

## 2019-10-30 PROCEDURE — 25000132 ZZH RX MED GY IP 250 OP 250 PS 637: Performed by: NURSE PRACTITIONER

## 2019-10-30 PROCEDURE — 99239 HOSP IP/OBS DSCHRG MGMT >30: CPT | Performed by: INTERNAL MEDICINE

## 2019-10-30 PROCEDURE — 25000132 ZZH RX MED GY IP 250 OP 250 PS 637: Performed by: HOSPITALIST

## 2019-10-30 PROCEDURE — 36415 COLL VENOUS BLD VENIPUNCTURE: CPT | Performed by: INTERNAL MEDICINE

## 2019-10-30 PROCEDURE — 80053 COMPREHEN METABOLIC PANEL: CPT | Performed by: INTERNAL MEDICINE

## 2019-10-30 RX ORDER — LACTOBACILLUS RHAMNOSUS GG 10B CELL
1 CAPSULE ORAL 2 TIMES DAILY PRN
Qty: 30 CAPSULE | Refills: 0 | Status: SHIPPED | OUTPATIENT
Start: 2019-10-30 | End: 2020-03-14

## 2019-10-30 RX ADMIN — LEVETIRACETAM 1000 MG: 100 SOLUTION ORAL at 08:24

## 2019-10-30 RX ADMIN — ESTRADIOL 0.5 MG: 0.5 TABLET ORAL at 08:25

## 2019-10-30 RX ADMIN — AZITHROMYCIN 200 MG: 1200 POWDER, FOR SUSPENSION ORAL at 08:25

## 2019-10-30 RX ADMIN — ARIPIPRAZOLE 7.5 MG: 1 SOLUTION ORAL at 08:25

## 2019-10-30 RX ADMIN — ACETAMINOPHEN 480 MG: 325 SOLUTION ORAL at 08:23

## 2019-10-30 RX ADMIN — Medication 1 SCOOP: at 08:26

## 2019-10-30 RX ADMIN — SERTRALINE HYDROCHLORIDE 200 MG: 20 SOLUTION ORAL at 08:25

## 2019-10-30 RX ADMIN — Medication 1 CAPSULE: at 08:25

## 2019-10-30 ASSESSMENT — ACTIVITIES OF DAILY LIVING (ADL)
ADLS_ACUITY_SCORE: 44

## 2019-10-30 NOTE — PLAN OF CARE
/70 (BP Location: Right arm)   Pulse 69   Temp 98.3  F (36.8  C) (Axillary)   Resp 20   Wt 31 kg (68 lb 6.4 oz)   SpO2 95%   BMI 20.03 kg/m       Care from: [id number]      VS & Pain: VSS, on room air, no s/s of pain    Neuro: Alert, CHENCHO orientation due to cognitive delayed, difficulty speaking and understanding; speaks incomprehensible sounds; calm & cooperative; inconsistent in following command. BUE motor strength: 3, BLE motor strength: 2    Respiratory: infrequent weak congested cough, fine crackles noted in BLL    Cardiac: WDL    Peripheral neurovascular: baseline of decreased sensation present in BLE per mother    GI/: fecal and urinary incontinence. Unmeasured urine occurrence x2 & BM x2 this shift, loose stool    Nutrition: reg diet, total feed, administered bolus feed at 0800 and 1000 running at 400 mL/hr    Skin: WDL ex PIV & scabbing    Musculoskeletal: active ROM severely impaired in all extremities    Lines: Removed L PIV    Activity: bedrest, assist of 2, mechanical lift, total care    Events this shift: Administered all scheduled medications. Administered bolus tube feed. Turned/repositioned q4h per mother's request, refused turn/reposition q2h.    Events this shift: Pt is adequate for discharge. Removed L PIV. MD went over discharge poc with pt's mother, agreed with poc. Pt's mother was not present during discharge, reviwwed and signed discharge paperwork with [facility] home staffLeticia. Gave report to [facility] home nurseRashid and  home infusion nurse. Pt left the unit at 1245 with all belongings [facility] group home[facility]

## 2019-10-30 NOTE — PLAN OF CARE
[facility] cares [id number]     BP 94/58 (BP Location: Right arm)   Pulse 69   Temp 96.3  F (35.7  C) (Tympanic)   Resp 18   Wt 31.1 kg (68 lb 9.6 oz)   SpO2 96%   BMI 20.09 kg/m         Pain: No concerns for pain.  Neuro: Unable to assess. Cognitive delay.  Respiratory: O2 stats remained in a normal range.  Cardiac/Neurovascular: HR tachycardic.  GI/: Incontinent of urine and stool.   Nutrition: Inadequate intake. G-tube. 40ml/hr tube feed.  Activity: up w/2 using a lift.   Skin: No concerns.  Lines: PIV L arm (SL)  Events this shift: Patient mother did not want patient woken up at all throughout the night. Patient checked and changed at 1AM. Patients mom did not want her woken up anymore.      Plan: Continue to monitor. Possible discharge?

## 2019-10-30 NOTE — DISCHARGE SUMMARY
[facility], [place]  Hospitalist Discharge Summary       Date of Admission:  10/25/2019  Date of Discharge:  10/30/2019 12:45 PM  Discharging Provider: René Cesar MD  Discharge Team: Hospitalist Service, Gold 9    Discharge Diagnoses   # Right upper lobe pneumonia   # Mild respiratory failure with Hypoxia  # Fluid overload    # History of CDG1a  # History of seizure  # History of malnutrition  # Non-severe malnutrition in the context of acute illness    Follow-ups Needed After Discharge   Follow-up Appointments     Adult [facility] Follow-up and recommended labs and tests      Follow up with primary care provider, [facility],   within 10 days for hospital follow- up.  No follow up labs or test are   needed.      Appointments on [place] and/or [facility] (with [facility] or [facility]   provider or service). Call [phone] if you haven't heard regarding   these appointments within 7 days of discharge.             Unresulted Labs Ordered in the Past 30 Days of this Admission     Date and Time Order Name Status Description    10/25/2019 1251 Blood culture Preliminary     10/25/2019 1251 Blood culture Preliminary       These results will be followed up by Ordering Physician     Discharge Disposition   Discharged to home  Condition at discharge: Stable    Hospital Course   Sherly Rmaires is a 24 year old woman with a history of congenital disorder of glycosylation type 1a, severe developmental delay and seizures who is admitted with right upper lobe PNA. Family provided history reports cold like symptoms x3 days, taken to  who recommended visit to ER due to chest xray findings of pulmonary infiltrate. She had low grade fever but otherwise HDS. Started on ceftriaxone and azithromycin and completed a 5 day course with resolution of symptoms. Her hospitalization was c/b fluid overload and weight gain which improved with Lasix following discussion with the Genetics team.  She also required low dose O2 which was weaned off prior to discharge. Patient otherwise continued on home medications and discharged to home to follow up with PCP.     Diet: Pediatric Formula Bolus Feeding: QID Other - Specify; Nestle Compleat; Gastrostomy/PEG tube; 1; can(s); Feedings per day; 4; 7:00 AM; 10:00 AM; 2:00 PM; 5:00 PM; Give over: 1; hours; Mix 200 ccs water with tube feed container and give at 400 ccs/h...  Pediatric Formula Drip Feeding: Specified Time Other - Specify; Nestle Compleat; Gastrostomy/PEG tube; Rate: 40; Rate Units: mL/hr; From: 8:04 PM; To: 7:04 AM; Mix one can with 300 ccs of water and run at 40 ccs/hr overnight  Combination Diet Regular Diet Adult    DVT Prophylaxis: Low Risk st baseline mobility  Emerson Catheter: not present  Code Status: Full Code        Consultations This Hospital Stay   GENETICS/METABOLISM ADULT/PEDS IP CONSULT  VASCULAR ACCESS CARE ADULT IP CONSULT  VASCULAR ACCESS CARE ADULT IP CONSULT    Code Status   Full Code    Time Spent on this Encounter   I, René Cesar MD, personally saw the patient today and spent greater than 30 minutes discharging this patient.       René Cesar MD  [facility]  ______________________________________________________________________    Physical Exam   Vital Signs: Temp: 98.3  F (36.8  C) Temp src: Axillary BP: 111/70 Pulse: 69 Heart Rate: 89 Resp: 20 SpO2: 95 % O2 Device: None (Room air)    Weight: 68 lbs 6.4 oz  GEN: Small stature, resting in bed  CHEST: CTA B, no accessory muscles, no crackles or wheezes  CV: RRR  ABD: soft, thin, nt/nd.  EXT: L hand is slightly swollen compared to right    Primary Care Physician   [facility]    Discharge Orders      Reason for your hospital stay    You were admitted for pneumonia, treated with antibiotics and improved     Adult [facility] Follow-up and recommended labs and tests    Follow up with [facility], within 10 days for hospital follow- up.  No follow up labs or test are needed.      Appointments on [place] and/or [facility] (with [facility] or [facility] provider or service). Call [phone] if you haven't heard regarding these appointments within 7 days of discharge.     Activity    Your activity upon discharge: activity as tolerated     Full Code     Diet    Follow this diet upon discharge: Orders Placed This Encounter      Pediatric Formula Bolus Feeding: QID Other - Specify; Nestle Compleat; Gastrostomy/PEG tube; 1; can(s); Feedings per day; 4; 7:00 AM; 10:00 AM; 2:00 PM; 5:00 PM; Give over: 1; hours; Mix 200 ccs water with tube feed container and give at 400 ccs/h...      Pediatric Formula Drip Feeding: Specified Time Other - Specify; Nestle Compleat; Gastrostomy/PEG tube; Rate: 40; Rate Units: mL       Significant Results and Procedures   Most Recent 3 CBC's:  Recent Labs   Lab Test 10/25/19  1334 08/24/17  1005 05/30/16  0623   WBC 6.5 5.8 5.7   HGB 13.2 13.6 10.8*   MCV 95 94 97   * 114* 105*     Most Recent 3 BMP's:  Recent Labs   Lab Test 10/30/19  1029 10/29/19  1139 10/25/19  1334    143 136   POTASSIUM 3.7 3.8 3.8   CHLORIDE 109 112* 100   CO2 27 29 33*   BUN 15 13 11   CR 0.28* 0.26* 0.26*   ANIONGAP 6 2* 3   LINDA 8.5 7.9* 8.6   * 105* 97     Most Recent 2 LFT's:  Recent Labs   Lab Test 10/30/19  1029 10/29/19  1139   AST 35 32   ALT 44 43   ALKPHOS 121 128   BILITOTAL 0.5 0.2   ,   Results for orders placed or performed during the hospital encounter of 10/25/19   XR Chest Port 1 View    Narrative    CHEST ONE VIEW PORTABLE   10/25/2019 2:35 PM     HISTORY: Fever, cough.    COMPARISON: 3/1/2015      Impression    IMPRESSION: Abnormal airspace opacity projected over the right upper  lobe suggestive of right upper lobe pneumonia. The left lung is clear.  No pneumothorax or pleural effusion. Multilevel thoracic vertebral  hardware noted.    CASSIDY ROJAS MD   US  Upper Extremity Venous Duplex Left Portable    Narrative    EXAMINATION: DOPPLER VENOUS ULTRASOUND OF THE LEFT UPPER EXTREMITY,  10/27/2019 1:18 PM     COMPARISON: None.    HISTORY: Left arm swelling, rule out DVT    TECHNIQUE:  Gray-scale evaluation with compression, spectral flow and  color Doppler assessment of the deep venous system of the left upper  extremity.    FINDINGS:  Left: Normal blood flow and waveforms are demonstrated in the internal  jugular, innominate, subclavian, and axillary veins. There is normal  compressibility of the brachial and basilic veins. Cephalic vein in  the forearm is fully compressible. The proximal cephalic vein is not  visualized.      Impression    IMPRESSION: No evidence of left upper extremity deep venous  thrombosis.    I have personally reviewed the examination and initial interpretation  and I agree with the findings.    ODILIA HAUSER MD       Discharge Medications   Discharge Medication List as of 10/30/2019 12:23 PM      START taking these medications    Details   lactobacillus rhamnosus, GG, (CULTURELL) capsule 1 capsule by Per Feeding Tube route 2 times daily as needed (for diarrhea), Disp-30 capsule, R-0, Local Print         CONTINUE these medications which have NOT CHANGED    Details   acetaminophen (TYLENOL) 160 MG/5ML solution 480 mg (15 mL) by G-tube every 4 hours as needed, Historical      ARIPiprazole (ABILIFY) 1 MG/ML SOLN 7.5 mg daily , Historical      artificial saliva (BIOTENE DRY MOUTHWASH) LIQD liquid Swish and spit in mouth 4 times daily as needed for dry mouth, Historical      bacitracin ointment Apply topically 2 times daily as needed for wound careDisp-14 g, R-0Fax      calcium carbonate 1250 (500 CA) MG/5ML SUSP Take 6 mLs (1,500 mg) by mouth 2 times daily (with meals), Disp-450 mL, R-12, E-Prescribe      cholecalciferol (VITAMIN  -D) 1000 UNITS capsule Take 1 capsule (1,000 Units) by mouth daily, Disp-30 capsule, R-11, Local Print      D-Mannose POWD  2 times daily 1 scoop - mixed with water, Historical      diazepam (VALIUM) 5 MG/ML solution 7.5 mg by Gastronomy tube route every hour as needed for seizures (Seizures lasting longer than 5 minutes) , Historical      Docusate Sodium (ENEMEEZ MINI RE) 3 times a week (MWF) to help with bowel movements, Historical      estradiol (ESTRACE) 1 MG tablet Take 0.5 mg ( 1/2 tablet) daily., Disp-15 tablet, R-2, E-Prescribe      hydrOXYzine (ATARAX) 10 MG/5ML syrup Take 10 mg by mouth 2 times daily as needed for itching, Historical      levETIRAcetam (KEPPRA) 100 MG/ML solution GIVE 10ML (1000MG) PER G-TUBE EVERY MORNING;GIVE 15ML (1500MG) PER G-TUBE EVERY EVENING, Disp-775 mL, R-11, E-Prescribe      loratadine (CLARITIN) 10 MG tablet Take 10 mg by mouth daily, Historical      Nutritional Supplements (COMPLEAT PEDIATRIC PO) Historical      ondansetron (ZOFRAN) 4 MG/5ML solution Take 5 mLs (4 mg) by mouth every 6 hours as needed for nausea or vomiting, Disp-90 mL, R-0, Fax      ORDER FOR DME Equipment being ordered: Other: chest strap for her tilt in space manual wheel chair  Treatment Diagnosis: For safe transportation to home secondary to poor sitting balance in upright. CDG type Ia, seizures, and G-tube dependence.Disp-1 Device, R-0, Loc al Print      polyethylene glycol (MIRALAX/GLYCOLAX) packet Take 1 packet by mouth daily, Historical      sertraline (ZOLOFT) 20 MG/ML (HIGH CONC) solution 200 mg by Gastronomy tube route daily , Historical      !! traZODone (DESYREL) 50 MG tablet Crush one tablet and administer via G-tube 30 minutes prior to bedtime daily, Historical      !! traZODone (DESYREL) 50 MG tablet by Gastric Tube route At Bedtime, Historical      !! UNABLE TO FIND MEDICATION NAME: Rescue Remedy Herbal Supplement - Used as needed., Historical      !! UNABLE TO FIND MEDICATION NAME: Stress relax (magnesium citrate) 300 mg at bedtime, Historical      !! UNABLE TO FIND MEDICATION NAME: Mood probiotic 1 capsule  (3,000,000,000 CFUs) daily., Historical       !! - Potential duplicate medications found. Please discuss with provider.        Allergies   Allergies   Allergen Reactions     Ativan [Lorazepam] Other (See Comments)     Paradoxical reaction     Pseudoephedrine

## 2019-10-30 NOTE — PROGRESS NOTES
[facility], [place]    Genetics / Metabolism Progress Note    Date of Service (when I saw the patient): 10/30/2019     Assessment & Plan   Sherly Ramires is a 24 year old female with CDG1a who was admitted on 10/25/2019 for pneumonia. She did not need IVF during the hospitalization, and tolerated her G tube feeds. Received albumin infusion x 1 yesterday for low albumin, weight gain, concern for third spacing. Mother is pleased with her progress. She is planned for discharge today.  Agree with discharge. Continue home meds and feeds.     Interval History   Sherly was admitted on 10/25/2019 and was seen by my colleague Dr. German Duff. She has been gradually improving during the course of this hospitalization. She has been on antibiotics for pneumonia. She has not received IVF and has been tolerating tube feeds well. She was needing oxygen, which has gradually been weaned. Her albumin was 2.8 on 10/25 and weight gain was noted yesterday. Thus we recommended albumin level be re-checked and it was 2.1. We recommended albumin infusion @0.5 grams per kg and lasix 20 mg IV. Plan to repeat albumin 10/30 AM.  She has been on RA since late yesterday.    I met with the mother at bedside who was very pleased with Sherly's progress particularly since yesterday afternoon after receiving albumin infusion.     Mother was tearful and hoping we can assist her with having her daughter admitted to pediatric rather than adult hospital for future. I told her I will discuss with her primary metabolic doctor, Sarah Dubon MD.     No other concerns from mother.     Physical Exam   Temp: 98.3  F (36.8  C) Temp src: Axillary BP: 111/70 Pulse: 69 Heart Rate: 89 Resp: 20 SpO2: 95 % O2 Device: None (Room air)    Vitals:    10/29/19 1007 10/29/19 1855 10/30/19 0834   Weight: 34.6 kg (76 lb 4.8 oz) 31.1 kg (68 lb 9.6 oz) 31 kg (68 lb 6.4 oz)     Vital Signs with Ranges  Temp:  [96.3  F (35.7  C)-98.3  F  (36.8  C)] 98.3  F (36.8  C)  Pulse:  [69] 69  Heart Rate:  [89] 89  Resp:  [18-20] 20  BP: ()/(58-70) 111/70  SpO2:  [95 %-96 %] 95 %  I/O last 3 completed shifts:  In: 1630 [NG/GT:290]  Out: -     General: Alert, lying down in bed and trying to sign. Smiling and looks comfortable.   Head and Neck: The neck was supple   Eyes:  The conjunctivae were clear.   Ears:  Her ears were normal in architecture and placement.   Nose: The nose was clear.    Mouth and Throat: moist oral mucosa  Respiratory: b/l equal and normal breath sounds    CV:  RRR  Abdo: soft, slightly distended (I am not sure of her baseline), non-tender  : no discharge  Musculoskeletal: Very thin upper and lower extremities. Contractures. Poor muscle bulk throughout. No swelling or redness over limbs.   Neurologic: Developmentally delayed.  No words. Non-verbal communication during the encounter      Data   Recent Labs   Lab 10/30/19  1029 10/29/19  1139 10/25/19  1334   WBC  --   --  6.5   HGB  --   --  13.2   MCV  --   --  95   PLT  --   --  106*    143 136   POTASSIUM 3.7 3.8 3.8   CHLORIDE 109 112* 100   CO2 27 29 33*   BUN 15 13 11   CR 0.28* 0.26* 0.26*   ANIONGAP 6 2* 3   LINDA 8.5 7.9* 8.6   * 105* 97   ALBUMIN 2.8* 2.1* 2.4*   PROTTOTAL 5.9* 5.5* 6.3*   BILITOTAL 0.5 0.2 0.2   ALKPHOS 121 128 112   ALT 44 43 51*   AST 35 32 27   LIPASE  --   --  109

## 2019-10-30 NOTE — PLAN OF CARE
Pt sating well on RA. Up in chair for several hours tonight. Mom started tube feeds around 20:00 @ 40ml/h.  IV lasix administered X1. Mom asked for VS to be obtained at 20:00 then no more vs throughout night so pt can sleep, although mom would like nursing staff to check pt diaper around 0100 and change in necessary. Mom declined Q2h repositioning. Nursing zeroed bed before getting pt weight and pt weighed 8lbs more tonight compared to yesterday. Mom is frustrated with nursing for re-zeroing the bed and doesn't feel the weight is accurate. Continue to monitor oxygen saturation and recheck albumin level and weight tomorrow.

## 2019-10-31 LAB
BACTERIA SPEC CULT: NO GROWTH
BACTERIA SPEC CULT: NO GROWTH
Lab: NORMAL
Lab: NORMAL
SPECIMEN SOURCE: NORMAL
SPECIMEN SOURCE: NORMAL

## 2019-10-31 NOTE — PROGRESS NOTES
This is a recent snapshot of the patient's [facility] medical record.  For current drug dose and complete information and questions, call [phone] or In Basket pool, [facility] home infusion ([id number])  CSN Number:  [id number]

## 2019-11-20 ENCOUNTER — OFFICE VISIT (OUTPATIENT)
Dept: OPHTHALMOLOGY | Facility: CLINIC | Age: 24
End: 2019-11-20
Attending: OPHTHALMOLOGY
Payer: COMMERCIAL

## 2019-11-20 DIAGNOSIS — E74.89 CDG (CONGENITAL DISORDER OF GLYCOSYLATION) (H): ICD-10-CM

## 2019-11-20 DIAGNOSIS — H35.50 RETINAL DYSTROPHY: ICD-10-CM

## 2019-11-20 DIAGNOSIS — H50.15 ALTERNATING EXOTROPIA: Primary | ICD-10-CM

## 2019-11-20 DIAGNOSIS — H55.01 CONGENITAL NYSTAGMUS: ICD-10-CM

## 2019-11-20 PROCEDURE — G0463 HOSPITAL OUTPT CLINIC VISIT: HCPCS | Mod: 25,ZF

## 2019-11-20 PROCEDURE — 92015 DETERMINE REFRACTIVE STATE: CPT | Mod: ZF

## 2019-11-20 ASSESSMENT — REFRACTION
OS_AXIS: 100
OD_SPHERE: -0.50
OD_CYLINDER: +1.25
OS_CYLINDER: +1.75
OS_SPHERE: -0.50
OD_AXIS: 080

## 2019-11-20 ASSESSMENT — CONF VISUAL FIELD
METHOD: TOYS
OD_NORMAL: 1
OS_NORMAL: 1

## 2019-11-20 ASSESSMENT — TONOMETRY
OD_IOP_MMHG: 14
OS_IOP_MMHG: 16
IOP_METHOD: ICARE B/T

## 2019-11-20 ASSESSMENT — VISUAL ACUITY
METHOD: SNELLEN - LINEAR
OD_SC: 20/200
OS_SC: 20/150

## 2019-11-20 NOTE — LETTER
11/20/2019     RE: Sherly Ramires  [address]  [place] [address]     Dear Colleague,    Thank you for referring your patient, Sherly Ramires, to the [facility] PEDS EYE GENERAL at [facility]. Please see a copy of my visit note below.    Chief Complaint(s) and History of Present Illness(es)     Exotropia Follow Up     Laterality: both eyes    Onset: present since childhood    Quality: horizontal    Frequency: constantly              Comments     Mom per report and assistant from Anna's group home sees no XT. Believes vision is stable compared to LV. Per mom, pt has been talking about eye appointment for a long time. Interested in getting gls. Has several pairs of OTC readers but does not wear them often.            Review of systems for the eyes was negative other than the pertinent positives and negatives noted in the HPI.  History is obtained from Anna's friend from her adult group home     Primary care: [facility]   Assessment & Plan   Sherly Ramires is a 24 year old female who presents with:     Consecutive exotropia    (1996) BMR 6 (Gonsales)   (12/10/14) BLR 11 (Areaux) with subsequent refractory dellen that has resolved  Stable residual exotropia.     Retinal dystrophy, severe OU with cystoid macular edema, left eye    Congenital disorder of glycosylation (CDG)   Extinguished electroretinogram (ERG) 12/10/14.     Would not treat edema, give glasses, or repeat electroretinogram (ERG).   Nystagmus, thankfully, responding to Keppra.    Stable eye exam. Return to clinic as needed of any new concerns. I'd be happy to see Anna every few years to check her eyes.        Return in about 3 years (around 11/20/2022) for DFE, only refract PRN.    There are no Patient Instructions on file for this visit.    Visit Diagnoses & Orders    ICD-10-CM    1. Alternating exotropia H50.15    2. Retinal dystrophy H35.50    3. Congenital nystagmus H55.01    4. CDG (congenital  disorder of glycosylation) (H) E74.8       Attending Physician Attestation:  Complete documentation of historical and exam elements from today's encounter can be found in the full encounter summary report (not reduplicated in this progress note).  I personally obtained the chief complaint(s) and history of present illness.  I confirmed and edited as necessary the review of systems, past medical/surgical history, family history, social history, and examination findings as documented by others; and I examined the patient myself.  I personally reviewed the relevant tests, images, and reports as documented above.  I formulated and edited as necessary the assessment and plan and discussed the findings and management plan with the patient and family. - Skyler Correa Jr., MD     Again, thank you for allowing me to participate in the care of your patient.      Sincerely,    Skyler Correa MD

## 2019-11-20 NOTE — NURSING NOTE
Chief Complaint(s) and History of Present Illness(es)     Exotropia Follow Up     Laterality: both eyes    Onset: present since childhood    Quality: horizontal    Frequency: constantly              Comments     Mom sees not XT. Believes vision is stable compared to LV. Per mom, pt has been talking about eye appointment for a long time. Interested in getting gls. Has several pairs of OTC readers but does not wear them often.

## 2019-11-20 NOTE — PROGRESS NOTES
Chief Complaint(s) and History of Present Illness(es)     Exotropia Follow Up     Laterality: both eyes    Onset: present since childhood    Quality: horizontal    Frequency: constantly              Comments     Mom per report and assistant from Anna's group home sees no XT. Believes vision is stable compared to LV. Per mom, pt has been talking about eye appointment for a long time. Interested in getting gls. Has several pairs of OTC readers but does not wear them often.            Review of systems for the eyes was negative other than the pertinent positives and negatives noted in the HPI.  History is obtained from Anna's friend from her adult group home     Primary care: [facility]   Assessment & Plan   Sherly DAVALOS Ike is a 24 year old female who presents with:     Consecutive exotropia    (1996) BMR 6 (Gonsales)   (12/10/14) BLR 11 (Areaux) with subsequent refractory dellen that has resolved  Stable residual exotropia.     Retinal dystrophy, severe OU with cystoid macular edema, left eye    Congenital disorder of glycosylation (CDG)   Extinguished electroretinogram (ERG) 12/10/14.     Would not treat edema, give glasses, or repeat electroretinogram (ERG).   Nystagmus, thankfully, responding to Keppra.    Stable eye exam. Return to clinic as needed of any new concerns. I'd be happy to see Anna every few years to check her eyes.        Return in about 3 years (around 11/20/2022) for DFE, only refract PRN.    There are no Patient Instructions on file for this visit.    Visit Diagnoses & Orders    ICD-10-CM    1. Alternating exotropia H50.15    2. Retinal dystrophy H35.50    3. Congenital nystagmus H55.01    4. CDG (congenital disorder of glycosylation) (H) E74.8       Attending Physician Attestation:  Complete documentation of historical and exam elements from today's encounter can be found in the full encounter summary report (not reduplicated in this progress note).  I personally obtained the chief  complaint(s) and history of present illness.  I confirmed and edited as necessary the review of systems, past medical/surgical history, family history, social history, and examination findings as documented by others; and I examined the patient myself.  I personally reviewed the relevant tests, images, and reports as documented above.  I formulated and edited as necessary the assessment and plan and discussed the findings and management plan with the patient and family. - Skyler Correa Jr., MD

## 2019-11-21 ASSESSMENT — EXTERNAL EXAM - LEFT EYE: OS_EXAM: NORMAL

## 2019-11-21 ASSESSMENT — CUP TO DISC RATIO
OD_RATIO: 0.1
OS_RATIO: 0.1

## 2019-11-21 ASSESSMENT — EXTERNAL EXAM - RIGHT EYE: OD_EXAM: NORMAL

## 2019-11-21 ASSESSMENT — SLIT LAMP EXAM - LIDS
COMMENTS: NORMAL
COMMENTS: NORMAL

## 2020-03-14 ENCOUNTER — HOSPITAL ENCOUNTER (INPATIENT)
Facility: CLINIC | Age: 25
LOS: 2 days | Discharge: HOME | DRG: 641 | End: 2020-03-16
Attending: EMERGENCY MEDICINE | Admitting: INTERNAL MEDICINE
Payer: COMMERCIAL

## 2020-03-14 ENCOUNTER — APPOINTMENT (OUTPATIENT)
Dept: GENERAL RADIOLOGY | Facility: CLINIC | Age: 25
DRG: 641 | End: 2020-03-14
Attending: EMERGENCY MEDICINE
Payer: COMMERCIAL

## 2020-03-14 DIAGNOSIS — E88.9 INBORN METABOLISM ERROR: ICD-10-CM

## 2020-03-14 DIAGNOSIS — R11.10 VOMITING, INTRACTABILITY OF VOMITING NOT SPECIFIED, PRESENCE OF NAUSEA NOT SPECIFIED, UNSPECIFIED VOMITING TYPE: ICD-10-CM

## 2020-03-14 DIAGNOSIS — E86.0 DEHYDRATION: ICD-10-CM

## 2020-03-14 LAB
ALBUMIN SERPL-MCNC: 3.2 G/DL (ref 3.4–5)
ALBUMIN UR-MCNC: 10 MG/DL
ALP SERPL-CCNC: 90 U/L (ref 40–150)
ALT SERPL W P-5'-P-CCNC: 41 U/L (ref 0–50)
ANION GAP SERPL CALCULATED.3IONS-SCNC: 7 MMOL/L (ref 3–14)
APPEARANCE UR: CLEAR
AST SERPL W P-5'-P-CCNC: 30 U/L (ref 0–45)
BACTERIA #/AREA URNS HPF: ABNORMAL /HPF
BASOPHILS # BLD AUTO: 0 10E9/L (ref 0–0.2)
BASOPHILS NFR BLD AUTO: 0.1 %
BILIRUB SERPL-MCNC: 0.5 MG/DL (ref 0.2–1.3)
BILIRUB UR QL STRIP: NEGATIVE
BUN SERPL-MCNC: 15 MG/DL (ref 7–30)
CALCIUM SERPL-MCNC: 8.4 MG/DL (ref 8.5–10.1)
CHLORIDE SERPL-SCNC: 111 MMOL/L (ref 94–109)
CO2 SERPL-SCNC: 28 MMOL/L (ref 20–32)
COLOR UR AUTO: YELLOW
CREAT SERPL-MCNC: 0.29 MG/DL (ref 0.52–1.04)
DIFFERENTIAL METHOD BLD: ABNORMAL
EOSINOPHIL # BLD AUTO: 0 10E9/L (ref 0–0.7)
EOSINOPHIL NFR BLD AUTO: 0 %
ERYTHROCYTE [DISTWIDTH] IN BLOOD BY AUTOMATED COUNT: 13.2 % (ref 10–15)
GFR SERPL CREATININE-BSD FRML MDRD: >90 ML/MIN/{1.73_M2}
GLUCOSE SERPL-MCNC: 81 MG/DL (ref 70–99)
GLUCOSE UR STRIP-MCNC: NEGATIVE MG/DL
HCT VFR BLD AUTO: 42.1 % (ref 35–47)
HGB BLD-MCNC: 13.8 G/DL (ref 11.7–15.7)
HGB UR QL STRIP: NEGATIVE
HYALINE CASTS #/AREA URNS LPF: 3 /LPF (ref 0–2)
IMM GRANULOCYTES # BLD: 0 10E9/L (ref 0–0.4)
IMM GRANULOCYTES NFR BLD: 0.2 %
INR PPP: 1.03 (ref 0.86–1.14)
KETONES UR STRIP-MCNC: 80 MG/DL
LACTATE BLD-SCNC: 1.7 MMOL/L (ref 0.7–2)
LEUKOCYTE ESTERASE UR QL STRIP: NEGATIVE
LYMPHOCYTES # BLD AUTO: 0.4 10E9/L (ref 0.8–5.3)
LYMPHOCYTES NFR BLD AUTO: 3.1 %
MCH RBC QN AUTO: 31.2 PG (ref 26.5–33)
MCHC RBC AUTO-ENTMCNC: 32.8 G/DL (ref 31.5–36.5)
MCV RBC AUTO: 95 FL (ref 78–100)
MONOCYTES # BLD AUTO: 0.5 10E9/L (ref 0–1.3)
MONOCYTES NFR BLD AUTO: 4.1 %
MUCOUS THREADS #/AREA URNS LPF: PRESENT /LPF
NEUTROPHILS # BLD AUTO: 12.1 10E9/L (ref 1.6–8.3)
NEUTROPHILS NFR BLD AUTO: 92.5 %
NITRATE UR QL: NEGATIVE
NRBC # BLD AUTO: 0 10*3/UL
NRBC BLD AUTO-RTO: 0 /100
PH UR STRIP: 6.5 PH (ref 5–7)
PLATELET # BLD AUTO: 151 10E9/L (ref 150–450)
POTASSIUM SERPL-SCNC: 3.6 MMOL/L (ref 3.4–5.3)
PROT SERPL-MCNC: 6.6 G/DL (ref 6.8–8.8)
RBC # BLD AUTO: 4.43 10E12/L (ref 3.8–5.2)
RBC #/AREA URNS AUTO: 2 /HPF (ref 0–2)
SODIUM SERPL-SCNC: 146 MMOL/L (ref 133–144)
SOURCE: ABNORMAL
SP GR UR STRIP: 1.02 (ref 1–1.03)
SQUAMOUS #/AREA URNS AUTO: <1 /HPF (ref 0–1)
UROBILINOGEN UR STRIP-MCNC: NORMAL MG/DL (ref 0–2)
WBC # BLD AUTO: 13.1 10E9/L (ref 4–11)
WBC #/AREA URNS AUTO: 2 /HPF (ref 0–5)

## 2020-03-14 PROCEDURE — 83605 ASSAY OF LACTIC ACID: CPT | Performed by: FAMILY MEDICINE

## 2020-03-14 PROCEDURE — 81001 URINALYSIS AUTO W/SCOPE: CPT | Performed by: EMERGENCY MEDICINE

## 2020-03-14 PROCEDURE — 25000128 H RX IP 250 OP 636: Performed by: EMERGENCY MEDICINE

## 2020-03-14 PROCEDURE — 71046 X-RAY EXAM CHEST 2 VIEWS: CPT

## 2020-03-14 PROCEDURE — 40000141 ZZH STATISTIC PERIPHERAL IV START W/O US GUIDANCE

## 2020-03-14 PROCEDURE — 25000132 ZZH RX MED GY IP 250 OP 250 PS 637: Performed by: EMERGENCY MEDICINE

## 2020-03-14 PROCEDURE — 25800025 ZZH RX 258: Performed by: EMERGENCY MEDICINE

## 2020-03-14 PROCEDURE — 80053 COMPREHEN METABOLIC PANEL: CPT | Performed by: EMERGENCY MEDICINE

## 2020-03-14 PROCEDURE — 99222 1ST HOSP IP/OBS MODERATE 55: CPT | Mod: AI | Performed by: INTERNAL MEDICINE

## 2020-03-14 PROCEDURE — 96376 TX/PRO/DX INJ SAME DRUG ADON: CPT | Performed by: EMERGENCY MEDICINE

## 2020-03-14 PROCEDURE — 85610 PROTHROMBIN TIME: CPT | Performed by: EMERGENCY MEDICINE

## 2020-03-14 PROCEDURE — 99285 EMERGENCY DEPT VISIT HI MDM: CPT | Mod: 25 | Performed by: EMERGENCY MEDICINE

## 2020-03-14 PROCEDURE — 40000257 ZZH STATISTIC CONSULT NO CHARGE VASC ACCESS

## 2020-03-14 PROCEDURE — 99285 EMERGENCY DEPT VISIT HI MDM: CPT | Mod: Z6 | Performed by: EMERGENCY MEDICINE

## 2020-03-14 PROCEDURE — 25800030 ZZH RX IP 258 OP 636: Performed by: EMERGENCY MEDICINE

## 2020-03-14 PROCEDURE — 96361 HYDRATE IV INFUSION ADD-ON: CPT | Performed by: EMERGENCY MEDICINE

## 2020-03-14 PROCEDURE — 85025 COMPLETE CBC W/AUTO DIFF WBC: CPT | Performed by: EMERGENCY MEDICINE

## 2020-03-14 PROCEDURE — 12000014 ZZH R&B PEDS UMMC

## 2020-03-14 PROCEDURE — 96374 THER/PROPH/DIAG INJ IV PUSH: CPT | Performed by: EMERGENCY MEDICINE

## 2020-03-14 RX ORDER — D-MANNOSE 99 %
1 POWDER (GRAM) MISCELLANEOUS 2 TIMES DAILY
Status: DISCONTINUED | OUTPATIENT
Start: 2020-03-14 | End: 2020-03-16 | Stop reason: HOSPADM

## 2020-03-14 RX ORDER — FLUORIDE TOOTHPASTE
10 TOOTHPASTE DENTAL 2 TIMES DAILY
Status: DISCONTINUED | OUTPATIENT
Start: 2020-03-14 | End: 2020-03-16 | Stop reason: HOSPADM

## 2020-03-14 RX ORDER — ONDANSETRON 2 MG/ML
4 INJECTION INTRAMUSCULAR; INTRAVENOUS ONCE
Status: COMPLETED | OUTPATIENT
Start: 2020-03-14 | End: 2020-03-14

## 2020-03-14 RX ORDER — ONDANSETRON HYDROCHLORIDE 4 MG/5ML
0.1 SOLUTION ORAL EVERY 4 HOURS PRN
Status: DISCONTINUED | OUTPATIENT
Start: 2020-03-14 | End: 2020-03-16 | Stop reason: HOSPADM

## 2020-03-14 RX ORDER — CALCIUM CARBONATE 260MG(650)
300 TABLET,CHEWABLE ORAL DAILY
COMMUNITY

## 2020-03-14 RX ORDER — SACCHAROMYCES BOULARDII 250 MG
250 CAPSULE ORAL DAILY
COMMUNITY
End: 2024-05-24

## 2020-03-14 RX ORDER — LEVETIRACETAM 100 MG/ML
1500 SOLUTION ORAL AT BEDTIME
Status: DISCONTINUED | OUTPATIENT
Start: 2020-03-14 | End: 2020-03-16 | Stop reason: HOSPADM

## 2020-03-14 RX ORDER — CALCIUM CARBONATE 1250 MG/5ML
1500 SUSPENSION ORAL 2 TIMES DAILY WITH MEALS
Status: DISCONTINUED | OUTPATIENT
Start: 2020-03-14 | End: 2020-03-16 | Stop reason: HOSPADM

## 2020-03-14 RX ADMIN — ONDANSETRON 4 MG: 2 INJECTION INTRAMUSCULAR; INTRAVENOUS at 11:32

## 2020-03-14 RX ADMIN — CALCIUM CARBONATE 1500 MG: 1250 SUSPENSION ORAL at 20:17

## 2020-03-14 RX ADMIN — Medication 50 MG: at 20:18

## 2020-03-14 RX ADMIN — ACETAMINOPHEN 325 MG: 325 SOLUTION ORAL at 20:18

## 2020-03-14 RX ADMIN — LEVETIRACETAM 1500 MG: 100 SOLUTION ORAL at 20:17

## 2020-03-14 RX ADMIN — DEXTROSE AND SODIUM CHLORIDE: 10; .45 INJECTION, SOLUTION INTRAVENOUS at 13:59

## 2020-03-14 RX ADMIN — SODIUM CHLORIDE 600 ML: 9 INJECTION, SOLUTION INTRAVENOUS at 11:34

## 2020-03-14 RX ADMIN — Medication 10 ML: at 20:18

## 2020-03-14 RX ADMIN — ONDANSETRON 4 MG: 2 INJECTION INTRAMUSCULAR; INTRAVENOUS at 19:14

## 2020-03-14 ASSESSMENT — ACTIVITIES OF DAILY LIVING (ADL)
TOILETING: 4-->COMPLETELY DEPENDENT
SWALLOWING: 2-->DIFFICULTY SWALLOWING LIQUIDS
RETIRED_EATING: 4-->COMPLETELY DEPENDENT
TRANSFERRING: 4-->COMPLETELY DEPENDENT
RETIRED_COMMUNICATION: 2-->DIFFICULTY SPEAKING (NOT RELATED TO LANGUAGE BARRIER)
COGNITION: 0 - NO COGNITION ISSUES REPORTED
AMBULATION: 4-->COMPLETELY DEPENDENT
FALL_HISTORY_WITHIN_LAST_SIX_MONTHS: NO
DRESS: 4-->COMPLETELY DEPENDENT
BATHING: 4-->COMPLETELY DEPENDENT

## 2020-03-14 ASSESSMENT — ENCOUNTER SYMPTOMS
DIARRHEA: 0
FEVER: 0
VOMITING: 1
COUGH: 0
NAUSEA: 1

## 2020-03-14 NOTE — ED NOTES
Straight cathed pt using pediatric straight cath kit, 2 attempts.   Urine sent, pericare done and new brief applied.   2 RNs present for procedure and responsible party () during procedure.   Pt tolerated procedure well.

## 2020-03-14 NOTE — LETTER
Date: Mar 16, 2020    TO WHOM IT MAY CONCERN:    Patient Sherly Ramires was seen on Mar 14, 2020 and discharged on March 16, 2020. She is now medically appropriate to return to Adult day programing.         Rena Kaminski MD

## 2020-03-14 NOTE — ED TRIAGE NOTES
Pt caregiver states that the pt had n/v since 0630 today, and that they came because she gets sick quick. Tube feed has been turned off at this time.

## 2020-03-14 NOTE — ED NOTES
Pediatric vascular was paged. Unable to place iv, caregiver states that she generally needs pediatrics to help with iv placement and lab draws.

## 2020-03-14 NOTE — PHARMACY-ADMISSION MEDICATION HISTORY
Admission medication history interview status for the 3/14/2020 admission is complete. See Epic admission navigator for allergy information, pharmacy, prior to admission medications and immunization status.     Medication history interview sources:  , medication list    Changes made to PTA medication list (reason)  Added: magnesium citrate, saccharomyces boulardii probiotic (per )  Deleted:   - cholecalciferol 1000 units by mouth daily (per supervisor not taking)  - lactobacillus rhamnosus 1 capsule per feeding tube BID (per supervisor different probiotic - added to list)  - trazodone 50 mg by gastric tube at bedtime (duplicate)  - Mood Probiotic 1 capsule (3 million CFUs) daily (per supervisor different probiotic - added to list)  Changed:   - artificial saliva 4 times daily as needed for dry mouth ---> twice daily (per supervisor)  - docusate enema 3 times a week (MWF) to help with bowel movements ---> as needed if no bowel movement by day 2 (per supervisor)  - hydroxyzine as needed for itching ---> as needed for agitation (per supervisor)  - Stress Relax (magnesium citrate) 300 mg at bedtime (changed to magnesium citrate tablet per medication list)  - Changed multiple medications from oral to via g-tube per  patient takes medications via g-tube.    Additional medication history information (including reliability of information, actions taken by pharmacist):  - Patient's  was with patient in the ED and provided a medication list as well as reviewed patient's MAR for discrepancies.  - Of note, patient did not have her nutritional formula 3/14/20 due to nausea and vomiting.    [place] :  - 05/27/2019 Diazepam 5 Mg/ml Oral Conc #30 15-day supply       Prior to Admission medications    Medication Sig Last Dose Taking? Auth Provider   acetaminophen (TYLENOL) 160 MG/5ML solution 480 mg by Per G Tube route every 4 hours as needed for fever  or mild pain  PRN Yes Unknown, Entered By History   ARIPiprazole (ABILIFY) 1 MG/ML SOLN 7.5 mg by Per G Tube route daily  3/14/2020 at AM Yes Unknown, Entered By History   artificial saliva (BIOTENE DRY MOUTHWASH) LIQD liquid Swish and spit in mouth 2 times daily  3/14/2020 at AM Yes Reported, Patient   bacitracin ointment Apply topically 2 times daily as needed for wound care PRN Yes Erika Ozuna MD   calcium carbonate 1250 (500 CA) MG/5ML SUSP Take 6 mLs (1,500 mg) by mouth 2 times daily (with meals)  Patient taking differently: 1,500 mg by Gastronomy tube route 2 times daily (with meals)  3/14/2020 at AM Yes Barrett Lennon MD   D-Mannose POWD 1 Scoop by Per G Tube route 2 times daily mixed with water 3/14/2020 at AM Yes Reported, Patient   diazepam (VALIUM) 5 MG/ML solution 7.5 mg by Gastronomy tube route every hour as needed for seizures (Seizures lasting longer than 5 minutes)  PRN Yes Reported, Patient   Docusate Sodium (ENEMEEZ MINI RE) Place 1 enema rectally daily as needed (if no bowel movement by day 2)  PRN Yes Reported, Patient   estradiol (ESTRACE) 1 MG tablet Take 0.5 mg ( 1/2 tablet) daily.  Patient taking differently: Take 0.5 mg ( 1/2 tablet) daily. 3/14/2020 at AM Yes Sarah Dubon MD   hydrOXYzine (ATARAX) 10 MG/5ML syrup 10 mg by Per G Tube route 2 times daily as needed for anxiety (agitation)  PRN Yes Reported, Patient   levETIRAcetam (KEPPRA) 100 MG/ML solution GIVE 10ML (1000MG) PER G-TUBE EVERY MORNING;GIVE 15ML (1500MG) PER G-TUBE EVERY EVENING 3/14/2020 at AM Yes Nhan Knott MD   loratadine (CLARITIN) 10 MG tablet 10 mg by Per G Tube route daily  3/14/2020 at AM Yes Reported, Patient   Magnesium Citrate 100 MG TABS 300 mg by Per G Tube route daily 3/14/2020 at AM Yes Unknown, Entered By History   Nutritional Supplements (COMPLEAT PEDIATRIC PO) Take up to 5 cans daily. 3/13/2020 Yes Reported, Patient   ondansetron (ZOFRAN) 4 MG/5ML solution Take 5 mLs  (4 mg) by mouth every 6 hours as needed for nausea or vomiting  Patient taking differently: 4 mg by Per G Tube route every 6 hours as needed for nausea or vomiting  PRN Yes Erika Ozuna MD   polyethylene glycol (MIRALAX/GLYCOLAX) packet 1 packet by Per G Tube route daily  3/14/2020 at AM Yes Reported, Patient   saccharomyces boulardii (FLORASTOR) 250 MG capsule Take 250 mg by mouth daily 3/13/2020 at 1700 Yes Unknown, Entered By History   sertraline (ZOLOFT) 20 MG/ML (HIGH CONC) solution 200 mg by Gastronomy tube route daily  3/14/2020 at AM Yes Reported, Patient   traZODone (DESYREL) 50 MG tablet Crush one tablet and administer via G-tube 30 minutes prior to bedtime daily 3/13/2020 at Bedtime Yes Reported, Patient   UNABLE TO FIND MEDICATION NAME: Rescue Remedy Herbal Supplement - Used twice daily as needed. PRN Yes Unknown, Entered By History   ORDER FOR DME Equipment being ordered: Other: chest strap for her tilt in space manual wheel chair  Treatment Diagnosis: For safe transportation to home secondary to poor sitting balance in upright. CDG type Ia, seizures, and G-tube dependence.   Elsa Cline MD         Medication history completed by:   Jackie Durbin, PharmD  PGY1 Pharmacy Practice Resident in Behavioral Health

## 2020-03-14 NOTE — ED NOTES
Pt is resting comfortably with caregiver in room. Pediatric vascular was able to place iv (RN had one unsuccessful attempt on right ac). She has received her medication and is currently getting bolus per md order for rate at 300ml/hr for a 600ml bolus. She asked for a popsicle, MD approved and she is tolerating this well. Mother has been updated on situation by caregiver.

## 2020-03-14 NOTE — ED NOTES
Pt caregiver states that IV fluid can not be given as a bolus (history of third spacing), but should have maintenance fluids. States that albumin should be checked due to this being a sign per parents.

## 2020-03-14 NOTE — ED PROVIDER NOTES
"    [facility] EMERGENCY DEPARTMENT ([facility])    3/14/20        History     Chief Complaint   Patient presents with     Nausea, Vomiting, & Diarrhea     Started around 0600 this am     The history is provided by the shelter. The history is limited by the condition of the patient.   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 The patient started throwing up at 6:30am this morning. She has had 5 episodes of vomiting, and last vomited at 7:45am. They administered 5ml Zofran at 6:50am, and Pedialyte 50ml/hr in her G-tube at 9am. They stopped administering Pedialyte in transit to the Emergency Department. The group home staff states that the patient does not seem to be in any pain, and their primary concern is that the patient can get sick very quickly. She notes that the last time this happened the patient developed pneumonia. The patient has had nothing to eat today.   She notes that no one else at the group home is sick, and that yesterday the patient was feeling normal and they even went to see [employer]\". The group home professional states that she had a temperature of 97.5 and oxygen saturation of 94% at the group home and denied the patient has any diarrhea, fever, or cough.    Social history: The patient lives in a group home and is accompanied today by a group home professional.     I have reviewed the Medications, Allergies, Past Medical and Surgical History, and Social History in the [facility] system.  Past Medical History:   Diagnosis Date     Anxiety      Global developmental delay      Hypoglycemia      Metabolic disease  "    congenital disorder of glycosilation     Scoliosis      Seizures (H)     last at age 8 before today     Strabismus        Past Surgical History:   Procedure Laterality Date     ANESTHESIA OUT OF OR MRI 3T N/A 3/7/2017    Procedure: ANESTHESIA PEDS SEDATION MRI 3T;  Surgeon: GENERIC ANESTHESIA PROVIDER;  Location: [facility] PEDS SEDATION      BACK SURGERY      c1 decompression and fusionx3, scoliosis with instrumentation x1     ELECTRORETINOGRAM Bilateral 12/10/2014    ERG ([place])     ENT SURGERY       ear tubes     EXAM UNDER ANESTHESIA EYE(S) Bilateral 12/10/2014    EUA ([place])     EXTRACTION(S) DENTAL N/A 12/10/2014    Procedure: EXTRACTION(S) DENTAL;  Surgeon: Hubert York DDS;  Location: [facility] OR     EYE SURGERY Bilateral 1996    BMR 6mm ([place])      GI SURGERY      g tube     HC REPLACE GASTROSTOMY/CECOSTOMY TUBE PERCUTANEOUS N/A 12/10/2014    Procedure: REPLACE GASTROSTOMY TUBE, PERCUTANEOUS;  Surgeon: Phong Perea MD;  Location: [facility] OR     RECESSION RESECTION (REPAIR STRABISMUS) BILATERAL Bilateral 12/10/2014    BLR 11 ([place])     STRABISMUS SURGERY  1996    (1996) BMR 6 ([place])     STRABISMUS SURGERY  2014    BLR 11 ([place]) -        Family History   Problem Relation Age of Onset     Glaucoma Mother         no medications or surg to date     Glaucoma Maternal Grandmother         s/p surg, decreased VA      Amblyopia No family hx of      Strabismus No family hx of        Social History     Tobacco Use     Smoking status: Never Smoker     Smokeless tobacco: Never Used   Substance Use Topics     Alcohol use: No        Review of Systems   Constitutional: Negative for fever.   Respiratory: Negative for cough.    Gastrointestinal: Positive for nausea and vomiting (5 instances). Negative for diarrhea.   All other systems reviewed and are negative.  ROS is limited as patient is nonverbal    Physical Exam   BP: 109/69  Pulse: 129  Heart Rate: 116  Temp: 96.6  F (35.9  C)  Resp: 18  Weight: 30 kg (66 lb  2.2 oz)  SpO2: 97 %  BP 95/67   Pulse 129   Temp 97.3  F (36.3  C) (Axillary)   Resp 18   Wt 30 kg (66 lb 2.2 oz)   SpO2 95%   BMI 19.37 kg/m       Physical Exam   Constitutional:   well nourished, well developed, resting comfortably  Appears comfortable  HENT:   Head: Normocephalic and atraumatic.   Cardiovascular: regular rate and rhythm without murmurs or gallops  Pulmonary/Chest: Clear to auscultation bilaterally, with no wheezes or retractions.  GI: Soft with good bowel sounds.  Non-tender, non-distended  Musculoskeletal:  Flexion contractures  Skin: Skin is warm and dry. No rash noted.   Neurological: alert, nonverbal but follows commands      ED Course   10:09 AM  The patient was seen and examined by Martine Martel MD in Room ED06.      Procedures                        Results for orders placed or performed during the hospital encounter of 03/14/20   XR Chest 2 Views     Status: None    Narrative    CHEST TWO VIEWS 3/14/2020 1:42 PM     HISTORY: Inborn error of metabolism. Vomiting.    COMPARISON: October 25, 2019       Impression    IMPRESSION: There are no acute infiltrates. The cardiac silhouette is  not enlarged. Pulmonary vasculature is unremarkable.    SHARI TERRELL MD   CBC with platelets differential     Status: Abnormal   Result Value Ref Range    WBC 13.1 (H) 4.0 - 11.0 10e9/L    RBC Count 4.43 3.8 - 5.2 10e12/L    Hemoglobin 13.8 11.7 - 15.7 g/dL    Hematocrit 42.1 35.0 - 47.0 %    MCV 95 78 - 100 fl    MCH 31.2 26.5 - 33.0 pg    MCHC 32.8 31.5 - 36.5 g/dL    RDW 13.2 10.0 - 15.0 %    Platelet Count 151 150 - 450 10e9/L    Diff Method Automated Method     % Neutrophils 92.5 %    % Lymphocytes 3.1 %    % Monocytes 4.1 %    % Eosinophils 0.0 %    % Basophils 0.1 %    % Immature Granulocytes 0.2 %    Nucleated RBCs 0 0 /100    Absolute Neutrophil 12.1 (H) 1.6 - 8.3 10e9/L    Absolute Lymphocytes 0.4 (L) 0.8 - 5.3 10e9/L    Absolute Monocytes 0.5 0.0 - 1.3 10e9/L    Absolute Eosinophils 0.0 0.0 -  0.7 10e9/L    Absolute Basophils 0.0 0.0 - 0.2 10e9/L    Abs Immature Granulocytes 0.0 0 - 0.4 10e9/L    Absolute Nucleated RBC 0.0    Comprehensive metabolic panel     Status: Abnormal   Result Value Ref Range    Sodium 146 (H) 133 - 144 mmol/L    Potassium 3.6 3.4 - 5.3 mmol/L    Chloride 111 (H) 94 - 109 mmol/L    Carbon Dioxide 28 20 - 32 mmol/L    Anion Gap 7 3 - 14 mmol/L    Glucose 81 70 - 99 mg/dL    Urea Nitrogen 15 7 - 30 mg/dL    Creatinine 0.29 (L) 0.52 - 1.04 mg/dL    GFR Estimate >90 >60 mL/min/[1.73_m2]    GFR Estimate If Black >90 >60 mL/min/[1.73_m2]    Calcium 8.4 (L) 8.5 - 10.1 mg/dL    Bilirubin Total 0.5 0.2 - 1.3 mg/dL    Albumin 3.2 (L) 3.4 - 5.0 g/dL    Protein Total 6.6 (L) 6.8 - 8.8 g/dL    Alkaline Phosphatase 90 40 - 150 U/L    ALT 41 0 - 50 U/L    AST 30 0 - 45 U/L   INR     Status: None   Result Value Ref Range    INR 1.03 0.86 - 1.14   UA reflex to Microscopic and Culture     Status: Abnormal    Specimen: Urine catheter; Catheterized Urine   Result Value Ref Range    Color Urine Yellow     Appearance Urine Clear     Glucose Urine Negative NEG^Negative mg/dL    Bilirubin Urine Negative NEG^Negative    Ketones Urine 80 (A) NEG^Negative mg/dL    Specific Gravity Urine 1.018 1.003 - 1.035    Blood Urine Negative NEG^Negative    pH Urine 6.5 5.0 - 7.0 pH    Protein Albumin Urine 10 (A) NEG^Negative mg/dL    Urobilinogen mg/dL Normal 0.0 - 2.0 mg/dL    Nitrite Urine Negative NEG^Negative    Leukocyte Esterase Urine Negative NEG^Negative    Source Catheterized Urine     RBC Urine 2 0 - 2 /HPF    WBC Urine 2 0 - 5 /HPF    Bacteria Urine Few (A) NEG^Negative /HPF    Squamous Epithelial /HPF Urine <1 0 - 1 /HPF    Mucous Urine Present (A) NEG^Negative /LPF    Hyaline Casts 3 (H) 0 - 2 /LPF   Lactic acid     Status: None   Result Value Ref Range    Lactic Acid 1.7 0.7 - 2.0 mmol/L      Results for orders placed or performed during the hospital encounter of 03/14/20 (from the past 24 hour(s))   UA  reflex to Microscopic and Culture    Specimen: Urine catheter; Catheterized Urine   Result Value Ref Range    Color Urine Yellow     Appearance Urine Clear     Glucose Urine Negative NEG^Negative mg/dL    Bilirubin Urine Negative NEG^Negative    Ketones Urine 80 (A) NEG^Negative mg/dL    Specific Gravity Urine 1.018 1.003 - 1.035    Blood Urine Negative NEG^Negative    pH Urine 6.5 5.0 - 7.0 pH    Protein Albumin Urine 10 (A) NEG^Negative mg/dL    Urobilinogen mg/dL Normal 0.0 - 2.0 mg/dL    Nitrite Urine Negative NEG^Negative    Leukocyte Esterase Urine Negative NEG^Negative    Source Catheterized Urine     RBC Urine 2 0 - 2 /HPF    WBC Urine 2 0 - 5 /HPF    Bacteria Urine Few (A) NEG^Negative /HPF    Squamous Epithelial /HPF Urine <1 0 - 1 /HPF    Mucous Urine Present (A) NEG^Negative /LPF    Hyaline Casts 3 (H) 0 - 2 /LPF   CBC with platelets differential   Result Value Ref Range    WBC 13.1 (H) 4.0 - 11.0 10e9/L    RBC Count 4.43 3.8 - 5.2 10e12/L    Hemoglobin 13.8 11.7 - 15.7 g/dL    Hematocrit 42.1 35.0 - 47.0 %    MCV 95 78 - 100 fl    MCH 31.2 26.5 - 33.0 pg    MCHC 32.8 31.5 - 36.5 g/dL    RDW 13.2 10.0 - 15.0 %    Platelet Count 151 150 - 450 10e9/L    Diff Method Automated Method     % Neutrophils 92.5 %    % Lymphocytes 3.1 %    % Monocytes 4.1 %    % Eosinophils 0.0 %    % Basophils 0.1 %    % Immature Granulocytes 0.2 %    Nucleated RBCs 0 0 /100    Absolute Neutrophil 12.1 (H) 1.6 - 8.3 10e9/L    Absolute Lymphocytes 0.4 (L) 0.8 - 5.3 10e9/L    Absolute Monocytes 0.5 0.0 - 1.3 10e9/L    Absolute Eosinophils 0.0 0.0 - 0.7 10e9/L    Absolute Basophils 0.0 0.0 - 0.2 10e9/L    Abs Immature Granulocytes 0.0 0 - 0.4 10e9/L    Absolute Nucleated RBC 0.0    Comprehensive metabolic panel   Result Value Ref Range    Sodium 146 (H) 133 - 144 mmol/L    Potassium 3.6 3.4 - 5.3 mmol/L    Chloride 111 (H) 94 - 109 mmol/L    Carbon Dioxide 28 20 - 32 mmol/L    Anion Gap 7 3 - 14 mmol/L    Glucose 81 70 - 99 mg/dL     Urea Nitrogen 15 7 - 30 mg/dL    Creatinine 0.29 (L) 0.52 - 1.04 mg/dL    GFR Estimate >90 >60 mL/min/[1.73_m2]    GFR Estimate If Black >90 >60 mL/min/[1.73_m2]    Calcium 8.4 (L) 8.5 - 10.1 mg/dL    Bilirubin Total 0.5 0.2 - 1.3 mg/dL    Albumin 3.2 (L) 3.4 - 5.0 g/dL    Protein Total 6.6 (L) 6.8 - 8.8 g/dL    Alkaline Phosphatase 90 40 - 150 U/L    ALT 41 0 - 50 U/L    AST 30 0 - 45 U/L   INR   Result Value Ref Range    INR 1.03 0.86 - 1.14   XR Chest 2 Views    Narrative    CHEST TWO VIEWS 3/14/2020 1:42 PM     HISTORY: Inborn error of metabolism. Vomiting.    COMPARISON: October 25, 2019       Impression    IMPRESSION: There are no acute infiltrates. The cardiac silhouette is  not enlarged. Pulmonary vasculature is unremarkable.    SHARI TERRELL MD   Lactic acid   Result Value Ref Range    Lactic Acid 1.7 0.7 - 2.0 mmol/L     Medications   dextrose 10% and 0.45% sodium chloride infusion ( Intravenous New Bag 3/14/20 1359)   0.9% sodium chloride BOLUS (0 mLs Intravenous Stopped 3/14/20 1351)   ondansetron (ZOFRAN) injection 4 mg (4 mg Intravenous Given 3/14/20 1132)              Assessments & Plan (with Medical Decision Making)       I have reviewed the nursing notes.  EMERGENCY DEPARTMENT COURSE: Patient was seen and examined at 1009 am.    I was called by the pediatric metabolic physician Dr. Ange Jean shortly after I evaluated the patient.  The patient is apparently at risk for stroke if she becomes dehydrated, but hydration is complicated as the patient tends to third space. . Dr. Jean recommends obtaining a CMP, INR, CBC and a UA to check for proteinuria.  She also recommends treating her with a normal saline bolus given over 2 hours and Zofran.  I ordered normal saline at 20 cc/kg 600 cc to run over 2 hours--.  I also ordered 4 mg of Zofran IV.  I discussed Zofran dosing with the pediatric emergency medicine physician, who notes that the pediatric dose for Zofran is 0.15 mg/kg.  The patient  weighs 30 kg and therefore can get up to 4.5 mg of Zofran every 6 hours.  Laboratory studies show a UA with 80 ketones and 10 protein.  She has a leukocytosis, with a WBC of 13.1.  Comprehensive metabolic panel shows an albumin of 3.2.  Sodium is slightly elevated at 146.  Calcium and total protein are low.  INR is within normal limits at 1.03.  Chest Xray shows IMPRESSION: There are no acute infiltrates. The cardiac silhouette is  not enlarged. Pulmonary vasculature is unremarkable.  The patient was able to eat a popsicle and appears comfortable watching a TV show on her iPad.    I then recontacted Dr. Jean.  She recommends treating the patient with D10 half-normal saline at maintenance and admitting the patient to the pediatric service for gentle hydration. I ordered D10 1/2 NS at 70cc/hr.    The patient is a 25-year-old with a complicated past medical history including congenital metabolism disorder who presents with vomiting and concerns for dehydration.  She will be admitted to the pediatric service for gentle hydration and further evaluation.  I spoke with Dr. Kaminski of pediatrics who accepted the patient for admission.  I also kept the patient's mother updated on her progress here in the emergency department.        I have reviewed the findings, diagnosis, plan and need for follow up with the patient.    New Prescriptions    No medications on file       Final diagnoses:   Vomiting, intractability of vomiting not specified, presence of nausea not specified, unspecified vomiting type   Dehydration   Inborn metabolism error       3/14/2020   [facility], EMERGENCY DEPARTMENT  Ankita GONZALEZ Hang, am serving as a trained medical scribe to document services personally performed by Martine Martel MD based on the provider's statements to me on March 14, 2020.  This document has been checked and approved by the attending provider.    Martine GONZALEZ MD, was physically present and have reviewed and verified the  accuracy of this note documented by Sukhjinder Osorio, medical scribe.     This note was created in part by the use of Dragon voice recognition dictation system. Inadvertent grammatical errors and typographical errors may still exist.  MD Delonte Delacruz Alda L, MD  03/14/20 7511

## 2020-03-15 LAB

## 2020-03-15 PROCEDURE — 87581 M.PNEUMON DNA AMP PROBE: CPT | Performed by: STUDENT IN AN ORGANIZED HEALTH CARE EDUCATION/TRAINING PROGRAM

## 2020-03-15 PROCEDURE — 25000128 H RX IP 250 OP 636: Performed by: STUDENT IN AN ORGANIZED HEALTH CARE EDUCATION/TRAINING PROGRAM

## 2020-03-15 PROCEDURE — 25000132 ZZH RX MED GY IP 250 OP 250 PS 637: Performed by: STUDENT IN AN ORGANIZED HEALTH CARE EDUCATION/TRAINING PROGRAM

## 2020-03-15 PROCEDURE — 25000132 ZZH RX MED GY IP 250 OP 250 PS 637: Performed by: EMERGENCY MEDICINE

## 2020-03-15 PROCEDURE — 99233 SBSQ HOSP IP/OBS HIGH 50: CPT | Mod: GC | Performed by: PEDIATRICS

## 2020-03-15 PROCEDURE — 87486 CHLMYD PNEUM DNA AMP PROBE: CPT | Performed by: STUDENT IN AN ORGANIZED HEALTH CARE EDUCATION/TRAINING PROGRAM

## 2020-03-15 PROCEDURE — 25800025 ZZH RX 258: Performed by: STUDENT IN AN ORGANIZED HEALTH CARE EDUCATION/TRAINING PROGRAM

## 2020-03-15 PROCEDURE — 87633 RESP VIRUS 12-25 TARGETS: CPT | Performed by: STUDENT IN AN ORGANIZED HEALTH CARE EDUCATION/TRAINING PROGRAM

## 2020-03-15 PROCEDURE — 12000014 ZZH R&B PEDS UMMC

## 2020-03-15 PROCEDURE — 87252 VIRUS INOCULATION TISSUE: CPT | Performed by: STUDENT IN AN ORGANIZED HEALTH CARE EDUCATION/TRAINING PROGRAM

## 2020-03-15 RX ORDER — CALCIUM CARBONATE 260MG(650)
300 TABLET,CHEWABLE ORAL DAILY
Status: DISCONTINUED | OUTPATIENT
Start: 2020-03-15 | End: 2020-03-16 | Stop reason: CLARIF

## 2020-03-15 RX ORDER — DIAZEPAM ORAL SOLUTION (CONCENTRATE) 5 MG/ML
7.5 SOLUTION ORAL
Status: DISCONTINUED | OUTPATIENT
Start: 2020-03-15 | End: 2020-03-16 | Stop reason: HOSPADM

## 2020-03-15 RX ORDER — ESTRADIOL 0.5 MG/1
0.5 TABLET ORAL DAILY
Status: DISCONTINUED | OUTPATIENT
Start: 2020-03-15 | End: 2020-03-16 | Stop reason: HOSPADM

## 2020-03-15 RX ORDER — ARIPIPRAZOLE ORAL 1 MG/ML
7.5 SOLUTION ORAL DAILY
Status: DISCONTINUED | OUTPATIENT
Start: 2020-03-15 | End: 2020-03-16 | Stop reason: HOSPADM

## 2020-03-15 RX ORDER — ONDANSETRON 2 MG/ML
0.1 INJECTION INTRAMUSCULAR; INTRAVENOUS EVERY 8 HOURS PRN
Status: DISCONTINUED | OUTPATIENT
Start: 2020-03-15 | End: 2020-03-16 | Stop reason: HOSPADM

## 2020-03-15 RX ORDER — POLYETHYLENE GLYCOL 3350 17 G/17G
17 POWDER, FOR SOLUTION ORAL DAILY
Status: DISCONTINUED | OUTPATIENT
Start: 2020-03-15 | End: 2020-03-16 | Stop reason: HOSPADM

## 2020-03-15 RX ORDER — SERTRALINE HYDROCHLORIDE 20 MG/ML
200 SOLUTION ORAL DAILY
Status: DISCONTINUED | OUTPATIENT
Start: 2020-03-15 | End: 2020-03-16 | Stop reason: HOSPADM

## 2020-03-15 RX ORDER — HYDROXYZINE HCL 10 MG/5 ML
10 SOLUTION, ORAL ORAL 2 TIMES DAILY PRN
Status: DISCONTINUED | OUTPATIENT
Start: 2020-03-15 | End: 2020-03-16 | Stop reason: HOSPADM

## 2020-03-15 RX ORDER — LORATADINE 10 MG/1
10 TABLET ORAL DAILY
Status: DISCONTINUED | OUTPATIENT
Start: 2020-03-15 | End: 2020-03-16 | Stop reason: HOSPADM

## 2020-03-15 RX ADMIN — DEXTROSE AND SODIUM CHLORIDE: 10; .45 INJECTION, SOLUTION INTRAVENOUS at 03:01

## 2020-03-15 RX ADMIN — Medication 10 ML: at 19:52

## 2020-03-15 RX ADMIN — ONDANSETRON 3.2 MG: 2 INJECTION INTRAMUSCULAR; INTRAVENOUS at 00:35

## 2020-03-15 RX ADMIN — ONDANSETRON 3.2 MG: 2 INJECTION INTRAMUSCULAR; INTRAVENOUS at 09:30

## 2020-03-15 RX ADMIN — LEVETIRACETAM 1500 MG: 100 SOLUTION ORAL at 18:42

## 2020-03-15 RX ADMIN — CALCIUM CARBONATE 1500 MG: 1250 SUSPENSION ORAL at 18:42

## 2020-03-15 RX ADMIN — SERTRALINE HYDROCHLORIDE 200 MG: 20 SOLUTION ORAL at 10:20

## 2020-03-15 RX ADMIN — ARIPIPRAZOLE 7.5 MG: 1 SOLUTION ORAL at 10:18

## 2020-03-15 RX ADMIN — ESTRADIOL 0.5 MG: 0.5 TABLET ORAL at 10:20

## 2020-03-15 RX ADMIN — Medication 1 SCOOP: at 19:48

## 2020-03-15 RX ADMIN — Medication 50 MG: at 18:42

## 2020-03-15 NOTE — PLAN OF CARE
Arrived on unit around 2120. VSS. No signs of pain or discomfort. No signs of nausea, dry heaving, vomiting. Pt is briefed. Remains NPO. MIVF running. Tylenol and Zofran available PRN. Mother at bedside overnight. Continue to monitor for nausea and pain, will determine POC tomorrow.

## 2020-03-15 NOTE — PLAN OF CARE
[id number]: VSS, ex softer BP's and one below parameters, see prior note, MD aware. No signs of pain or discomfort. IV zofran given x1, no signs of nausea or emesis overnight. Good UOP, stool x1. MIVF running. Slept in between cares. Mother at bedside. Continue plan of care.

## 2020-03-15 NOTE — PROGRESS NOTES
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 She requires admission for gentle fluid administration as she has a history of third-spacing and developing pulmonary edema following IV fluids in the past.      GI  #viral gastritis  #History Constipation  #Inborn error of metabolism- glycosylation type 1A error  - Reduce IV fluids from 70ml to 30ml/hour during the day of 3/15, reduce rate to 20ml/hr overnight  - resume home feeds with slow increase (see below in FEN).    - follow ins/outs   - prn Zofran IV  - continue PTA miralax daily PRN  - repeat albumin 3/16      Neuro  #Myelomalacia of cervical spinal cord  #History of complex partial seizures  #Increased risk of stroke with dehydration  - continue PTA Keppra  - Neuro checks qshift  - valium via G-tube for seizure rescue     #Psych  - continue PTA Aripiprazole  - continue PTA sertraline  - continue PTA trazodone at bedtime  - continue hydroxyzine PRN for anxiety      Endo  Osteoporosis  Ovarian failure  - hold PTA estradiol   - hold PTA calcium supplement      FEN  - Home G-tube feeds: start at 40ml/hr and increase by 10mg/hr every hour until she reaches max rate of 100ml/hr. Decrease to 50ml/hr overnight.   - D10 1/2NS: run at 20ml/hr   - if requiring bolus, fluids must be given slowly over 2 hours given extremely fluid sensitive           Disposition Plan   Expected discharge: Tomorrow, recommended to prior living arrangement (group home) once tolerating home feeds (rate of 100ml/hr) and PO intake.   Entered: Bethany Sy  MD Yariel 03/15/2020, 4:29 PM       The patient's care was discussed with the Attending Physician, Dr. Kaminski..    Bethany Saldivar MD    ______________________________________________________________________    Interval History   Patient is seen with Mom. Mom says that Anna has been having some nasal congestion and she is wondering if this is an upper respiratory illness vs viral gastro. She reports that Anna vomited 5 times on Saturday and has not vomited since then. She recounts how Anna ended up with pulmonary edema after receiving IV fluids several years ago, and thinks that anna is doing well with the rate of IV rehydration currentls. No abdominal pain, no cough, no fever, no rashes, or other new symptoms. One BP of 87/50, increased to 99/59 on recheck.     Data reviewed today: I reviewed all medications, new labs and imaging results over the last 24 hours. I personally reviewed no images or EKG's today.    Physical Exam   Vital Signs: Temp: 97.4  F (36.3  C) Temp src: Axillary BP: 104/65 Pulse: 78 Heart Rate: 86 Resp: 20 SpO2: 99 % O2 Device: None (Room air)    Weight: 73 lbs 0 oz     GENERAL: Small for age, thin,coarse and dysmorphic facial features, looks younger than age, active, alert, appears comfortable, watching TV  SKIN: Clear. No significant rash, abnormal pigmentation or lesions  HEAD: Normocephalic (head appears large compared to the rest of her body)  EYES: Pupils equal, round, reactive, Extraocular muscles intact. Normal conjunctivae.  EARS: Normal canals. Tympanic membranes are normal; gray and translucent.  NOSE: Normal without discharge.  MOUTH/THROAT: moist mucous membranes. No oral lesions. Teeth without obvious abnormalities.  NECK: Supple, no masses.  No thyromegaly.  LYMPH NODES: No adenopathy  LUNGS: Clear good air movement in all lung fields. No rales, rhonchi, wheezing or retractions  HEART: Regular rhythm. Normal S1/S2. No murmurs. Normal pulses.  ABDOMEN: Soft,  "non-tender, not distended, no masses or hepatosplenomegaly. Bowel sounds normal.   NEUROLOGIC:  Contractures of arms, hands, legs and feet, mostly nonverbal but following commands, says \"Ma\" and points to TB  EXTREMITIES: contractures of both feet, no edema and no ulcers    Data   No new labs     Physician Attestation   I, Collette Kaminski MD, MD, saw this patient with the resident and agree with the resident/fellow's findings and plan of care as documented in the note.      I personally reviewed vital signs, medications and labs.    Date of Service (when I saw the patient): 03/15/20    "

## 2020-03-15 NOTE — H&P
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 She requires admission for gentle fluid administration.     GI  Concern for viral gastritis  History Constipation  Inborn error of metabolism- glycosylation type 1A error  - mIVF  - prn Zofran IV  - continue PTA miralax daily  - spoke with Genetics/metabolism, recommend to continue IVF and monitor albumin since patient has high propensity for 3rd spacing.     Neuro  Myelomalacia of cervical spinal cord  History of complex partial seizures  Increased risk of stroke with dehydration  - continue PTA Keppra  - Neuro checks qshift  - valium via G-tube for seizure rescue    Psych  - continue PTA Aripiprazole  - continue PTA sertraline  - continue PTA trazodone at bedtime  - continue hydroxyzine PRN for anxiety     Endo  Osteoporosis  Ovarian failure  - continue PTA estradiol  - continue PTA calcium supplement    FEN  - CLD, advance as tolerated  - D10 1/2NS at maintenance  - strict I/O  - CMP in AM. Will follow albumin level closely.  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 She seemed to be at her baseline health according to her mother and  until about 6:30 AM this morning when she began to vomit. She had a total of 5 episodes of vomiting within about 1 hour and then period episodes of retching afterward. There has not been any recent illnesses, though she had one day of being more fatigued one week prior. Her mom was a little concerned about that but she seemed to recover quickly to the point that she went to see [facility] on 3/13. Of note, another resident at the group home had vomiting episodes a few days prior.   She has not had cough, diarrhea, fever, difficulty breathing, mental status changes, weakness.     In the ED, she received a 20/kg bolus run slowly over 2 hours and zofran and then admitted for gentle fluids per recommendation of Dr. Dubon of genetics/metabolism. She was able to take some juice and sips of water but then vomited again in the evening.     Per mother, she has a history of 3rd spacing fluids.  Few years prior, she had been admitted for vomiting and dehydration. She received multiple fluid boluses given that she was not urinating much over the course of the hospitalization. She never had significant weight changes or edema but had increasing respiratory needs requiring intubation as sign of 3rd spacing of fluids. Albumin at that time had been low.     Past Medical History    I have reviewed this patient's medical history and updated it with pertinent information if needed.   Past Medical History:   Diagnosis Date     Anxiety      Global developmental delay      Hypoglycemia      Metabolic disease     congenital disorder of glycosilation     Scoliosis      Seizures (H)     last at age 8 before today     Strabismus        Past Surgical History   I have reviewed this patient's surgical history and updated it with pertinent information if needed.  Past Surgical History:   Procedure Laterality  Date     ANESTHESIA OUT OF OR MRI 3T N/A 3/7/2017    Procedure: ANESTHESIA PEDS SEDATION MRI 3T;  Surgeon: GENERIC ANESTHESIA PROVIDER;  Location: [facility] PEDS SEDATION      BACK SURGERY      c1 decompression and fusionx3, scoliosis with instrumentation x1     ELECTRORETINOGRAM Bilateral 12/10/2014    ERG ()     ENT SURGERY       ear tubes     EXAM UNDER ANESTHESIA EYE(S) Bilateral 12/10/2014    EUA ([place])     EXTRACTION(S) DENTAL N/A 12/10/2014    Procedure: EXTRACTION(S) DENTAL;  Surgeon: Hubert York DDS;  Location: [facility] OR     EYE SURGERY Bilateral     BMR 6mm ([place])      GI SURGERY      g tube     HC REPLACE GASTROSTOMY/CECOSTOMY TUBE PERCUTANEOUS N/A 12/10/2014    Procedure: REPLACE GASTROSTOMY TUBE, PERCUTANEOUS;  Surgeon: Phong Perea MD;  Location: [facility] OR     RECESSION RESECTION (REPAIR STRABISMUS) BILATERAL Bilateral 12/10/2014    BLR 11 ([place])     STRABISMUS SURGERY      () BMR 6 ([place])     STRABISMUS SURGERY      BLR 11 ([place]) -        Immunization History   Immunization Status:  up to date and documented    Prior to Admission Medications   Prior to Admission Medications   Prescriptions Last Dose Informant Patient Reported? Taking?   ARIPiprazole (ABILIFY) 1 MG/ML SOLN 3/14/2020 at AM Nursing Home Yes Yes   Si.5 mg by Per G Tube route daily    D-Mannose POWD 3/14/2020 at AM Nursing Home Yes Yes   Si Scoop by Per G Tube route 2 times daily mixed with water   Docusate Sodium (ENEMEEZ MINI RE) PRN Nursing Home Yes Yes   Sig: Place 1 enema rectally daily as needed (if no bowel movement by day 2)    Magnesium Citrate 100 MG TABS 3/14/2020 at AM  Yes Yes   Si mg by Per G Tube route daily   Nutritional Supplements (COMPLEAT PEDIATRIC PO) 3/13/2020 Nursing Home Yes Yes   ORDER FOR DME  Nursing Home No No   Sig: Equipment being ordered: Other: chest strap for her tilt in space manual wheel chair  Treatment Diagnosis: For safe transportation to home secondary  to poor sitting balance in upright. CDG type Ia, seizures, and G-tube dependence.   UNABLE TO FIND PRN Nursing Home Yes Yes   Sig: MEDICATION NAME: Rescue Remedy Herbal Supplement - Used twice daily as needed.   acetaminophen (TYLENOL) 160 MG/5ML solution PRN Nursing Home Yes Yes   Si mg by Per G Tube route every 4 hours as needed for fever or mild pain    artificial saliva (BIOTENE DRY MOUTHWASH) LIQD liquid 3/14/2020 at AM Nursing Home Yes Yes   Sig: Swish and spit in mouth 2 times daily    bacitracin ointment PRN Nursing Home No Yes   Sig: Apply topically 2 times daily as needed for wound care   calcium carbonate 1250 (500 CA) MG/5ML SUSP 3/14/2020 at AM Nursing Home No Yes   Sig: Take 6 mLs (1,500 mg) by mouth 2 times daily (with meals)   Patient taking differently: 1,500 mg by Gastronomy tube route 2 times daily (with meals)    diazepam (VALIUM) 5 MG/ML solution PRN Nursing Home Yes Yes   Si.5 mg by Gastronomy tube route every hour as needed for seizures (Seizures lasting longer than 5 minutes)    estradiol (ESTRACE) 1 MG tablet 3/14/2020 at AM Nursing Home No Yes   Sig: Take 0.5 mg ( 1/2 tablet) daily.   Patient taking differently: Take 0.5 mg ( 1/2 tablet) daily.   hydrOXYzine (ATARAX) 10 MG/5ML syrup PRN Nursing Home Yes Yes   Sig: 10 mg by Per G Tube route 2 times daily as needed for anxiety (agitation)    levETIRAcetam (KEPPRA) 100 MG/ML solution 3/14/2020 at AM Nursing Home No Yes   Sig: GIVE 10ML (1000MG) PER G-TUBE EVERY MORNING;GIVE 15ML (1500MG) PER G-TUBE EVERY EVENING   loratadine (CLARITIN) 10 MG tablet 3/14/2020 at AM Nursing Home Yes Yes   Sig: 10 mg by Per G Tube route daily    ondansetron (ZOFRAN) 4 MG/5ML solution PRN Nursing Home No Yes   Sig: Take 5 mLs (4 mg) by mouth every 6 hours as needed for nausea or vomiting   Patient taking differently: 4 mg by Per G Tube route every 6 hours as needed for nausea or vomiting    polyethylene glycol (MIRALAX/GLYCOLAX) packet 3/14/2020 at AM  Nursing Home Yes Yes   Si packet by Per G Tube route daily    saccharomyces boulardii (FLORASTOR) 250 MG capsule 3/13/2020 at 1700  Yes Yes   Sig: Take 250 mg by mouth daily   sertraline (ZOLOFT) 20 MG/ML (HIGH CONC) solution 3/14/2020 at AM Nursing Home Yes Yes   Si mg by Gastronomy tube route daily    traZODone (DESYREL) 50 MG tablet 3/13/2020 at Bedtime Nursing Home Yes Yes   Sig: Crush one tablet and administer via G-tube 30 minutes prior to bedtime daily      Facility-Administered Medications: None     Allergies   Allergies   Allergen Reactions     Ativan [Lorazepam] Other (See Comments)     Paradoxical reaction     Pseudoephedrine        Social History   I have updated and reviewed the following Social History Narrative:   Pediatric History   Patient Parents/Guardians     Julianne Sage (NEED LETTERS OF GUARDIANSHIP DOCUMENT) (Mother/Guardian)     Hayes Ramires (NEED LETTERS OF GUARDIANSHIP DOCUMENT) (Father/Guardian)     Other Topics Concern     Not on file   Social History Narrative    Lives with m/d/3 sibs     Lives in a group home.     Family History   I have reviewed this patient's family history and updated it with pertinent information if needed.   Family History   Problem Relation Age of Onset     Glaucoma Mother         no medications or surg to date     Glaucoma Maternal Grandmother         s/p surg, decreased [facility]      Amblyopia No family hx of      Strabismus No family hx of        Review of Systems   The 10 point Review of Systems is negative other than noted in the HPI or here.     Physical Exam   Temp: 98.5  F (36.9  C) Temp src: Axillary BP: 97/53 Pulse: 97 Heart Rate: 116 Resp: (!) 40 SpO2: 96 % O2 Device: None (Room air)    Vital Signs with Ranges  Temp:  [96.6  F (35.9  C)-98.8  F (37.1  C)] 98.5  F (36.9  C)  Pulse:  [] 97  Heart Rate:  [116] 116  Resp:  [18-40] 40  BP: ()/(53-73) 97/53  SpO2:  [94 %-97 %] 96 %  68 lbs 8 oz    GENERAL: Active, alert, appears  comfortable  SKIN: Clear. No significant rash, abnormal pigmentation or lesions  HEAD: Normocephalic  EYES: Pupils equal, round, reactive, Extraocular muscles intact. Normal conjunctivae.  EARS: Normal canals. Tympanic membranes are normal; gray and translucent.  NOSE: Normal without discharge.  MOUTH/THROAT: moist mucous membranes. No oral lesions. Teeth without obvious abnormalities.  NECK: Supple, no masses.  No thyromegaly.  LYMPH NODES: No adenopathy  LUNGS: Clear good air movement in all lung fields. No rales, rhonchi, wheezing or retractions  HEART: Regular rhythm. Normal S1/S2. No murmurs. Normal pulses.  ABDOMEN: Soft, non-tender, not distended, no masses or hepatosplenomegaly. Bowel sounds normal.   NEUROLOGIC: Sleeping initially but easily arousable. Nonverbal but following commands. Extremities are hypertonic.   EXTREMITIES: no deformities, no edema    Data   Results for orders placed or performed during the hospital encounter of 03/14/20 (from the past 24 hour(s))   UA reflex to Microscopic and Culture    Specimen: Urine catheter; Catheterized Urine   Result Value Ref Range    Color Urine Yellow     Appearance Urine Clear     Glucose Urine Negative NEG^Negative mg/dL    Bilirubin Urine Negative NEG^Negative    Ketones Urine 80 (A) NEG^Negative mg/dL    Specific Gravity Urine 1.018 1.003 - 1.035    Blood Urine Negative NEG^Negative    pH Urine 6.5 5.0 - 7.0 pH    Protein Albumin Urine 10 (A) NEG^Negative mg/dL    Urobilinogen mg/dL Normal 0.0 - 2.0 mg/dL    Nitrite Urine Negative NEG^Negative    Leukocyte Esterase Urine Negative NEG^Negative    Source Catheterized Urine     RBC Urine 2 0 - 2 /HPF    WBC Urine 2 0 - 5 /HPF    Bacteria Urine Few (A) NEG^Negative /HPF    Squamous Epithelial /HPF Urine <1 0 - 1 /HPF    Mucous Urine Present (A) NEG^Negative /LPF    Hyaline Casts 3 (H) 0 - 2 /LPF   CBC with platelets differential   Result Value Ref Range    WBC 13.1 (H) 4.0 - 11.0 10e9/L    RBC Count 4.43 3.8  - 5.2 10e12/L    Hemoglobin 13.8 11.7 - 15.7 g/dL    Hematocrit 42.1 35.0 - 47.0 %    MCV 95 78 - 100 fl    MCH 31.2 26.5 - 33.0 pg    MCHC 32.8 31.5 - 36.5 g/dL    RDW 13.2 10.0 - 15.0 %    Platelet Count 151 150 - 450 10e9/L    Diff Method Automated Method     % Neutrophils 92.5 %    % Lymphocytes 3.1 %    % Monocytes 4.1 %    % Eosinophils 0.0 %    % Basophils 0.1 %    % Immature Granulocytes 0.2 %    Nucleated RBCs 0 0 /100    Absolute Neutrophil 12.1 (H) 1.6 - 8.3 10e9/L    Absolute Lymphocytes 0.4 (L) 0.8 - 5.3 10e9/L    Absolute Monocytes 0.5 0.0 - 1.3 10e9/L    Absolute Eosinophils 0.0 0.0 - 0.7 10e9/L    Absolute Basophils 0.0 0.0 - 0.2 10e9/L    Abs Immature Granulocytes 0.0 0 - 0.4 10e9/L    Absolute Nucleated RBC 0.0    Comprehensive metabolic panel   Result Value Ref Range    Sodium 146 (H) 133 - 144 mmol/L    Potassium 3.6 3.4 - 5.3 mmol/L    Chloride 111 (H) 94 - 109 mmol/L    Carbon Dioxide 28 20 - 32 mmol/L    Anion Gap 7 3 - 14 mmol/L    Glucose 81 70 - 99 mg/dL    Urea Nitrogen 15 7 - 30 mg/dL    Creatinine 0.29 (L) 0.52 - 1.04 mg/dL    GFR Estimate >90 >60 mL/min/[1.73_m2]    GFR Estimate If Black >90 >60 mL/min/[1.73_m2]    Calcium 8.4 (L) 8.5 - 10.1 mg/dL    Bilirubin Total 0.5 0.2 - 1.3 mg/dL    Albumin 3.2 (L) 3.4 - 5.0 g/dL    Protein Total 6.6 (L) 6.8 - 8.8 g/dL    Alkaline Phosphatase 90 40 - 150 U/L    ALT 41 0 - 50 U/L    AST 30 0 - 45 U/L   INR   Result Value Ref Range    INR 1.03 0.86 - 1.14   XR Chest 2 Views    Narrative    CHEST TWO VIEWS 3/14/2020 1:42 PM     HISTORY: Inborn error of metabolism. Vomiting.    COMPARISON: October 25, 2019       Impression    IMPRESSION: There are no acute infiltrates. The cardiac silhouette is  not enlarged. Pulmonary vasculature is unremarkable.    SHARI TERRELL MD   Lactic acid   Result Value Ref Range    Lactic Acid 1.7 0.7 - 2.0 mmol/L

## 2020-03-15 NOTE — PLAN OF CARE
Problem: Adult Inpatient Plan of Care  Goal: Plan of Care Review  3/15/2020 1847 by Costa Fletcher, RN  Outcome: Improving  3/15/2020 0535 by Richy Bautista, RN  Outcome: No Change    Started with pedialyte this morning. Then transitioned to home feed composition. Slowly increasing rate. Seems to be tolerating. No emesis. Mother at bedside and updated on plan of care.

## 2020-03-16 ENCOUNTER — HOME INFUSION (PRE-WILLOW HOME INFUSION) (OUTPATIENT)
Dept: PHARMACY | Facility: CLINIC | Age: 25
End: 2020-03-16

## 2020-03-16 VITALS
RESPIRATION RATE: 24 BRPM | WEIGHT: 73 LBS | DIASTOLIC BLOOD PRESSURE: 86 MMHG | OXYGEN SATURATION: 96 % | SYSTOLIC BLOOD PRESSURE: 113 MMHG | TEMPERATURE: 98 F | HEART RATE: 63 BPM | BODY MASS INDEX: 21.38 KG/M2

## 2020-03-16 LAB
ALBUMIN SERPL-MCNC: 2.4 G/DL (ref 3.4–5)
CORTIS SERPL-MCNC: 21.4 UG/DL (ref 4–22)
ESTRADIOL SERPL-MCNC: <11 PG/ML
T4 FREE SERPL-MCNC: 0.95 NG/DL (ref 0.76–1.46)
TSH SERPL DL<=0.005 MIU/L-ACNC: 4.69 MU/L (ref 0.4–4)

## 2020-03-16 PROCEDURE — 82670 ASSAY OF TOTAL ESTRADIOL: CPT | Performed by: STUDENT IN AN ORGANIZED HEALTH CARE EDUCATION/TRAINING PROGRAM

## 2020-03-16 PROCEDURE — 82306 VITAMIN D 25 HYDROXY: CPT | Performed by: STUDENT IN AN ORGANIZED HEALTH CARE EDUCATION/TRAINING PROGRAM

## 2020-03-16 PROCEDURE — 82533 TOTAL CORTISOL: CPT | Performed by: STUDENT IN AN ORGANIZED HEALTH CARE EDUCATION/TRAINING PROGRAM

## 2020-03-16 PROCEDURE — 25000132 ZZH RX MED GY IP 250 OP 250 PS 637: Performed by: STUDENT IN AN ORGANIZED HEALTH CARE EDUCATION/TRAINING PROGRAM

## 2020-03-16 PROCEDURE — 99239 HOSP IP/OBS DSCHRG MGMT >30: CPT | Mod: GC | Performed by: PEDIATRICS

## 2020-03-16 PROCEDURE — 82040 ASSAY OF SERUM ALBUMIN: CPT | Performed by: STUDENT IN AN ORGANIZED HEALTH CARE EDUCATION/TRAINING PROGRAM

## 2020-03-16 PROCEDURE — 84439 ASSAY OF FREE THYROXINE: CPT | Performed by: STUDENT IN AN ORGANIZED HEALTH CARE EDUCATION/TRAINING PROGRAM

## 2020-03-16 PROCEDURE — 36415 COLL VENOUS BLD VENIPUNCTURE: CPT | Performed by: STUDENT IN AN ORGANIZED HEALTH CARE EDUCATION/TRAINING PROGRAM

## 2020-03-16 PROCEDURE — 25000125 ZZHC RX 250

## 2020-03-16 PROCEDURE — 84443 ASSAY THYROID STIM HORMONE: CPT | Performed by: STUDENT IN AN ORGANIZED HEALTH CARE EDUCATION/TRAINING PROGRAM

## 2020-03-16 PROCEDURE — 25000132 ZZH RX MED GY IP 250 OP 250 PS 637: Performed by: EMERGENCY MEDICINE

## 2020-03-16 RX ORDER — LIDOCAINE 40 MG/G
CREAM TOPICAL
Status: COMPLETED
Start: 2020-03-16 | End: 2020-03-16

## 2020-03-16 RX ADMIN — LIDOCAINE: 40 CREAM TOPICAL at 05:49

## 2020-03-16 RX ADMIN — ESTRADIOL 0.5 MG: 0.5 TABLET ORAL at 08:38

## 2020-03-16 RX ADMIN — POLYETHYLENE GLYCOL 3350 17 G: 17 POWDER, FOR SOLUTION ORAL at 08:56

## 2020-03-16 RX ADMIN — ARIPIPRAZOLE 7.5 MG: 1 SOLUTION ORAL at 08:38

## 2020-03-16 RX ADMIN — CALCIUM CARBONATE 1500 MG: 1250 SUSPENSION ORAL at 08:38

## 2020-03-16 RX ADMIN — Medication 1 SCOOP: at 08:42

## 2020-03-16 RX ADMIN — LORATADINE 10 MG: 10 TABLET ORAL at 08:37

## 2020-03-16 RX ADMIN — SERTRALINE HYDROCHLORIDE 200 MG: 20 SOLUTION ORAL at 08:37

## 2020-03-16 RX ADMIN — Medication 10 ML: at 08:42

## 2020-03-16 NOTE — PROGRESS NOTES
Care Coordinator Progress Note    Admission Date/Time:  3/14/2020  Attending MD:  Collette Kaminski MD    Data  Chart reviewed, discussed with interdisciplinary team.   Patient was admitted for:    Vomiting, intractability of vomiting not specified, presence of nausea not specified, unspecified vomiting type  Dehydration  Inborn metabolism error.    Concerns with insurance coverage for discharge needs: None.  Current Living Situation: Patient lives in a group home.  Support System: Supportive and Involved  Services Involved: Home Infusion  Transportation at Discharge: Car and Family or friend will provide  Transportation to Medical Appointments:   - Name of caregiver: Wild burton   Barriers to Discharge: medical stability     Coordination of Care and Referrals: Provided patient/family with options for Home Infusion.        [facility]   [address] [place] [address]  (P) [phone]  (F) [phone]  Enteral feeds and supplies     Assessment  Met with Anna and Wild burton to discuss discharge planning. Julianne states that she has already made the group home aware that Anna should be ready to discharge today. The group home acknowledges and requests for a nurse to nurse report when Anna's discharge orders are entered. Julianne has the phone number to the nurse. Julianne will be providing transportation for Anna. Anna is also with [facility] for enteral feeds and supplies. Will enter for [facility] for enteral feeds. Julianne did not have any other concerns at this time. [facility] will continue to follow for any needs.      Plan  Anticipated Discharge Date:  Today   Anticipated Discharge Plan:  Group home      MARSHA Larson RN Care Coordinator  Pager [phone] (covering pager)

## 2020-03-16 NOTE — CONSULTS
Pediatric Metabolic Consultation    Sherly Ramires MRN# [id number]   YOB: 1995 Age: 25 year old   Date of Admission: 3/14/2020     Reason for consult: I was asked by Dr. Rena Kaminski to evaluate this patient for congenital disorder of glycosylation.            Chief Complaint:   Sherly Ramires is a 25 year old non-ambulatory  female with history of congenital disorder of glycosylation, global cognitive delays, complex partial seizures, osteoporosis and ovarian failure who presents for 1 day of acute onset vomiting starting around 6:30 AM of the day of admission. She had a total of 5 episodes of vomiting within about 1 hour and then episodes of heaving afterward. She has not had any URI symptoms, diarrhea, fever or mental status changes.      In the ED, she received a 20/kg bolus run slowly over 2 hours, was given zofran and then admitted for IV fluid administration.         Assessment and Plan:   25 year old with CDG with vomiting. Because of her coagulation abnormalities she is at risk for stroke in the setting of dehydration. She also is at risk for third spacing and fluid overload when her albumin levels are low especially during illness.     Plan: D10 1/2 NS at maintenance.  Watch carefully for fluid overload.    Prior to discharge please check the following labs:     Free T4, TSH, vitamin D, cortisol, estradiol level.                  Past Medical History:     Past Medical History:   Diagnosis Date     Anxiety      Global developmental delay      Hypoglycemia      Metabolic disease     congenital disorder of glycosilation     Scoliosis      Seizures (H)     last at age 8 before today     Strabismus              Past Surgical History:     Past Surgical History:   Procedure Laterality Date     ANESTHESIA OUT OF OR MRI 3T N/A 3/7/2017    Procedure: ANESTHESIA PEDS SEDATION MRI 3T;  Surgeon: GENERIC ANESTHESIA PROVIDER;  Location: [facility] PEDS SEDATION      BACK SURGERY      c1  decompression and fusionx3, scoliosis with instrumentation x1     ELECTRORETINOGRAM Bilateral 12/10/2014    ERG ([place])     ENT SURGERY       ear tubes     EXAM UNDER ANESTHESIA EYE(S) Bilateral 12/10/2014    EUA ([place])     EXTRACTION(S) DENTAL N/A 12/10/2014    Procedure: EXTRACTION(S) DENTAL;  Surgeon: Hubert York DDS;  Location: [facility] OR     EYE SURGERY Bilateral 1996    BMR 6mm ([place])      GI SURGERY      g tube     HC REPLACE GASTROSTOMY/CECOSTOMY TUBE PERCUTANEOUS N/A 12/10/2014    Procedure: REPLACE GASTROSTOMY TUBE, PERCUTANEOUS;  Surgeon: Phong Perea MD;  Location: [facility] OR     RECESSION RESECTION (REPAIR STRABISMUS) BILATERAL Bilateral 12/10/2014    BLR 11 ([place])     STRABISMUS SURGERY  1996    (1996) BMR 6 ([place])     STRABISMUS SURGERY  2014    BLR 11 ([place]) -                Social History:     Social History     Tobacco Use     Smoking status: Never Smoker     Smokeless tobacco: Never Used   Substance Use Topics     Alcohol use: No             Family History:     Family History   Problem Relation Age of Onset     Glaucoma Mother         no medications or surg to date     Glaucoma Maternal Grandmother         s/p surg, decreased [facility]      Amblyopia No family hx of      Strabismus No family hx of                 Allergies:     Allergies   Allergen Reactions     Ativan [Lorazepam] Other (See Comments)     Paradoxical reaction     Pseudoephedrine              Medications:     Medications Prior to Admission   Medication Sig Dispense Refill Last Dose     acetaminophen (TYLENOL) 160 MG/5ML solution 480 mg by Per G Tube route every 4 hours as needed for fever or mild pain    PRN     ARIPiprazole (ABILIFY) 1 MG/ML SOLN 7.5 mg by Per G Tube route daily    3/14/2020 at AM     artificial saliva (BIOTENE DRY MOUTHWASH) LIQD liquid Swish and spit in mouth 2 times daily    3/14/2020 at AM     bacitracin ointment Apply topically 2 times daily as needed for wound care 14 g 0 PRN     calcium carbonate  1250 (500 CA) MG/5ML SUSP Take 6 mLs (1,500 mg) by mouth 2 times daily (with meals) (Patient taking differently: 1,500 mg by Gastronomy tube route 2 times daily (with meals) ) 450 mL 12 3/14/2020 at AM     D-Mannose POWD 1 Scoop by Per G Tube route 2 times daily mixed with water   3/14/2020 at AM     diazepam (VALIUM) 5 MG/ML solution 7.5 mg by Gastronomy tube route every hour as needed for seizures (Seizures lasting longer than 5 minutes)    PRN     Docusate Sodium (ENEMEEZ MINI RE) Place 1 enema rectally daily as needed (if no bowel movement by day 2)    PRN     estradiol (ESTRACE) 1 MG tablet Take 0.5 mg ( 1/2 tablet) daily. (Patient taking differently: Take 0.5 mg ( 1/2 tablet) daily.) 15 tablet 2 3/14/2020 at AM     hydrOXYzine (ATARAX) 10 MG/5ML syrup 10 mg by Per G Tube route 2 times daily as needed for anxiety (agitation)    PRN     levETIRAcetam (KEPPRA) 100 MG/ML solution GIVE 10ML (1000MG) PER G-TUBE EVERY MORNING;GIVE 15ML (1500MG) PER G-TUBE EVERY EVENING 775 mL 11 3/14/2020 at AM     loratadine (CLARITIN) 10 MG tablet 10 mg by Per G Tube route daily    3/14/2020 at AM     Magnesium Citrate 100 MG TABS 300 mg by Per G Tube route daily   3/14/2020 at AM     Nutritional Supplements (COMPLEAT PEDIATRIC PO)    3/13/2020     ondansetron (ZOFRAN) 4 MG/5ML solution Take 5 mLs (4 mg) by mouth every 6 hours as needed for nausea or vomiting (Patient taking differently: 4 mg by Per G Tube route every 6 hours as needed for nausea or vomiting ) 90 mL 0 PRN     polyethylene glycol (MIRALAX/GLYCOLAX) packet 1 packet by Per G Tube route daily    3/14/2020 at AM     saccharomyces boulardii (FLORASTOR) 250 MG capsule Take 250 mg by mouth daily   3/13/2020 at 1700     sertraline (ZOLOFT) 20 MG/ML (HIGH CONC) solution 200 mg by Gastronomy tube route daily    3/14/2020 at AM     traZODone (DESYREL) 50 MG tablet Crush one tablet and administer via G-tube 30 minutes prior to bedtime daily   3/13/2020 at Bedtime     UNABLE TO  FIND MEDICATION NAME: Rescue Remedy Herbal Supplement - Used twice daily as needed.   PRN     ORDER FOR DME Equipment being ordered: Other: chest strap for her tilt in space manual wheel chair  Treatment Diagnosis: For safe transportation to home secondary to poor sitting balance in upright. CDG type Ia, seizures, and G-tube dependence. 1 Device 0         Current Facility-Administered Medications   Medication     acetaminophen (TYLENOL) solution 480 mg     ARIPiprazole (ABILIFY) solution 7.5 mg     artificial saliva (BIOTENE DRY MOUTHWASH) liquid 10 mL     calcium carbonate suspension 1,500 mg     D-Mannose POWD 1 Scoop     dextrose 10% and 0.45% sodium chloride infusion     diazepam (VALIUM) 5 MG/ML (HIGH CONC) solution 7.5 mg     estradiol (ESTRACE) tablet 0.5 mg     hydrOXYzine (ATARAX) syrup 10 mg     levETIRAcetam (KEPPRA) solution 1,500 mg     loratadine (CLARITIN) tablet 10 mg     Magnesium Citrate TABS 300 mg     ondansetron (ZOFRAN) injection 3.2 mg     ondansetron (ZOFRAN) solution 3.2 mg     polyethylene glycol (MIRALAX) Packet 17 g     sertraline (ZOLOFT) 20 MG/ML (HIGH CONC) solution 200 mg     traZODone (DESYREL) tablet 50 mg            Review of Systems:   CONSTITUTIONAL:  negative  EYES:  negative  HEENT:  negative  RESPIRATORY:  negative  CARDIOVASCULAR:  negative  GASTROINTESTINAL:  negative  GENITOURINARY:  negative  INTEGUMENT/BREAST:  negative  HEMATOLOGIC/LYMPHATIC:  negative  ALLERGIC/IMMUNOLOGIC:  negative  ENDOCRINE:  negative  MUSCULOSKELETAL:  negative  NEUROLOGICAL:  negative  BEHAVIOR/PSYCH:  negative         Physical Exam:   Blood pressure 91/48, pulse 78, temperature 96.8  F (36  C), temperature source Axillary, resp. rate 26, weight 33.1 kg (73 lb), SpO2 96 %, not currently breastfeeding.  Constitutional:   awake, alert   Eyes:   Lids and lashes normal, sclera clear, conjunctiva normal   ENT:   Normocephalic, without obvious abnormality, external ears without lesions, oral pharynx  with moist mucus membranes   Neck:   Supple, symmetrical, trachea midline, no adenopathy, thyroid symmetric, not enlarged and no tenderness   Hematologic / Lymphatic:   no cervical lymphadenopathy   Lungs:   No increased work of breathing, good air exchange, clear to auscultation bilaterally, no crackles or wheezing   Cardiovascular:   Normal apical impulse, regular rate and rhythm, normal S1 and S2, no S3 or S4, and no murmur noted   Abdomen:   No scars, normal bowel sounds, soft, non-distended, non-tender, no masses palpated, no hepatosplenomegally     Musculoskeletal:   There is no redness, warmth, or swelling of the joints.  Full range of motion noted.  Motor strength and tone are normal.       Skin:   no lesions         Laboratory results:     Component      Latest Ref Rng & Units 3/14/2020   WBC      4.0 - 11.0 10e9/L 13.1 (H)   RBC Count      3.8 - 5.2 10e12/L 4.43   Hemoglobin      11.7 - 15.7 g/dL 13.8   Hematocrit      35.0 - 47.0 % 42.1   MCV      78 - 100 fl 95   MCH      26.5 - 33.0 pg 31.2   MCHC      31.5 - 36.5 g/dL 32.8   RDW      10.0 - 15.0 % 13.2   Platelet Count      150 - 450 10e9/L 151   Diff Method       Automated Method   % Neutrophils      % 92.5   % Lymphocytes      % 3.1   % Monocytes      % 4.1   % Eosinophils      % 0.0   % Basophils      % 0.1   % Immature Granulocytes      % 0.2   Nucleated RBCs      0 /100 0   Absolute Neutrophil      1.6 - 8.3 10e9/L 12.1 (H)   Absolute Lymphocytes      0.8 - 5.3 10e9/L 0.4 (L)   Absolute Monocytes      0.0 - 1.3 10e9/L 0.5   Absolute Eosinophils      0.0 - 0.7 10e9/L 0.0   Absolute Basophils      0.0 - 0.2 10e9/L 0.0   Abs Immature Granulocytes      0 - 0.4 10e9/L 0.0   Absolute Nucleated RBC       0.0   Sodium      133 - 144 mmol/L 146 (H)   Potassium      3.4 - 5.3 mmol/L 3.6   Chloride      94 - 109 mmol/L 111 (H)   Carbon Dioxide      20 - 32 mmol/L 28   Anion Gap      3 - 14 mmol/L 7   Glucose      70 - 99 mg/dL 81   Urea Nitrogen      7 - 30  mg/dL 15   Creatinine      0.52 - 1.04 mg/dL 0.29 (L)   GFR Estimate      >60 mL/min/1.73:m2 >90   GFR Estimate If Black      >60 mL/min/1.73:m2 >90   Calcium      8.5 - 10.1 mg/dL 8.4 (L)   Bilirubin Total      0.2 - 1.3 mg/dL 0.5   Albumin      3.4 - 5.0 g/dL 3.2 (L)   Protein Total      6.8 - 8.8 g/dL 6.6 (L)   Alkaline Phosphatase      40 - 150 U/L 90   ALT      0 - 50 U/L 41   AST      0 - 45 U/L 30   INR      0.86 - 1.14 1.03   Lactic Acid      0.7 - 2.0 mmol/L 1.7       Sarah Dubon M.D.  [facility]  Division of Pediatric Endocrinology  Division of Genetics & Metabolism  [phone]

## 2020-03-16 NOTE — PLAN OF CARE
Pt slept between cares, tolerating feeds at 50mL/hr (increased to 70mL/hr at 0515 per mom request). No emesis. Plan to continue to monitor, probable discharge to home today.

## 2020-03-16 NOTE — PLAN OF CARE
Patient tolerating increase in tube feedings today from 100 ml/hr at 0730 to 160 ml/hr as of 1330.  MD approved Anna for discharge this afternoon.  Discharge paperwork reviewed with mother of patient which included changes in diet order, when to call physician, follow-up care and discharge medications.  Patient had no new medications they were being sent home with and needed no refills.  Mother understood all discharge paperwork and had no further questions or concerns.  Patient discharged to home with mother at 1415.

## 2020-03-16 NOTE — PLAN OF CARE
[id number]: VSS. No signs of pain or nausea/emesis. Tolerating g-tube feeds, Decreased to 50mL overnight per home routine. Good UOP, stool x1. MIVF running. Mother at bedside. Possible discharge home tomorrow. Continue plan of care.

## 2020-03-16 NOTE — DISCHARGE SUMMARY
[facility], [place]  Discharge Summary - Medicine & Pediatrics       Date of Admission:  3/14/2020  Date of Discharge:  3/16/2020  Discharging Provider: Dr. Collette Kaminski  Discharge Service: General Pediatrics    Discharge Diagnoses   #Dehydration requiring IV fluids   # Viral gastroenteritis     Follow-ups Needed After Discharge    - Follow up with Dr. Dubon as an outpatient   - Labs completed at discharge include TSH, free T4, estradiol, cortisol, vitamin D, and albumin level. These results will be followed up by Metabolic medicine.      Discharge Disposition   Discharged to home  Condition at discharge: Stable    Hospital Course   Sherly Ramires is a 25 year old non-ambulatory  female with history of congenital disorder of glycosylation, global cognitive delays, complex partial seizures, osteoporosis and ovarian failure who presents for 1 day of acute onset vomiting starting around 6:30 AM of the day of admission. She had a total of 5 episodes of vomiting within about 1 hour and then episodes of heaving afterward. In the ED, she received a 20/kg bolus run slowly over 2 hours, was given zofran and then admitted for IV fluid administration.  She has not had any URI symptoms, diarrhea, fever or mental status changes.  Due to her underlying coagulation abnormalities, she is at risk of stroke when dehydrated. She also has a history of third-spacing and fluid overload with low albumin. Labs included albumin, TSH, T4, vitamin D, cortisol, estradiol. Anna's admission albumin was 3.2 and dropped to 2.4 on day of discharge. This low albumin was discussed with Dr. Dubon and felt since Anna was tolerating feeds she was safe to discharge home. IV fluids were started at a rate of 30ml/hr, and reduced to 20ml/hr. Her G-tube feeds were started and slowly increased by 10ml/hr. She was tolerating her feeds at a rate of 160ml/hr at time of discharge. She had no further vomiting, diarrhea,  "or nausea or URI symptoms while hospitalized.      Consultations This Hospital Stay   MEDICATION HISTORY IP PHARMACY CONSULT   Genetics consult - Dr. Dubon       Code Status   Prior       The patient was discussed with Dr. Kaminski.    Bethany Saldivar MD, PGY-1  [facility], [place]  ______________________________________________________________________    Physical Exam   Vital Signs: Temp: 96.7  F (35.9  C) Temp src: Axillary BP: 92/50 Pulse: 78 Heart Rate: 62 Resp: 24 SpO2: 94 % O2 Device: None (Room air)    Weight: 73 lbs 0 oz  GENERAL: Small for age, appears younger than age, thin,coarse and dysmorphic facial features, alert, appears comfortable, watching TV, dark brown hair in pigtails.   SKIN: Clear. No significant rash, abnormal pigmentation or lesions  HEAD: Normocephalic (head appears large compared to the rest of her body)  EYES: Pupils equal, round, reactive, Extraocular muscles intact. Normal conjunctivae.  EARS: Normal canals. Tympanic membranes are normal; gray and translucent.  NOSE: Normal without discharge.  MOUTH/THROAT: moist mucous membranes. No oral lesions.   NECK: Supple, no masses.  No thyromegaly.  LYMPH NODES: No adenopathy  LUNGS: Clear good air movement in all lung fields. No rales, rhonchi, wheezing or retractions  HEART: Regular rhythm. Normal S1/S2. No murmurs. Normal pulses.  ABDOMEN: Soft, non-tender, not distended, no masses or hepatosplenomegaly. Bowel sounds normal. G-tube site clean and dry.  NEUROLOGIC:  Contractures of arms, hands, legs and feet, mostly nonverbal but following commands, says \"Ma\" and points to TV/points to make needs known.   EXTREMITIES: very thin legs, contractures of both feet, no edema and no ulcers      Primary Care Physician   WARNER HAMLIN    Discharge Orders   No discharge procedures on file.    Significant Results and Procedures   Admission on 03/14/2020   Component Date Value Ref Range Status     WBC " 03/14/2020 13.1* 4.0 - 11.0 10e9/L Final     RBC Count 03/14/2020 4.43  3.8 - 5.2 10e12/L Final     Hemoglobin 03/14/2020 13.8  11.7 - 15.7 g/dL Final     Hematocrit 03/14/2020 42.1  35.0 - 47.0 % Final     MCV 03/14/2020 95  78 - 100 fl Final     MCH 03/14/2020 31.2  26.5 - 33.0 pg Final     MCHC 03/14/2020 32.8  31.5 - 36.5 g/dL Final     RDW 03/14/2020 13.2  10.0 - 15.0 % Final     Platelet Count 03/14/2020 151  150 - 450 10e9/L Final     Diff Method 03/14/2020 Automated Method   Final     % Neutrophils 03/14/2020 92.5  % Final     % Lymphocytes 03/14/2020 3.1  % Final     % Monocytes 03/14/2020 4.1  % Final     % Eosinophils 03/14/2020 0.0  % Final     % Basophils 03/14/2020 0.1  % Final     % Immature Granulocytes 03/14/2020 0.2  % Final     Nucleated RBCs 03/14/2020 0  0 /100 Final     Absolute Neutrophil 03/14/2020 12.1* 1.6 - 8.3 10e9/L Final     Absolute Lymphocytes 03/14/2020 0.4* 0.8 - 5.3 10e9/L Final     Absolute Monocytes 03/14/2020 0.5  0.0 - 1.3 10e9/L Final     Absolute Eosinophils 03/14/2020 0.0  0.0 - 0.7 10e9/L Final     Absolute Basophils 03/14/2020 0.0  0.0 - 0.2 10e9/L Final     Abs Immature Granulocytes 03/14/2020 0.0  0 - 0.4 10e9/L Final     Absolute Nucleated RBC 03/14/2020 0.0   Final     Sodium 03/14/2020 146* 133 - 144 mmol/L Final     Potassium 03/14/2020 3.6  3.4 - 5.3 mmol/L Final     Chloride 03/14/2020 111* 94 - 109 mmol/L Final     Carbon Dioxide 03/14/2020 28  20 - 32 mmol/L Final     Anion Gap 03/14/2020 7  3 - 14 mmol/L Final     Glucose 03/14/2020 81  70 - 99 mg/dL Final     Urea Nitrogen 03/14/2020 15  7 - 30 mg/dL Final     Creatinine 03/14/2020 0.29* 0.52 - 1.04 mg/dL Final     GFR Estimate 03/14/2020 >90  >60 mL/min/[1.73_m2] Final     GFR Estimate If Black 03/14/2020 >90  >60 mL/min/[1.73_m2] Final     Calcium 03/14/2020 8.4* 8.5 - 10.1 mg/dL Final     Bilirubin Total 03/14/2020 0.5  0.2 - 1.3 mg/dL Final     Albumin 03/14/2020 3.2* 3.4 - 5.0 g/dL Final     Protein Total  03/14/2020 6.6* 6.8 - 8.8 g/dL Final     Alkaline Phosphatase 03/14/2020 90  40 - 150 U/L Final     ALT 03/14/2020 41  0 - 50 U/L Final     AST 03/14/2020 30  0 - 45 U/L Final     INR 03/14/2020 1.03  0.86 - 1.14 Final     Color Urine 03/14/2020 Yellow   Final     Appearance Urine 03/14/2020 Clear   Final     Glucose Urine 03/14/2020 Negative  NEG^Negative mg/dL Final     Bilirubin Urine 03/14/2020 Negative  NEG^Negative Final     Ketones Urine 03/14/2020 80* NEG^Negative mg/dL Final     Specific Gravity Urine 03/14/2020 1.018  1.003 - 1.035 Final     Blood Urine 03/14/2020 Negative  NEG^Negative Final     pH Urine 03/14/2020 6.5  5.0 - 7.0 pH Final     Protein Albumin Urine 03/14/2020 10* NEG^Negative mg/dL Final     Urobilinogen mg/dL 03/14/2020 Normal  0.0 - 2.0 mg/dL Final     Nitrite Urine 03/14/2020 Negative  NEG^Negative Final     Leukocyte Esterase Urine 03/14/2020 Negative  NEG^Negative Final     Source 03/14/2020 Catheterized Urine   Final     RBC Urine 03/14/2020 2  0 - 2 /HPF Final     WBC Urine 03/14/2020 2  0 - 5 /HPF Final     Bacteria Urine 03/14/2020 Few* NEG^Negative /HPF Final     Squamous Epithelial /HPF Urine 03/14/2020 <1  0 - 1 /HPF Final     Mucous Urine 03/14/2020 Present* NEG^Negative /LPF Final     Hyaline Casts 03/14/2020 3* 0 - 2 /LPF Final     Lactic Acid 03/14/2020 1.7  0.7 - 2.0 mmol/L Final     Adenovirus 03/15/2020 Not Detected  NDET^Not Detected Final     Coronavirus 03/15/2020 Not Detected  NDET^Not Detected Final     Human Metapneumovirus 03/15/2020 Not Detected  NDET^Not Detected Final     Human Rhinovirus/Enterovirus 03/15/2020 Not Detected  NDET^Not Detected Final     Influenza A 03/15/2020 Not Detected  NDET^Not Detected Final     Influenza A, H1 03/15/2020 Not Detected  NDET^Not Detected Final     Influenza A 2009 H1N1 03/15/2020 Not Detected  NDET^Not Detected Final     Influenza A, H3 03/15/2020 Not Detected  NDET^Not Detected Final     Influenza B 03/15/2020 Not Detected   NDET^Not Detected Final     Parainfluenza Virus 1 03/15/2020 Not Detected  NDET^Not Detected Final     Parainfluenza Virus 2 03/15/2020 Not Detected  NDET^Not Detected Final     Parainfluenza Virus 3 03/15/2020 Not Detected  NDET^Not Detected Final     Parainfluenza Virus 4 03/15/2020 Not Detected  NDET^Not Detected Final     Respiratory Syncytial Virus A 03/15/2020 Not Detected  NDET^Not Detected Final     Respiratory Syncytial Virus B 03/15/2020 Not Detected  NDET^Not Detected Final     Chlamydia pneumoniae 03/15/2020 Not Detected  NDET^Not Detected Final     Mycoplasma pneumoniae 03/15/2020 Not Detected  NDET^Not Detected Final     Respiratory Virus Comment 03/15/2020 See comment below   Final     Albumin 03/16/2020 2.4* 3.4 - 5.0 g/dL Final     TSH 03/16/2020 4.69* 0.40 - 4.00 mU/L Final     T4 Free 03/16/2020 0.95  0.76 - 1.46 ng/dL Final     Cortisol Serum 03/16/2020 21.4  4 - 22 ug/dL Final         Discharge Medications   Current Discharge Medication List      CONTINUE these medications which have NOT CHANGED    Details   acetaminophen (TYLENOL) 160 MG/5ML solution 480 mg by Per G Tube route every 4 hours as needed for fever or mild pain       ARIPiprazole (ABILIFY) 1 MG/ML SOLN 7.5 mg by Per G Tube route daily       artificial saliva (BIOTENE DRY MOUTHWASH) LIQD liquid Swish and spit in mouth 2 times daily       bacitracin ointment Apply topically 2 times daily as needed for wound care  Qty: 14 g, Refills: 0    Associated Diagnoses: Skin irritation      calcium carbonate 1250 (500 CA) MG/5ML SUSP Take 6 mLs (1,500 mg) by mouth 2 times daily (with meals)  Qty: 450 mL, Refills: 12    Associated Diagnoses: Congenital disorder of glycosylation (CDG) (H); Osteoporosis      D-Mannose POWD 1 Scoop by Per G Tube route 2 times daily mixed with water      diazepam (VALIUM) 5 MG/ML solution 7.5 mg by Gastronomy tube route every hour as needed for seizures (Seizures lasting longer than 5 minutes)       Docusate Sodium  (ENEMEEZ MINI RE) Place 1 enema rectally daily as needed (if no bowel movement by day 2)       estradiol (ESTRACE) 1 MG tablet Take 0.5 mg ( 1/2 tablet) daily.  Qty: 15 tablet, Refills: 2    Associated Diagnoses: Ovarian failure      hydrOXYzine (ATARAX) 10 MG/5ML syrup 10 mg by Per G Tube route 2 times daily as needed for anxiety (agitation)       levETIRAcetam (KEPPRA) 100 MG/ML solution GIVE 10ML (1000MG) PER G-TUBE EVERY MORNING;GIVE 15ML (1500MG) PER G-TUBE EVERY EVENING  Qty: 775 mL, Refills: 11    Associated Diagnoses: Complex partial seizure evolving to generalized seizure (H)      loratadine (CLARITIN) 10 MG tablet 10 mg by Per G Tube route daily       Magnesium Citrate 100 MG TABS 300 mg by Per G Tube route daily      Nutritional Supplements (COMPLEAT PEDIATRIC PO)       ondansetron (ZOFRAN) 4 MG/5ML solution Take 5 mLs (4 mg) by mouth every 6 hours as needed for nausea or vomiting  Qty: 90 mL, Refills: 0    Associated Diagnoses: Viral gastroenteritis      polyethylene glycol (MIRALAX/GLYCOLAX) packet 1 packet by Per G Tube route daily       saccharomyces boulardii (FLORASTOR) 250 MG capsule Take 250 mg by mouth daily      sertraline (ZOLOFT) 20 MG/ML (HIGH CONC) solution 200 mg by Gastronomy tube route daily       traZODone (DESYREL) 50 MG tablet Crush one tablet and administer via G-tube 30 minutes prior to bedtime daily      UNABLE TO FIND MEDICATION NAME: Rescue Remedy Herbal Supplement - Used twice daily as needed.      ORDER FOR DME Equipment being ordered: Other: chest strap for her tilt in space manual wheel chair  Treatment Diagnosis: For safe transportation to home secondary to poor sitting balance in upright. CDG type Ia, seizures, and G-tube dependence.  Qty: 1 Device, Refills: 0    Associated Diagnoses: Viral gastroenteritis; Dehydration; Acute respiratory failure with hypoxia (H); Congenital disorder of glycosylation (CDG) (H)           Allergies   Allergies   Allergen Reactions     Ativan  [Lorazepam] Other (See Comments)     Paradoxical reaction     Pseudoephedrine

## 2020-03-17 LAB — DEPRECATED CALCIDIOL+CALCIFEROL SERPL-MC: 42 UG/L (ref 20–75)

## 2020-03-17 NOTE — PROGRESS NOTES
This is a recent snapshot of the patient's [facility] medical record.  For current drug dose and complete information and questions, call [phone] or In Basket pool, [facility] home infusion ([id number])  CSN Number:  [id number]

## 2020-03-27 LAB
SPECIMEN SOURCE: NORMAL
VIRUS SPEC CULT: NORMAL
VIRUS SPEC CULT: NORMAL

## 2020-04-29 ENCOUNTER — VIRTUAL VISIT (OUTPATIENT)
Dept: NEUROLOGY | Facility: CLINIC | Age: 25
End: 2020-04-29
Payer: COMMERCIAL

## 2020-04-29 DIAGNOSIS — G40.209 COMPLEX PARTIAL SEIZURE EVOLVING TO GENERALIZED SEIZURE (H): ICD-10-CM

## 2020-04-29 RX ORDER — LEVETIRACETAM 100 MG/ML
SOLUTION ORAL
Qty: 775 ML | Refills: 11 | Status: SHIPPED | OUTPATIENT
Start: 2020-04-29 | End: 2021-05-11

## 2020-04-29 NOTE — LETTER
4/29/2020    RE: Sherly Ramires  [id number] [facility] [address]     Sherly Ramires is a 25 year old female who is being evaluated via a billable video visit.      EPILEPSY CLINIC TELEPHONE VISIT NOTE  The scheduled clinic visit was changed to a telephone visit to reduce the risk of COVID-19 transmission.           Service Date: 04/29/2020    HISTORY: I spoke with a [facility] staff person concerning Ms. 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     The patient reportedly has had no recent fever or cough, and exposure to anyone thought to have COVID-19.     Following the most recent visit to this clinic on 05/29/2019, the patient reportedly has had no seizures.       MEDICATIONS:  Levetiracetam solution 1000 mg in the morning and 1500 mg in the evening per gastrostomy, and other medications as per the electronic medical record.      LABORATORY DATA:   AED levels on 04/19/2017:   Levetiracetam: 38.0 mcg/ml.     IMPRESSION:   No seizures have recurred on the current dose of levetiracetam, with no apparent adverse effects.      I reviewed gastrostomy function with the staff person, and it seems that no problems have been noted.      PLAN:   1)  Continue levetiracetam at the current dose.    2)  Return in 6 months for in-person clinic visit.  I spent 15 minutes in this telephone contact.    Nhan Knott M.D., Professor of Neurology

## 2020-04-29 NOTE — PROGRESS NOTES
"Sherly Ramires is a 25 year old female who is being evaluated via a billable video visit.      The patient has been notified of following:     \"This video visit will be conducted via a call between you and your physician/provider. We have found that certain health care needs can be provided without the need for an in-person physical exam.  This service lets us provide the care you need with a video conversation.  If a prescription is necessary we can send it directly to your pharmacy.  If lab work is needed we can place an order for that and you can then stop by our lab to have the test done at a later time.    Video visits are billed at different rates depending on your insurance coverage.  Please reach out to your insurance provider with any questions.    If during the course of the call the physician/provider feels a video visit is not appropriate, you will not be charged for this service.\"    Patient has given verbal consent for Video visit? Yes    How would you like to obtain your AVS? Mail a copy    Patient would like the video invitation sent by: Send to e-mail at: [email or web address]    Thank you  Fernando Tidwell LPN        EPILEPSY CLINIC TELEPHONE VISIT NOTE  The scheduled clinic visit was changed to a telephone visit to reduce the risk of COVID-19 transmission.           Service Date: 04/29/2020    HISTORY: I spoke with a [facility] staff person concerning Ms. 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     The patient reportedly has had no recent fever or cough, and exposure to anyone thought to have COVID-19.     Following the most recent visit to this clinic on 05/29/2019, the patient reportedly has had no seizures.       MEDICATIONS:  Levetiracetam solution 1000 mg in the morning and 1500 mg in the evening per gastrostomy, and other " medications as per the electronic medical record.      LABORATORY DATA:   AED levels on 04/19/2017:   Levetiracetam: 38.0 mcg/ml.     IMPRESSION:   No seizures have recurred on the current dose of levetiracetam, with no apparent adverse effects.      I reviewed gastrostomy function with the staff person, and it seems that no problems have been noted.      PLAN:   1)  Continue levetiracetam at the current dose.    2)  Return in 6 months for in-person clinic visit.  I spent 15 minutes in this telephone contact.    Nhan Knott M.D., Professor of Neurology

## 2020-09-16 ENCOUNTER — VIRTUAL VISIT (OUTPATIENT)
Dept: PEDIATRICS | Facility: CLINIC | Age: 25
End: 2020-09-16
Attending: PEDIATRICS
Payer: COMMERCIAL

## 2020-09-16 VITALS — HEIGHT: 56 IN | WEIGHT: 69 LBS | BODY MASS INDEX: 15.52 KG/M2

## 2020-09-16 DIAGNOSIS — E28.39 OVARIAN FAILURE: ICD-10-CM

## 2020-09-16 DIAGNOSIS — E74.89 CONGENITAL DISORDER OF GLYCOSYLATION (CDG) (H): Primary | ICD-10-CM

## 2020-09-16 RX ORDER — ESTRADIOL 1 MG/1
TABLET ORAL
Qty: 30 TABLET | Refills: 2 | Status: SHIPPED | OUTPATIENT
Start: 2020-09-16 | End: 2024-05-24

## 2020-09-16 ASSESSMENT — PAIN SCALES - GENERAL: PAINLEVEL: NO PAIN (0)

## 2020-09-16 ASSESSMENT — MIFFLIN-ST. JEOR: SCORE: 920.98

## 2020-09-16 NOTE — PROGRESS NOTES
Pediatric Metabolic Follow Up Consultation     Patient: Sherly Ramires MRN# [id number]   YOB: 1995 Age: 25  year old 6 month old   Date of Visit 09/16/2020     Dear Dr. Mustafa:     I had the pleasure of seeing your patient, Sherly Raymundo in the Virtual Pediatric Metabolic Clinic, [facility], on 09/16/2020 for follow up consultation regarding of congenital disorder of glycosylation.                              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    Since Sherly's hospitalization in March 2020, she is doing much better. At that time she has multiple episodes of vomiting which family believes was due to increased PO intake and progressive slowing of stomach emptying. Now, they only give one meal by mouth, while other 4 meals are via G-tube. She has had no recent illnesses, ER visits, or hospitalizations.    Mom is concerned about poor wound healing. Sherly has had an abrasion on her wrist, she then picks at it, and it does not heal on its own. She wears a cuff over it now to prevent Sherly from picking at the wound. There is no skin breakdown in other areas. She also easily bruises but heal on their own. Mom is also concerned about decreased muscle  tone. Denies any decreased energy, vision changes, hearing loss, new nausea, vomiting, or abdominal pain. No acne, body hair, or body odor noted.    Discussed participation in a CDG study at [facility]. They are interested in receiving more information.      I have reviewed the available past laboratory evaluations, imaging studies, and medical records available to me at this visit. I have reviewed the Anna's growth chart.  History was obtained from Anna's mother and caregiver at group home.           Past Medical History:     Past Medical History:   Diagnosis Date     Anxiety      Global developmental delay      Hypoglycemia      Metabolic disease     congenital disorder of glycosilation     Scoliosis      Seizures (H)     last at age 8 before today     Strabismus               Past Surgical History:     Past Surgical History:   Procedure Laterality Date     ANESTHESIA OUT OF OR MRI 3T N/A 3/7/2017    Procedure: ANESTHESIA PEDS SEDATION MRI 3T;  Surgeon: GENERIC ANESTHESIA PROVIDER;  Location: [facility] PEDS SEDATION      BACK SURGERY      c1 decompression and fusionx3, scoliosis with instrumentation x1     ELECTRORETINOGRAM Bilateral 12/10/2014    ERG ([place])     ENT SURGERY       ear tubes     EXAM UNDER ANESTHESIA EYE(S) Bilateral 12/10/2014    EUA ([place])     EXTRACTION(S) DENTAL N/A 12/10/2014    Procedure: EXTRACTION(S) DENTAL;  Surgeon: Hubert York DDS;  Location: [facility] OR     EYE SURGERY Bilateral 1996    BMR 6mm ([place])      GI SURGERY      g tube     HC REPLACE GASTROSTOMY/CECOSTOMY TUBE PERCUTANEOUS N/A 12/10/2014    Procedure: REPLACE GASTROSTOMY TUBE, PERCUTANEOUS;  Surgeon: Phong Perea MD;  Location: [facility] OR     RECESSION RESECTION (REPAIR STRABISMUS) BILATERAL Bilateral 12/10/2014    BLR 11 ([place])     STRABISMUS SURGERY  1996    (1996) BMR 6 ([place])     STRABISMUS SURGERY  2014    BLR 11 ([place]) -           Social History:      Social History     Socioeconomic History     Marital  status: Single     Spouse name: Not on file     Number of children: Not on file     Years of education: Not on file     Highest education level: Not on file   Occupational History     Not on file   Social Needs     Financial resource strain: Not on file     Food insecurity     Worry: Not on file     Inability: Not on file     Transportation needs     Medical: Not on file     Non-medical: Not on file   Tobacco Use     Smoking status: Never Smoker     Smokeless tobacco: Never Used   Substance and Sexual Activity     Alcohol use: No     Drug use: No     Sexual activity: Never   Lifestyle     Physical activity     Days per week: Not on file     Minutes per session: Not on file     Stress: Not on file   Relationships     Social connections     Talks on phone: Not on file     Gets together: Not on file     Attends Restorationist service: Not on file     Active member of club or organization: Not on file     Attends meetings of clubs or organizations: Not on file     Relationship status: Not on file     Intimate partner violence     Fear of current or ex partner: Not on file     Emotionally abused: Not on file     Physically abused: Not on file     Forced sexual activity: Not on file   Other Topics Concern     Not on file   Social History Narrative    Lives with m/d/3 sibs           Family History:     Family History   Problem Relation Age of Onset     Glaucoma Mother         no medications or surg to date     Glaucoma Maternal Grandmother         s/p surg, decreased VA      Amblyopia No family hx of      Strabismus No family hx of           Allergies:     Allergies   Allergen Reactions     Ativan [Lorazepam] Other (See Comments)     Paradoxical reaction     Pseudoephedrine                   Medications:       Current Outpatient Medications   Medication     acetaminophen (TYLENOL) 160 MG/5ML solution     ARIPiprazole (ABILIFY) 1 MG/ML SOLN     artificial saliva (BIOTENE DRY MOUTHWASH) LIQD liquid     bacitracin ointment      "D-Mannose POWD     diazepam (VALIUM) 5 MG/ML solution     Docusate Sodium (ENEMEEZ MINI RE)     estradiol (ESTRACE) 1 MG tablet     hydrOXYzine (ATARAX) 10 MG/5ML syrup     levETIRAcetam (KEPPRA) 100 MG/ML solution     loratadine (CLARITIN) 10 MG tablet     Magnesium Citrate 100 MG TABS     Nutritional Supplements (COMPLEAT PEDIATRIC PO)     polyethylene glycol (MIRALAX/GLYCOLAX) packet     saccharomyces boulardii (FLORASTOR) 250 MG capsule     sertraline (ZOLOFT) 20 MG/ML (HIGH CONC) solution     traZODone (DESYREL) 50 MG tablet     UNABLE TO FIND     calcium carbonate 1250 (500 CA) MG/5ML SUSP     ondansetron (ZOFRAN) 4 MG/5ML solution     ORDER FOR DME     No current facility-administered medications for this visit.           Review of Systems:   Gen: No fatigue or unexpected weight change.  Eye: No visual disturbance, normal vision.  ENT: No hearing concerns. No ear pain.  Pulmonary:  No shortness of breath with exercise or cough.    Cardio: No chest pain.  No palpitations.  No rapid heart rate.   Gastrointestinal: No recent vomiting or diarrhea.  No constipation.  No abdominal pain.   Hematologic: No bleeding disorders.  No anemia.   Musculoskeletal: No joint pain.  No muscular weakness.  Psychiatric: No significant sadness or irritability.  Neurologic: No seizures.  No headaches.    Skin: No birth marks.  No hyperpigmentation noted.  No acne.  No rashes   Endocrine: See above.            Physical Exam:   Ht 4' 8\" (142.2 cm)   Wt 69 lb (31.3 kg)   BMI 15.47 kg/m         Constitutional:    awake, alert, cooperative, no apparent distress. Interactive and compliant with exam.   Eyes:    Lids and lashes normal, sclera clear, conjunctiva normal. PERRL. Normal red reflex bilaterally.   ENT:     Normocephalic, without obvious abnormality, external ears without lesions, TMs with cerumen.   Neck:    Supple, symmetrical, trachea midline, thyroid symmetric, not enlarged and no tenderness  Hematologic / Lymphatic:  No " lymphadenopathy  Lungs:   No increased work of breathing, clear to auscultation bilaterally with good air entry.  Cardiovascular: Regular rate and rhythm, no murmurs.  Abdomen: Scafoid abdomen without scars. Normal bowel sounds, soft, non-tender, no masses palpated, no hepatosplenomegally  Musculoskeletal: There is no redness, warmth, or swelling of the joints.  Full range of motion noted.  Motor strength and tone are normal.  Neurologic:        Awake, alert, oriented to name, place and time.  Neuropsychiatric:  Age appropriate affect, talkative, interactive.  Skin: no rashes            Laboratory results:    Labs have not been performed yet.           Assessment and Plan:     Sherly Ramires is a 25 year old non-ambulatory  female with history of congenital disorder of glycosylation, global cognitive delays, complex partial seizures, osteoporosis and ovarian failure. Overall she has been doing well and the following labs are recommended:     Orders Placed This Encounter   Procedures     Hemoglobin A1c     Carnitine free and total     Comprehensive metabolic panel     ECHO Congenital Transthoracic (TTE)       - Increase estradiol (estrace) to 1 tablet daily due to low estradiol levels and maintenance of bone strength  - Obtain labs noted above   - Repeat echocardiogram in 2020. Last done in 2017.  - Follow up visit in 6 months.  _Follow up with Dr. Azra Mays.    Thank you for allowing me to participate in the care of your patient.  Please do not hesitate to call with questions or concerns.     Sincerely,     I, Lisseth Lyons, MS4, saw and examined patient in the presence of Dr. Ling MD, on September 16, 2020.     Lisseth Lyons, MS4  [facility]      The document recorded by the medical student accurately reflects the services I personally performed and the decisions made by me. I  personally performed the entire clinical encounter documented in this note.    It is our pleasure to be involved  in [facility]. If you or the family has questions or concerns regarding these test results, please feel free to contact us via our Call Center at [phone].      Sincerely,       Dept. of Pediatrics - Divisions of Endocrinology and Genetics & Metabolism  Dept. of Experimental & Clinical Pharmacology  [address] [facility]  , [place] [address]  Ph: [phone]  Email: [email or web address]    CC  SELF, REFERRED    Copy to patient  DELIO VASQUES (NEED LETTERS OF GUARDIANSHIP DOCUMENT) RYAN WHITFIELD (NEED LETTERS OF GUARDIANSHIP DOCUMENT)  [id number] [facility]  [place] [facility] [id number]

## 2020-09-16 NOTE — LETTER
"  9/16/2020      RE: Sherly Ramires  [address]  [place] [address]       Sherly Ramires is a 25 year old female who is being evaluated via a billable video visit.      The patient has been notified of following:     \"This video visit will be conducted via a call between you and your physician/provider. We have found that certain health care needs can be provided without the need for an in-person physical exam.  This service lets us provide the care you need with a video conversation.  If a prescription is necessary we can send it directly to your pharmacy.  If lab work is needed we can place an order for that and you can then stop by our lab to have the test done at a later time.    Video visits are billed at different rates depending on your insurance coverage.  Please reach out to your insurance provider with any questions.    If during the course of the call the physician/provider feels a video visit is not appropriate, you will not be charged for this service.\"    Patient has given verbal consent for Video visit? Yes  How would you like to obtain your AVS? Mail a copy  If you are dropped from the video visit, the video invite should be resent to: Send to e-mail at: [email or web address]  Will anyone else be joining your video visit? Yes: Mom. How would they like to receive their invitation? Send to e-mail at: [email or web address]  Sharron Garcia M.A.      Pediatric Metabolic Follow Up Consultation     Patient: Sherly Ramires MRN# [id number]   YOB: 1995 Age: 25  year old 6 month old   Date of Visit 09/16/2020     Dear Dr. Mustafa:     I had the pleasure of seeing your patient, Sherly Raymundo in the Virtual Pediatric Metabolic Clinic, [facility], on 09/16/2020 for follow up consultation regarding of congenital disorder of glycosylation.                              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    Since Sherly's hospitalization in March 2020, she is doing much better. At that time she has multiple episodes of vomiting which family believes was due to increased PO intake and progressive slowing of stomach emptying. Now, they only give one meal by mouth, while other 4 meals are via G-tube. She has had no recent illnesses, ER visits, or hospitalizations.    Mom is concerned about poor wound healing. Sherly has had an abrasion on her wrist, she then picks at it, and it does not heal on its own. She wears a cuff over it now to prevent Sherly from picking at the wound. There is no skin breakdown in other areas. She also easily bruises but heal on their own. Mom is also concerned about decreased muscle tone. Denies any decreased energy, vision changes, hearing loss, new nausea, vomiting, or abdominal pain. No acne, body hair, or body odor noted.    Discussed participation in a CDG study at [facility]. They are interested in receiving more information.      I have reviewed the available past laboratory evaluations, imaging studies, and medical records available to me at this visit. I have reviewed the Anna's growth chart.  History was obtained from Anna's mother and caregiver at group home.           Past Medical History:     Past Medical History:    Diagnosis Date     Anxiety      Global developmental delay      Hypoglycemia      Metabolic disease     congenital disorder of glycosilation     Scoliosis      Seizures (H)     last at age 8 before today     Strabismus               Past Surgical History:     Past Surgical History:   Procedure Laterality Date     ANESTHESIA OUT OF OR MRI 3T N/A 3/7/2017    Procedure: ANESTHESIA PEDS SEDATION MRI 3T;  Surgeon: GENERIC ANESTHESIA PROVIDER;  Location: [facility] PEDS SEDATION      BACK SURGERY      c1 decompression and fusionx3, scoliosis with instrumentation x1     ELECTRORETINOGRAM Bilateral 12/10/2014    ERG ([place])     ENT SURGERY       ear tubes     EXAM UNDER ANESTHESIA EYE(S) Bilateral 12/10/2014    EUA ([place]aux)     EXTRACTION(S) DENTAL N/A 12/10/2014    Procedure: EXTRACTION(S) DENTAL;  Surgeon: Hubert York DDS;  Location: [facility] OR     EYE SURGERY Bilateral 1996    BMR 6mm ([place])      GI SURGERY      g tube     HC REPLACE GASTROSTOMY/CECOSTOMY TUBE PERCUTANEOUS N/A 12/10/2014    Procedure: REPLACE GASTROSTOMY TUBE, PERCUTANEOUS;  Surgeon: Phong Perea MD;  Location: [facility] OR     RECESSION RESECTION (REPAIR STRABISMUS) BILATERAL Bilateral 12/10/2014    BLR 11 (Areaux)     STRABISMUS SURGERY  1996    (1996) BMR 6 ([place]ons[place])     STRABISMUS SURGERY  2014    BLR 11 (Areaux) -           Social History:      Social History     Socioeconomic History     Marital status: Single     Spouse name: Not on file     Number of children: Not on file     Years of education: Not on file     Highest education level: Not on file   Occupational History     Not on file   Social Needs     Financial resource strain: Not on file     Food insecurity     Worry: Not on file     Inability: Not on file     Transportation needs     Medical: Not on file     Non-medical: Not on file   Tobacco Use     Smoking status: Never Smoker     Smokeless tobacco: Never Used   Substance and Sexual Activity     Alcohol use: No     Drug use: No      Sexual activity: Never   Lifestyle     Physical activity     Days per week: Not on file     Minutes per session: Not on file     Stress: Not on file   Relationships     Social connections     Talks on phone: Not on file     Gets together: Not on file     Attends Spiritism service: Not on file     Active member of club or organization: Not on file     Attends meetings of clubs or organizations: Not on file     Relationship status: Not on file     Intimate partner violence     Fear of current or ex partner: Not on file     Emotionally abused: Not on file     Physically abused: Not on file     Forced sexual activity: Not on file   Other Topics Concern     Not on file   Social History Narrative    Lives with m/d/3 sibs           Family History:     Family History   Problem Relation Age of Onset     Glaucoma Mother         no medications or surg to date     Glaucoma Maternal Grandmother         s/p surg, decreased VA      Amblyopia No family hx of      Strabismus No family hx of           Allergies:     Allergies   Allergen Reactions     Ativan [Lorazepam] Other (See Comments)     Paradoxical reaction     Pseudoephedrine                   Medications:       Current Outpatient Medications   Medication     acetaminophen (TYLENOL) 160 MG/5ML solution     ARIPiprazole (ABILIFY) 1 MG/ML SOLN     artificial saliva (BIOTENE DRY MOUTHWASH) LIQD liquid     bacitracin ointment     D-Mannose POWD     diazepam (VALIUM) 5 MG/ML solution     Docusate Sodium (ENEMEEZ MINI RE)     estradiol (ESTRACE) 1 MG tablet     hydrOXYzine (ATARAX) 10 MG/5ML syrup     levETIRAcetam (KEPPRA) 100 MG/ML solution     loratadine (CLARITIN) 10 MG tablet     Magnesium Citrate 100 MG TABS     Nutritional Supplements (COMPLEAT PEDIATRIC PO)     polyethylene glycol (MIRALAX/GLYCOLAX) packet     saccharomyces boulardii (FLORASTOR) 250 MG capsule     sertraline (ZOLOFT) 20 MG/ML (HIGH CONC) solution     traZODone (DESYREL) 50 MG tablet     UNABLE TO FIND      "calcium carbonate 1250 (500 CA) MG/5ML SUSP     ondansetron (ZOFRAN) 4 MG/5ML solution     ORDER FOR DME     No current facility-administered medications for this visit.           Review of Systems:   Gen: No fatigue or unexpected weight change.  Eye: No visual disturbance, normal vision.  ENT: No hearing concerns. No ear pain.  Pulmonary:  No shortness of breath with exercise or cough.    Cardio: No chest pain.  No palpitations.  No rapid heart rate.   Gastrointestinal: No recent vomiting or diarrhea.  No constipation.  No abdominal pain.   Hematologic: No bleeding disorders.  No anemia.   Musculoskeletal: No joint pain.  No muscular weakness.  Psychiatric: No significant sadness or irritability.  Neurologic: No seizures.  No headaches.    Skin: No birth marks.  No hyperpigmentation noted.  No acne.  No rashes   Endocrine: See above.            Physical Exam:   Ht 4' 8\" (142.2 cm)   Wt 69 lb (31.3 kg)   BMI 15.47 kg/m         Constitutional:    awake, alert, cooperative, no apparent distress. Interactive and compliant with exam.   Eyes:    Lids and lashes normal, sclera clear, conjunctiva normal. PERRL. Normal red reflex bilaterally.   ENT:     Normocephalic, without obvious abnormality, external ears without lesions, TMs with cerumen.   Neck:    Supple, symmetrical, trachea midline, thyroid symmetric, not enlarged and no tenderness  Hematologic / Lymphatic:  No lymphadenopathy  Lungs:   No increased work of breathing, clear to auscultation bilaterally with good air entry.  Cardiovascular: Regular rate and rhythm, no murmurs.  Abdomen: Scafoid abdomen without scars. Normal bowel sounds, soft, non-tender, no masses palpated, no hepatosplenomegally  Musculoskeletal: There is no redness, warmth, or swelling of the joints.  Full range of motion noted.  Motor strength and tone are normal.  Neurologic:        Awake, alert, oriented to name, place and time.  Neuropsychiatric:  Age appropriate affect, talkative, " interactive.  Skin: no rashes            Laboratory results:               Assessment and Plan:     Sherly Ramires is a 25 year old non-ambulatory  female with history of congenital disorder of glycosylation, global cognitive delays, complex partial seizures, osteoporosis and ovarian failure. Overall she has been doing well. With concern of poor wound healing the following labs are recommended:     Orders Placed This Encounter   Procedures     Hemoglobin A1c     Carnitine free and total     Comprehensive metabolic panel     ECHO Congenital Transthoracic (TTE)       - Increase estradiol (estrace) to 1 tablet daily due to low estradiol levels and maintenance of bone strength  - Obtain labs noted above   - Repeat echocardiogram in 2020. Last done in 2017.  - Follow up visit in 6 months.      Thank you for allowing me to participate in the care of your patient.  Please do not hesitate to call with questions or concerns.     Sincerely,     I, Lisseth Lyons, MS4, saw and examined patient in the presence of Dr. Ling MD, on September 16, 2020.     Lisseth Lyons, MS4  [facility]      Attestation:     I agree with above History, Review of Systems, Physical exam and Plan.  I have reviewed the content of the documentation and have edited it as needed. I have personally performed the services documented here and the documentation accurately represents those services and  the decisions I have made.     Sincerely,     The document recorded by the medical student accurately reflects the services I personally performed and the decisions made by me. I  personally performed the entire clinical encounter documented in this note.    It is our pleasure to be involved in XXX s health care. If you or the family has questions or concerns regarding these test results, please feel free to contact us via our Call Center at [phone].      Sincerely,        Sarah Dubon MD       Dept. of  Pediatrics - Divisions of Endocrinology and Genetics & Metabolism  Dept. of Experimental & Clinical Pharmacology  [address] [place] [facility], [place] [address]  Ph: [phone]  Email: [email or web address]

## 2020-09-16 NOTE — PROGRESS NOTES
"Sherly Ramires is a 25 year old female who is being evaluated via a billable video visit.      The patient has been notified of following:     \"This video visit will be conducted via a call between you and your physician/provider. We have found that certain health care needs can be provided without the need for an in-person physical exam.  This service lets us provide the care you need with a video conversation.  If a prescription is necessary we can send it directly to your pharmacy.  If lab work is needed we can place an order for that and you can then stop by our lab to have the test done at a later time.    Video visits are billed at different rates depending on your insurance coverage.  Please reach out to your insurance provider with any questions.    If during the course of the call the physician/provider feels a video visit is not appropriate, you will not be charged for this service.\"    Patient has given verbal consent for Video visit? Yes  How would you like to obtain your AVS? Mail a copy  If you are dropped from the video visit, the video invite should be resent to: Send to e-mail at: [email or web address]  Will anyone else be joining your video visit? Yes: Mom. How would they like to receive their invitation? Send to e-mail at: [email or web address]  Sharron Garcia M.A.    "

## 2021-05-03 DIAGNOSIS — G40.209 COMPLEX PARTIAL SEIZURE EVOLVING TO GENERALIZED SEIZURE (H): ICD-10-CM

## 2021-05-05 NOTE — TELEPHONE ENCOUNTER
levETIRAcetam (KEPPRA) 100 MG/ML solution  Last Written Prescription Date:  4/29/20  Last Fill Quantity: 775ml,   # refills: 11  Last Office Visit : 4/29/20  Future Office visit:  5/5/21      Routing refill request to provider for review/approval because: appt today. not filled pending any med change.  Thanks.

## 2021-05-11 ENCOUNTER — VIRTUAL VISIT (OUTPATIENT)
Dept: NEUROLOGY | Facility: CLINIC | Age: 26
End: 2021-05-11
Payer: COMMERCIAL

## 2021-05-11 DIAGNOSIS — G40.209 COMPLEX PARTIAL SEIZURE EVOLVING TO GENERALIZED SEIZURE (H): ICD-10-CM

## 2021-05-11 PROCEDURE — 99202 OFFICE O/P NEW SF 15 MIN: CPT | Mod: 95 | Performed by: PSYCHIATRY & NEUROLOGY

## 2021-05-11 RX ORDER — LEVETIRACETAM 100 MG/ML
SOLUTION ORAL
Qty: 775 ML | Refills: 11 | OUTPATIENT
Start: 2021-05-11

## 2021-05-11 RX ORDER — LEVETIRACETAM 100 MG/ML
SOLUTION ORAL
Qty: 775 ML | Refills: 11 | Status: SHIPPED | OUTPATIENT
Start: 2021-05-11 | End: 2022-06-24

## 2021-05-11 NOTE — LETTER
5/11/2021       RE: Sherly Ramires  [address]  [place] [address]     Dear Colleague,    Thank you for referring your patient, Sherly Ramires, to the [facility] [place] [facility]. Please see a copy of my visit note below.    Anna is a 26 year old who is being evaluated via a billable video visit.       How would you like to obtain your AVS? MyChart  If the video visit is dropped, the invitation should be resent by: Send to NUMBER at:[phone]   Will anyone else be joining your video visit? No    EPILEPSY CLINIC VIDEO VISIT NOTE  The scheduled clinic visit was changed to a video visit to reduce the risk of COVID-19 transmission.           Service Date: 05/11/2021    HISTORY: HISTORY: I spoke with a Sunitha Polanco [facility] staff person concerning Ms. Sherly Ramires, who is a 26-year-old woman with epilepsy, in the setting of mental retardation and spastic quadriparesis, due to congenital disorder of glycosylation type 1A.  The patient was continuously visible throughout the conversation and limited video examination today.      Following the most recent virtual visit to this clinic on 04/29/2020, the patient reportedly has had no seizures and no apparent adverse effects of levetiracetam.       No new medical problems were reported by the  staff person.       MEDICATIONS:  Levetiracetam solution 1000 mg in the morning and 1500 mg in the evening per gastrostomy, and other medications as per the electronic medical record.      VIDEO OBSERVATIONS:   The patient was sitting up in a wheelchair.  She appeared continuously alert.  She followed no commands.  She often direct her gaze to the  staff person.  She moved her left arm toward the staff person spontaneously.      LABORATORY DATA:   AED levels on 04/19/2017:   Levetiracetam: 38.0 mcg/ml.     IMPRESSION:   No seizures have recurred on the current dose of  levetiracetam, with no apparent adverse effects.      I reviewed gastrostomy function with the staff person, and she reported that no problems have been noted.      PLAN:   1)  Continue levetiracetam at the current dose.    2)  Return in 11 months for in-person clinic visit.  I spent 15 minutes in this telephone contact.     Video Conference via [employer].   Video Start Time: 11:01 a.m.   Video Stop Time: 11:09 a.m.  Provider location: [facility]   Reported Patient Location: Home     I personally spent 15 minutes in this patient care on the day of this visit, including time in Video contact with the patient, and time in other necessary patient care activities without direct patient contact including medical record, EEG and imaging review.      Nhan Knott M.D., Professor of Neurology

## 2021-05-11 NOTE — PROGRESS NOTES
Anna is a 26 year old who is being evaluated via a billable video visit.       How would you like to obtain your AVS? MyChart  If the video visit is dropped, the invitation should be resent by: Send to NUMBER at:[phone]   Will anyone else be joining your video visit? No    EPILEPSY CLINIC VIDEO VISIT NOTE  The scheduled clinic visit was changed to a video visit to reduce the risk of COVID-19 transmission.           Service Date: 05/11/2021    HISTORY: HISTORY: I spoke with a Sunitha Polanco [facility] staff person concerning Ms. Sherly Ramires, who is a 26-year-old woman with epilepsy, in the setting of mental retardation and spastic quadriparesis, due to congenital disorder of glycosylation type 1A.  The patient was continuously visible throughout the conversation and limited video examination today.      Following the most recent virtual visit to this clinic on 04/29/2020, the patient reportedly has had no seizures and no apparent adverse effects of levetiracetam.       No new medical problems were reported by the  staff person.       MEDICATIONS:  Levetiracetam solution 1000 mg in the morning and 1500 mg in the evening per gastrostomy, and other medications as per the electronic medical record.      VIDEO OBSERVATIONS:   The patient was sitting up in a wheelchair.  She appeared continuously alert.  She followed no commands.  She often direct her gaze to the  staff person.  She moved her left arm toward the staff person spontaneously.      LABORATORY DATA:   AED levels on 04/19/2017:   Levetiracetam: 38.0 mcg/ml.     IMPRESSION:   No seizures have recurred on the current dose of levetiracetam, with no apparent adverse effects.      I reviewed gastrostomy function with the staff person, and she reported that no problems have been noted.      PLAN:   1)  Continue levetiracetam at the current dose.    2)  Return in 11 months for in-person clinic visit.  I spent 15 minutes in this telephone  contact.     Video Conference via [employer].   Video Start Time: 11:01 a.m.   Video Stop Time: 11:09 a.m.  Provider location: [facility]   Reported Patient Location: Home     I personally spent 15 minutes in this patient care on the day of this visit, including time in Video contact with the patient, and time in other necessary patient care activities without direct patient contact including medical record, EEG and imaging review.      Nhan Knott M.D., Professor of Neurology

## 2021-05-14 RX ORDER — HEPARIN SODIUM,PORCINE 10 UNIT/ML
2 VIAL (ML) INTRAVENOUS
Status: CANCELLED | OUTPATIENT
Start: 2021-05-14

## 2021-05-17 ENCOUNTER — TELEPHONE (OUTPATIENT)
Dept: ENDOCRINOLOGY | Facility: CLINIC | Age: 26
End: 2021-05-17
Payer: COMMERCIAL

## 2021-05-17 NOTE — TELEPHONE ENCOUNTER
[facility] Call Center    Phone Message    May a detailed message be left on voicemail: yes     Reason for Call: Appointment    Per caregiver Leticia pt needs 1h cortisol test done in infusion center to coordinate w/Dr Dubon followup. No order for such lab in place and these need to be coordinated on a Tues or Thurs per mom per caregiver. Please have orders entered then reach out to schedule as Dr Dubon's return [facility] schedule is out to Jan 2022. Thanks.    Action Taken: Message routed to:  Other: Peds Endo [employer]/Peds Scheduling [employer]    Travel Screening: Not Applicable

## 2021-05-20 ENCOUNTER — TELEPHONE (OUTPATIENT)
Dept: PEDIATRICS | Facility: CLINIC | Age: 26
End: 2021-05-20

## 2021-05-20 NOTE — TELEPHONE ENCOUNTER
[facility] Call Center    Phone Message    May a detailed message be left on voicemail: yes     Reason for Call: Other:  reached out to mom and asked her to coordinate Anna's next infusion with an appointment with her on a Tuesday or Thursday because she doesn't have clinic those days. Sending message as Dr. Dubon's schedule template ojnly allows for Wednesday appointments. Please call back to line up with infusion appointment. Thank you. Sending HP.      Action Taken: Message routed to:  Other: SCHEDULING PEDS METABOLISM [employer]    Travel Screening: Not Applicable

## 2021-05-21 NOTE — TELEPHONE ENCOUNTER
Per Dr. Dubon, OK to schedule on Wednesday, June 30th @ 9:30AM. Wednesday will work with the Infusion Center for the low dose ACTH stimulation test and no need to come Tuesday or Thursday as she initially recommended. Spoke with Leticia and relayed this information to her, and assisted in scheduling appointment.     Future Appointments   Date Time Provider Department Center   6/30/2021  9:30 AM Sarah Dubon MD [facility] CLIN

## 2021-06-29 RX ORDER — HEPARIN SODIUM,PORCINE 10 UNIT/ML
2 VIAL (ML) INTRAVENOUS
Status: CANCELLED | OUTPATIENT
Start: 2021-06-29

## 2021-06-30 ENCOUNTER — OFFICE VISIT (OUTPATIENT)
Dept: PEDIATRICS | Facility: CLINIC | Age: 26
End: 2021-06-30
Attending: PEDIATRICS
Payer: COMMERCIAL

## 2021-06-30 ENCOUNTER — INFUSION THERAPY VISIT (OUTPATIENT)
Dept: INFUSION THERAPY | Facility: CLINIC | Age: 26
End: 2021-06-30
Attending: PEDIATRICS
Payer: COMMERCIAL

## 2021-06-30 VITALS
SYSTOLIC BLOOD PRESSURE: 105 MMHG | WEIGHT: 70.99 LBS | HEART RATE: 102 BPM | OXYGEN SATURATION: 96 % | DIASTOLIC BLOOD PRESSURE: 72 MMHG | BODY MASS INDEX: 15.92 KG/M2 | TEMPERATURE: 97 F | RESPIRATION RATE: 28 BRPM

## 2021-06-30 VITALS
HEART RATE: 102 BPM | SYSTOLIC BLOOD PRESSURE: 105 MMHG | WEIGHT: 71 LBS | TEMPERATURE: 97 F | RESPIRATION RATE: 28 BRPM | OXYGEN SATURATION: 96 % | DIASTOLIC BLOOD PRESSURE: 72 MMHG | BODY MASS INDEX: 15.92 KG/M2

## 2021-06-30 DIAGNOSIS — E28.39 OVARIAN FAILURE: Primary | ICD-10-CM

## 2021-06-30 DIAGNOSIS — E74.89 CONGENITAL DISORDER OF GLYCOSYLATION ASSOCIATED WITH MUTATION IN PMM2 GENE (H): Primary | ICD-10-CM

## 2021-06-30 LAB
ACTH PLAS-MCNC: 77 PG/ML
ALBUMIN SERPL-MCNC: 2.9 G/DL (ref 3.4–5)
ALP SERPL-CCNC: 93 U/L (ref 40–150)
ALT SERPL W P-5'-P-CCNC: 31 U/L (ref 0–50)
ANION GAP SERPL CALCULATED.3IONS-SCNC: 5 MMOL/L (ref 3–14)
ANION GAP SERPL CALCULATED.3IONS-SCNC: 7 MMOL/L (ref 3–14)
APTT PPP: 34 SEC (ref 22–37)
AST SERPL W P-5'-P-CCNC: 30 U/L (ref 0–45)
AT III ACT/NOR PPP CHRO: 60 % (ref 85–135)
BILIRUB SERPL-MCNC: 0.2 MG/DL (ref 0.2–1.3)
BUN SERPL-MCNC: 11 MG/DL (ref 7–30)
CALCIUM SERPL-MCNC: 8.9 MG/DL (ref 8.5–10.1)
CHLORIDE SERPL-SCNC: 103 MMOL/L (ref 94–109)
CHLORIDE SERPL-SCNC: 103 MMOL/L (ref 94–109)
CO2 SERPL-SCNC: 27 MMOL/L (ref 20–32)
CO2 SERPL-SCNC: 31 MMOL/L (ref 20–32)
CORTIS SERPL-MCNC: 10 UG/DL (ref 4–22)
CORTIS SERPL-MCNC: 20.5 UG/DL (ref 4–22)
CORTIS SERPL-MCNC: 25.7 UG/DL (ref 4–22)
CORTIS SERPL-MCNC: 29.2 UG/DL (ref 4–22)
CORTIS SERPL-MCNC: 9.6 UG/DL (ref 4–22)
CREAT SERPL-MCNC: 0.38 MG/DL (ref 0.52–1.04)
FACT XI ACT/NOR PPP: 42 % (ref 65–150)
GFR SERPL CREATININE-BSD FRML MDRD: >90 ML/MIN/{1.73_M2}
GLUCOSE SERPL-MCNC: 70 MG/DL (ref 70–99)
IGF BINDING PROTEIN 3 SD SCORE: NORMAL
IGF BP3 SERPL-MCNC: 5.2 UG/ML (ref 3.4–7.8)
INR PPP: 1.08 (ref 0.86–1.14)
POTASSIUM SERPL-SCNC: 3.8 MMOL/L (ref 3.4–5.3)
POTASSIUM SERPL-SCNC: 3.9 MMOL/L (ref 3.4–5.3)
PROT SERPL-MCNC: 6.4 G/DL (ref 6.8–8.8)
SODIUM SERPL-SCNC: 137 MMOL/L (ref 133–144)
SODIUM SERPL-SCNC: 139 MMOL/L (ref 133–144)
T4 FREE SERPL-MCNC: 0.79 NG/DL (ref 0.76–1.46)
TSH SERPL DL<=0.005 MIU/L-ACNC: 6.23 MU/L (ref 0.4–4)

## 2021-06-30 PROCEDURE — 82530 CORTISOL FREE: CPT

## 2021-06-30 PROCEDURE — 84999 UNLISTED CHEMISTRY PROCEDURE: CPT

## 2021-06-30 PROCEDURE — 96374 THER/PROPH/DIAG INJ IV PUSH: CPT

## 2021-06-30 PROCEDURE — 82024 ASSAY OF ACTH: CPT | Performed by: PEDIATRICS

## 2021-06-30 PROCEDURE — 99215 OFFICE O/P EST HI 40 MIN: CPT | Performed by: PEDIATRICS

## 2021-06-30 PROCEDURE — 84449 ASSAY OF TRANSCORTIN: CPT

## 2021-06-30 PROCEDURE — G0463 HOSPITAL OUTPT CLINIC VISIT: HCPCS | Mod: 25

## 2021-06-30 PROCEDURE — 250N000011 HC RX IP 250 OP 636: Performed by: PEDIATRICS

## 2021-06-30 PROCEDURE — 82533 TOTAL CORTISOL: CPT | Performed by: PEDIATRICS

## 2021-06-30 PROCEDURE — 84244 ASSAY OF RENIN: CPT | Performed by: PEDIATRICS

## 2021-06-30 PROCEDURE — 80051 ELECTROLYTE PANEL: CPT | Performed by: PEDIATRICS

## 2021-06-30 RX ADMIN — COSYNTROPIN 1 MCG: 0.25 INJECTION, POWDER, LYOPHILIZED, FOR SOLUTION INTRAMUSCULAR; INTRAVENOUS at 08:55

## 2021-06-30 ASSESSMENT — PAIN SCALES - GENERAL: PAINLEVEL: NO PAIN (0)

## 2021-06-30 NOTE — PATIENT INSTRUCTIONS
"Pediatric Metabolism/PKU Clinic  [facility]  Pediatric Specialty Clinic ([facility])      Formula   We did not make any changes to your formula today.   We will review the lab results and call you with our recommendations.  *Do not make any formula changes without first speaking to your dietician or doctor.       Medications   We did not make any changes to your medications today. We will review the lab results and call you with our recommendations.  *Do not make any medication changes without first speaking to your doctor.  **Please contact us at least one week in advance during regular business hours if you are about to run out of formula or medication       Emergency & Sick Calls   Keep your emergency letter with your child at all times  (at their school, in your vehicles, purse/bag and home, etc).  Consider making a medical alert bracelet.    If your child is unresponsive or has other life threatening medical emergency YOU SHOULD CALL 911.     If your child is NOT ACTING NORMALLY such as: confused or sleepier than normal, having nausea or vomiting, not tolerating their formula or medications, breathing faster than normal, has a fever, diarrhea, or other parental concern CALL US IMMEDIATELY.     ? Call [phone] and ask the  to \"page Genetic Metabolic Physician on-call\"   ? If no one calls you back within 15 minutes call again.        Helpful Numbers   To schedule appointments:  Pediatric Call Center for [facility]: [phone]  Neuropsychology [phone]  Radiology/ Imaging/ Echocardiogram: [phone]   Services:   [phone]     For questions about medications/ supplies or non-urgent medical concerns:        Chelsey REYES, RN, PHN  Nurse Care Coordinator               Ph: [phone]        Caroline Espinoza APRN, CNP             Ph: [phone]    For questions about your child's nutrition:  Michelle Ortiz RD  Ph: [phone]    For " questions about genetic counseling:         If you have not already done so consider signing up for "[employer] by speaking with the person at the  on your way out or go to [email or web address] to sign up online.      You should receive a phone call about your next appointment. If you do not receive this within two weeks of your visit, please call [phone].

## 2021-06-30 NOTE — PROVIDER NOTIFICATION
06/30/21 1110   Child Life   Location Infusion Center  (Timed Test: Low Dose ACTH Stim Test)   Intervention Introduced self and services to patient and family. Patient required a PIV for today's infusion and declined CFL support. This writer provided assistance with setting up a show on the infusion room xbox. Also reviewed [facility] activities and resources patient can utilize during her lengthy infusion. Patient and mother declined activities at this time.    Concerns About Development (Patient has significant past medical history. To note per chart, global cognitive delay.)   Special Interests [employer] videos on [employer] or [employer].   Outcomes/Follow Up Continue to Follow/Support

## 2021-06-30 NOTE — PROGRESS NOTES
Pediatric Metabolic Follow Up Consultation    Patient: Sherly Ramires MRN# [id number]   YOB: 1995 Age: 26 year old   Date of Visit: 6/30/2021    Dear Dr. Mustafa:    I had the pleasure of seeing your patient, Sherly Raymundo in the Pediatric Metabolic Clinic, [facility], on 06/30/2021 for follow up consultation regarding of congenital disorder of glycosylation.          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    Anna was last seen virtually on 9/16/2020. Since we last saw Anna, she has generally been doing well. No recent illnesses, ER visits or hospitalizations. Mom thinks that Anna's digestion has slowed even more over the past year. Anna used to be able to eat at least 2 meals per day by mouth, however for the past  month, Anna vomits after eating 2 meals by mouth in one day. To reduce the vomiting, Anna now only eats 1 meal by mouth (about 250 calories) and then receives 4 meals by G-tube.     Anna's caregiver at her group home notes that in May, Anna had her period for one day. Her caregiver noticed some blood on her underwear when cleaning Anna and reports that the blood appeared to be coming from Anna's vagina. Her caregiver only noticed the blood once in the morning.    We discussed participation in the CDG natural history study at [facility]. Mom is interested in participating. A full review of systems questions were documented to participate in study and are summarized below:    HEENT: positive for- retinal dystrophy; negative for- cleft palate, hearing loss, micro and macrocephaly   CV: positive for- had a flow murmur when she was younger, has been told that her heart is positioned differently ([facility] negative for- pericardial effusion   Respiratory: no problems   GI:      History was obtained from patient's mother and caregiver at group home.      I have reviewed the available past laboratory evaluations, imaging studies, and medical records available to me at this visit. I have reviewed the Sherly's growth chart.            Past Medical History:     Past Medical History:   Diagnosis Date     Anxiety      Global developmental delay      Hypoglycemia      Metabolic disease     congenital disorder of glycosilation     Scoliosis      Seizures (H)     last at age 8 before today     Strabismus             Past Surgical History:     Past Surgical History:   Procedure Laterality Date     ANESTHESIA OUT OF OR MRI 3T N/A 3/7/2017    Procedure: ANESTHESIA PEDS SEDATION MRI 3T;  Surgeon: GENERIC ANESTHESIA PROVIDER;  Location: [facility] PEDS SEDATION      BACK SURGERY      c1 decompression and fusionx3, scoliosis with instrumentation x1     ELECTRORETINOGRAM Bilateral 12/10/2014    ERG ([place])     ENT  SURGERY       ear tubes     EXAM UNDER ANESTHESIA EYE(S) Bilateral 12/10/2014    EUA ([place])     EXTRACTION(S) DENTAL N/A 12/10/2014    Procedure: EXTRACTION(S) DENTAL;  Surgeon: Hubert York DDS;  Location: [facility] OR     EYE SURGERY Bilateral 1996    BMR 6mm ([place])      GI SURGERY      g tube     HC REPLACE GASTROSTOMY/CECOSTOMY TUBE PERCUTANEOUS N/A 12/10/2014    Procedure: REPLACE GASTROSTOMY TUBE, PERCUTANEOUS;  Surgeon: Phong Perea MD;  Location: [facility] OR     RECESSION RESECTION (REPAIR STRABISMUS) BILATERAL Bilateral 12/10/2014    BLR 11 ([place]aux)     STRABISMUS SURGERY  1996    (1996) BMR 6 ([place])     STRABISMUS SURGERY  2014    BLR 11 ([place]) -                Social History:     Social History     Social History Narrative    Lives with m/d/3 sibs               Family History:     Family History   Problem Relation Age of Onset     Glaucoma Mother         no medications or surg to date     Glaucoma Maternal Grandmother         s/p surg, decreased VA      Amblyopia No family hx of      Strabismus No family hx of             Allergies:     Allergies   Allergen Reactions     Ativan [Lorazepam] Other (See Comments)     Paradoxical reaction     Pseudoephedrine              Medications:     Current Outpatient Medications   Medication     acetaminophen (TYLENOL) 160 MG/5ML solution     ARIPiprazole (ABILIFY) 1 MG/ML SOLN     artificial saliva (BIOTENE DRY MOUTHWASH) LIQD liquid     bacitracin ointment     calcium carbonate 1250 (500 CA) MG/5ML SUSP     D-Mannose POWD     diazepam (VALIUM) 5 MG/ML solution     Docusate Sodium (ENEMEEZ MINI RE)     estradiol (ESTRACE) 1 MG tablet     hydrOXYzine (ATARAX) 10 MG/5ML syrup     levETIRAcetam (KEPPRA) 100 MG/ML solution     loratadine (CLARITIN) 10 MG tablet     Magnesium Citrate 100 MG TABS     Nutritional Supplements (COMPLEAT PEDIATRIC PO)     ondansetron (ZOFRAN) 4 MG/5ML solution     ORDER FOR DME     polyethylene glycol (MIRALAX/GLYCOLAX) packet      saccharomyces boulardii (FLORASTOR) 250 MG capsule     sertraline (ZOLOFT) 20 MG/ML (HIGH CONC) solution     traZODone (DESYREL) 50 MG tablet     UNABLE TO FIND     No current facility-administered medications for this visit.               Review of Systems:   Gen: No fatigue or unexpected weight change.  Eye: No visual disturbance, normal vision.  ENT: No hearing loss.  No ear pain.  No sore throat. No nasal discharge.  Pulmonary:  No cough.  No shortness of breath with exercise.  No snoring.  Cardio: No chest pain.  No palpitations.  No rapid heart rate. No hypertension.  Gastrointestinal: No recent vomiting or diarrhea.  No constipation.  No abdominal pain.   Hematologic: No bleeding disorders.  No anemia.  Genitourinary: No dysuria or hematuria.  No incontinence or enuresis.  Musculoskeletal: No joint pain.  No muscular weakness.  Psychiatric: No significant sadness or irritability.  Neurologic: No seizures.  No headaches.  No focal deficits noted.  Skin: No birth marks.  No hyperpigmentation noted.  No acne.   Endocrine: see HPI.            Physical Exam:   Blood pressure 105/72, pulse 102, temperature 97  F (36.1  C), resp. rate 28, weight 70 lb 15.8 oz (32.2 kg), SpO2 96 %, not currently breastfeeding.  Growth percentile SmartLinks can only be used for patients less than 20 years old.  Height: Data Unavailable, Facility age limit for growth percentiles is 20 years.  Weight: 70 lbs 15.81 oz, Facility age limit for growth percentiles is 20 years.  BMI: Body mass index is 15.92 kg/m . Facility age limit for growth percentiles is 20 years.      Constitutional: awake, alert, cooperative, no apparent distress  Eyes: Lids and lashes normal, sclera clear, conjunctiva normal  ENT: Normocephalic, without obvious abnormality, external ears without lesions, oral pharynx with moist mucus membranes  Neck: Supple, symmetrical, trachea midline, thyroid symmetric, not enlarged and no tenderness  Hematologic / Lymphatic: no  cervical lymphadenopathy  Lungs: No increased work of breathing, clear to auscultation bilaterally with good air entry.  Cardiovascular: Regular rate and rhythm, no murmurs.  Abdomen: No scars, normal bowel sounds, soft, non-distended, non-tender, no masses palpated, no hepatosplenomegally  Genitourinary: Deferred  Breasts: Aguila stage III  Musculoskeletal: There is no redness, warmth, or swelling of the joints.  Full range of motion noted.  Motor strength and tone are normal.  Neurologic: Awake, alert, oriented to name, place and time.  Neuropsychiatric: General: normal  Skin: no lesions          Laboratory results:     Component      Latest Ref Rng & Units 6/30/2021 6/30/2021 6/30/2021 6/30/2021           8:45 AM  9:14 AM  9:29 AM  9:59 AM   Cortisol Serum      4 - 22 ug/dL  20.5 25.7 (H) 29.2 (H)   C-Telopept B-X-Linked      64 - 640 pg/mL 291        Component      Latest Ref Rng & Units 6/30/2021           8:45 AM   Sodium      133 - 144 mmol/L 137   Potassium      3.4 - 5.3 mmol/L 3.9   Chloride      94 - 109 mmol/L 103   Carbon Dioxide      20 - 32 mmol/L 27   Anion Gap      3 - 14 mmol/L 7   Glucose      70 - 99 mg/dL 70   Urea Nitrogen      7 - 30 mg/dL 11   Creatinine      0.52 - 1.04 mg/dL 0.38 (L)   GFR Estimate      >60 mL/min/1.73:m2 >90   GFR Estimate If Black      >60 mL/min/1.73:m2 >90   Calcium      8.5 - 10.1 mg/dL 8.9   Bilirubin Total      0.2 - 1.3 mg/dL 0.2   Albumin      3.4 - 5.0 g/dL 2.9 (L)   Protein Total      6.8 - 8.8 g/dL 6.4 (L)   Alkaline Phosphatase      40 - 150 U/L 93   ALT      0 - 50 U/L 31   AST      0 - 45 U/L 30     Component      Latest Ref Rng & Units 6/30/2021           8:45 AM   25 OH Vit D2      ug/L <5   25 OH Vit D3      ug/L 50   25 OH Vit D total      20 - 75 ug/L <55   IGF Binding Protein3      3.4 - 7.8 ug/mL 5.2   IGF Binding Protein 3 SD Score       NEG 0.4   IgF-1       103 ng/ml  Z-score:-1.2 SD below the mean   PTT      22 - 37 sec 34   Factor 11 Assay      65  - 150 % 42 (L)   Antithrombin III Chromogenic      85 - 135 % 60 (L)   T4 Free      0.76 - 1.46 ng/dL 0.79   TSH      0.40 - 4.00 mU/L 6.23 (H)   INR      0.86 - 1.14 1.08   Prot C Chromogenic      70 - 170 % 76     Component 12 d ago   Result SEE NOTE 07/06/2021 10:10 PM    Comment: (Note)   Test                          Result        Flag  Unit    RefValue   ------------------------------------------------------------------   Cortisol, Free, S             0.554               mcg/dL                -- REFERENCE VALUE --      6:00-10:30 AM Collection 0.121-1.065 mcg/dL      This test was developed and its performance characteristics      determined by [facility] in a manner consistent with CLIA      requirements. This test has not been cleared or approved by      the U.S. Food and Drug Administration.            Test Performed by:      [facility]               Assessment and Plan:   Sherly Ramires is a 26 year old non-ambulatory  female with history of congenital disorder of glycosylation, global cognitive delays, complex partial seizures,  and ovarian failure.  She has not had a DXA since 2016 and had not had a cardiac ECHO since 2014. She has mild constipation, which is controlled with Miralax and Magnesium. She has been on Estrace 1 mg for for one year. She had breakthrough bleeding last month most likely due to the fact that she was taking estrogen without Prometrium. A pelvic US was requested during this visit. She had a low dose ACTH stimulation test today to evaluate her for secondary adrenal insufficiency as children with CDG are at increased risk for secondary adrenal insufficiency due to the effect of the abnormal glycosylation on the function of her hypothalamic-pituitary-adrenal axis.She needs an appointment with pediatric hematologist,Dr. Mays. She is followed annually by Ophthalmology. She had extinguished retinogram but she is still has some vision. We  will refer Sherly to an adult gastroenterologist and to Dr. Rena Collier, an adult cardiologist .  During this visit multiple labs were sent to evaluate her coagulation status, thyroid and growth axes as well as vitamin D and calcium and phosphorus concentrations.    Addendum: Low dose ACTH stimulation test showed adequate adrenal cortisol production. Free cortisol level was normal too.  Her IgF-1 and IgFB3 levels were within normal range. Her liver enzymes were normal but her total protein and albumin were low. Her INR and PTT concentrations were within appropriate range. Her vitamin D level was normal. Antithrombin III and Factor 11 were low. Her CTX and alkaline phosphatase concentrations were within appropriate range. Her free T4 was normal but her TSH was mildly elevated. We will recommend repeat thyroid function tests in couple of weeks and if they continue being elevated we will start her on levothyroxine.      Observe emergency precautions as discussed today.  Our on-call metabolic service is available 24 hours/day by calling the page  ([phone]) and asking for the Genetics and Metabolism doctor on call.  A written emergency letter will be generated for Sherly.       Patient was seen and staffed with Dr. Dubon. Thank you for allowing me to participate in the care of your patient.  Please do not hesitate to call with questions or concerns.    Sincerely,    Margarita Wright MD  PGY-1 Pediatrics       Patient  was seen in the [facility] Pediatric Metabolism  Clinic by me, Sarah Dubon and the resident. I reviewed, edited and augmented the history & repeated all key aspects of the physical exam.  I agree with the resident's findings and plan of care as documented in the resident's note.    I spent 40 minutes of total time, before, during, and after the visit reviewing and interpreting previous labs and records, examining the patient, formulating and discussing the plan of  care, ordering  Labs, reviewing resulted labs, and documenting clinical information in the electronic health record.       It is our pleasure to be involved in .your patient's. health care. If you or the family has questions or concerns regarding these test results, please feel free to contact us via our Call Center at [phone].      Sincerely,    Sarah Dubon MD     Dept. of Pediatrics - Divisions of Endocrinology and Genetics & Metabolism  Dept. of Experimental & Clinical Pharmacology  [address], [facility], [place] [address]  Ph: [phone]  Email: [email or web address]    CC  SELF, REFERRED    Copy to patient  DELIO VASQUES (NEED LETTERS OF GUARDIANSHIP DOCUMENT) RYAN WHITFIELD (NEED LETTERS OF GUARDIANSHIP DOCUMENT)  [id number] [facility] [id number]

## 2021-06-30 NOTE — NURSING NOTE
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

## 2021-06-30 NOTE — PROGRESS NOTES
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 Declined numbing. Labs drawn as ordered from PIV.    Infusion Note: Cosyntropin administered IV push. Timed cortisol levels drawn at +15,+30,+60 minutes. Timed test completed without complication.     Post Infusion Assessment: Patient tolerated infusion, Vital signs remained stable throughout and PIV removed without issue    Discharge Plan:   Pt left [facility] in stable condition for apt [facility] with Dr. Dubon.

## 2021-07-01 RX ORDER — PROGESTERONE 100 MG/1
100 CAPSULE ORAL DAILY
Qty: 30 CAPSULE | Refills: 11 | Status: SHIPPED | OUTPATIENT
Start: 2021-07-01 | End: 2024-05-24

## 2021-07-02 LAB
DEPRECATED CALCIDIOL+CALCIFEROL SERPL-MC: <55 UG/L (ref 20–75)
MISCELLANEOUS TEST: NORMAL
MISCELLANEOUS TEST: NORMAL
PROT C ACT/NOR PPP CHRO: 76 % (ref 70–170)
VITAMIN D2 SERPL-MCNC: <5 UG/L
VITAMIN D3 SERPL-MCNC: 50 UG/L

## 2021-07-03 LAB — COLLAGEN CTX SERPL-MCNC: 291 PG/ML (ref 64–640)

## 2021-07-06 LAB
RESULT: NORMAL
SEND OUTS MISC TEST CODE: NORMAL
SEND OUTS MISC TEST SPECIMEN: NORMAL
TEST NAME: NORMAL

## 2021-07-07 LAB
LAB SCANNED RESULT: NORMAL
RENIN PLAS-CCNC: 0.7 NG/ML/HR

## 2021-07-09 LAB — LAB SCANNED RESULT: NORMAL

## 2021-07-13 ENCOUNTER — CARE COORDINATION (OUTPATIENT)
Dept: PEDIATRICS | Facility: CLINIC | Age: 26
End: 2021-07-13

## 2021-07-13 ENCOUNTER — CARE COORDINATION (OUTPATIENT)
Dept: ENDOCRINOLOGY | Facility: CLINIC | Age: 26
End: 2021-07-13

## 2021-07-13 DIAGNOSIS — E28.39 OVARIAN FAILURE: ICD-10-CM

## 2021-07-13 DIAGNOSIS — R79.89 ELEVATED TSH: Primary | ICD-10-CM

## 2021-07-13 DIAGNOSIS — E74.89 CDG (CONGENITAL DISORDER OF GLYCOSYLATION) (H): Primary | ICD-10-CM

## 2021-07-13 NOTE — LETTER
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 This is a rare, life-long, inborn error of metabolism (genetic condition). Individuals with CDG1a have problems with synthesis of glycoproteins and glycolipids, resulting in a variety of multi-organ symptoms.    Clinical symptoms for individuals with CDG-1a can include psychomotor retardation, seizures, abnormal fat distribution, failure to thrive/severe feeding problems, eye problems including retinitis pigmentosa, liver disease, coagulopathy (abnormal clotting of the blood), protein losing enteropathy, cardiac involvement including pericardial effusions, and developmental delays/brain abnormalities. Sherly was diagnosed with this condition in early childhood. Some of her problems include: seizures, strabismus, instability of the cervical spine requiring bony fusion, hip dislocation, growth hormone deficiency, gastrostomy tube dependency & abnormal hepatic enzyme levels    Of most immediate consequence to her is her history of recurrent hypoglycemia. This is managed with use of continuous feedings. Please be aware that her blood sugar can decrease rapidly and require emergent care.    Sherly is at great risk of medical emergency under the following circumstances: severe body stresses such as dehydration, fever, major physical injuries, surgery and prolonged fasting as they can trigger hypoglycemia and increase her risk for stroke.     This letter is not exhaustive and is not a substitute for contact with the Genetics and Metabolism physician on call available 24 hours/day via the page  ([phone]). Please initiate the protocol below and contact us immediately.     Acute Treatment:    During any acute illness with inadequate oral intake, IV therapy with D10 1/2 NS at maintenance should be started.     Monitor  her albumin levels as she is at risk of hypoalbuminemia and third spacing. She may need albumin infusion if her albumin level is very low. Crystalloid administration may also need to be coupled with albumin infusion followed by Lasix.    Aggressive fever management    Evaluate and aggressively treat precipitating event    If Sherly does not respond to above intervention, more aggressive management may be required    Pre-coordination with Metabolism is needed if surgery/anesthesia required    Immediate Laboratory Studies to Order:    Blood glucose, electrolytes, liver function tests, INR/PTT, ACTH, Cortisol, and Corticosteroid-binding globulin    CBC should be monitored closely    Low threshold for neuro imaging

## 2021-07-13 NOTE — PROGRESS NOTES
Spoke to Betty at Group home. Informed that compounding pharmacy and Dr. Mays's office should be contacting to arrange appointment. Confirmed that had number to schedule for radiology exams. Informed new referral placed for Dr. Yvette Collier cardiology. Provided scheduling number. Instructed emergency note has been updated and will send to PCP and patient home. Confirmed this RN contact information.      Betty verbalized understanding and had no further questions.

## 2021-07-13 NOTE — PROGRESS NOTES
Call placed at the request of Dr. Dubon as follow up for Anna's recent appointment in metabolism clinic.  I spoke to Leticia at the group home and provided her with the following information:   Low dose ACTH stimulation test showed adequate adrenal cortisol production. Free cortisol level was normal too.  Her IgF-1 and IgFB3 levels were within normal range. Her liver enzymes were normal but her total protein and albumin were low. Her INR and PTT concentrations were within appropriate range. Her vitamin D level was normal. Antithrombin III and Factor 11 were low. Her CTX and alkaline phosphatase concentrations were within appropriate range. Her free T4 was normal but her TSH was mildly elevated. We will recommend repeat thyroid function tests in couple of weeks and if they continue being elevated we will start her on levothyroxine.  A pelvic US was requested during this visit. She needs an appointment with pediatric hematologist,Dr. Mays. She is followed annually by Ophthalmology. We will refer Sherly to an adult gastroenterologist and to Dr. Rena Collier, an adult cardiologist.      Dr. Dubon requested we ask if Steffanie could tolerate sublingual prometrium since it cannot be given by G-Tube.  Leticia stated that she thought they should try this. Success will be dependent on Anna's  mood and cooperation at the time of administration.  I will send message to Dr. Dubon.  I provided the phone number for scheduling with Dr. Mays and radiology.  A referral was placed for pediatric cardiology.  A correction to adult cardiology and a referral for both adult cardiology and pediatric hematology will be requested. Scheduling numbers for those appointments will be provided to the group [place]. Leticia will communicate plan with the parents/ guardians. An updated emergency care plan letter will be sent to the group [place] also. Leticia was provided with the contact numbers for the nurse coordinators here should  they have further questions.

## 2021-07-13 NOTE — LETTER
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 This is a rare, life-long, inborn error of metabolism (genetic condition). Individuals with CDG1a have problems with synthesis of glycoproteins and glycolipids, resulting in a variety of multi-organ symptoms.    Clinical symptoms for individuals with CDG-1a can include psychomotor retardation, seizures, abnormal fat distribution, failure to thrive/severe feeding problems, eye problems including retinitis pigmentosa, liver disease, coagulopathy (abnormal clotting of the blood), protein losing enteropathy, cardiac involvement including pericardial effusions, and developmental delays/brain abnormalities. Sherly was diagnosed with this condition in early childhood. Some of her problems include: seizures, strabismus, instability of the cervical spine requiring bony fusion, hip dislocation, growth hormone deficiency, gastrostomy tube dependency & abnormal hepatic enzyme levels    Of most immediate consequence to her is her history of recurrent hypoglycemia. This is managed with use of continuous feedings. Please be aware that her blood sugar can decrease rapidly and require emergent care.    Sherly is at great risk of medical emergency under the following circumstances: severe body stresses such as dehydration, fever, major physical injuries, surgery and prolonged fasting as they can trigger hypoglycemia and increase her risk for stroke.     This letter is not exhaustive and is not a substitute for contact with the Genetics and Metabolism physician on call available 24 hours/day via the page  ([phone]). Please initiate the protocol below and contact us immediately.     Acute Treatment:    During any acute illness with inadequate oral intake, IV therapy with D10 1/2 NS at maintenance should be started.     Monitor  her albumin levels as she is at risk of hypoalbuminemia and third spacing. She may need albumin infusion if her albumin level is very low. Crystalloid administration may also need to be coupled with albumin infusion followed by Lasix.    Aggressive fever management    Evaluate and aggressively treat precipitating event    If Sherly does not respond to above intervention, more aggressive management may be required    Pre-coordination with Metabolism is needed if surgery/anesthesia required    Immediate Laboratory Studies to Order:    Blood glucose, electrolytes, liver function tests, INR/PTT, ACTH, Cortisol, and Corticosteroid-binding globulin    CBC should be monitored closely    Low threshold for neuro imaging

## 2021-07-14 DIAGNOSIS — E74.89 CONGENITAL DISORDER OF GLYCOSYLATION ASSOCIATED WITH MUTATION IN PMM2 GENE (H): Primary | ICD-10-CM

## 2021-07-14 RX ORDER — PROGESTERONE 100 MG/1
100 CAPSULE ORAL DAILY
Qty: 30 CAPSULE | Refills: 11 | Status: SHIPPED | OUTPATIENT
Start: 2021-07-14 | End: 2024-05-24

## 2021-07-29 ENCOUNTER — TELEPHONE (OUTPATIENT)
Dept: PEDIATRICS | Facility: CLINIC | Age: 26
End: 2021-07-29

## 2021-07-29 DIAGNOSIS — E74.89 CONGENITAL DISORDER OF GLYCOSYLATION ASSOCIATED WITH MUTATION IN PMM2 GENE (H): Primary | ICD-10-CM

## 2021-07-29 NOTE — TELEPHONE ENCOUNTER
Spoke to Betty at patient's group home. Patient has appointment with Dr. Mays on August 12th. Unfortunately, Anna needs to be seen by an adult provider and this appointment time will be cancelled. This RN verified an adult referral has been placed. This RN provided group home with adult centralized scheduling and provided recommended provider names. Instructed to call this RN back if there are further issues.     Betty verbalized understanding and had no further questions.     Chelsey Brantley BSN, RN, PHN  Genetics and Metabolism  RN Care Coordinator  [address] [place] [address]. [phone] Fax [phone]

## 2021-07-30 ENCOUNTER — TELEPHONE (OUTPATIENT)
Dept: PEDIATRICS | Facility: CLINIC | Age: 26
End: 2021-07-30

## 2021-07-30 NOTE — TELEPHONE ENCOUNTER
Patient's mother called stating that patient has been complaining of intermittent right shin pain associated with transferring or in stander or power wheelchair. This has been occurring over the last several weeks. Mom denied discoloration, swelling, redness or bumps or hardness in the calf area.  Mom is concerned that this is ongoing and not sure if its due to her underlying osteoporosis. Mother also states that she has a history of bone break and pain is not as significant as that event.   Mother also stated this could be behavioral as well its too hard to distinguish.     Mother would like a call to discuss next steps on care.    This RN stated they would take message and forward to provider. Informed mother that if pain increases, signs of infection, or clot to go to emergency room. Mother stated she did not have any indication at this time and verbalized understanding.    Chelsey DANGELON, RN, PHN  Genetics and Metabolism  RN Care Coordinator  [facility] 12th [place] [address]  Ph. [phone] Fax [phone]

## 2021-08-03 ENCOUNTER — TELEPHONE (OUTPATIENT)
Dept: HEMATOLOGY | Facility: CLINIC | Age: 26
End: 2021-08-03

## 2021-08-03 NOTE — TELEPHONE ENCOUNTER
[facility]  [facility]  [facility], [address], [place] [address]  Main: [phone], Fax: [phone]    Sherly Ramires is being referred to hematology for concern for hypercoaguability due to congenital disorder of glycosylation.     RN reviewed chart.     Patient call was placed. There was no answer at her group home. Voicemail was left requesting call back.    The patient will be seen by Dr. Hoskins for next available appointment.    Our contact number was provided for any further questions, comments or concerns.     Sarah Willis, MSN, RN, PHN - Nurse Clinician - [facility] - [phone]      Contacts for scheduling Anna:  Lo (home supervisor): [phone]    Home: [phone]  Carter

## 2021-08-03 NOTE — TELEPHONE ENCOUNTER
Left message for Julianne. Informing mother that Dr. Dubon would recommend PCP evaluate. Provided this RN call back number for further questions or discussion.    Chelsey Brantley BSN, RN, PHN  Genetics and Metabolism  RN Care Coordinator  [address] [place] [address]. [phone] Fax [phone]

## 2021-10-18 ENCOUNTER — OFFICE VISIT (OUTPATIENT)
Dept: HEMATOLOGY | Facility: CLINIC | Age: 26
End: 2021-10-18
Attending: PEDIATRICS
Payer: COMMERCIAL

## 2021-10-18 VITALS
RESPIRATION RATE: 14 BRPM | DIASTOLIC BLOOD PRESSURE: 73 MMHG | OXYGEN SATURATION: 97 % | HEIGHT: 56 IN | TEMPERATURE: 97.5 F | WEIGHT: 71 LBS | BODY MASS INDEX: 15.97 KG/M2 | SYSTOLIC BLOOD PRESSURE: 108 MMHG | HEART RATE: 87 BPM

## 2021-10-18 DIAGNOSIS — E74.89 CONGENITAL DISORDER OF GLYCOSYLATION ASSOCIATED WITH MUTATION IN PMM2 GENE (H): ICD-10-CM

## 2021-10-18 PROCEDURE — G0463 HOSPITAL OUTPT CLINIC VISIT: HCPCS

## 2021-10-18 PROCEDURE — 99215 OFFICE O/P EST HI 40 MIN: CPT | Performed by: INTERNAL MEDICINE

## 2021-10-18 ASSESSMENT — PAIN SCALES - GENERAL: PAINLEVEL: NO PAIN (0)

## 2021-10-18 ASSESSMENT — MIFFLIN-ST. JEOR: SCORE: 925.05

## 2021-10-18 NOTE — PROGRESS NOTES
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 She presents to [facility] to establish with adult hematology given concern for coagulation dysfunction associated with glycosylation disorder. Previously followed by pediatric hematologist (Dr. Azra Mays MD). She has repeatedly had low FActor XI, antithrombin III and protien S. She has normal protein C, vWF, factor 2/7/8, and fibrinogen levels (11/2018). Has undergone multiple significant procedures in past, but no known hematologic complications per patient's  and no documentation of complications per chart review.  Low concern that patient is significantly hypercoaguable or hypocoagulable at this time, given only mild laboratory abnormalities. There is some suggestion in the literature that patients with this disorder are hypercoaguable. The low protein S and antithrombin would be associated with hypercoaguable state, however the low factor XI would be associated with bleeding.  But the level of factor XI is not very indicative of bleeding risk  Recommendations for persons with congenital disorder of glycosylation include following prothrombin time, protein C, protein S, antithrombin III, factor IX, & factor XI. Most recent labs were only a few months ago, so would not recommend repeating at this time. At this time, if she needs surgery, I would recommend that she does not need any special precautions. As she ages, she may need DVT prophylaxis for surgical procedure, in particular any lower extremity procedures.   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 She was previously followed with pediatric hematology (Dr. Azra Mays MD) for coagulation protein abnormalities. Presents with , but she endorses that mother (who is highly involved in patient's care) had no specific concerns she wanted addressed. Not currently on any blood thinners. No known history of bleeding or coagulation complications, including with significant prior surgical procedures per . In her normal state of health as of late. Did recently require trip to ED due to gastroenteritis, which has now resolved (10/08/31). She will occassionally gets bruises related to her hitting hands on chair while playing Wii but the bruises typically resolve spontaneously. Has a history of ovarian failure related to her glycosylatoin disorder, for which she is on estradiol. One occurrence of menstrual bleeding a few months ago (which is abnormal for her). Planned to start progesterone, in addition to her estradiol, but have been unable to do so due to inability to give medication through her feeding tube.     Review of Systems: limited 2/2 patients baseline cognition    Gastrointestinal: No bloody/tarry stools.   Hematologic: Occasional bruising, which resolves spontaneously. No noted bleeding from any other source.     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     Medications:  - Abilify  - Atarax  - Calcium carbonate  - D-mannose  - Keppra  - Loratadine  - Oxybutynin  - Sertraline  - Trazodone  - Zofran      Family History:  - No known family history of bleeding or clotting disorders per   - Multiple siblings who do not share the glycosylation disorder     Social History:  - Resides in group home    Vitals: /73 (BP Location: Right arm, Patient Position: Sitting, Cuff Size: Adult Small)   Pulse 87   Temp 97.5  F (36.4  C) (Axillary)   Resp 14   Ht 1.422 m (4' 8\")   Wt 32.2 kg (71 lb)   SpO2 97%   BMI 15.92 kg/m      Physical Exam:    General: Young adult female in wheelchair. No acute distress. Playing with IPAD, minimally interactive, does not respond to questions  HENT: NC/AT. No visible bleeding in oropharynx.   Cardiovascular: RRR. No audible gallop/murmur/rub.    Pulmonary: No increased work of breathing. Lungs CTAB.   Skin: No petechiae. Minimal bruising on LUE, which appears to be healing (related to recent ED visit). Well healed scar on right shin.  Extremities: Significant muscle wasting all limbs.   Neuro: Alert. Unable to engage in conversatoin but follows commands and nods head to yes/no questions.     Diagnostic Results Reviewed:     Ref. Range 11/30/2017 11:03 11/29/2018 11:40 3/14/2020 11:39 6/30/2021 08:45   INR Latest Ref Range: 0.86 - 1.14  1.07 1.07 1.03 1.08   PTT Latest Ref Range: 22 - 37 sec 31 31  34   Fibrinogen Latest Ref Range: 200 - 420 mg/dL 272 291     Factor 2 Assay Latest Ref Range: 60 - 140 % 99 101     Factor 7 Assay Latest Ref Range: 50 - 129 % 77      Factor 8 Assay Latest Ref Range: 55 - 200 % 111 116     von Willebrand Antigen Latest Ref Range: 50 - 200 %  105     von " Willebrand Factor Activity Latest Ref Range: 50 - 180 %  121     Factor 11 Assay Latest Ref Range: 65 - 150 % 52 (L) 51 (L)  42 (L)   Antithrombin III Chromogenic Latest Ref Range: 85 - 135 % 69 (L) 65 (L)  60 (L)   Prot C Chromogenic Latest Ref Range: 70 - 170 % 87 78  76   Protein S Free Latest Ref Range: 55 - 125 % 48 (L) 42 (L)

## 2021-10-18 NOTE — PROGRESS NOTES
Attending Note:  I have reviewed the patient chart, and interviewed and examined the patient. I was present with Leonard Redman MS4 for the entire interview and exam. I have edited his note to reflect my findings, assessment and plan.  Time: I spent a total of 60 minutes on the day of the visit. Please see the note for further information on patient assessment and treatment.       Alejandra Hoskins MD  Hematology

## 2022-06-21 DIAGNOSIS — G40.209 COMPLEX PARTIAL SEIZURE EVOLVING TO GENERALIZED SEIZURE (H): ICD-10-CM

## 2022-06-24 RX ORDER — LEVETIRACETAM 100 MG/ML
SOLUTION ORAL
Qty: 775 ML | Refills: 3 | Status: SHIPPED | OUTPATIENT
Start: 2022-06-24 | End: 2022-11-26

## 2022-06-24 NOTE — TELEPHONE ENCOUNTER
levETIRAcetam 100 MG/ML Solution  Last Written Prescription Date:   5/11/2021  Last Fill Quantity: 775,   # refills: 11  Last Office Visit :  5/11/2021  Future Office visit:   8/16/2022   775 mL, 3 Refills sent to pharm to cover Pt until visit in August.       Oralia Watson RN  Central Triage Red Flags/Med Refills

## 2022-09-21 ENCOUNTER — VIRTUAL VISIT (OUTPATIENT)
Dept: NEUROLOGY | Facility: CLINIC | Age: 27
End: 2022-09-21
Payer: COMMERCIAL

## 2022-09-21 VITALS — HEIGHT: 56 IN | WEIGHT: 71 LBS | BODY MASS INDEX: 15.97 KG/M2

## 2022-09-21 DIAGNOSIS — G40.209 COMPLEX PARTIAL SEIZURE EVOLVING TO GENERALIZED SEIZURE (H): Primary | ICD-10-CM

## 2022-09-21 RX ORDER — ESTRADIOL 0.5 MG/1
0.5 TABLET ORAL DAILY
COMMUNITY
Start: 2022-09-15

## 2022-09-21 NOTE — PROGRESS NOTES
Anna is a 27 year old who is being evaluated via a telephone call.    What phone number would you like to be contacted at? [phone]   How would you like to obtain your AVS? Mail a copy  Medication and allergies have been reviewed.   Carter Winston, VF        Report of Telephone Conversation        I spoke with Leticia, who is staff at the patient's group home. She noted that the patient has currently asymptomatic Covid positivity, as do other clients at the group home.       She reported that the patient has had no recent seizures and no apparent adverse effects of levetiracetam.  The patient is not able to participate in a telephone conversation.       She recognized that the pateint has not had a neurological examination since 2019, and agreed to schedule an in-person visit after the patient becomes Covid negative.  Medication refills are up to date.  Nhan Knott M.D.

## 2022-09-21 NOTE — LETTER
2022      RE: Sherly Ramires  : 1995   MRN: [id number]      Dear Colleague,    Thank you for referring your patient, Sherly Ramires, to the [facility] EPILE[facility] at [facility]. Please see a copy of my visit note below.      Anna is a 27 year old who is being evaluated via a telephone call.    What phone number would you like to be contacted at? [phone]   How would you like to obtain your AVS? Mail a copy  Medication and allergies have been reviewed.   Carter Winston, EVA      Report of Telephone Conversation        I spoke with Leticia, who is staff at the patient's group home. She noted that the patient has currently asymptomatic Covid positivity, as do other clients at the group home.       She reported that the patient has had no recent seizures and no apparent adverse effects of levetiracetam.  The patient is not able to participate in a telephone conversation.       She recognized that the pateint has not had a neurological examination since 2019, and agreed to schedule an in-person visit after the patient becomes Covid negative.  Medication refills are up to date.  Nhan Knott M.D.     Again, thank you for allowing me to participate in the care of your patient.      Sincerely,    Nhan Knott MD

## 2022-10-08 ENCOUNTER — HOME INFUSION (PRE-WILLOW HOME INFUSION) (OUTPATIENT)
Dept: PHARMACY | Facility: CLINIC | Age: 27
End: 2022-10-08

## 2022-10-12 NOTE — PROGRESS NOTES
This is a recent snapshot of the patient's [facility] medical record.  For current drug dose and complete information and questions, call [phone] or In Basket pool, [facility] home infusion ([id number])  CSN Number:  [id number]

## 2022-10-17 ENCOUNTER — OFFICE VISIT (OUTPATIENT)
Dept: HEMATOLOGY | Facility: CLINIC | Age: 27
End: 2022-10-17
Attending: INTERNAL MEDICINE
Payer: COMMERCIAL

## 2022-10-17 VITALS — RESPIRATION RATE: 16 BRPM | SYSTOLIC BLOOD PRESSURE: 102 MMHG | HEART RATE: 82 BPM | DIASTOLIC BLOOD PRESSURE: 62 MMHG

## 2022-10-17 DIAGNOSIS — E74.89 CONGENITAL DISORDER OF GLYCOSYLATION ASSOCIATED WITH MUTATION IN PMM2 GENE (H): Primary | ICD-10-CM

## 2022-10-17 DIAGNOSIS — D68.9 COAGULOPATHY (H): Chronic | ICD-10-CM

## 2022-10-17 PROCEDURE — G0463 HOSPITAL OUTPT CLINIC VISIT: HCPCS

## 2022-10-17 PROCEDURE — 99214 OFFICE O/P EST MOD 30 MIN: CPT | Performed by: INTERNAL MEDICINE

## 2022-10-17 NOTE — PROGRESS NOTES
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       Interim history: Ms. Ramires is seen for follow-up of coagulopathy.  She is accompanied by one of her caregivers, who gives history.  She lives in a group home.  She was last seen 10/18/2021.  She has a glycosylation problem that results in low factor XI, Antithrombin, protein S.  She has had no bleeding or clotting issues.  She has had no new health issues since I saw her a year ago.  She does have occasional minor skin wounds for example due to a scrape, that seem to take a long time to heal.  She does tend to pick at her wounds.  She is not having any bruising, other than due to obvious trauma.  No major bruises.  She has not had any procedures in the last year, and does not have any procedures planned.      PHYSICAL EXAMINATION:  /62 (BP Location: Right arm, Patient Position: Sitting, Cuff Size: Adult Small)   Pulse 82   Resp 16     General appearance:  Patient is 27-year-old woman in no acute distress.   She is sitting in a wheelchair, cachectic, unable to give any history.  HEENT:  No pallor, icterus.   Lungs:  Clear to auscultation bilaterally.   Heart:  Regular rate and rhythm; no S3 S4 or murmer.     Abdomen:  Positive bowel sounds, soft and nontender, nondistended.  Feeding tube in place, no exudate or bruising around the tube entry site.    Extremities:    No ankle edema.     Skin: Approximately 3  mm x 5 mm scaling patch on one of her left fingers, no erythema or exudate or open wound.  No rash, no petechiae or ecchymoses.      Labs: Reviewed today   Latest Reference Range & Units 11/29/18 11:40 03/14/20 11:39 06/30/21 08:45   INR 0.86 - 1.14  1.07 1.03 1.08   PTT 22 - 37 sec 31  34   Fibrinogen 200 - 420 mg/dL 291     TEG WITHOUT HEPARINASE  Rpt !     Factor 2 Assay 60 - 140 % 101     Factor 8 Assay 55 - 200 % 116     von Willebrand Antigen 50 - 200 % 105     von Willebrand Factor Activity 50 - 180 % 121     Von Willebrand Multimers  Multimer analysis is not indicated on this specimen. Test canceled.     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  She has had multiple surgical procedures with no problems with bleeding or clotting.  She needs to undergo surgery of lower extremity or abdominal/pelvic surgery, she should have DVT prophylaxis with enoxaparin.  Since she weighs only 37 kg, using adult dosing is problematic.  I would use pediatric dosing of enoxaparin 0.5 mg/kg every 12 hours for prophylaxis.    RTC 2 years-see YEIMY.    Time: I spent a total of 30 minutes on the day of the visit. Please see the note for further information on patient assessment and treatment.     Alejandra Hoskins MD  Hematology

## 2022-11-25 DIAGNOSIS — G40.209 COMPLEX PARTIAL SEIZURE EVOLVING TO GENERALIZED SEIZURE (H): ICD-10-CM

## 2022-11-26 DIAGNOSIS — G40.209 COMPLEX PARTIAL SEIZURE EVOLVING TO GENERALIZED SEIZURE (H): ICD-10-CM

## 2022-11-26 RX ORDER — LEVETIRACETAM 100 MG/ML
SOLUTION ORAL
Qty: 775 ML | Refills: 11 | Status: CANCELLED | OUTPATIENT
Start: 2022-11-26

## 2022-11-26 RX ORDER — LEVETIRACETAM 100 MG/ML
SOLUTION ORAL
Qty: 775 ML | Refills: 11 | Status: SHIPPED | OUTPATIENT
Start: 2022-11-26 | End: 2022-12-07

## 2022-11-28 ENCOUNTER — OFFICE VISIT (OUTPATIENT)
Dept: OPHTHALMOLOGY | Facility: CLINIC | Age: 27
End: 2022-11-28
Attending: OPHTHALMOLOGY
Payer: COMMERCIAL

## 2022-11-28 DIAGNOSIS — E74.89 CONGENITAL DISORDER OF GLYCOSYLATION ASSOCIATED WITH MUTATION IN PMM2 GENE (H): ICD-10-CM

## 2022-11-28 DIAGNOSIS — H35.50 RETINAL DYSTROPHY: ICD-10-CM

## 2022-11-28 DIAGNOSIS — H50.10 CONSECUTIVE EXOTROPIA OF BOTH EYES: Primary | ICD-10-CM

## 2022-11-28 PROCEDURE — G0463 HOSPITAL OUTPT CLINIC VISIT: HCPCS

## 2022-11-28 PROCEDURE — 92014 COMPRE OPH EXAM EST PT 1/>: CPT | Performed by: OPHTHALMOLOGY

## 2022-11-28 ASSESSMENT — VISUAL ACUITY
OD_SC: CUSM
METHOD: FIXATION
METHOD: LEA - BLOCKED
OS_SC: CUSM

## 2022-11-28 ASSESSMENT — CONF VISUAL FIELD
OD_INFERIOR_NASAL_RESTRICTION: 0
OS_SUPERIOR_NASAL_RESTRICTION: 0
OD_SUPERIOR_NASAL_RESTRICTION: 0
OD_SUPERIOR_TEMPORAL_RESTRICTION: 0
OS_INFERIOR_TEMPORAL_RESTRICTION: 0
OD_NORMAL: 1
OS_NORMAL: 1
METHOD: TOYS
OS_SUPERIOR_TEMPORAL_RESTRICTION: 0
OS_INFERIOR_NASAL_RESTRICTION: 0
OD_INFERIOR_TEMPORAL_RESTRICTION: 0

## 2022-11-28 ASSESSMENT — TONOMETRY
OS_IOP_MMHG: 16
IOP_METHOD: ICARE SINGLE JC
OD_IOP_MMHG: 13

## 2022-11-28 ASSESSMENT — SLIT LAMP EXAM - LIDS
COMMENTS: NORMAL
COMMENTS: NORMAL

## 2022-11-28 ASSESSMENT — EXTERNAL EXAM - LEFT EYE: OS_EXAM: NORMAL

## 2022-11-28 ASSESSMENT — CUP TO DISC RATIO
OS_RATIO: 0.1
OD_RATIO: 0.1

## 2022-11-28 ASSESSMENT — EXTERNAL EXAM - RIGHT EYE: OD_EXAM: NORMAL

## 2022-11-28 NOTE — PATIENT INSTRUCTIONS
Anna's eye exam was stable today. No need for glasses, though she is welcome to wear / have blue glasses for style if she likes!        Skyler Correa Jr., MD    Pediatric Ophthalmology & Strabismus  [facility]  [address], 3rd floor  [address] [place] [address]  Office:  [phone]  Appointments:  [phone]  Fax:  [phone]     [facility] [place] [address]  Office: [phone]  Appointments: [phone]  Fax: [phone]

## 2022-11-28 NOTE — NURSING NOTE
Chief Complaint(s) and History of Present Illness(es)     Exotropia Follow Up            Laterality: both eyes    Onset: present since childhood    Associated symptoms: Negative for droopy eyelid, eye pain and blurred vision    Treatments tried: no treatment    Comments: Vision seems stable since LV. Anna will say she wants glasses, has readers from [employer] but does not wear them.  Care giver does not report any noticeable changes in alignment of eyes.          Retinal Dystrophy Hereditary Follow Up            Associated symptoms: Negative for glare, eye pain and headache    Associated symptoms: Negative for glare, eye pain and headache    Comments: Seems to see well, is able to recognize staff at group home even in dark room. No photophobia   No concerns today.            Comments    Inf: Caregiver from group home

## 2022-11-28 NOTE — PROGRESS NOTES
Chief Complaint(s) and History of Present Illness(es)     Exotropia Follow Up            Laterality: both eyes    Onset: present since childhood    Associated symptoms: Negative for droopy eyelid, eye pain and blurred vision    Treatments tried: no treatment    Comments: Vision seems stable since LV. Anna will say she wants glasses, has readers from [employer] but does not wear them.  Care giver does not report any noticeable changes in alignment of eyes.          Retinal Dystrophy Hereditary Follow Up            Associated symptoms: Negative for glare, eye pain and headache    Associated symptoms: Negative for glare, eye pain and headache    Comments: Seems to see well, is able to recognize staff at group home even in dark room. No photophobia   No concerns today.            Comments    Inf: Caregiver from group home           History was obtained from the following independent historians: caregiver from adult home     Primary care: Racheal Lennon & Tammy DAVALOS Ike is a 27 year old female who presents with:     Consecutive exotropia    (1996) BMR 6 (Dru)   (12/10/14) BLR 11 (Madeline) with subsequent refractory dellen that has resolved  Stable residual exotropia.     Retinal dystrophy, severe OU with cystoid macular edema, left eye    Congenital disorder of glycosylation (CDG)   Extinguished electroretinogram (ERG) 12/10/14.     Would not treat edema, give glasses, or repeat electroretinogram (ERG).   Nystagmus, thankfully, responding to Keppra.    Stable eye exam. Return to clinic as needed of any new concerns. I'd be happy to see Anna every few years to check her eyes.        Return in about 3 years (around 11/28/2025) for DFE, only refract PRN.    Patient Instructions   Anna's eye exam was stable today. No need for glasses, though she is welcome to wear / have blue glasses for style if she likes!        Skyler Correa Jr., MD    Pediatric Ophthalmology &  Strabismus  [facility]  [address], 3rd floor  [address] [place] [address]  Office:  [phone]  Appointments:  [phone]  Fax:  [phone]     [facility] at [address] [address]  Office: [phone]  Appointments: [phone]  Fax: [phone]         Visit Diagnoses & Orders    ICD-10-CM    1. Consecutive exotropia of both eyes  H50.10       2. Retinal dystrophy  H35.50       3. 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  I personally obtained the chief complaint(s) and history of present illness.  I confirmed and edited as necessary the review of systems, past medical/surgical history, family history, social history, and examination findings as documented by others; and I examined the patient myself.  I personally reviewed the relevant tests, images, and reports as documented above.  I formulated and edited as necessary the assessment and plan and discussed the findings and management plan with the patient and family. - Skyler Correa Jr., MD

## 2022-12-07 ENCOUNTER — VIRTUAL VISIT (OUTPATIENT)
Dept: NEUROLOGY | Facility: CLINIC | Age: 27
End: 2022-12-07
Payer: COMMERCIAL

## 2022-12-07 DIAGNOSIS — G40.209 COMPLEX PARTIAL SEIZURE EVOLVING TO GENERALIZED SEIZURE (H): ICD-10-CM

## 2022-12-07 RX ORDER — LEVETIRACETAM 100 MG/ML
SOLUTION ORAL
Qty: 775 ML | Refills: 11 | Status: SHIPPED | OUTPATIENT
Start: 2022-12-07 | End: 2023-06-14

## 2022-12-07 NOTE — PROGRESS NOTES
Anna is a 27 year old who is being evaluated via a billable video visit.    How would you like to obtain your AVS? Mail a copy  If the video visit is dropped, the invitation should be resent by: Text to cell phone: [phone]  Will anyone else be joining your video visit? Yes: staff Kelly.      EPILEPSY CLINIC VIDEO VISIT NOTE         Service Date: 12/07/2022    HISTORY: I spoke with the patient s mother, who was present at [facility], concerning Ms. Sherly Ramires, who is a 26-year-old woman with epilepsy, in the setting of mental retardation and spastic quadriparesis, due to congenital disorder of glycosylation type 1A.  The patient was continuously visible throughout the conversation and limited video examination today.       Following the most recent virtual visit to this clinic on 05/11/2021, the patient reportedly has had no seizures and no apparent adverse effects of levetiracetam.      By report, the most recent seizures occurred a number of years ago, as a GTC seizure in 2014, a complex partial seizure in 2014, and an atonic seizure in early childhood.       No new medical problems were reported by her mother.       MEDICATIONS:  Levetiracetam solution 1000 mg in the morning and 1500 mg in the evening per gastrostomy, and other medications as per the electronic medical record.      PAST MEDICAL-SURGICAL HISTORY:    1.  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     2.  Congenital cervical spine malformations, possibly complicated by early cervical trauma, with spastic quadriparesis; status post occipital-C2 fusion and C1 laminectomy (2003); status post foramen magnum decompression and C1 decompression, with dorsal zkjfosm-A4-Y0 fusion (2003); status post additional  decompression and stabilization procedure (2004).   3.  Status post Burns rojelio procedure for kyphoscoliosis (2007).   4.  Status post gastrostomy tube placement and multiple replacements.   5.  Status post 2 procedures for strabismus.       VIDEO OBSERVATIONS:   The patient was sitting up in a wheelchair.  She appeared continuously alert.  She followed no commands.  She often directed her gaze to her mother.  She moved her arms spontaneously.       IMPRESSION:   No seizures have recurred on the current dose of levetiracetam, with no apparent adverse effects.       No problems have been noted in gastrostomy function.       PLAN:   1)  Continue levetiracetam at the current dose.    2)  Check levetiracetam level.   3)  Return in 6 months for in-person clinic visit.    Video Conference via [employer].   Video Start Time: 12:29 p.m.  Video Stop Time: 12:45 p.m.  Provider location: [facility]   Reported Patient Location: Home     I spent 29 minutes in this patient care, with 16 minutes in direct patient contact, and 13 minutes in chart review and document preparation on the day of the visit.     Nhan Knott M.D., Professor of Neurology

## 2022-12-07 NOTE — LETTER
2022     RE: Sherly Ramires  : 1995   MRN: [id number]      Dear Colleague,    Thank you for referring your patient, Sherly Ramires, to the [facility] at [facility]. Please see a copy of my visit note below.    Anna is a 27 year old who is being evaluated via a billable video visit.    How would you like to obtain your AVS? Mail a copy  If the video visit is dropped, the invitation should be resent by: Text to cell phone: [phone]  Will anyone else be joining your video visit? Yes: [facility] staff.    EPILEPSY CLINIC VIDEO VISIT NOTE         Service Date: 2022    Video Conference via [employer].   Video Start Time: 12:29 p.m.  Video Stop Time: 12:45 p.m.  Provider location: [facility]   Reported Patient Location: Home     I spent 29 minutes in this patient care, with 16 minutes in direct patient contact, and 13 minutes in chart review and document preparation on the day of the visit.     Nhan Knott M.D., Professor of Neurology

## 2022-12-12 ENCOUNTER — OFFICE VISIT (OUTPATIENT)
Dept: URGENT CARE | Facility: URGENT CARE | Age: 27
End: 2022-12-12
Payer: COMMERCIAL

## 2022-12-12 ENCOUNTER — LAB (OUTPATIENT)
Dept: LAB | Facility: CLINIC | Age: 27
End: 2022-12-12
Payer: COMMERCIAL

## 2022-12-12 VITALS
SYSTOLIC BLOOD PRESSURE: 124 MMHG | TEMPERATURE: 98.7 F | HEART RATE: 82 BPM | OXYGEN SATURATION: 97 % | WEIGHT: 71 LBS | BODY MASS INDEX: 15.92 KG/M2 | RESPIRATION RATE: 20 BRPM | DIASTOLIC BLOOD PRESSURE: 86 MMHG

## 2022-12-12 DIAGNOSIS — G40.209 COMPLEX PARTIAL SEIZURE EVOLVING TO GENERALIZED SEIZURE (H): ICD-10-CM

## 2022-12-12 DIAGNOSIS — R10.31 RLQ ABDOMINAL PAIN: Primary | ICD-10-CM

## 2022-12-12 LAB — LEVETIRACETAM SERPL-MCNC: 48.9 ΜG/ML (ref 10–40)

## 2022-12-12 PROCEDURE — 36415 COLL VENOUS BLD VENIPUNCTURE: CPT

## 2022-12-12 PROCEDURE — 80177 DRUG SCRN QUAN LEVETIRACETAM: CPT

## 2022-12-12 PROCEDURE — 99213 OFFICE O/P EST LOW 20 MIN: CPT | Performed by: FAMILY MEDICINE

## 2022-12-12 NOTE — PROGRESS NOTES
"SUBJECTIVE  HPI: Sherly Ramires is a 27 year old female  who presents with the CC of abdominal/pelvic pain.   Pain is located in the RLQ area, with radiation to None    The pain is characterized as \"pain\".    Pain has been present for 1 week(s)   No v/d    Past Medical History:   Diagnosis Date     Anxiety      Global developmental delay      Hypoglycemia      Metabolic disease     congenital disorder of glycosilation     Scoliosis      Seizures (H)     last at age 8 before today     Strabismus      Allergies   Allergen Reactions     Ibuprofen GI Disturbance     Pt is able to have motrin with food but not on an empty stomache.   Other reaction(s): Gastrointestinal     Lorazepam Other (See Comments)     Paradoxical reaction  Paradoxical alertness     Pseudoephedrine      Latex Rash     Social History     Tobacco Use     Smoking status: Never     Smokeless tobacco: Never   Substance Use Topics     Alcohol use: No       ROS:CONSTITUTIONAL:NEGATIVE for fever, chills, change in weight    EXAMINATION:  /86   Pulse 82   Temp 98.7  F (37.1  C) (Tympanic)   Resp 20   Wt 32.2 kg (71 lb)   SpO2 97%   BMI 15.92 kg/m  GENERAL APPEARANCE: healthy, alert and no distress  ABDOMEN: tenderness moderate RLQ      ICD-10-CM    1. RLQ abdominal pain  R10.31         Pt will go through ED foir cont w/u    "

## 2023-01-03 ENCOUNTER — TELEPHONE (OUTPATIENT)
Dept: PEDIATRICS | Facility: CLINIC | Age: 28
End: 2023-01-03

## 2023-01-03 NOTE — TELEPHONE ENCOUNTER
[facility] Call Center    Phone Message    May a detailed message be left on voicemail: yes     Reason for Call: Other: Christiana from [facility] called today and stated that the pt G tube size is on back order. Christiana is wanting to have a back up plan for when they have to change the G tube. Christiana states that they will have to change the G tube this month, she also states that she called the company that provides the pt with the G tube and they directed her to the care team to see if there is an alternative for now. Please call [facility] back **Christiana states that you can speak to anyone in their team**. HP message was requested by Christiana. Thank you.    Action Taken: Other: MET     Travel Screening: Not Applicable

## 2023-01-04 ENCOUNTER — DOCUMENTATION ONLY (OUTPATIENT)
Dept: OTHER | Facility: CLINIC | Age: 28
End: 2023-01-04

## 2023-01-04 NOTE — TELEPHONE ENCOUNTER
Spoke to group home, discussed that Dr. Dubon is not managing [facility] or its supplies. Would recommend reaching out to the ordering provider. If unknown start with PCP or GI that patient is being followed.    Staff verbalized understanding and had no further questions.    Chelsey Brantley BSN, RN, PHN  Genetics and Metabolism  RN Care Coordinator  [facility] [address] [address]. [phone] Fax [phone]

## 2023-02-28 NOTE — ED PROVIDER NOTES
History     Chief Complaint   Patient presents with     Vomiting     Patient is a 22 year old female presenting with vomiting. The history is provided by a parent. The history is limited by the absence of a caregiver and a developmental delay. No  was used.   Vomiting    This is a new problem. The current episode started 3 to 5 hours ago. The problem occurs more than 10 times per day. The problem has been resolved. There has been no fever. Associated symptoms include abdominal pain. Pertinent negatives include no chills, no cough, no diarrhea and no fever. Risk factors include ill contacts.     Sherly is a 22 year old female with a complex medical history of metabolic disease (congenital disorder of glycosylation - see letter), global developmental delay, G tube dependene and seizure disorder who presents from her group home at 9:06 AM with her mother for evaluation of new onset of vomiting. Mother shares that Sherly had complaints of abdominal pain yesterday at the group home and at 3:30am this morning she vomited once every half hour until 7am. Mother is no the primary caregiver and thus is unsure about the color of the vomit. She does not believe Sherly had other associated symptoms. Mother reports that Sherly was hospitalized one year ago for a similar problem and it was the result of a UTI. She had a previous epiosde n may of last year which was similar to this one and she had UTI even when she was afebrile. She does have ESBL colonization.    PMHx:  Past Medical History:   Diagnosis Date     Anxiety      Global developmental delay      Hypoglycemia      Metabolic disease     congenital disorder of glycosilation     Scoliosis      Seizures (H)     last at age 8 before today     Strabismus      Past Surgical History:   Procedure Laterality Date     ANESTHESIA OUT OF OR MRI 3T N/A 3/7/2017    Procedure: ANESTHESIA PEDS SEDATION MRI 3T;  Surgeon: GENERIC ANESTHESIA PROVIDER;  Location:   PEDS SEDATION      BACK SURGERY      c1 decompression and fusionx3, scoliosis with instrumentation x1     ELECTRORETINOGRAM Bilateral 12/10/2014    ERG ([place])     ENT SURGERY       ear tubes     EXAM UNDER ANESTHESIA EYE(S) Bilateral 12/10/2014    EUA ([place])     EXTRACTION(S) DENTAL N/A 12/10/2014    Procedure: EXTRACTION(S) DENTAL;  Surgeon: Hubert York DDS;  Location: [facility] OR     EYE SURGERY Bilateral 1996    BMR 6mm ([place])      GI SURGERY      g tube     HC REPLACE GASTROSTOMY/CECOSTOMY TUBE PERCUTANEOUS N/A 12/10/2014    Procedure: REPLACE GASTROSTOMY TUBE, PERCUTANEOUS;  Surgeon: Phong Perea MD;  Location: [facility] OR     RECESSION RESECTION (REPAIR STRABISMUS) BILATERAL Bilateral 12/10/2014    BLR 11 ([facility]aux)     STRABISMUS SURGERY  1996    (1996) BMR 6 ([place])     STRABISMUS SURGERY  2014    BLR 11 ([place]) -      These were reviewed with the patient/family.    MEDICATIONS were reviewed and are as follows:   No current facility-administered medications for this encounter.      Current Outpatient Prescriptions   Medication     traZODone (DESYREL) 50 MG tablet     levETIRAcetam (KEPPRA) 100 MG/ML solution     sertraline (ZOLOFT) 20 MG/ML (HIGH CONC) solution     D-Mannose POWD     Nutritional Supplements (COMPLEAT PEDIATRIC PO)     UNABLE TO FIND     UNABLE TO FIND     calcium carbonate 1250 (500 CA) MG/5ML SUSP     ondansetron (ZOFRAN) 4 MG/5ML solution     bacitracin ointment     ARIPiprazole (ABILIFY) 1 MG/ML SOLN     acetaminophen (TYLENOL) 160 MG/5ML solution     diazepam (VALIUM) 5 MG/ML solution     ORDER FOR DME     Docusate Sodium (ENEMEEZ MINI RE)       ALLERGIES:  Ativan [lorazepam] and Motrin [ibuprofen]    IMMUNIZATIONS:  UTD by report.    SOCIAL HISTORY: Sherly lives at a group home.     I have reviewed the Medications, Allergies, Past Medical and Surgical History, and Social History in the Epic system.    Review of Systems   Constitutional: Negative for chills and fever.   Eyes:  Negative for redness.   Respiratory: Negative for cough.    Gastrointestinal: Positive for abdominal pain and vomiting. Negative for diarrhea.     Please see HPI for pertinent positives and negatives.  All other systems reviewed and found to be negative.        Physical Exam   Heart Rate: 108  Temp: 99.1  F (37.3  C)  Resp: 24  Weight: 36.3 kg (80 lb)  SpO2: 99 %    Physical Exam   Constitutional: She appears well-nourished.   Alert wheel-chair bound female, non-verbal, smiles and interacts well with caregivers. NAD.    HENT:   Head: Normocephalic and atraumatic.   Right Ear: External ear normal.   Left Ear: External ear normal.   Neck:   Has a soft c-collar to protect   Cardiovascular: Normal rate, regular rhythm and normal heart sounds.    Pulmonary/Chest: Effort normal and breath sounds normal. No respiratory distress. She has no wheezes. She has no rales.   Abdominal: Soft. Bowel sounds are normal. There is tenderness.   but no RLQ tenderness.     ED Course     ED Course     Procedures    Results for orders placed or performed during the hospital encounter of 08/24/17 (from the past 24 hour(s))   UA with Microscopic   Result Value Ref Range    Color Urine Yellow     Appearance Urine Slightly Cloudy     Glucose Urine Negative NEG^Negative mg/dL    Bilirubin Urine Negative NEG^Negative    Ketones Urine 40 (A) NEG^Negative mg/dL    Specific Gravity Urine 1.015 1.003 - 1.035    Blood Urine Negative NEG^Negative    pH Urine 7.5 (H) 5.0 - 7.0 pH    Protein Albumin Urine 10 (A) NEG^Negative mg/dL    Urobilinogen mg/dL Normal 0.0 - 2.0 mg/dL    Nitrite Urine Positive (A) NEG^Negative    Leukocyte Esterase Urine Large (A) NEG^Negative    Source Catheterized Urine     WBC Urine 31 (H) 0 - 2 /HPF    RBC Urine 1 0 - 2 /HPF    Bacteria Urine Moderate (A) NEG^Negative /HPF    Transitional Epi 1 0 - 1 /HPF   CBC with platelets differential   Result Value Ref Range    WBC 5.8 4.0 - 11.0 10e9/L    RBC Count 4.35 3.8 - 5.2  10e12/L    Hemoglobin 13.6 11.7 - 15.7 g/dL    Hematocrit 40.7 35.0 - 47.0 %    MCV 94 78 - 100 fl    MCH 31.3 26.5 - 33.0 pg    MCHC 33.4 31.5 - 36.5 g/dL    RDW 12.4 10.0 - 15.0 %    Platelet Count 114 (L) 150 - 450 10e9/L    Diff Method Automated Method     % Neutrophils 81.9 %    % Lymphocytes 8.5 %    % Monocytes 9.4 %    % Eosinophils 0.0 %    % Basophils 0.0 %    % Immature Granulocytes 0.2 %    Nucleated RBCs 0 0 /100    Absolute Neutrophil 4.7 1.6 - 8.3 10e9/L    Absolute Lymphocytes 0.5 (L) 0.8 - 5.3 10e9/L    Absolute Monocytes 0.5 0.0 - 1.3 10e9/L    Absolute Eosinophils 0.0 0.0 - 0.7 10e9/L    Absolute Basophils 0.0 0.0 - 0.2 10e9/L    Abs Immature Granulocytes 0.0 0 - 0.4 10e9/L    Absolute Nucleated RBC 0.0    Comprehensive metabolic panel   Result Value Ref Range    Sodium 143 133 - 144 mmol/L    Potassium 3.5 3.4 - 5.3 mmol/L    Chloride 109 94 - 109 mmol/L    Carbon Dioxide 25 20 - 32 mmol/L    Anion Gap 9 3 - 14 mmol/L    Glucose 83 70 - 99 mg/dL    Urea Nitrogen 13 7 - 30 mg/dL    Creatinine 0.23 (L) 0.52 - 1.04 mg/dL    GFR Estimate >90 >60 mL/min/1.7m2    GFR Estimate If Black >90 >60 mL/min/1.7m2    Calcium 8.4 (L) 8.5 - 10.1 mg/dL    Bilirubin Total 0.3 0.2 - 1.3 mg/dL    Albumin 3.3 (L) 3.4 - 5.0 g/dL    Protein Total 7.1 6.8 - 8.8 g/dL    Alkaline Phosphatase 88 40 - 150 U/L    ALT 26 0 - 50 U/L    AST 19 0 - 45 U/L   INR   Result Value Ref Range    INR 1.03 0.86 - 1.14   Partial thromboplastin time   Result Value Ref Range    PTT 30 22 - 37 sec   hCG qual urine POCT   Result Value Ref Range    HCG Qual Urine Negative neg    Internal QC OK Yes    Abdomen XR, 2 vw, flat and upright    Narrative    XR ABDOMEN 2 VW  8/24/2017 10:31 AM      HISTORY: vomiting    COMPARISON: 5/25/2016    FINDINGS: AP and left lateral decubitus views of the abdomen. There is  mild diffuse bowel gas distention with air-fluid levels, although  decreased distention from the comparison examination. There is  a  moderate amount of rectal stool. Percutaneous gastrostomy tube  projects over the stomach. The lung bases are clear. Bone findings are  unchanged with chronic left hip dislocation, scoliosis status post  posterior spinal fusion, and diffuse demineralization.      Impression    IMPRESSION: Mild bowel gas distention and air-fluid levels with a  moderate amount of rectal stool. Bowel gas distention has decreased  from 5/25/2016.    CHERRIE OLIVEIRA MD       Medications - No data to display  Fluids were started  Old chart from [facility] reviewed, supported history as above.  Labs reviewed and revealed a UTI as she is positive for nitrite which she had in May of last year as well when she had UTI. She is also colonized with ESBL but this UTI which is similar to last May UTI which is nitrite positive suggesting true UTI so will treat with ceftriaxone. She did have repeat urine cultures last time which were just ESBL.  Patient was attended to immediately upon arrival and assessed for immediate life-threatening conditions. A UA with micro, urine culture, hCG, INR, PTT, CBC w/ platelets, XR and CMP ordered and obtained in the ED. The patient was re-evaluated after 30 minutes and briefed regarding the results of her UA.     Upon reassessment, patient remains stable and appears well hydrated. Ceftriaxone was ordered to be administered IV for treatment of her UTI.     Discussed case with GI and endocrine. Endocrine primary doctor feels comfortable discharging her to the group home. Mother does not feel comfortable with this and would like to stay for observation as she does not believe the oral challenge will be well tolerated by Sherly.     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 Coli comes in today with another episode of UTI causing vomiting. Given her prior history of similar symptoms due to UTI and striking UTI labs, it is my  Complexity (Necessary For Coding; Major - 90 Day Global With Some Exceptions; Minor - 10 Day Global): minor suspicion that a UTI is the cause of her vomiting. She is doing very well at this point , however mother does does not feel comfortable being discharged home as she still is not feeding well and mother worried that she will come right back if discharged now and so the decision was made to admit for observation. Metabolic and GI  comfortable with this plan. No concern for bowel obstruction as X ray shows air in the rectum. No concern for appendicitis as no RLQ tenderness and no fever. G tube seems to be in place on X ray. A report was called to the inpatient team who accepted the patient. All other questions were answered.    I have reviewed the nursing notes.    I have reviewed the findings, diagnosis, plan and need for follow up with the patient.  New Prescriptions    No medications on file       Final diagnoses:   Urinary tract infection, site unspecified   Vomiting    8/24/2017   [facility] EMERGENCY DEPARTMENT    This data collected with the Resident working in the Emergency Department. Patient was seen and evaluated by myself and I repeated the history and physical exam with the patient. The plan of care was discussed with them. The key portions of the note including the entire assessment and plan reflect my documentation. Kj Kwong MD  08/24/17 0526     Discussion: Decision for surgery refers to any surgeries performed today, or discussion of treatment in future.  In general, decision for repair is not made until the final extent of the surgical wound is known. Risk Assessment Explanation (Does Not Render In The Note): Clinical determination of the probability and/or consequences of an event, such as surgery. Clinical assessment of the level of risk is affected by the nature of the event under consideration for the patient. Modifier 57 is used to indicate an Evaluation and Management (E/M) service resulted in the initial decision to perform surgery either the day before a major surgery (90 day global) or the day of a major surgery. Date Of Surgery - Today Or Tomorrow?: today Identified Risk Factors Documented?: yes

## 2023-03-15 ENCOUNTER — OFFICE VISIT (OUTPATIENT)
Dept: PEDIATRICS | Facility: CLINIC | Age: 28
End: 2023-03-15
Attending: PEDIATRICS
Payer: COMMERCIAL

## 2023-03-15 VITALS
SYSTOLIC BLOOD PRESSURE: 102 MMHG | HEART RATE: 96 BPM | BODY MASS INDEX: 15.97 KG/M2 | HEIGHT: 56 IN | DIASTOLIC BLOOD PRESSURE: 66 MMHG | WEIGHT: 71 LBS | RESPIRATION RATE: 24 BRPM

## 2023-03-15 DIAGNOSIS — E74.89 CDG (CONGENITAL DISORDER OF GLYCOSYLATION) (H): Primary | ICD-10-CM

## 2023-03-15 LAB
ALBUMIN SERPL BCG-MCNC: 3.2 G/DL (ref 3.5–5.2)
ALP SERPL-CCNC: 89 U/L (ref 35–104)
ALT SERPL W P-5'-P-CCNC: 19 U/L (ref 10–35)
ANION GAP SERPL CALCULATED.3IONS-SCNC: 9 MMOL/L (ref 7–15)
AST SERPL W P-5'-P-CCNC: 22 U/L (ref 10–35)
BILIRUB SERPL-MCNC: 0.2 MG/DL
BUN SERPL-MCNC: 12.8 MG/DL (ref 6–20)
CALCIUM SERPL-MCNC: 8.7 MG/DL (ref 8.6–10)
CHLORIDE SERPL-SCNC: 101 MMOL/L (ref 98–107)
CORTIS SERPL-MCNC: 4 UG/DL
CREAT SERPL-MCNC: 0.39 MG/DL (ref 0.51–0.95)
DEPRECATED HCO3 PLAS-SCNC: 28 MMOL/L (ref 22–29)
GFR SERPL CREATININE-BSD FRML MDRD: >90 ML/MIN/1.73M2
GLUCOSE SERPL-MCNC: 86 MG/DL (ref 70–99)
HBA1C MFR BLD: 4.5 %
INR PPP: 1.07 (ref 0.85–1.15)
Lab: NORMAL
PERFORMING LABORATORY: NORMAL
POTASSIUM SERPL-SCNC: 3.9 MMOL/L (ref 3.4–5.3)
PROT SERPL-MCNC: 6.4 G/DL (ref 6.4–8.3)
SODIUM SERPL-SCNC: 138 MMOL/L (ref 136–145)
SPECIMEN STATUS: NORMAL
T4 FREE SERPL-MCNC: 0.9 NG/DL (ref 0.9–1.7)
TEST NAME: NORMAL
TSH SERPL DL<=0.005 MIU/L-ACNC: 5.06 UIU/ML (ref 0.3–4.2)

## 2023-03-15 PROCEDURE — 99215 OFFICE O/P EST HI 40 MIN: CPT | Performed by: PEDIATRICS

## 2023-03-15 PROCEDURE — 84080 ASSAY ALKALINE PHOSPHATASES: CPT | Performed by: PEDIATRICS

## 2023-03-15 PROCEDURE — 83937 ASSAY OF OSTEOCALCIN: CPT | Performed by: PEDIATRICS

## 2023-03-15 PROCEDURE — 80053 COMPREHEN METABOLIC PANEL: CPT | Performed by: PEDIATRICS

## 2023-03-15 PROCEDURE — 84999 UNLISTED CHEMISTRY PROCEDURE: CPT | Performed by: PEDIATRICS

## 2023-03-15 PROCEDURE — 82523 COLLAGEN CROSSLINKS: CPT | Performed by: PEDIATRICS

## 2023-03-15 PROCEDURE — 36415 COLL VENOUS BLD VENIPUNCTURE: CPT | Performed by: PEDIATRICS

## 2023-03-15 PROCEDURE — 84439 ASSAY OF FREE THYROXINE: CPT | Performed by: PEDIATRICS

## 2023-03-15 PROCEDURE — G0463 HOSPITAL OUTPT CLINIC VISIT: HCPCS | Performed by: PEDIATRICS

## 2023-03-15 PROCEDURE — 82306 VITAMIN D 25 HYDROXY: CPT | Performed by: PEDIATRICS

## 2023-03-15 PROCEDURE — 84305 ASSAY OF SOMATOMEDIN: CPT | Performed by: PEDIATRICS

## 2023-03-15 PROCEDURE — 82533 TOTAL CORTISOL: CPT | Performed by: PEDIATRICS

## 2023-03-15 PROCEDURE — 83036 HEMOGLOBIN GLYCOSYLATED A1C: CPT | Performed by: PEDIATRICS

## 2023-03-15 PROCEDURE — 82397 CHEMILUMINESCENT ASSAY: CPT | Performed by: PEDIATRICS

## 2023-03-15 PROCEDURE — 82024 ASSAY OF ACTH: CPT | Performed by: PEDIATRICS

## 2023-03-15 PROCEDURE — 84443 ASSAY THYROID STIM HORMONE: CPT | Performed by: PEDIATRICS

## 2023-03-15 PROCEDURE — 85610 PROTHROMBIN TIME: CPT | Performed by: PEDIATRICS

## 2023-03-15 ASSESSMENT — PAIN SCALES - GENERAL: PAINLEVEL: NO PAIN (0)

## 2023-03-15 NOTE — LETTER
3/15/2023      RE: Sherly Ramires  [address] [facility]  [place] [address]     Dear Colleague,    Thank you for the opportunity to participate in the care of your patient, Sherly Ramires, at the [facility] at [facility]. Please see a copy of my visit note below.    Mother and caregiver's questions and concerns:   - Skin continues to be very fragile, scraps herself on the wheelchair  - Wounds take a very long time to heal (healing is very slow, taking longer to heal) - takes months to heal   - Urgent care for skin wound on her left wrist (no abx)   - No bed sores  - Mother is wondering about testing for diabetes. History of hypoglycemic  - Consistent feeds throughout the day and the night  - Low sugars - will swing hypoglycemic with lots of sugars (mother uses the word coma)  - Last A1C was 4.5 on 2/4/16  - 1 meal (250 calories by mouth) per day  - Pediatric complete (250ml mixed with 200ml of water x4-5 during the day and at night she continuous feeds 40ml/hr at night)  - Enemas: Monday, Wednesday, Friday   - Full cap of Miralax per day  - Rare for her to have a bowel movement that is not associated with enemas  - 2 meals in the same day - throwing up, went to 1 meal per day about a year ago and no vomiting  - One of the caregiver noticed that she is developing asymmetrically - breast developing (right side is larger than other)  - New research??   - All medications through G-tube  - Some minor colds in the last few years (got COVID once but it was mild)  - Right sided upper quadrant pain - gallstones ([facility], [place] and saw gallstones, but nothing was done) at the same time was diagnosed by a UTI  - Does not have a history of UTIs  - One of Anna's sister has her gallbladder removed and brother may have gallbladder issues (not diagnosed??)  - Group home care giver wondering about the 0.5mg estradiol        [place]  Pediatric CDG Rating Scale (NPCRS)    21 years old and up        Name:  :  Date of assessment:  Age at assessment:    Section I: Current Function  Rate function during the preceding 4 week period only according to patient and / or caregiver interview. Indicate the score that best fits patient's functional status independently of the nature of the signs.   1. Vision (with usual glasses)   0. Normal. No parental / patient concerns  1. Mild. Inattention to small objects in visual field or refractory error corrected with glasses  2. Moderate. Visual impairment not fully corrected with glasses or inattention to large objects in visual field  3. Severe. Not recognising faces or registered blind or using additional visual aids  2. Hearing  0. Normal  1. Mild. Requires regular repetition / raised voice or not reacting to loud sounds (e.g., clapping)  2. Moderate. Hearing impaired but fully corrected with hearing aid  3. Severe. Poor hearing even with aid  3. Communication (assessed with appropriate regard for age)  0. Normal. Age appropriate communication  1. Mild. Verbal communication impaired. Supplemented by alternative methods (e.g. signing, pointing)  2. Moderate. Meaningful verbal communication with strangers is not possible (irrespective of methods)  3. Severe. Not communicating effectively with parents (irrespective of methods)      4. Feeding  0. Normal  1. Mild. Choking / vomiting / anorexia resulting in reduced intake or adaptation of age appropriate diet  2. Moderate. Supplementary enteral (tube) feeding recommended  3. Severe. Exclusive enteral feeding (gastrostomy / NG tube). Nil by mouth  5. Self-care (personal hygiene, dressing, utensil use e.g. for feeding)  0. Normal. No concerns. Age appropriately reliant on carers / parents  1. Mild. Requires help with some age appropriate tasks   2. Moderate. Requires help with all age appropriate tasks  3. Severe. Reliant on parents with no contribution to self  care  6. Mobility  0. Normal. No concerns. Age appropriate mobility  1. Mild. Difficulty walking up stairs or inclines  2. Moderate. Requires support (stick / frame / callipers) to walk on flat surface  3. Severe. Wheelchair / carrier dependent  7. Educational achievement   0. Normal. Completed secondary school without support.  1. Mild. Completed secondary school with support (e.g. IEP).  2. Moderate. Completed modified curriculum (e.g. life skills)  3. Severe. Unable to complete schooling primarily due to illness      Section II: System Specific Involvement  Rate system specific involvement during the preceding 6 month period only unless otherwise stated in the question.  Scores should be assigned according to patient and / or caregiver interview, clinician's knowledge of the patient and clinical notes.    1. Seizures  0. None  1. Mild. Myoclonic or absence seizures only or 1 generalised tonic-clonic seizure/month  2. Moderate. > 5 generalized tonic-clonic seizures/month or > 20 absence or myoclonic seizures/month  3. Severe. Status epilepticus  2. Encephalopathy  0. None  1. Mild. Single episode of personality change, excessive sleepiness, confusion or disorientation  2. Moderate. Abnormal mood and behaviour or excessive sleepiness  3. Severe. Psychiatric condition or life-threatening encephalopathy or stroke-like episode - requires artificial ventilation  3. Bleeding diathesis or coagulation defects  0. None  1. Mild. Laboratory abnormalities- No therapy needed  2. Moderate. Single episode of thrombosis, excessive bleeding or stroke   3. Severe. Multiple episodes of thrombosis, excessive bleeding or stroke episodes   4. Gastrointestinal  0. Normal  1. Mild. Mild constipation or unexplained vomiting / diarrhoea < 1/week  2. Moderate. Moderate constipation (some relief with laxative treatment) or unexplained vomiting / diarrhoea > 3/week  3. Severe. Severe constipation (no relief with laxative treatment) or  unexplained vomiting / diarrhoea every day or protein losing enteropathy  5. Endocrine  0. Normal  1. Mild. Biochemical evidence of impaired function  2. Moderate. Endocrine failure requiring replacement therapy  3. Severe. Decompensation (e.g. diabetic ketoacidosis, Addisonian crisis)  6. Respiratory  0. Normal  1. Mild. Abnormal respiration not requiring hospitalisation  2. Moderate. Abnormal respiration requiring hospitalisation but not ventilation  3. Severe. Abnormal respiration requiring artificial ventilation  7. Cardiovascular- over preceding 12 months  0. Normal  1. Mild. Asymptomatic ECG change  2. Moderate. Abnormal echocardiogram (e.g. cardiomegaly) or sustained / symptomatic arrhythmia on ECG or chronic pericardial fluid collection  3. Severe. Decompensated cardiomyopathy or requiring pacing device / defibrillator / ablation or chronic pericardial fluid collection requiring therapy  8. Renal  0. Normal  1. Mild. Impaired function but no change in diet or therapy required  2. Moderate. Impaired function requiring restricted protein diet  3. Severe. Failure requiring transplant / dialysis  9. Liver  0. Normal  1. Mild. Mildly impaired Liver Function Tests (LFTs). No symptoms of hepatic failure  2. Moderate. Impaired LFTs with symptoms (e.g. jaundice, oedema)  3. Severe. Failure requiring hospitalisation and / or transplantation  10. Blood  0. Normal  1. Mild. Anaemia or thrombocytopenia only  2. Moderate. Asymptomatic cytopenia  3. Severe. Pancytopenia requiring regular transfusion / transplantation      Section III: Current Clinical Assessment  Rate current status according to the clinician's examination at the time of assessment unless otherwise stated in the question.  1. Growth (ht and weight) over preceding 6 months  0. Normal. Following normal growth trajectory or attained expected adult height given mid-parental heights and normal BMI  1. Mild. Height or weight or both less than 2nd centile but  growing parallel to it or adult height <2 SD from expected, but stable for past 6 months or unanticipated weight loss in adults <5% of body weight  2. Moderate. Height or weight or both crossing one centile or unanticipated weight loss in adults >5% but <10% of body weight  3. Severe. Height or weight or both crossing ? 2 centiles or less than 2nd centile with divergent trajectory or unanticipated weight loss of >10% of body weight  2. Development or change in function over the preceding 6 months                     Score:                        No developmental disability? appropriate?  Y  Score 0  N  Has individual regressed in any area of development?  Y  N  Score 7    Is the individual globally delayed?  N    Y  Score 6  Evidence of delayed motor milestones?    Y  Developmental Progress?  Score 2  Score 1  Evidence of delayed speech, language or hearing?  Y  Developmental Progress?  N  Score 5  N  Y  Y  Evidence of  delay in social development?  Y  Developmental Progress?  Score 2  Score 1  N  Y  Developmental Progress?  Score 2  Score 1  N  Y      3. Vision with usual glasses. Acuity is based on vision in the better eye  0. Normal. Visual acuity better than or equal to 6/12 or normal fixation and tracking   1. Mild. Acuity worse than 6/12 but better than or equal to 6/18 or no fixation on small objects   2. Moderate. Acuity worse than 6/18 but better than or equal to 6/60 or impaired fixation on large, brightly coloured objects  3. Severe. Acuity worse than 6/60 or no response to light or visual threat or unable to finger count      4. Strabismus and Eye Movement  0. Normal  1. Mild. Intermittent strabismus or ptosis or impaired eye movement at extremities  2. Moderate. Intermittent nystagmus at rest or bilateral strabismus  3. Severe. Continuous nystagmus at rest or eye movement paresis  For Items 5-9, if patient unable to ambulate, choose only 1 primary cause of inability to ambulate indicated with  3 . Other  items scored 0-2 as appropriate.    5. Myopathy   0. Normal  1. Mild. Mild symmetrical weakness of hip and / or shoulder girdle only  2. Moderate. Moderate symmetrical weakness (proximal>distal) limiting mobility  3. Severe. Unable to stand up or walk or respiratory compromise primarily due to myopathy  6. Ataxia  0. Normal  1. Mild. Ataxic gait but walks unaided, titubation or mild upper limb dysmetria  2. Moderate. Gait abnormality requiring support (frame / callipers) to walk on flat surface or severe upper limb dysmetria   3. Severe. Unable to make steps or unable to feed primarily due to ataxia  7. Pyramidal  0. Normal  1. Mild. Mild spasticity and/or hyperreflexia allowing unaided ambulation  2. Moderate. Moderate spasticity/hyperreflexia allowing ambulation with aids  3. Severe. Unable to make step seven with aids primarily due to spasticity  8. Extrapyramidal  0. Normal  1. Mild. Episodic dystonia or involuntary movements  2. Moderate. Generalised dystonia, persistent involuntary movements or bilateral extrapyramidal tremor    3. Severe. Unable to walk or feed primarily due to extrapyramidal disorder    9. Neuropathy  0. Normal.  1. Mild. Decreased deep tendon reflexes, no muscle atrophy  2. Moderate. Decreased reflexes or areflexia with distal weakness and distal muscle atrophy, but mobile  3. Severe.  Areflexia, fully reliant on mobility aids primarily due to peripheral neuropathy      Mother and caregiver's questions and concerns:   - Skin continues to be very fragile, scraps herself on the wheelchair  - Wounds take a very long time to heal (healing is very slow, taking longer to heal) - takes months to heal   - Urgent care for skin wound on her left wrist (no abx)   - No bed sores  - Mother is wondering about testing for diabetes. History of hypoglycemic  - Consistent feeds throughout the day and the night  - Low sugars - will swing hypoglycemic with lots of sugars (mother uses the word coma)  - Last A1C  was 4.5 on 16  - 1 meal (250 calories by mouth) per day  - Pediatric complete (250ml mixed with 200ml of water x4-5 during the day and at night she continuous feeds 40ml/hr at night)  - Enemas: Monday, Wednesday, Friday   - Full cap of Miralax per day  - Rare for her to have a bowel movement that is not associated with enemas  - 2 meals in the same day - throwing up, went to 1 meal per day about a year ago and no vomiting  - One of the caregiver noticed that she is developing asymmetrically - breast developing (right side is larger than other)  - New research??   - All medications through G-tube  - Some minor colds in the last few years (got COVID once but it was mild)  - Right sided upper quadrant pain - gallstones ([facility], [place] and saw gallstones, but nothing was done) at the same time was diagnosed by a UTI  - Does not have a history of UTIs  - One of Anna's sister has her gallbladder removed and brother may have gallbladder issues (not diagnosed??)  - Group home care giver wondering about the 0.5mg estradiol        [facility] Pediatric CDG Rating Scale (NPCRS)    21 years old and up        Name:  :  Date of assessment:  Age at assessment:    Section I: Current Function  Rate function during the preceding 4 week period only according to patient and / or caregiver interview. Indicate the score that best fits patient's functional status independently of the nature of the signs.   1. Vision (with usual glasses)   4. Normal. No parental / patient concerns  5. Mild. Inattention to small objects in visual field or refractory error corrected with glasses  6. Moderate. Visual impairment not fully corrected with glasses or inattention to large objects in visual field  7. Severe. Not recognising faces or registered blind or using additional visual aids  2. Hearing  4. Normal  5. Mild. Requires regular repetition / raised voice or not reacting to loud sounds (e.g., clapping)  6. Moderate. Hearing impaired but fully  corrected with hearing aid  7. Severe. Poor hearing even with aid  3. Communication (assessed with appropriate regard for age)  4. Normal. Age appropriate communication  5. Mild. Verbal communication impaired. Supplemented by alternative methods (e.g. signing, pointing)  6. Moderate. Meaningful verbal communication with strangers is not possible (irrespective of methods)  7. Severe. Not communicating effectively with parents (irrespective of methods)      4. Feeding  4. Normal  5. Mild. Choking / vomiting / anorexia resulting in reduced intake or adaptation of age appropriate diet  6. Moderate. Supplementary enteral (tube) feeding recommended  7. Severe. Exclusive enteral feeding (gastrostomy / NG tube). Nil by mouth  5. Self-care (personal hygiene, dressing, utensil use e.g. for feeding)  4. Normal. No concerns. Age appropriately reliant on carers / parents  5. Mild. Requires help with some age appropriate tasks   6. Moderate. Requires help with all age appropriate tasks  7. Severe. Reliant on parents with no contribution to self care  6. Mobility  4. Normal. No concerns. Age appropriate mobility  5. Mild. Difficulty walking up stairs or inclines  6. Moderate. Requires support (stick / frame / callipers) to walk on flat surface  7. Severe. Wheelchair / carrier dependent  7. Educational achievement   4. Normal. Completed secondary school without support.  5. Mild. Completed secondary school with support (e.g. IEP).  6. Moderate. Completed modified curriculum (e.g. life skills)  7. Severe. Unable to complete schooling primarily due to illness      Section II: System Specific Involvement  Rate system specific involvement during the preceding 6 month period only unless otherwise stated in the question.  Scores should be assigned according to patient and / or caregiver interview, clinician's knowledge of the patient and clinical notes.    1. Seizures  4. None  5. Mild. Myoclonic or absence seizures only or 1 generalised  tonic-clonic seizure/month  6. Moderate. > 5 generalized tonic-clonic seizures/month or > 20 absence or myoclonic seizures/month  7. Severe. Status epilepticus  2. Encephalopathy  1. None  4. Mild. Single episode of personality change, excessive sleepiness, confusion or disorientation  5. Moderate. Abnormal mood and behaviour or excessive sleepiness  6. Severe. Psychiatric condition or life-threatening encephalopathy or stroke-like episode - requires artificial ventilation  3. Bleeding diathesis or coagulation defects  4. None  5. Mild. Laboratory abnormalities- No therapy needed  6. Moderate. Single episode of thrombosis, excessive bleeding or stroke   7. Severe. Multiple episodes of thrombosis, excessive bleeding or stroke episodes   4. Gastrointestinal  4. Normal  5. Mild. Mild constipation or unexplained vomiting / diarrhoea < 1/week  6. Moderate. Moderate constipation (some relief with laxative treatment) or unexplained vomiting / diarrhoea > 3/week  7. Severe. Severe constipation (no relief with laxative treatment) or unexplained vomiting / diarrhoea every day or protein losing enteropathy  5. Endocrine  4. Normal  5. Mild. Biochemical evidence of impaired function  6. Moderate. Endocrine failure requiring replacement therapy  7. Severe. Decompensation (e.g. diabetic ketoacidosis, Addisonian crisis)  6. Respiratory  4. Normal  5. Mild. Abnormal respiration not requiring hospitalisation  6. Moderate. Abnormal respiration requiring hospitalisation but not ventilation  7. Severe. Abnormal respiration requiring artificial ventilation  7. Cardiovascular- over preceding 12 months  4. Normal  5. Mild. Asymptomatic ECG change  6. Moderate. Abnormal echocardiogram (e.g. cardiomegaly) or sustained / symptomatic arrhythmia on ECG or chronic pericardial fluid collection  7. Severe. Decompensated cardiomyopathy or requiring pacing device / defibrillator / ablation or chronic pericardial fluid collection requiring  therapy  8. Renal  4. Normal  5. Mild. Impaired function but no change in diet or therapy required  6. Moderate. Impaired function requiring restricted protein diet  7. Severe. Failure requiring transplant / dialysis  9. Liver  4. Normal  5. Mild. Mildly impaired Liver Function Tests (LFTs). No symptoms of hepatic failure  6. Moderate. Impaired LFTs with symptoms (e.g. jaundice, oedema)  7. Severe. Failure requiring hospitalisation and / or transplantation  10. Blood  4. Normal  5. Mild. Anaemia or thrombocytopenia only  6. Moderate. Asymptomatic cytopenia  7. Severe. Pancytopenia requiring regular transfusion / transplantation      Section III: Current Clinical Assessment  Rate current status according to the clinician's examination at the time of assessment unless otherwise stated in the question.  1. Growth (ht and weight) over preceding 6 months  4. Normal. Following normal growth trajectory or attained expected adult height given mid-parental heights and normal BMI  5. Mild. Height or weight or both less than 2nd centile but growing parallel to it or adult height <2 SD from expected, but stable for past 6 months or unanticipated weight loss in adults <5% of body weight  6. Moderate. Height or weight or both crossing one centile or unanticipated weight loss in adults >5% but <10% of body weight  7. Severe. Height or weight or both crossing ? 2 centiles or less than 2nd centile with divergent trajectory or unanticipated weight loss of >10% of body weight      2. Development or change in function over the preceding 6 months                     Score:                        No developmental disability? appropriate?  Y  Score 0  N  Has individual regressed in any area of development?  Y  N  Score 7    Is the individual globally delayed?  N    Y  Score 6  Evidence of delayed motor milestones?    Y  Developmental Progress?  Score 2  Score 1  Evidence of delayed speech, language or hearing?  Y  Developmental  Progress?  N  Score 5  N  Y  Y  Evidence of  delay in social development?  Y  Developmental Progress?  Score 2  Score 1  N  Y  Developmental Progress?  Score 2  Score 1  N  Y      3. Vision with usual glasses. Acuity is based on vision in the better eye  4. Normal. Visual acuity better than or equal to 6/12 or normal fixation and tracking   5. Mild. Acuity worse than 6/12 but better than or equal to 6/18 or no fixation on small objects   6. Moderate. Acuity worse than 6/18 but better than or equal to 6/60 or impaired fixation on large, brightly coloured objects  7. Severe. Acuity worse than 6/60 or no response to light or visual threat or unable to finger count      4. Strabismus and Eye Movement  4. Normal  5. Mild. Intermittent strabismus or ptosis or impaired eye movement at extremities  6. Moderate. Intermittent nystagmus at rest or bilateral strabismus  7. Severe. Continuous nystagmus at rest or eye movement paresis  For Items 5-9, if patient unable to ambulate, choose only 1 primary cause of inability to ambulate indicated with  3 . Other items scored 0-2 as appropriate.    5. Myopathy   4. Normal  5. Mild. Mild symmetrical weakness of hip and / or shoulder girdle only  6. Moderate. Moderate symmetrical weakness (proximal>distal) limiting mobility  7. Severe. Unable to stand up or walk or respiratory compromise primarily due to myopathy  6. Ataxia  4. Normal  5. Mild. Ataxic gait but walks unaided, titubation or mild upper limb dysmetria  6. Moderate. Gait abnormality requiring support (frame / callipers) to walk on flat surface or severe upper limb dysmetria   7. Severe. Unable to make steps or unable to feed primarily due to ataxia  7. Pyramidal  4. Normal  5. Mild. Mild spasticity and/or hyperreflexia allowing unaided ambulation  6. Moderate. Moderate spasticity/hyperreflexia allowing ambulation with aids  7. Severe. Unable to make step seven with aids primarily due to spasticity  8.  Extrapyramidal  4. Normal  5. Mild. Episodic dystonia or involuntary movements  6. Moderate. Generalised dystonia, persistent involuntary movements or bilateral extrapyramidal tremor    7. Severe. Unable to walk or feed primarily due to extrapyramidal disorder    9. Neuropathy  4. Normal.  5. Mild. Decreased deep tendon reflexes, no muscle atrophy  6. Moderate. Decreased reflexes or areflexia with distal weakness and distal muscle atrophy, but mobile  7. Severe.  Areflexia, fully reliant on mobility aids primarily due to peripheral neuropathy        Please do not hesitate to contact me if you have any questions/concerns.     Sincerely,       Sarah Dubon MD

## 2023-03-15 NOTE — NURSING NOTE
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     Vitals:    03/15/23 1153   BP: 102/66   BP Location: Left arm   Patient Position: Chair   Pulse: 96   Resp: 24   Weight: 71 lb (32.2 kg)   Height: 4' 7.98\" (142.2 cm)   HC: 52.5 cm (20.67\")           Sharron Garcia M.A.    March 15, 2023  "

## 2023-03-15 NOTE — PROGRESS NOTES
"Pediatric Endocrinology Follow-up Consultation    Patient: Sherly Ramires MRN# [id number]   YOB: 1995 Age: 28year 0month old   Date of Visit: Mar 15, 2023    Dear  Referred Self:    I had the pleasure of seeing your patient, Sherly Ramires in the Pediatric Endocrinology Clinic, [facility], on Mar 15, 2023 for a follow-up consultation of congenital disorder of glycosylation.           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     History was obtained from Anna's mother, Julianne, and Leticia from Anna's group home.     Anna was last seen in clinic on 6/3/21. Since that visit she has been doing well overall. Her mother and Leticia have some concerns regarding Anna's delayed wound " healing and possible hyperglycemia as well as continued concerns regarding Anna's slow gastric motility and severe constipation.     Anna's mother and Leticia note that Anna has always had slow wound healing, but they have recently noticed that it seems to take even longer for her skin wounds to heal. Anna often bumps her hands and arms on her wheelchair and will get minor cuts and scraps. Anna does pick at her wounds, but it still seems to take months for them to fully heal. She was recently seen in urgent care for a skin wound on her left wrist and there was concern that there was a red line extending up her arm. She was prescribed antibiotics in urgent care for possible cellulitis, but by the next day the wound was looking significantly better without antibiotics and so the antibiotics were never given. Anna does not have pressure sores on her legs, buttock, or hips. Her mother is wondering if Anna could possibly have developed hyperglycemia/diabetes and this is why it is taking so long for her wounds to heal. In the past, Anna has had issues with hypoglycemia when she consumes foods with high sugar content. Her mother references one instance, in which Anna had been fasting for a medical procedure and when she restarted her G-tube feeds at a high rate she developed significant hypoglycemia and went into a coma. Her last hemoglobin A1C in our system was on 2/4/16 and it was 4.5.    Another concern that her mother and Leticia have regarding Anna's health is her slow digestion. They state that Anna has always had slow digestion, but it has slowed even more in the past year. She is currently eating one meal per day (~250 calories) by mouth and is getting the rest of her nutrition through G-tube feeds. She take 4 G-tubes feeds during the day on the days she has an oral feed and 5 G-tube feeds per day if she has nothing by mouth. Her feeds run continuously at 40ml/hr overnight. Her current formula is Pediatric  Massiel. She previously could tolerate 2 meals by mouth each day, but now she throws up if she has more than about 250 calories by mouth each day. Her mother and Leticia are wondering why it is that Anna's gastric motility is so slow and if there is anything that can be done to improve her digestion.     Additionally, Anna has severe constipation. She is currently taking one full cap of Miralax per day via her G-tube and receives an enema on Monday, Wednesday, and Friday. She almost always has a bowel movement following an enema, but rarely has a spontaneous bowel movement without an enema.     Anna's caregivers at the group home have also had concerns regarding Anna having asymmetric breast development. Per her caregivers, Anna's right breast is larger than her left breast. Leticia is also wondering about Anna's current dose of estradiol being at 0.5mg as it was previously at 1.0mg. They are wondering if this was a mistake with the most recent refill of her prescription.     A few months ago, Anna was in the [facility] ER for right sided abdominal pain. On ultrasound there were gallstones visulized in the gallbladder but there was no signs of inflammation or obstruction. She was found to have a UTI at that time and was discharged with a course of antibiotics. She does not frequently get UTIs. She has not had any further episodes of right sided abdominal pain. This has been her only emergency room visit since her last clinic visit and overall she has been doing well. She has had a few minor cold, and she had COVID once but was relatively asymptomatic.     Lastly, Anna's mother is curious to learn about any new research in congenital disorder of glycosylation.           Social History:     Social History     Social History Narrative    Anna currently lives in a group home.       Social history was reviewed and is unchanged. Refer to the initial note.         Family History:     Family History   Problem Relation Age  of Onset     Glaucoma Mother         no medications or surg to date     Glaucoma Maternal Grandmother         s/p surg, decreased VA      Amblyopia No family hx of      Strabismus No family hx of        Family history was reviewed and is unchanged. Refer to the initial note.         Allergies:     Allergies   Allergen Reactions     Ibuprofen GI Disturbance     Pt is able to have motrin with food but not on an empty stomache.   Other reaction(s): Gastrointestinal     Lorazepam Other (See Comments)     Paradoxical reaction  Paradoxical alertness     Pseudoephedrine      Latex Rash             Medications:     Current Outpatient Medications   Medication Sig Dispense Refill     acetaminophen (TYLENOL) 160 MG/5ML solution 480 mg by Per G Tube route every 4 hours as needed for fever or mild pain        ARIPiprazole (ABILIFY) 1 MG/ML SOLN 7.5 mg by Per G Tube route daily        artificial saliva (BIOTENE DRY MOUTHWASH) LIQD liquid Swish and spit in mouth 2 times daily       bacitracin ointment Apply topically 2 times daily as needed for wound care 14 g 0     calcium carbonate 1250 (500 CA) MG/5ML SUSP Take 6 mLs (1,500 mg) by mouth 2 times daily (with meals) (Patient taking differently: 1,500 mg by Gastronomy tube route 2 times daily (with meals)) 450 mL 12     COMPOUNDED NON-CONTROLLED SUBSTANCE (CMPD RX) - PHARMACY TO MIX COMPOUNDED MEDICATION Take 2.5mls (100mg) progesterone sublingual every day. 80 mL 11     D-Mannose POWD 1 Scoop by Per G Tube route 2 times daily mixed with water       diazepam (VALIUM) 5 MG/ML solution 7.5 mg by Gastronomy tube route every hour as needed for seizures (Seizures lasting longer than 5 minutes)        Docusate Sodium (ENEMEEZ MINI RE) Place 1 enema rectally daily as needed (if no bowel movement by day 2)        estradiol (ESTRACE) 0.5 MG tablet 0.5 mg daily       estradiol (ESTRACE) 1 MG tablet Take 1 mg daily. 30 tablet 2     hydrOXYzine (ATARAX) 10 MG/5ML syrup 10 mg by Per G Tube  route 2 times daily as needed for anxiety (agitation)        levETIRAcetam (KEPPRA) 100 MG/ML solution GIVE 10ML (1000MG) PER G-TUBE EVERY MORNING;GIVE 15ML (1500MG) PER G-TUBE EVERY EVENING *ORDER WHEN LOW* 775 mL 11     loratadine (CLARITIN) 10 MG tablet 10 mg by Per G Tube route daily        Magnesium Citrate 100 MG TABS 300 mg by Per G Tube route daily       Nutritional Supplements (COMPLEAT PEDIATRIC PO)        ondansetron (ZOFRAN) 4 MG/5ML solution Take 5 mLs (4 mg) by mouth every 6 hours as needed for nausea or vomiting (Patient taking differently: 4 mg by Per G Tube route every 6 hours as needed for nausea or vomiting) 90 mL 0     ORDER FOR DME Equipment being ordered: Other: chest strap for her tilt in space manual wheel chair  Treatment Diagnosis: For safe transportation to home secondary to poor sitting balance in upright. CDG type Ia, seizures, and G-tube dependence. 1 Device 0     oxybutynin (DITROPAN) 5 MG/5ML syrup        polyethylene glycol (MIRALAX/GLYCOLAX) packet 1 packet by Per G Tube route daily 8.5g daily       progesterone (PROMETRIUM) 100 MG capsule Take 1 capsule (100 mg) by mouth daily 100 mg to take sublingually  daily. 30 capsule 11     progesterone (PROMETRIUM) 100 MG capsule Take 1 capsule (100 mg) by mouth daily 30 capsule 11     saccharomyces boulardii (FLORASTOR) 250 MG capsule Take 250 mg by mouth daily       sertraline (ZOLOFT) 20 MG/ML (HIGH CONC) solution 200 mg by Gastronomy tube route daily        traZODone (DESYREL) 50 MG tablet Crush one tablet and administer via G-tube 30 minutes prior to bedtime daily       UNABLE TO FIND MEDICATION NAME: Rescue Remedy Herbal Supplement - Used twice daily as needed.               Review of Systems:   Gen: Negative  Eye: Negative  ENT: Negative  Pulmonary:  Negative  Cardio: Negative  Gastrointestinal: Negative  Hematologic: Negative  Genitourinary: Negative  Musculoskeletal: Negative  Psychiatric: Negative  Neurologic: Negative  Skin:  "Negative  Endocrine: see HPI.            Physical Exam:   Blood pressure 102/66, pulse 96, resp. rate 24, height 1.422 m (4' 7.98\"), weight 32.2 kg (71 lb), head circumference 52.5 cm (20.67\"), not currently breastfeeding.  Growth percentile SmartLinks can only be used for patients less than 20 years old.  Height: 142.2 cm  (55.98\") Facility age limit for growth percentiles is 20 years.  Weight: 32.2 kg (actual weight), Facility age limit for growth percentiles is 20 years.  BMI: Body mass index is 15.93 kg/m . Facility age limit for growth percentiles is 20 years.        Constitutional: awake, alert, cooperative, no apparent distress  Eyes: Lids and lashes normal, sclera clear, conjunctiva normal  ENT: Normocephalic, without obvious abnormality, external ears without lesions,   Neck: Supple, symmetrical, trachea midline, thyroid symmetric, not enlarged and no tenderness  Hematologic / Lymphatic: no cervical lymphadenopathy  Lungs: No increased work of breathing, clear to auscultation bilaterally with good air entry.  Cardiovascular: Regular rate and rhythm, no murmurs.  Abdomen: No scars, normal bowel sounds, soft, non-distended, non-tender, no masses palpated, no hepatosplenomegaly  Musculoskeletal: There is no redness, warmth, or swelling of the joints.    Neurologic: Awake, alert, oriented to name, place and time.  Neuropsychiatric: normal  Skin: no lesions        Laboratory results:     Component      Latest Ref [facility] 3/15/2023  2:18 PM   Sodium      136 - 145 mmol/L 138    Potassium      3.4 - 5.3 mmol/L 3.9    Chloride      98 - 107 mmol/L 101    Carbon Dioxide (CO2)      22 - 29 mmol/L 28    Anion Gap      7 - 15 mmol/L 9    Urea Nitrogen      6.0 - 20.0 mg/dL 12.8    Creatinine      0.51 - 0.95 mg/dL 0.39 (L)    Calcium      8.6 - 10.0 mg/dL 8.7    Glucose      70 - 99 mg/dL 86    Alkaline Phosphatase      35 - 104 U/L 89    AST      10 - 35 U/L 22    ALT      10 - 35 U/L 19    Protein Total      6.4 - 8.3 " g/dL 6.4    Albumin      3.5 - 5.2 g/dL 3.2 (L)    Bilirubin Total      <=1.2 mg/dL 0.2    GFR Estimate      >60 mL/min/1.73m2 >90    See Scanned Result Specimen received. Reordered and sent to performing laboratory. Report to follow up on completion.    Performing Laboratory [email or web address]    Test Name C-Telopeptide, Beta-Cross-Linked, Serum    Test Code [id number]    25 OH Vit D2      ug/L <5    25 OH Vit D3      ug/L 57    25 OH Vit D total      20 - 75 ug/L <62    Insulin Growth Factor 1 (External)      63 - 373 ng/mL 102    Insulin Growth Factor I SD Score (External)      -2.0-+.2.0 SD  -1.1    IGF Binding Protein3      3.5 - 7.6 ug/mL 3.9    IGF Binding Protein 3 SD Score -1.7    Cortisol Serum        ug/dL 4.0    Bone Spec Alk Phosphatase      ug/L 19.5    Osteocalcin by ECIA      8 - 36 ng/mL 10    T4 Free      0.90 - 1.70 ng/dL 0.90    TSH      0.30 - 4.20 uIU/mL 5.06 (H)    INR      0.85 - 1.15  1.07    Hemoglobin A1C      <5.7 % 4.5    Adrenal Corticotropin      <47 pg/mL 37    Miscellaneous Test SEE NOTE       Legend:  (L) Low  (H) High           Assessment and Plan:        Orders Placed This Encounter   Procedures     Cortisol     Bone specific alk phosphatase     Osteocalcin     Comprehensive metabolic panel     T4 free     TSH     INR     Insulin-Like Growth Factor 1 Ped     Hemoglobin A1c     Adrenal corticotropin     [facility] PunchTab; [id number] (Laboratory Miscellaneous Order)     INR     Vitamin D2 + D3, 25 Hydroxy     IGFBP3     [id number]: [facility] Miscellaneous Test     I have reviewed patient's past medical history, family history, social history, medications and allergies as documented in the electronic medical record.  There were no additional findings except as noted.     I spent 40 minutes of total time, before, during, and after the visit reviewing and interpreting previous labs and records, examining the patient, formulating and discussing the plan of care, ordering  Labs, reviewing resulted labs,  and documenting clinical information in the electronic health record.    It is our pleasure to be involved in Sherly Ramires care. If you or the family has questions or concerns regarding these test results, please feel free to contact us via our Access Center at [phone].       Sincerely,       Sarah Dubon MD     Dept. of Pediatrics - Divisions of Endocrinology and Genetics & Metabolism  Dept. of Experimental & Clinical Pharmacology  [address], [place] [address], [place] [address]  Ph: [phone]  Email: [email or web address]               CC  Patient Care Team:  Leeanne Mustafa as PCP - General (Family Practice)  Ana Granda MD as MD (Pediatric Metabolism)  Barrett Lennon MD as MD (Pediatrics)  Skyler Correa MD (Ophthalmology)  Sherrie Fontana MD as MD (Pediatrics)  Ancelmo Smith MD as Referring Physician (Neurology)  Nhan Knott MD as MD (Neurology)  Nhan Knott MD as Assigned Neuroscience Provider  Alejandra Hoskins MD as Assigned Cancer Care Provider  Skyler Correa MD as Assigned Surgical Provider  SELF, REFERRED    Copy to patient  DELIO VASQUES (CO GUARIDAN-MUST ACT JOINTLY) RYAN RAMIRES (CO GUARDIAN-MUST ACT JOINTLY)  [address] [facility] [address]

## 2023-03-15 NOTE — PATIENT INSTRUCTIONS
"Pediatric Metabolism/PKU Clinic  [facility]  Pediatric Specialty Clinic ([facility])      Formula   We did not make any changes to your formula today.   We will review the lab results and call you with our recommendations.  *Do not make any formula changes without first speaking to your dietician or doctor.       Medications   We did not make any changes to your medications today. We will review the lab results and call you with our recommendations.  *Do not make any medication changes without first speaking to your doctor.  **Please contact us at least one week in advance during regular business hours if you are about to run out of formula or medication       Emergency & Sick Calls   Keep your emergency letter with your child at all times  (at their school, in your vehicles, purse/bag and home, etc).  Consider making a medical alert bracelet.    If your child is unresponsive or has other life threatening medical emergency YOU SHOULD CALL 911.     If your child is NOT ACTING NORMALLY such as: confused or sleepier than normal, having nausea or vomiting, not tolerating their formula or medications, breathing faster than normal, has a fever, diarrhea, or other parental concern CALL US IMMEDIATELY.     Call [phone] and ask the  to \"page Genetic Metabolic Physician on-call\"   If no one calls you back within 15 minutes call again.        Helpful Numbers   To schedule appointments:  Pediatric Call Center for [facility]: [phone]  Neuropsychology [phone]   Radiology/ Imaging/ Echocardiogram: [phone]   Services:   [phone]     For questions about medications/ supplies or non-urgent medical concerns:        Chelsey REYES, RN, PHN  Nurse Care Coordinator               Ph: [phone]        Caroline Espinoza APRN, CNP             Ph: [phone]    For questions about your child's nutrition:  Michelle Ortiz RD  Ph: [phone]    For " questions about genetic counseling:                    If you have not already done so consider signing up for [employer] by speaking with the person at the  on your way out or go to [email or web address] to sign up online.      You should receive a phone call about your next appointment. If you do not receive this within two weeks of your visit, please call [phone].

## 2023-03-16 LAB
ACTH PLAS-MCNC: 37 PG/ML
ALP BONE SERPL-MCNC: 19.5 UG/L
IGF BINDING PROTEIN 3 SD SCORE: -1.7
IGF BP3 SERPL-MCNC: 3.9 UG/ML (ref 3.5–7.6)
OSTEOCALCIN SERPL-MCNC: 10 NG/ML

## 2023-03-17 LAB
DEPRECATED CALCIDIOL+CALCIFEROL SERPL-MC: <62 UG/L (ref 20–75)
MISCELLANEOUS TEST 1 (ARUP): NORMAL
VITAMIN D2 SERPL-MCNC: <5 UG/L
VITAMIN D3 SERPL-MCNC: 57 UG/L

## 2023-03-24 LAB
INSULIN GROWTH FACTOR 1 (EXTERNAL): 102 NG/ML (ref 63–373)
INSULIN GROWTH FACTOR I SD SCORE (EXTERNAL): -1.1

## 2023-04-02 ENCOUNTER — HEALTH MAINTENANCE LETTER (OUTPATIENT)
Age: 28
End: 2023-04-02

## 2023-06-14 ENCOUNTER — OFFICE VISIT (OUTPATIENT)
Dept: NEUROLOGY | Facility: CLINIC | Age: 28
End: 2023-06-14
Payer: COMMERCIAL

## 2023-06-14 VITALS — HEIGHT: 56 IN | WEIGHT: 71 LBS | TEMPERATURE: 97 F | BODY MASS INDEX: 15.97 KG/M2

## 2023-06-14 DIAGNOSIS — G40.209 COMPLEX PARTIAL SEIZURE EVOLVING TO GENERALIZED SEIZURE (H): Primary | ICD-10-CM

## 2023-06-14 RX ORDER — LEVETIRACETAM 100 MG/ML
SOLUTION ORAL
Qty: 600 ML | Refills: 11 | Status: SHIPPED | OUTPATIENT
Start: 2023-06-14 | End: 2024-05-01

## 2023-06-14 NOTE — LETTER
2023       RE: Sherly Ramires  : 1995   MRN: [id number]        Dear Colleague,    Thank you for referring your patient, Sherly Ramires, to the [facility] EPILEPSY CARE at [facility]. Please see a copy of my visit note below.      [facility] Epilepsy Clinic:  RETURN VISIT          Service Date: 2023     I spent 34 minutes in this patient care, with 20 minutes in direct patient contact, and 14 minutes in chart review and document preparation on the day of the visit.         Again, thank you for allowing me to participate in the care of your patient.      Sincerely,    Nhan Knott MD

## 2023-06-15 NOTE — PROGRESS NOTES
[facility]ilepsy [facility]:  RETURN VISIT          Service Date: 06/14/2023     HISTORY:  Ms. 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      Following the most recent virtual visit to this clinic on 12/07/2022, the patient reportedly has had no seizures and no apparent adverse effects of levetiracetam.       By report, the most recent seizures occurred a number of years ago, as a GTC seizure in 2014, a complex partial seizure in 2014, and an atonic seizure in early childhood.       No new medical problems were reported by her mother.       Ictal semiology-history:   The patient has had essentially 3 types of seizures in her lifetime, which have been responsive to levetiracetam in the case of the most recent seizures.  These essentially occurred as generalized atonic seizures in early childhood and later as complex partial seizures and generalized tonic-clonic seizures.      The patient had generalized atonic seizures which occurred approximately 12 times, usually in association with a febrile illness or other physiological stressors, between the ages of 3 and 6 years.  Her mother was the primary witnesses of these events and noted that the patient suddenly became unresponsive, usually while lying in her crib and when she moves the patient's limbs, she noted generalized flaccidity.  She was told that these were generalized atonic seizures and the patient was given rectal Diastat to prevent a cluster of these seizures.  She did not receive chronic antiepileptic drug therapy at this time.  The patient had essentially no seizures that were definitely witnessed between the ages of 6 and 16 years.      At 16 years of age, the patient had her first generalized tonic-clonic seizure witnessed by her mother.  She had 3 of these on the day of onset, a single  grand mal seizure later that year and then a single grand mal seizure about 1 year after the first seizures.  All of these were associated with sleep deprivation.  Her mother witnessed her to suddenly extend her limbs stiffly with truncal extension followed by generalized rhythmic jerking for a minute or 2.  She did not have tongue biting.      It appears that over several years after the grand mal seizures began, there was a question with various care providers of brief staring spells.  Her mother witnessed a definite staring spell with essentially no movement and no responsiveness, but with diffuse stiffness for a minute or 2.  This occurred in , in association with sleep deprivation.  Subsequently, no one has seen any definite staring spell seizures.      The patient reportedly has no other type of paroxysmal behavioral alterations.     Epilepsy-seizure predispositions:   With early developmental delay, the patient was evaluated and eventually diagnosed with congenital disorder of glycosylation type 1.  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  She has had slow language development and has learned to communicate by producing single linguistic sound sequences which are not complete intelligible words, but are accompanied by gesturing to supplement the vocalization.  In this manner the patient is able to make a number of requests of people who know her well.  Her mother thinks that she has much greater comprehension of speech compared with her own speech production.      The patient has no family history of epilepsy or seizures.  She has no history of gestational or  injury, febrile convulsions, developmental delay, stroke, meningitis, encephalitis, significant head injury, or other epileptic predispositions than CDG type 1a.     Laboratory evaluations:   The patient has had brain imaging and  electroencephalography at a number of different clinics in [facility] and [place].  The most recent brain MRI was performed at [facility] on 06/07/2013, and this was reported to show deformities at the craniocervical junction consistent with reported surgical procedures, marked hypoplasia of the cerebellar vermis and cerebellar hemispheres (unchanged) and myelomalacia of adjacent portions of the superior cervical spinal cord, with limited T2 signal alterations in subcortical white matter.      Notes from her earlier pediatric neurologist, Dr. Gus Ny indicate that she had definite right frontal lobe interictal spikes on electroencephalograms in 2003 and 2004.     Epilepsy therapeutics:   The patient was treated with levetiracetam following her first grand mal seizure at 16 years of age.  She has continued on this since then, with upward dose adjustments a number of years ago.  She has not been noted to have adverse effects of levetiracetam.  She has not been on other chronic antiepileptic drugs therapies.     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  Her mother thinks that she was twice accidentally dropped or fell a small distance when she was about 6 years of age, once sliding often an adaptive tricycle onto the floor and once collapsing down out of the hands of a person who was holding her in an erect position.  It was at around this time that the patient was noted to have bilateral leg weakness, which progressed at an early age, leaving her with a spastic quadriplegia involving legs much more than arms.  Now over the last 6 months she appears to have had progression in arm weakness, however, probably involving the left arm more than the right arm.  She appears clearly weaker in her assistance with transfers, although she is able to readily manipulate light objects  such that she can feed herself and use coloring crayons.  With this myelopathy, she has had complete urinary and fecal incontinence, although possibly these problems began before other manifestations of myelopathy.  She seems to have some feeling in her feet.  Her mother is planning to pursue evaluation to determine whether further cervical spine surgery is indicated.     PAST MEDICAL-SURGICAL HISTORY:    1.  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     2.  Congenital cervical spine malformations, possibly complicated by early cervical trauma, with spastic quadriparesis; status post occipital-C2 fusion and C1 laminectomy (2003); status post foramen magnum decompression and C1 decompression, with dorsal [id number] fusion (2003); status post additional decompression and stabilization procedure (2004).   3.  Status post Burns rojelio procedure for kyphoscoliosis (2007).   4.  Status post gastrostomy tube placement and multiple replacements.   5.  Status post 2 procedures for strabismus.     FAMILY HISTORY:  There is no family history of epilepsy, seizures or other neurological conditions than migraine headaches in both parents.     PERSONAL AND SOCIAL HISTORY:  The patient lived with her parents and siblings for most of her life and had special education classes for much of this time.  More recently she moved into a specialized group home with 3 other highly disabled clients and appears to be doing well there.  She reportedly is able to swallow safely based on repeated swallowing studies, but has very little appetite, so receives most of her nutrition through the gastrostomy tube.  She does assist with some with activities of daily living.      REVIEW OF SYSTEMS:  The patient reportedly has not been evaluated  for peripheral neuropathy.   She has no history of rashes and easy bruising.  The remainder of a 14-system symptom review was negative or unobtainable, except as noted above.       MEDICATIONS:  Levetiracetam solution 1000 mg in the morning and 1500 mg in the evening per gastrostomy, and other medications as per the electronic medical record.     PHYSICAL EXAMINATION:    The patient was sitting up in a wheelchair. Alert, tracks. Does not follow commands. Communicates via single words. Skull and cervical deformities consistent with surgeries. Wearing soft collar. Pupils equal, round. BLE antigravity. Face symmetric. DTRs 2+ symmetric in biceps, brachioradialis, patellar, and Achilles. Appeared somewhat uncomfortable with examination.    IMPRESSION:    The patient continues to be free of seizures on levetiracetam monotherapy.      On 04/19/2017, she had an outpatient 3-hour video-EEG recording at [facility], which showed ongoing generalized theta-delta slowing, with frequent brief bursts of high amplitude bifrontocentral delta slowing, but no epileptiform abnormalities.  She was reported to have had right frontal spikes on prior interictal EEG recordings.      The levetiracetam level was 38.0 mcg per mL in 2017.     Her weakness is reportedly stable. She has previously been seen again by surgeon in [place] who recommended soft collar without further intervention.    PLAN:    1.  Continue levetiracetam at the current dose.   2.  Return visit in approximately 10 months.     I spent 34 minutes in this patient care, with 20 minutes in direct patient contact, and 14 minutes in chart review and document preparation on the day of the visit.       Nhan Knott M.D.   Professor of Neurology

## 2023-08-10 ENCOUNTER — TELEPHONE (OUTPATIENT)
Dept: NEUROLOGY | Facility: CLINIC | Age: 28
End: 2023-08-10

## 2023-08-10 NOTE — TELEPHONE ENCOUNTER
Prior Authorization Retail Medication Request  levETIRAcetam (KEPPRA) 100 MG/ML oral solution  Medication/Dose:    ICD code (if different than what is on RX):    Previously Tried and Failed:    Rationale:      Insurance Name:    Insurance ID:        Pharmacy Information (if different than what is on RX)  Name:    Phone:

## 2023-08-16 NOTE — TELEPHONE ENCOUNTER
PA Initiation    Medication: LEVETIRACETAM 100 MG/ML PO SOLN  Insurance Company: [employer] ([facility]) - Phone [phone] Fax [phone]  Pharmacy Filling the Rx: [facility]. - [place], [place] - [address]  Filling Pharmacy Phone: [phone]  Filling Pharmacy Fax: [phone]  Start Date: 8/16/2023

## 2023-08-16 NOTE — TELEPHONE ENCOUNTER
Prior Authorization Not Needed per Insurance    Medication: LEVETIRACETAM 100 MG/ML PO SOLN  Insurance Company: [employer] ([facility]) - Phone [phone] Fax [phone]  Expected CoPay:      Pharmacy Filling the Rx: [facility] - [place], [place] - [address]  Pharmacy Notified: Yes  Patient Notified: Yes **Instructed pharmacy to notify patient when script is ready to /ship.**

## 2023-10-18 NOTE — MR AVS SNAPSHOT
After Visit Summary   10/25/2017    Sherly Ramires    MRN: [id number]           Patient Information     Date Of Birth          1995        Visit Information        Provider Department      10/25/2017 1:00 PM Nhan [facility]nott [facility]        Today's Diagnoses     Complex partial seizure evolving to generalized seizure (H)           Follow-ups after your visit        Follow-up notes from your care team     Return in about 6 months (around 4/25/2018).      Your next 10 appointments already scheduled     Nov 08, 2017  9:30 AM CST   Return Pediatric Visit with Skyler Correa MD   [facility] Peds Eye General ([facility])    [address]  [place] [address] [facility] [address]   [phone]            Nov 20, 2017  2:00 PM CST   Return Visit with Sherrie Fontana MD   Peds Integrative Health ([facility])    [facility]  [facility]  [address] [address] [phone]            Feb 01, 2018 11:15 AM CST   Return Visit with Barrett Lennon MD   Pediatric Endocrinology ([facility])    [facility]  [address]  [address] [address] [phone]            Apr 25, 2018 10:00 AM CDT   Return Visit with Nhan Knott MD   [facility] ([facility])    [address]  [facility] [address] [phone]            May 23, 2018 11:30 AM CDT   Return Metabolic Visit with Sarah Dubon MD   Peds Metabolism ([facility])    [facility]  [address]  [address] [facility] [address] [phone]              Who to contact     Please call your clinic at [phone] to:    Ask questions about your health    Make or cancel appointments    Discuss your medicines    Learn about your test results    Speak to your doctor   If you have compliments or concerns about an experience at your clinic, or if you wish to file  a complaint, please contact [facility] Patient Relations at [phone] or email us at [email or web address]         Additional Information About Your Visit        [employer] Information     [employer] is an electronic gateway that provides easy, online access to your medical records. With [employer], you can request a clinic appointment, read your test results, renew a prescription or communicate with your care team.     To sign up for [employer] visit the website at [email or web address]   You will be asked to enter the access code listed below, as well as some personal information. Please follow the directions to create your username and password.     Your access code is: [email or web address]  Expires: 2017 10:31 AM     Your access code will  in 90 days. If you need help or a new code, please contact your [facility] or call [phone] for assistance.        Care EveryWhere ID     This is your Care EveryWhere ID. This could be used by other organizations to access your [place] medical records  [id number]         Blood Pressure from Last 3 Encounters:   17 96/66   17 107/76   17 110/70    Weight from Last 3 Encounters:   17 80 lb (36.3 kg)   17 85 lb (38.6 kg)   17 89 lb 8 oz (40.6 kg)              Today, you had the following     No orders found for display         Today's Medication Changes          These changes are accurate as of: 10/25/17  1:32 PM.  If you have any questions, ask your nurse or doctor.               These medicines have changed or have updated prescriptions.        Dose/Directions    calcium carbonate 1250 (500 CA) MG/5ML Susp suspension   This may have changed:  how to take this   Used for:  Congenital disorder of glycosylation (CDG) (H), Osteoporosis        Dose:  1500 mg   Take 6 mLs (1,500 mg) by mouth 2 times daily (with meals)   Quantity:  450 mL   Refills:  12       levETIRAcetam  100 MG/ML solution   Commonly known as:  KEPPRA   This may have changed:  additional instructions   Used for:  Complex partial seizure evolving to generalized seizure (H)        10 mL  in the AM and 15 ml in the PM, by gastrostomy tube.   Quantity:  750 mL   Refills:  11       ondansetron 4 MG/5ML solution   Commonly known as:  ZOFRAN   This may have changed:  how to take this   Used for:  Viral gastroenteritis        Dose:  4 mg   Take 5 mLs (4 mg) by mouth every 6 hours as needed for nausea or vomiting   Quantity:  90 mL   Refills:  0            Where to get your medicines      Some of these will need a paper prescription and others can be bought over the counter.  Ask your nurse if you have questions.     Bring a paper prescription for each of these medications     levETIRAcetam 100 MG/ML solution                Primary Care Provider Office Phone # Fax #    Leeanne Mustafa [phone]       [facility] [address] [place] [address]        Equal Access to Services     [facility]: Hadii ramos vasquez hadasho Soomaali, waaxda luqadaha, qaybta kaalmada adeegyada, waxay kathy moseley . So [facility] [phone].    ATENCIÓN: Si habla español, tiene a chakraborty disposición servicios gratuitos de asistencia lingüística. Llame al [phone].    We comply with applicable federal civil rights laws and [place] laws. We do not discriminate on the basis of race, color, national origin, age, disability, sex, sexual orientation, or gender identity.            Thank you!     Thank you for choosing [facility]  for your care. Our goal is always to provide you with excellent care. Hearing back from our patients is one way we can continue to improve our services. Please take a few minutes to complete the written survey that you may receive in the mail after your visit with us. Thank you!             Your Updated Medication List - Protect others around you: Learn how to safely use,  store and throw away your medicines at www.disposemymeds.org.          This list is accurate as of: 10/25/17  1:32 PM.  Always use your most recent med list.                   Brand Name Dispense Instructions for use Diagnosis    acetaminophen 160 MG/5ML solution    TYLENOL     480 mg (15 mL) by G-tube every 4 hours as needed        ARIPiprazole 1 MG/ML Soln solution    ABILIFY     7.5 mg daily        bacitracin ointment     14 g    Apply topically 2 times daily as needed for wound care    Skin irritation       calcium carbonate 1250 (500 CA) MG/5ML Susp suspension     450 mL    Take 6 mLs (1,500 mg) by mouth 2 times daily (with meals)    Congenital disorder of glycosylation (CDG) (H), Osteoporosis       COMPLEAT PEDIATRIC PO           D-Mannose Powd      2 times daily 1 scoop - mixed with water        diazepam 5 MG/ML (HIGH CONC) solution    VALIUM     7.5 mg by Gastronomy tube route every hour as needed for seizures (Seizures lasting longer than 5 minutes)        ENEMEEZ MINI RE      3 times a week (MWF) to help with bowel movements        levETIRAcetam 100 MG/ML solution    KEPPRA    750 mL    10 mL  in the AM and 15 ml in the PM, by gastrostomy tube.    Complex partial seizure evolving to generalized seizure (H)       MELATONIN PO      Take 3 mg by mouth At Bedtime        ondansetron 4 MG/5ML solution    ZOFRAN    90 mL    Take 5 mLs (4 mg) by mouth every 6 hours as needed for nausea or vomiting    Viral gastroenteritis       order for DME     1 Device    Equipment being ordered: Other: chest strap for her tilt in space manual wheel chair Treatment Diagnosis: For safe transportation to home secondary to poor sitting balance in upright. CDG type Ia, seizures, and G-tube dependence.    Viral gastroenteritis, Dehydration, Acute respiratory failure with hypoxia (H), Congenital disorder of glycosylation (CDG) (H)       sertraline 20 MG/ML (HIGH CONC) solution    ZOLOFT     200 mg by Gastronomy tube route daily         * traZODone 50 MG tablet    DESYREL     by Gastric Tube route At Bedtime        * traZODone 50 MG tablet    DESYREL     Crush one tablet and administer via G-tube 30 minutes prior to bedtime daily        * UNABLE TO FIND      MEDICATION NAME: Stress relax (magnesium citrate) 380 mg at bedtime        * UNABLE TO FIND      MEDICATION NAME: Mood probiotic 1 capsule (3,000,000,000 CFUs) daily.        * UNABLE TO FIND      MEDICATION NAME: Rescue Remedy Herbal Supplement - Used as needed.        * Notice:  This list has 5 medication(s) that are the same as other medications prescribed for you. Read the directions carefully, and ask your doctor or other care provider to review them with you.       4 = No assist / stand by assistance

## 2023-10-30 NOTE — ED PROVIDER NOTES
History     Chief Complaint   Patient presents with     LUCAS Dubois SUSANNAH Ramires is a 24-year-old woman who presents to the ED from urgent care after her group home brought her in for cough and fever. At urgent care, she was found to have a temperature of 101 F and tachycardia. She is accompanied by someone from the group home who provides the majority of the history. Ms. Ramires was visited by her father on Sunday (who started to feel ill on Saturday) and her mother on Monday (who started to feel ill on Sunday). Her mom's symptoms have resolved however Ms. Ramires's father did not improve and is now starting antibiotics. Employees at the group home noticed that Ms. Ramires has been coughing since Tuesday night, although she is not producing sputum. Her mother felt that she was feverish, which is why she was brought in to Urgent Care. Per conversation with Ms. Ramires's mother, Ms. Ramires has a history of third-spacing intravenous fluids and has developed pulmonary edema in the past. Ms. Ramires has a headache and sore throat. She does not have abdominal pain or pain elsewhere in her body. The employee from the group home does report that she has two loose stools today, which is more than usual.     I have reviewed the medications, allergies, past medical and surgical history, and social history in the Epic system.    Review of Systems   Constitutional: Positive for fever.   HENT: Positive for sore throat.    Respiratory: Positive for cough.    Gastrointestinal: Negative for abdominal pain.   Musculoskeletal: Negative for myalgias.       Physical Exam   BP: 101/66  Pulse: 128  Temp: 99.9  F (37.7  C)  Resp: 22  Weight: 35.9 kg (79 lb 3.2 oz)  SpO2: 91 %    Physical Exam  Constitutional:       Appearance: She is not diaphoretic.      Comments: Appears uncomfortable.    HENT:      Head: Atraumatic.      Nose: Rhinorrhea present.      Mouth/Throat:      Mouth: Mucous membranes are moist.      Pharynx: No  · Noted history     posterior oropharyngeal erythema.   Eyes:      General: No scleral icterus.        Right eye: No discharge.         Left eye: No discharge.      Conjunctiva/sclera: Conjunctivae normal.   Cardiovascular:      Rate and Rhythm: Regular rhythm. Tachycardia present.      Comments: No murmurs, rubs, or gallops appreciated on auscultation.  Pulmonary:      Comments: Left lung sounds congested.   Abdominal:      Palpations: Abdomen is soft.      Tenderness: There is no tenderness.   Musculoskeletal:      Comments: Patient has scoliosis.    Neurological:      Mental Status: She is alert.       ED Course     Critical Care time: None.  Results for orders placed or performed during the hospital encounter of 10/25/19 (from the past 24 hour(s))   CBC with platelets differential   Result Value Ref Range    WBC 6.5 4.0 - 11.0 10e9/L    RBC Count 4.25 3.8 - 5.2 10e12/L    Hemoglobin 13.2 11.7 - 15.7 g/dL    Hematocrit 40.3 35.0 - 47.0 %    MCV 95 78 - 100 fl    MCH 31.1 26.5 - 33.0 pg    MCHC 32.8 31.5 - 36.5 g/dL    RDW 12.6 10.0 - 15.0 %    Platelet Count 106 (L) 150 - 450 10e9/L    Diff Method Automated Method     % Neutrophils 77.8 %    % Lymphocytes 10.4 %    % Monocytes 11.1 %    % Eosinophils 0.0 %    % Basophils 0.2 %    % Immature Granulocytes 0.5 %    Nucleated RBCs 0 0 /100    Absolute Neutrophil 5.1 1.6 - 8.3 10e9/L    Absolute Lymphocytes 0.7 (L) 0.8 - 5.3 10e9/L    Absolute Monocytes 0.7 0.0 - 1.3 10e9/L    Absolute Eosinophils 0.0 0.0 - 0.7 10e9/L    Absolute Basophils 0.0 0.0 - 0.2 10e9/L    Abs Immature Granulocytes 0.0 0 - 0.4 10e9/L    Absolute Nucleated RBC 0.0    Comprehensive metabolic panel   Result Value Ref Range    Sodium 136 133 - 144 mmol/L    Potassium 3.8 3.4 - 5.3 mmol/L    Chloride 100 94 - 109 mmol/L    Carbon Dioxide 33 (H) 20 - 32 mmol/L    Anion Gap 3 3 - 14 mmol/L    Glucose 97 70 - 99 mg/dL    Urea Nitrogen 11 7 - 30 mg/dL    Creatinine 0.26 (L) 0.52 - 1.04 mg/dL    GFR Estimate >90 >60  mL/min/[1.73_m2]    GFR Estimate If Black >90 >60 mL/min/[1.73_m2]    Calcium 8.6 8.5 - 10.1 mg/dL    Bilirubin Total 0.2 0.2 - 1.3 mg/dL    Albumin 2.4 (L) 3.4 - 5.0 g/dL    Protein Total 6.3 (L) 6.8 - 8.8 g/dL    Alkaline Phosphatase 112 40 - 150 U/L    ALT 51 (H) 0 - 50 U/L    AST 27 0 - 45 U/L   Lipase   Result Value Ref Range    Lipase 109 73 - 393 U/L   Blood culture   Result Value Ref Range    Specimen Description Blood Right Arm     Special Requests Aerobic and anaerobic bottles received     Culture Micro PENDING    Influenza A and B and RSV PCR   Result Value Ref Range    Specimen Description Nasopharyngeal     Influenza A PCR Negative NEG^Negative    Influenza B PCR Negative NEG^Negative    Resp Syncytial Virus Negative NEG^Negative   Lactic acid whole blood   Result Value Ref Range    Lactic Acid 1.2 0.7 - 2.0 mmol/L   Blood culture   Result Value Ref Range    Specimen Description Blood Left Arm     Special Requests Aerobic and anaerobic bottles received     Culture Micro PENDING    XR Chest Port 1 View    Narrative    CHEST ONE VIEW PORTABLE   10/25/2019 2:35 PM     HISTORY: Fever, cough.    COMPARISON: 3/1/2015      Impression    IMPRESSION: Abnormal airspace opacity projected over the right upper  lobe suggestive of right upper lobe pneumonia. The left lung is clear.  No pneumothorax or pleural effusion. Multilevel thoracic vertebral  hardware noted.    CASSIDY ROJAS MD         Assessments & Plan (with Medical Decision Making)   Ms. 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 The differential diagnosis includes upper respiratory infection vs community-acquired pneumonia. Per the Emergency Care Plan in Ms. Ramires's chart, we will obtain the recommended labs (blood glucose, electrolytes, LFTs, INR/PTT, and CBC). We will also obtain a baseline weight  and will monitor urine output in the setting of IV fluids (D10 1/2NS). Supplemental oxygen has resulted in improved oxygen saturation. Ms. Ramires did receive her flu shot this year (on Monday) however we will obtain a rapid influenza. We will also obtain a chest x-ray and blood cultures.    2:12 PM UPDATE  Found to have platelet count of 106, which appears to be around baseline.     2:50 PM UPDATE  Found to have right upper lobe pneumonia on chest x-ray. Patient's mother contacted. Per conversation with the patient's mother and the letter written by Dr. Dubon on 16, Ms. Ramires's needs would be best met at [facility] therefore we are attempting to admit Ms. Ramires to the pediatric hospital. We will likely start weight-based treatment with azithromycin and ceftriaxone for community-acquired pneumonia.     3:45 PM UPDATE  Plan for admission to the adult medicine service.     I have reviewed the nursing notes.    I have reviewed the findings, diagnosis, plan and need for follow up with the patient.    New Prescriptions    No medications on file       Final diagnoses:   Pneumonia of right upper lobe due to infectious organism (H)       Kaya Hickman MD  Psychiatry Resident, PGY1    10/25/2019   [facility], EMERGENCY DEPARTMENT      This data collected with the Resident working in the Emergency Department.  Patient was seen and evaluated by myself and I repeated the history and physical exam with the patient.  The plan of care was discussed with them.  The key portions of the note including the entire assessment and plan reflect my documentation.    Medical decision m[age]-year-old female with a history of external disorder of glycosylation presents to us with a chief complaint of cough and fever.  Patient is mildly hypoxic around 90%.  Supplemental O2 helped with her saturations and apparent comfort level.  Septic work-up started.  Patient has normal laboratory values including white count and lactic  acid.  Elect lites and kidney function are normal as well.  Chest x-ray reveals a right upper lobe pneumonia.  Weight-based dosing of Rocephin and azithromycin was given to the patient.  Patient has a care plan that indicates for her general conditions D10 half-normal saline at maintenance as recommended.  This was started.  Patient has issues with third spacing due to low albumin in the past.  Her albumin is slightly low at 2.4.  As she continues to be tachycardic I elected to keep her fluids at maintenance until this improves.  Informed her caregiver they she should continue to hold to give her her tube feeds while they are waiting for a bed.  Patient's mother indicates that in the past she has been admitted to [facility] despite being over the age of 18 due to her mental capacity of a 3-4-year-old.  I did discuss with pediatric hospitalist Arpan Maher in addition to pediatric genetic specialist on-call Dr. Cirilo Duff.  He stated that there were he is a cutoff of no more than 21 years of age for [facility].  He also indicated that the last time patient was admitted to [facility] 2 years ago with the patient's mother was informed of this.  Discussed admission with  medicine triage Dr. Pettit.  He was informed of the concerns for possible third spacing and the patient.  He was also informed that Dr. Justin is available for consultations at the [facility].  I did discuss with the patient's mother that she would be admitted to the [facility] because of her age.     Jaya Nina, DO  10/25/19 [id number]

## 2024-02-29 ENCOUNTER — TELEPHONE (OUTPATIENT)
Dept: NEUROLOGY | Facility: CLINIC | Age: 29
End: 2024-02-29

## 2024-02-29 DIAGNOSIS — G40.209 COMPLEX PARTIAL SEIZURE EVOLVING TO GENERALIZED SEIZURE (H): Primary | ICD-10-CM

## 2024-02-29 NOTE — TELEPHONE ENCOUNTER
What is the concern that needs to be addressed by a nurse?     Group home is requesting to get orders placed for pre appt labs. Her appt is with DR Knott 5-1-24    May a detailed message be left on voicemail? yes    Date of last office visit: 04-    Message routed to: mice[facility] rn pool

## 2024-05-01 ENCOUNTER — OFFICE VISIT (OUTPATIENT)
Dept: NEUROLOGY | Facility: CLINIC | Age: 29
End: 2024-05-01
Payer: COMMERCIAL

## 2024-05-01 VITALS
SYSTOLIC BLOOD PRESSURE: 114 MMHG | HEIGHT: 55 IN | DIASTOLIC BLOOD PRESSURE: 82 MMHG | BODY MASS INDEX: 16.43 KG/M2 | TEMPERATURE: 97.2 F | WEIGHT: 71 LBS | HEART RATE: 102 BPM

## 2024-05-01 DIAGNOSIS — G40.209 COMPLEX PARTIAL SEIZURE EVOLVING TO GENERALIZED SEIZURE (H): ICD-10-CM

## 2024-05-01 RX ORDER — LEVETIRACETAM 100 MG/ML
SOLUTION ORAL
Qty: 450 ML | Refills: 11 | Status: SHIPPED | OUTPATIENT
Start: 2024-05-01

## 2024-05-01 NOTE — LETTER
2024       RE: Sherly Ramires  : 1995   MRN: [id number]        Dear Colleague,    Thank you for referring your patient, Sherly Ramires, to the [facility] EPILEPSY CARE at [facility]. Please see a copy of my visit note below.      [facility]psy [facility]:  RETURN VISIT          Service Date: 2024     I spent 42 minutes in this patient care, with 28 minutes in direct patient contact, and 14 minutes in chart review and document preparation.           Again, thank you for allowing me to participate in the care of your patient.      Sincerely,    Nhan Knott MD

## 2024-05-03 NOTE — PROGRESS NOTES
[facility] Epilepsy Clinic:  RETURN VISIT          Service Date: 05/01/2024      HISTORY:  Ms. 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  She came to the visit with two [facility] staff members.       Following the most recent virtual visit to this clinic on 06/14/2023, the patient reportedly has had no seizures and no apparent adverse effects of levetiracetam.       By report, the most recent seizures occurred a number of years ago, as a GTC seizure in 2014, a complex partial seizure in 2014, and an atonic seizure in early childhood.       No new medical problems were reported by the  staff persons.       Ictal semiology-history:   The patient has had essentially 3 types of seizures in her lifetime, which have been responsive to levetiracetam in the case of the most recent seizures.  These essentially occurred as generalized atonic seizures in early childhood and later as complex partial seizures and generalized tonic-clonic seizures.      The patient had generalized atonic seizures which occurred approximately 12 times, usually in association with a febrile illness or other physiological stressors, between the ages of 3 and 6 years.  Her mother was the primary witnesses of these events and noted that the patient suddenly became unresponsive, usually while lying in her crib and when she moves the patient's limbs, she noted generalized flaccidity.  She was told that these were generalized atonic seizures and the patient was given rectal Diastat to prevent a cluster of these seizures.  She did not receive chronic antiepileptic drug therapy at this time.  The patient had essentially no seizures that were definitely witnessed between the ages of 6 and 16 years.      At 16 years of age, the patient had her first generalized tonic-clonic seizure  witnessed by her mother.  She had 3 of these on the day of onset, a single grand mal seizure later that year and then a single grand mal seizure about 1 year after the first seizures.  All of these were associated with sleep deprivation.  Her mother witnessed her to suddenly extend her limbs stiffly with truncal extension followed by generalized rhythmic jerking for a minute or 2.  She did not have tongue biting.      It appears that over several years after the grand mal seizures began, there was a question with various care providers of brief staring spells.  Her mother witnessed a definite staring spell with essentially no movement and no responsiveness, but with diffuse stiffness for a minute or 2.  This occurred in , in association with sleep deprivation.  Subsequently, no one has seen any definite staring spell seizures.      The patient reportedly has no other type of paroxysmal behavioral alterations.      Epilepsy-seizure predispositions:   With early developmental delay, the patient was evaluated and eventually diagnosed with congenital disorder of glycosylation type 1.  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  She has had slow language development and has learned to communicate by producing single linguistic sound sequences which are not complete intelligible words, but are accompanied by gesturing to supplement the vocalization.  In this manner the patient is able to make a number of requests of people who know her well.  Her mother thinks that she has much greater comprehension of speech compared with her own speech production.      The patient has no family history of epilepsy or seizures.  She has no history of gestational or  injury, febrile convulsions, developmental delay, stroke, meningitis, encephalitis, significant head injury, or other epileptic predispositions than CDG type 1a.       Laboratory evaluations:   The patient has had brain imaging and electroencephalography at a number of different clinics in [facility] and [place].  The most recent brain MRI was performed at [facility] on 06/07/2013, and this was reported to show deformities at the craniocervical junction consistent with reported surgical procedures, marked hypoplasia of the cerebellar vermis and cerebellar hemispheres (unchanged) and myelomalacia of adjacent portions of the superior cervical spinal cord, with limited T2 signal alterations in subcortical white matter.       Notes from her earlier pediatric neurologist, Dr. Gus Ny indicate that she had definite right frontal lobe interictal spikes on electroencephalograms in 2003 and 2004.      Epilepsy therapeutics:   The patient was treated with levetiracetam following her first grand mal seizure at 16 years of age.  She has continued on this since then, with upward dose adjustments a number of years ago.  She has not been noted to have adverse effects of levetiracetam.  She has not been on other chronic antiepileptic drugs therapies.      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  Her mother thinks that she was twice accidentally dropped or fell a small distance when she was about 6 years of age, once sliding often an adaptive tricycle onto the floor and once collapsing down out of the hands of a person who was holding her in an erect position.  It was at around this time that the patient was noted to have bilateral leg weakness, which progressed at an early age, leaving her with a spastic quadriplegia involving legs much more than arms.  Now over the last 6 months she appears to have had progression in arm weakness, however, probably involving the left arm more than the right arm.  She appears clearly weaker in her assistance  with transfers, although she is able to readily manipulate light objects such that she can feed herself and use coloring crayons.  With this myelopathy, she has had complete urinary and fecal incontinence, although possibly these problems began before other manifestations of myelopathy.  She seems to have some feeling in her feet.  Her mother is planning to pursue evaluation to determine whether further cervical spine surgery is indicated.      PAST MEDICAL-SURGICAL HISTORY:    1.  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     2.  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   3.  Status post Burns rojelio procedure for kyphoscoliosis (2007).   4.  Status post gastrostomy tube placement and multiple replacements.   5.  Status post 2 procedures for strabismus.      FAMILY HISTORY:  There is no family history of epilepsy, seizures or other neurological conditions than migraine headaches in both parents.      PERSONAL AND SOCIAL HISTORY:  The patient lived with her parents and siblings for most of her life, and had special education classes for much of this time.  More recently she moved into a specialized group home with 3 other highly disabled clients and appears to be doing well there.  She reportedly is able to swallow safely based on repeated swallowing studies, but has very little appetite, so receives most of her nutrition through the gastrostomy tube.  She does assist with some with activities of daily  living.      REVIEW OF SYSTEMS:  The patient reportedly has not been evaluated for peripheral neuropathy.   She has no history of rashes and easy bruising.  The remainder of a 14-system symptom review was negative or unobtainable, except as noted above.        MEDICATIONS:  Levetiracetam solution 1000 mg in the morning and 1500 mg in the evening per gastrostomy, and other medications as per the electronic medical record.      PHYSICAL EXAMINATION:    The patient was sitting up in a wheelchair. Alert, tracks. Does not follow commands. Communicates via single words. Skull and cervical deformities consistent with surgeries. Wearing soft collar. Pupils equal, round. BLE antigravity. Face symmetric. DTRs 2+ symmetric in biceps, brachioradialis, patellar, and Achilles. Appeared somewhat uncomfortable with examination.     IMPRESSION:    The patient continues to be free of seizures on levetiracetam monotherapy.  The levetiracetam level was 67.6 mcg per mL on 04/09/2024.   We agreed to reduce the levetiracetam dose, in view of the prolonged period of freed from seizures.       On 04/19/2017, she had an outpatient 3-hour video-EEG recording at [facility], which showed ongoing generalized theta-delta slowing, with frequent brief bursts of high amplitude bifrontocentral delta slowing, but no epileptiform abnormalities.  She was reported to have had right frontal spikes on prior interictal EEG recordings.  We will re-check EEG activities with an out-patient recording.       Her weakness is reportedly stable. She has previously been seen again by surgeon in [place] who recommended soft collar without further intervention.     PLAN:    1.  Decrease levetiracetam to 750 mg b.i.d.   2.  Out-patient 3hr VEEG.   3.  Return visit in approximately 10 months.      I spent 42 minutes in this patient care, with 28 minutes in direct patient contact, and 14 minutes in chart review and document preparation.         Nhan Knott M.D.   Professor of  Neurology

## 2024-05-08 ENCOUNTER — TELEPHONE (OUTPATIENT)
Dept: NEUROLOGY | Facility: CLINIC | Age: 29
End: 2024-05-08

## 2024-05-08 NOTE — TELEPHONE ENCOUNTER
Writer returned call to [facility]. [facility] received updated order. They are not able to dispense the medication at this time because patient recently picked up the medication.    Writer spoke to paty who asked that we fax updated order so they can decrease the medication. Writer sent updated order via fax to [facility].     Paty has no other questions at this time.       Fax number to [facility]: [phone]        Patient Name: Miriam Harrell   YOB: 1935      STAT: YES     Transfusion type:   Type and cross match 12- for 2 units of leukoreduced PRBC's  12-  transfuse 2 units of leukoreduced PRBC's each over 3 hours.  12-  hgb = 7.3 g/dl  hct = 22.1 %     Contact patient when she is to arrive for blood transfusion 12-  Patient: [phone]  Daughter in law Ana: [phone] cell.     SPECIAL INSTRUCTIONS  *Send to pharmacy if medications are ordered  Pretreat with acetaminophen 650 mg PO x1  Preteat with diphenhydramine 25 mg PO x1     Medications: as at home  Diet/Activity: as at home  Discharge when transfusion is completed     For any questions or concerns: Please contact Dr Loulou Espinal.  [phone]            Physician Signature: ____________________________________                                  Dr Loulou Espinal      [facility]. - NEPHROLOGY / [facility]  [address] [place] [address]

## 2024-05-08 NOTE — TELEPHONE ENCOUNTER
Paty from Patient's group stated that [facility] is stating that they have not received the Keppra Oral solution that was send on 5-1-24, can this please be resent. ( They did confirm receiving this 5-1-24 @ 3:13pm)

## 2024-05-24 RX ORDER — DIAZEPAM INTENSOL 5 MG/ML
SOLUTION, CONCENTRATE ORAL
Qty: 30 ML | Refills: 5 | OUTPATIENT
Start: 2024-05-24

## 2024-05-24 NOTE — TELEPHONE ENCOUNTER
diazePAM Intensol 5 MG/ML Concentrate             Last Office Visit: 5/1/24  Future Office visit:   2/26/25    Routing refill request to provider for review/approval because:  Drug not on the [facility], [facility] or [facility] refill protocol or controlled substance  Listed as historical    Rufina Mahoney RN  [facility] Red Flag Triage/MRT

## 2024-05-25 ENCOUNTER — MYC MEDICAL ADVICE (OUTPATIENT)
Dept: NEUROLOGY | Facility: CLINIC | Age: 29
End: 2024-05-25

## 2024-06-13 ENCOUNTER — ANCILLARY PROCEDURE (OUTPATIENT)
Dept: NEUROLOGY | Facility: CLINIC | Age: 29
End: 2024-06-13
Attending: PSYCHIATRY & NEUROLOGY
Payer: COMMERCIAL

## 2024-06-13 DIAGNOSIS — G40.209 COMPLEX PARTIAL SEIZURE EVOLVING TO GENERALIZED SEIZURE (H): ICD-10-CM

## 2024-07-08 ENCOUNTER — OFFICE VISIT (OUTPATIENT)
Dept: OPHTHALMOLOGY | Facility: CLINIC | Age: 29
End: 2024-07-08
Attending: OPHTHALMOLOGY
Payer: COMMERCIAL

## 2024-07-08 DIAGNOSIS — H50.15 ALTERNATING EXOTROPIA: Primary | ICD-10-CM

## 2024-07-08 DIAGNOSIS — E74.89 CDG (CONGENITAL DISORDER OF GLYCOSYLATION) (H): ICD-10-CM

## 2024-07-08 DIAGNOSIS — H35.50 RETINAL DYSTROPHY: ICD-10-CM

## 2024-07-08 DIAGNOSIS — H55.01 CONGENITAL NYSTAGMUS: ICD-10-CM

## 2024-07-08 PROCEDURE — 99213 OFFICE O/P EST LOW 20 MIN: CPT | Performed by: OPHTHALMOLOGY

## 2024-07-08 PROCEDURE — 92014 COMPRE OPH EXAM EST PT 1/>: CPT | Performed by: OPHTHALMOLOGY

## 2024-07-08 PROCEDURE — 92060 SENSORIMOTOR EXAMINATION: CPT | Performed by: OPHTHALMOLOGY

## 2024-07-08 ASSESSMENT — VISUAL ACUITY
METHOD: FIXATION
OD_SC: CUSM
OS_SC: CUSM
METHOD: LEA - BLOCKED

## 2024-07-08 ASSESSMENT — EXTERNAL EXAM - LEFT EYE: OS_EXAM: NORMAL

## 2024-07-08 ASSESSMENT — CONF VISUAL FIELD
OD_NORMAL: 1
OS_INFERIOR_TEMPORAL_RESTRICTION: 0
OS_NORMAL: 1
OD_INFERIOR_NASAL_RESTRICTION: 0
OS_INFERIOR_NASAL_RESTRICTION: 0
OS_SUPERIOR_NASAL_RESTRICTION: 0
OD_SUPERIOR_NASAL_RESTRICTION: 0
OD_SUPERIOR_TEMPORAL_RESTRICTION: 0
OS_SUPERIOR_TEMPORAL_RESTRICTION: 0
OD_INFERIOR_TEMPORAL_RESTRICTION: 0
METHOD: TOYS

## 2024-07-08 ASSESSMENT — TONOMETRY
IOP_METHOD: ICARE SINGLE JC
OD_IOP_MMHG: 08
OS_IOP_MMHG: 11

## 2024-07-08 ASSESSMENT — CUP TO DISC RATIO
OD_RATIO: 0.3
OS_RATIO: 0.3

## 2024-07-08 ASSESSMENT — EXTERNAL EXAM - RIGHT EYE: OD_EXAM: NORMAL

## 2024-07-08 ASSESSMENT — SLIT LAMP EXAM - LIDS
COMMENTS: NORMAL
COMMENTS: NORMAL

## 2024-07-08 NOTE — NURSING NOTE
Chief Complaint(s) and History of Present Illness(es)       Exotropia Follow Up              Associated symptoms: Negative for eye pain    Comments: Had been playing a table game and it seemed like she wasn't able to see it all of a sudden and had to move very close with face. Mom noticed this just one time, 2-3 months ago.  Caregivers had not thought she has had any changes in vision.  Mom concerned for low tension glaucoma. Grandparents on mom's side both have - on drops. Mom has glaucoma as well but not on drops. She wonders if there is a way to prevent this. Will sometimes rub eyes.              Retinal Dystrophy Hereditary Follow Up              Associated symptoms: Negative for double vision, eye pain and tearing    Associated symptoms: Negative for double vision, eye pain and tearing    Comments: Doing well with recognizing caregivers and she uses her iPad often.  Caregivers notice nystagmus. No strab noticed.              Comments    Inf: caregivers from group home with notes from mom.

## 2024-07-08 NOTE — PROGRESS NOTES
Chief Complaint(s) and History of Present Illness(es)       Exotropia Follow Up              Associated symptoms: Negative for eye pain    Comments: Had been playing a table game and it seemed like she wasn't able to see it all of a sudden and had to move very close with face. Mom noticed this just one time, 2-3 months ago.  Caregivers had not thought she has had any changes in vision.  Mom concerned for low tension glaucoma. Grandparents on mom's side both have - on drops. Mom has glaucoma as well but not on drops. She wonders if there is a way to prevent this. Will sometimes rub eyes.              Retinal Dystrophy Hereditary Follow Up              Associated symptoms: Negative for double vision, eye pain and tearing    Associated symptoms: Negative for double vision, eye pain and tearing    Comments: Doing well with recognizing caregivers and she uses her iPad often.  Caregivers notice nystagmus. No strab noticed.              Comments    Inf: caregivers from group home with notes from mom.             History was obtained from the following independent historians: patient's caregivers with notes from Mom     Primary care: Racheal Lennon   Assessment & Tammy DAVALOS Ike is a 29 year old female who presents with:     Consecutive exotropia    (1996) BMR 6 (Gonsales)   (12/10/14) BLR 11 (Areaux) with subsequent refractory dellen that has resolved  Stable residual exotropia.     Retinal dystrophy, severe OU with cystoid macular edema, left eye    Congenital disorder of glycosylation (CDG)   Extinguished electroretinogram (ERG) 12/10/14.     Would not treat edema, give glasses, or repeat electroretinogram (ERG).   Nystagmus, thankfully, responding to Lorin.    Family history of NTG: reassured that there is no evidence of this in Anna and we discussed that peripheral vision monitoring is neither possible nor helpful due to the retinal dystrophy which is already causing peripheral visual field constriction.      Stable eye exam. Return to clinic as needed of any new concerns. I'd be happy to see Anna every few years to check her eyes.        Return in about 3 years (around 7/8/2027) for IOP, DFE.    There are no Patient Instructions on file for this visit.    Visit Diagnoses & Orders    ICD-10-CM    1. Alternating exotropia  H50.15 Sensorimotor      2. Retinal dystrophy  H35.50       3. CDG (congenital disorder of glycosylation) (H24)  E74.89       4. Congenital nystagmus  H55.01          Attending Physician Attestation:  Complete documentation of historical and exam elements from today's encounter can be found in the full encounter summary report (not reduplicated in this progress note).  I personally obtained the chief complaint(s) and history of present illness.  I confirmed and edited as necessary the review of systems, past medical/surgical history, family history, social history, and examination findings as documented by others; and I examined the patient myself.  I personally reviewed the relevant tests, images, and reports as documented above.  I formulated and edited as necessary the assessment and plan and discussed the findings and management plan with the patient and family. - Skyler Correa Jr., MD

## 2024-08-27 ENCOUNTER — TELEPHONE (OUTPATIENT)
Dept: HEMATOLOGY | Facility: CLINIC | Age: 29
End: 2024-08-27
Payer: COMMERCIAL

## 2024-09-05 ENCOUNTER — ENROLLMENT (OUTPATIENT)
Dept: HOME HEALTH SERVICES | Facility: HOME HEALTH | Age: 29
End: 2024-09-05
Payer: COMMERCIAL

## 2024-10-31 NOTE — PLAN OF CARE
/58   Pulse 116   Temp 96.8  F (36  C) (Axillary)   Resp 28   Wt 32.8 kg (72 lb 5 oz)   SpO2 99%   BMI 21.17 kg/m      Neuro: Developmentally delayed, total care.   Cardiac: VSS  Respiratory:  Maintaining adequate O2 sats on 2L oxygen via oxy mask. Weak cough. Receiving scheduled nebs   GI/: Incontinent of urine and stool x2  Diet/appetite: Received bolus TF (1 can + 200 mL water) over 2.5 hours at 0730, 1100, 1500, per patients family request. Pt to receive one more at 1800. TF runs continuously from 8p-7a at 40 mL/hr. No nausea noted. On regular diet but did not eat anything today  Activity:  Assist of 2 to turn and repo.   Pain: No non verbal indicators of pain noted  Skin: No skin deficits noted  LDA's: PIV infusing TKO in between antibiotics for CAP.    Plan: Family/PCA at bedside all throughout the day-very supportive and helpful. Weaned down to 2L oxygen and patient is doing well. Pt is to receive one more bolus feeding before continuous TF overnight. Pts family is requesting patient to receive PRN trazadone to help her with an afternoon nap along with a dose at HS-awaiting from pharmacy. Nursing will continue to follow the POC and update the MD team with concerns.     Detail Level: Detailed

## 2024-11-04 ENCOUNTER — MEDICAL CORRESPONDENCE (OUTPATIENT)
Dept: HOME HEALTH SERVICES | Facility: HOME HEALTH | Age: 29
End: 2024-11-04

## 2024-11-13 ENCOUNTER — HOME INFUSION (OUTPATIENT)
Dept: HOME HEALTH SERVICES | Facility: HOME HEALTH | Age: 29
End: 2024-11-13
Payer: COMMERCIAL

## 2024-11-13 DIAGNOSIS — E23.0 GHD (GROWTH HORMONE DEFICIENCY) (H): ICD-10-CM

## 2024-11-13 DIAGNOSIS — J18.9 COMMUNITY ACQUIRED PNEUMONIA OF RIGHT UPPER LOBE OF LUNG: Primary | ICD-10-CM

## 2024-11-14 ENCOUNTER — MEDICAL CORRESPONDENCE (OUTPATIENT)
Dept: HOME HEALTH SERVICES | Facility: HOME HEALTH | Age: 29
End: 2024-11-14

## 2024-11-14 ENCOUNTER — HOME INFUSION BILLING (OUTPATIENT)
Dept: HOME HEALTH SERVICES | Facility: HOME HEALTH | Age: 29
End: 2024-11-14
Payer: COMMERCIAL

## 2024-11-14 PROCEDURE — B9002 ENTER NUTR INF PUMP ANY TYPE: HCPCS | Mod: RR

## 2024-11-14 PROCEDURE — B4153 EF HYDROLYZED/AMINO ACIDS: HCPCS

## 2024-11-14 PROCEDURE — S9342 HIT ENTERAL PUMP DIEM: HCPCS

## 2024-11-14 RX ORDER — NUTRITIONAL SUPPLEMENT/FIBER
625 LIQUID (ML) ORAL DAILY
Qty: 56250 ML | Refills: 3 | Status: DISPENSED | OUTPATIENT
Start: 2024-11-14 | End: 2024-11-15

## 2024-11-15 ENCOUNTER — MEDICAL CORRESPONDENCE (OUTPATIENT)
Dept: HOME HEALTH SERVICES | Facility: HOME HEALTH | Age: 29
End: 2024-11-15

## 2024-11-15 PROCEDURE — S9342 HIT ENTERAL PUMP DIEM: HCPCS

## 2024-11-15 PROCEDURE — B4153 EF HYDROLYZED/AMINO ACIDS: HCPCS

## 2024-11-16 PROCEDURE — B4153 EF HYDROLYZED/AMINO ACIDS: HCPCS

## 2024-11-16 PROCEDURE — S9342 HIT ENTERAL PUMP DIEM: HCPCS

## 2024-11-17 PROCEDURE — B4153 EF HYDROLYZED/AMINO ACIDS: HCPCS

## 2024-11-17 PROCEDURE — S9342 HIT ENTERAL PUMP DIEM: HCPCS

## 2024-11-18 PROCEDURE — S9342 HIT ENTERAL PUMP DIEM: HCPCS

## 2024-11-18 PROCEDURE — B4153 EF HYDROLYZED/AMINO ACIDS: HCPCS

## 2024-11-19 PROCEDURE — S9342 HIT ENTERAL PUMP DIEM: HCPCS

## 2024-11-19 PROCEDURE — B4153 EF HYDROLYZED/AMINO ACIDS: HCPCS

## 2024-11-20 PROCEDURE — B4153 EF HYDROLYZED/AMINO ACIDS: HCPCS

## 2024-11-20 PROCEDURE — S9342 HIT ENTERAL PUMP DIEM: HCPCS

## 2024-11-21 PROCEDURE — S9342 HIT ENTERAL PUMP DIEM: HCPCS

## 2024-11-21 PROCEDURE — B4153 EF HYDROLYZED/AMINO ACIDS: HCPCS

## 2024-11-22 PROCEDURE — S9342 HIT ENTERAL PUMP DIEM: HCPCS

## 2024-11-22 PROCEDURE — B4153 EF HYDROLYZED/AMINO ACIDS: HCPCS

## 2024-11-23 PROCEDURE — S9342 HIT ENTERAL PUMP DIEM: HCPCS

## 2024-11-23 PROCEDURE — B4153 EF HYDROLYZED/AMINO ACIDS: HCPCS

## 2024-11-24 PROCEDURE — S9342 HIT ENTERAL PUMP DIEM: HCPCS

## 2024-11-24 PROCEDURE — B4153 EF HYDROLYZED/AMINO ACIDS: HCPCS

## 2024-11-25 PROCEDURE — S9342 HIT ENTERAL PUMP DIEM: HCPCS

## 2024-11-25 PROCEDURE — B4153 EF HYDROLYZED/AMINO ACIDS: HCPCS

## 2024-11-26 PROCEDURE — B4153 EF HYDROLYZED/AMINO ACIDS: HCPCS

## 2024-11-26 PROCEDURE — S9342 HIT ENTERAL PUMP DIEM: HCPCS

## 2024-11-27 PROCEDURE — S9342 HIT ENTERAL PUMP DIEM: HCPCS

## 2024-11-27 PROCEDURE — B4153 EF HYDROLYZED/AMINO ACIDS: HCPCS

## 2024-11-28 PROCEDURE — B4153 EF HYDROLYZED/AMINO ACIDS: HCPCS

## 2024-11-28 PROCEDURE — S9342 HIT ENTERAL PUMP DIEM: HCPCS

## 2024-11-29 PROCEDURE — B4153 EF HYDROLYZED/AMINO ACIDS: HCPCS

## 2024-11-29 PROCEDURE — S9342 HIT ENTERAL PUMP DIEM: HCPCS

## 2024-11-30 PROCEDURE — B4153 EF HYDROLYZED/AMINO ACIDS: HCPCS

## 2024-11-30 PROCEDURE — S9342 HIT ENTERAL PUMP DIEM: HCPCS

## 2024-12-01 PROCEDURE — S9342 HIT ENTERAL PUMP DIEM: HCPCS

## 2024-12-01 PROCEDURE — B4153 EF HYDROLYZED/AMINO ACIDS: HCPCS

## 2024-12-02 PROCEDURE — B4153 EF HYDROLYZED/AMINO ACIDS: HCPCS

## 2024-12-02 PROCEDURE — S9342 HIT ENTERAL PUMP DIEM: HCPCS

## 2024-12-03 PROCEDURE — B4153 EF HYDROLYZED/AMINO ACIDS: HCPCS

## 2024-12-03 PROCEDURE — S9342 HIT ENTERAL PUMP DIEM: HCPCS

## 2024-12-04 PROCEDURE — B4153 EF HYDROLYZED/AMINO ACIDS: HCPCS

## 2024-12-04 PROCEDURE — S9342 HIT ENTERAL PUMP DIEM: HCPCS

## 2024-12-05 PROCEDURE — B4153 EF HYDROLYZED/AMINO ACIDS: HCPCS

## 2024-12-05 PROCEDURE — S9342 HIT ENTERAL PUMP DIEM: HCPCS

## 2024-12-06 ENCOUNTER — ANCILLARY PROCEDURE (OUTPATIENT)
Dept: BONE DENSITY | Facility: CLINIC | Age: 29
End: 2024-12-06
Attending: PEDIATRICS
Payer: COMMERCIAL

## 2024-12-06 DIAGNOSIS — E74.89 CDG (CONGENITAL DISORDER OF GLYCOSYLATION) (H): ICD-10-CM

## 2024-12-06 PROCEDURE — 77080 DXA BONE DENSITY AXIAL: CPT

## 2024-12-06 PROCEDURE — B4153 EF HYDROLYZED/AMINO ACIDS: HCPCS

## 2024-12-06 PROCEDURE — S9342 HIT ENTERAL PUMP DIEM: HCPCS

## 2024-12-07 PROCEDURE — S9342 HIT ENTERAL PUMP DIEM: HCPCS

## 2024-12-07 PROCEDURE — B4153 EF HYDROLYZED/AMINO ACIDS: HCPCS

## 2024-12-08 PROCEDURE — S9342 HIT ENTERAL PUMP DIEM: HCPCS

## 2024-12-08 PROCEDURE — B4153 EF HYDROLYZED/AMINO ACIDS: HCPCS

## 2024-12-09 PROCEDURE — B4153 EF HYDROLYZED/AMINO ACIDS: HCPCS

## 2024-12-09 PROCEDURE — S9342 HIT ENTERAL PUMP DIEM: HCPCS

## 2024-12-10 PROCEDURE — B4153 EF HYDROLYZED/AMINO ACIDS: HCPCS

## 2024-12-10 PROCEDURE — S9342 HIT ENTERAL PUMP DIEM: HCPCS

## 2024-12-11 PROCEDURE — B4153 EF HYDROLYZED/AMINO ACIDS: HCPCS

## 2024-12-11 PROCEDURE — S9342 HIT ENTERAL PUMP DIEM: HCPCS

## 2024-12-12 PROCEDURE — B4153 EF HYDROLYZED/AMINO ACIDS: HCPCS

## 2024-12-12 PROCEDURE — S9342 HIT ENTERAL PUMP DIEM: HCPCS

## 2024-12-13 PROCEDURE — B4153 EF HYDROLYZED/AMINO ACIDS: HCPCS

## 2024-12-13 PROCEDURE — S9342 HIT ENTERAL PUMP DIEM: HCPCS

## 2024-12-19 ENCOUNTER — HOME INFUSION BILLING (OUTPATIENT)
Dept: HOME HEALTH SERVICES | Facility: HOME HEALTH | Age: 29
End: 2024-12-19
Payer: COMMERCIAL

## 2024-12-19 PROCEDURE — B9002 ENTER NUTR INF PUMP ANY TYPE: HCPCS | Mod: RR

## 2024-12-19 PROCEDURE — S9342 HIT ENTERAL PUMP DIEM: HCPCS

## 2024-12-19 PROCEDURE — B4153 EF HYDROLYZED/AMINO ACIDS: HCPCS

## 2024-12-20 PROCEDURE — S9342 HIT ENTERAL PUMP DIEM: HCPCS

## 2024-12-21 PROCEDURE — S9342 HIT ENTERAL PUMP DIEM: HCPCS

## 2024-12-22 PROCEDURE — S9342 HIT ENTERAL PUMP DIEM: HCPCS

## 2024-12-23 PROCEDURE — S9342 HIT ENTERAL PUMP DIEM: HCPCS

## 2024-12-24 PROCEDURE — S9342 HIT ENTERAL PUMP DIEM: HCPCS

## 2024-12-25 PROCEDURE — S9342 HIT ENTERAL PUMP DIEM: HCPCS

## 2024-12-26 PROCEDURE — S9342 HIT ENTERAL PUMP DIEM: HCPCS

## 2024-12-27 PROCEDURE — S9342 HIT ENTERAL PUMP DIEM: HCPCS

## 2024-12-28 ENCOUNTER — HEALTH MAINTENANCE LETTER (OUTPATIENT)
Age: 29
End: 2024-12-28

## 2024-12-28 PROCEDURE — S9342 HIT ENTERAL PUMP DIEM: HCPCS

## 2024-12-29 PROCEDURE — S9342 HIT ENTERAL PUMP DIEM: HCPCS

## 2024-12-30 PROCEDURE — S9342 HIT ENTERAL PUMP DIEM: HCPCS

## 2024-12-31 PROCEDURE — S9342 HIT ENTERAL PUMP DIEM: HCPCS

## 2025-01-01 ENCOUNTER — EPISODE UPDATE (OUTPATIENT)
Dept: HOME HEALTH SERVICES | Facility: HOME HEALTH | Age: 30
End: 2025-01-01
Payer: COMMERCIAL

## 2025-01-01 PROCEDURE — S9342 HIT ENTERAL PUMP DIEM: HCPCS

## 2025-01-02 PROCEDURE — S9342 HIT ENTERAL PUMP DIEM: HCPCS

## 2025-01-03 PROCEDURE — S9342 HIT ENTERAL PUMP DIEM: HCPCS

## 2025-01-03 NOTE — PROGRESS NOTES
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 She has no history of thrombosis or hemorrhagic diatheses, event despite having undergone multiple significant procedures in past. She has historically been followed by my colleague Dr. Hoskins. Please refer to her previous notes for additional details regarding this patient's history (Dates: 10/17/22, 10/18/21).    Interval History:  Last seen by Dr. Hoskins on 10/17/2022. She presents today with her caregivers for routine follow-up. Interval history obtained through caregivers. She still has no history of thrombosis or significant hemorrhagic diatheses. Her care team notes that she gets a small amount of cutaneous bleeding 2/2 skin picking but this resolves easily with pressure and proper dressings. No epistaxis, gingival bleeding, excessive bruising/ecchymosis, hematuria, hematochezia, or melena. No observable lower extremity edema or dyspnea.    No major surgeries planned for the coming year.    Exam:  /84 (BP Location: Left arm, Patient Position: Sitting)   Pulse 100   Temp 98.1  F (36.7  C) (Tympanic)   Ht 1.397 m (4' 7\")   Wt 36.6 kg (80 lb 9.6 oz)   SpO2 97%   BMI 18.73 kg/m    Constitutional: Appears comfortable, sitting her her wheelchair.  Respiratory: Breathing comfortably on room air.  Skin: No " notable ecchymosis, petechiae, purpura, or wounds.  Extremities: Significant muscle wasting of all limbs. No lower extremity edema or erythema.  Neuro: She is alert. Unable to engage in conversation but does respond to caregivers by turning her head, occasional nods.    Labs:    Ref. Range 11/30/2017 11:03 11/29/2018 11:40 3/14/2020 11:39 6/30/2021 08:45   INR Latest Ref Range: 0.86 - 1.14  1.07 1.07 1.03 1.08   PTT Latest Ref Range: 22 - 37 sec 31 31   34   Fibrinogen Latest Ref Range: 200 - 420 mg/dL 272 291       Factor 2 Assay Latest Ref Range: 60 - 140 % 99 101       Factor 7 Assay Latest Ref Range: 50 - 129 % 77         Factor 8 Assay Latest Ref Range: 55 - 200 % 111 116       von Willebrand Antigen Latest Ref Range: 50 - 200 %   105       von Willebrand Factor Activity Latest Ref Range: 50 - 180 %   121       Factor 11 Assay Latest Ref Range: 65 - 150 % 52 (L) 51 (L)   42 (L)   Antithrombin III Chromogenic Latest Ref Range: 85 - 135 % 69 (L) 65 (L)   60 (L)   Prot C Chromogenic Latest Ref Range: 70 - 170 % 87 78   76   Protein S Free Latest Ref Range: 55 - 125 % 48 (L) 42 (L)         Assessment/Plan:  In summary, Sherly Ramires is a 29 year old female that has a 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 There is some suggestion in the literature that patients with this disorder are hypercoagulable, however, Sherly herself does not appear to be hypercoagulable or hypocoagulable at this time. She remains without any history of thrombosis or significant hemorrhagic diatheses, event despite having undergone multiple significant procedures in past. At this time, I do not recommend any intervention for her. In  the future, as she ages, we may consider DVT prophylaxis following surgical procedures. Specific recommendations to be made on a case by case basis. Today, we will repeat her labs as part of routine monitoring. Results will be communicated via M/A-COMhart per care team request.    She can follow-up with hematology every 2 years. May have her see Dr. Hoskins/MD every other appointment.    In the interim, she should out if any bleeding/clotting concerns arise, and prior to major surgeries.    30 minutes spent on the date of the encounter doing chart review, history and exam, documentation and further activities per the note.       Day Buckner PA-C, [facility]  [facility]  [address], [address], [place] [address]  Main: [phone], Fax: [phone]

## 2025-01-04 PROCEDURE — S9342 HIT ENTERAL PUMP DIEM: HCPCS

## 2025-01-05 PROCEDURE — S9342 HIT ENTERAL PUMP DIEM: HCPCS

## 2025-01-06 ENCOUNTER — OFFICE VISIT (OUTPATIENT)
Dept: HEMATOLOGY | Facility: CLINIC | Age: 30
End: 2025-01-06
Attending: PHYSICIAN ASSISTANT
Payer: COMMERCIAL

## 2025-01-06 VITALS
BODY MASS INDEX: 18.65 KG/M2 | SYSTOLIC BLOOD PRESSURE: 118 MMHG | OXYGEN SATURATION: 97 % | WEIGHT: 80.6 LBS | HEIGHT: 55 IN | DIASTOLIC BLOOD PRESSURE: 84 MMHG | HEART RATE: 100 BPM | TEMPERATURE: 98.1 F

## 2025-01-06 DIAGNOSIS — D68.9 COAGULOPATHY: Primary | ICD-10-CM

## 2025-01-06 LAB
APTT PPP: 31 SECONDS (ref 22–38)
ERYTHROCYTE [DISTWIDTH] IN BLOOD BY AUTOMATED COUNT: 13.3 % (ref 10–15)
HCT VFR BLD AUTO: 40.7 % (ref 35–47)
HGB BLD-MCNC: 13.2 G/DL (ref 11.7–15.7)
INR PPP: 1.03 (ref 0.85–1.15)
MCH RBC QN AUTO: 30.1 PG (ref 26.5–33)
MCHC RBC AUTO-ENTMCNC: 32.4 G/DL (ref 31.5–36.5)
MCV RBC AUTO: 93 FL (ref 78–100)
PLATELET # BLD AUTO: 121 10E3/UL (ref 150–450)
RBC # BLD AUTO: 4.39 10E6/UL (ref 3.8–5.2)
WBC # BLD AUTO: 4.3 10E3/UL (ref 4–11)

## 2025-01-06 PROCEDURE — S9342 HIT ENTERAL PUMP DIEM: HCPCS

## 2025-01-06 PROCEDURE — 85306 CLOT INHIBIT PROT S FREE: CPT | Performed by: PHYSICIAN ASSISTANT

## 2025-01-06 PROCEDURE — 85250 CLOT FACTOR IX PTC/CHRSTMAS: CPT | Performed by: PHYSICIAN ASSISTANT

## 2025-01-06 PROCEDURE — 85303 CLOT INHIBIT PROT C ACTIVITY: CPT | Performed by: PHYSICIAN ASSISTANT

## 2025-01-06 PROCEDURE — 85270 CLOT FACTOR XI PTA: CPT | Performed by: PHYSICIAN ASSISTANT

## 2025-01-06 PROCEDURE — 85730 THROMBOPLASTIN TIME PARTIAL: CPT | Performed by: PHYSICIAN ASSISTANT

## 2025-01-06 PROCEDURE — 99203 OFFICE O/P NEW LOW 30 MIN: CPT | Performed by: PHYSICIAN ASSISTANT

## 2025-01-06 PROCEDURE — G0463 HOSPITAL OUTPT CLINIC VISIT: HCPCS | Performed by: PHYSICIAN ASSISTANT

## 2025-01-06 PROCEDURE — 36415 COLL VENOUS BLD VENIPUNCTURE: CPT | Performed by: PHYSICIAN ASSISTANT

## 2025-01-06 PROCEDURE — 85027 COMPLETE CBC AUTOMATED: CPT | Performed by: PHYSICIAN ASSISTANT

## 2025-01-06 PROCEDURE — 85610 PROTHROMBIN TIME: CPT | Performed by: PHYSICIAN ASSISTANT

## 2025-01-06 PROCEDURE — 85300 ANTITHROMBIN III ACTIVITY: CPT | Performed by: PHYSICIAN ASSISTANT

## 2025-01-06 NOTE — PATIENT INSTRUCTIONS
[facility]  [facility]  [address], [address], [place] [address]  Main: [phone], Fax: [phone]      -Routine labs today.   -Follow-up in 2 years.  -Contact us if there are any bleeding or clotting concerns and prior to major surgeries.

## 2025-01-07 LAB
AT III ACT/NOR PPP CHRO: 60 % (ref 85–135)
FACT IX ACT/NOR PPP: 109 %
FACT XI ACT/NOR PPP: 42 % (ref 65–150)

## 2025-01-07 PROCEDURE — S9342 HIT ENTERAL PUMP DIEM: HCPCS

## 2025-01-08 PROCEDURE — S9342 HIT ENTERAL PUMP DIEM: HCPCS

## 2025-01-09 LAB
PROT C ACT/NOR PPP CHRO: 78 % (ref 70–170)
PROT S FREE AG ACT/NOR PPP IA: 58 % (ref 55–125)

## 2025-01-09 PROCEDURE — S9342 HIT ENTERAL PUMP DIEM: HCPCS

## 2025-01-10 PROCEDURE — S9342 HIT ENTERAL PUMP DIEM: HCPCS

## 2025-01-11 PROCEDURE — S9342 HIT ENTERAL PUMP DIEM: HCPCS

## 2025-01-12 PROCEDURE — S9342 HIT ENTERAL PUMP DIEM: HCPCS

## 2025-01-13 PROCEDURE — S9342 HIT ENTERAL PUMP DIEM: HCPCS

## 2025-01-14 PROCEDURE — S9342 HIT ENTERAL PUMP DIEM: HCPCS

## 2025-01-15 PROCEDURE — S9342 HIT ENTERAL PUMP DIEM: HCPCS

## 2025-01-16 PROCEDURE — S9342 HIT ENTERAL PUMP DIEM: HCPCS

## 2025-01-17 ENCOUNTER — OFFICE VISIT (OUTPATIENT)
Dept: PEDIATRICS | Facility: CLINIC | Age: 30
End: 2025-01-17
Attending: PEDIATRICS
Payer: COMMERCIAL

## 2025-01-17 ENCOUNTER — HOME INFUSION (OUTPATIENT)
Dept: HOME HEALTH SERVICES | Facility: HOME HEALTH | Age: 30
End: 2025-01-17
Payer: COMMERCIAL

## 2025-01-17 VITALS
HEART RATE: 102 BPM | DIASTOLIC BLOOD PRESSURE: 68 MMHG | OXYGEN SATURATION: 94 % | BODY MASS INDEX: 18.67 KG/M2 | WEIGHT: 80.69 LBS | SYSTOLIC BLOOD PRESSURE: 109 MMHG | HEIGHT: 55 IN

## 2025-01-17 DIAGNOSIS — L03.012 CELLULITIS OF FINGER OF LEFT HAND: ICD-10-CM

## 2025-01-17 DIAGNOSIS — E74.89: ICD-10-CM

## 2025-01-17 DIAGNOSIS — E23.0 GHD (GROWTH HORMONE DEFICIENCY): ICD-10-CM

## 2025-01-17 DIAGNOSIS — E74.89 CDG (CONGENITAL DISORDER OF GLYCOSYLATION): Primary | ICD-10-CM

## 2025-01-17 LAB
ALBUMIN SERPL BCG-MCNC: 3.4 G/DL (ref 3.5–5.2)
ALP SERPL-CCNC: 98 U/L (ref 40–150)
ALT SERPL W P-5'-P-CCNC: 32 U/L (ref 0–50)
ANION GAP SERPL CALCULATED.3IONS-SCNC: 7 MMOL/L (ref 7–15)
AST SERPL W P-5'-P-CCNC: 37 U/L (ref 0–45)
BILIRUB SERPL-MCNC: 0.2 MG/DL
BUN SERPL-MCNC: 13.4 MG/DL (ref 6–20)
CALCIUM SERPL-MCNC: 8.9 MG/DL (ref 8.8–10.4)
CHLORIDE SERPL-SCNC: 101 MMOL/L (ref 98–107)
CORTIS SERPL-MCNC: 2.8 UG/DL
CREAT SERPL-MCNC: 0.31 MG/DL (ref 0.51–0.95)
EGFRCR SERPLBLD CKD-EPI 2021: >90 ML/MIN/1.73M2
GLUCOSE SERPL-MCNC: 78 MG/DL (ref 70–99)
HCO3 SERPL-SCNC: 28 MMOL/L (ref 22–29)
POTASSIUM SERPL-SCNC: 4.1 MMOL/L (ref 3.4–5.3)
PROT SERPL-MCNC: 7 G/DL (ref 6.4–8.3)
SODIUM SERPL-SCNC: 136 MMOL/L (ref 135–145)
T4 FREE SERPL-MCNC: 1.02 NG/DL (ref 0.9–1.7)
TSH SERPL DL<=0.005 MIU/L-ACNC: 4.03 UIU/ML (ref 0.3–4.2)

## 2025-01-17 PROCEDURE — 84439 ASSAY OF FREE THYROXINE: CPT | Performed by: PEDIATRICS

## 2025-01-17 PROCEDURE — G0463 HOSPITAL OUTPT CLINIC VISIT: HCPCS | Performed by: PEDIATRICS

## 2025-01-17 PROCEDURE — 36415 COLL VENOUS BLD VENIPUNCTURE: CPT | Performed by: PEDIATRICS

## 2025-01-17 PROCEDURE — S9342 HIT ENTERAL PUMP DIEM: HCPCS

## 2025-01-17 PROCEDURE — 82523 COLLAGEN CROSSLINKS: CPT | Performed by: PEDIATRICS

## 2025-01-17 PROCEDURE — 84305 ASSAY OF SOMATOMEDIN: CPT | Performed by: PEDIATRICS

## 2025-01-17 PROCEDURE — 84132 ASSAY OF SERUM POTASSIUM: CPT | Performed by: PEDIATRICS

## 2025-01-17 PROCEDURE — 82533 TOTAL CORTISOL: CPT | Performed by: PEDIATRICS

## 2025-01-17 PROCEDURE — 82024 ASSAY OF ACTH: CPT | Performed by: PEDIATRICS

## 2025-01-17 PROCEDURE — 84443 ASSAY THYROID STIM HORMONE: CPT | Performed by: PEDIATRICS

## 2025-01-17 PROCEDURE — 82397 CHEMILUMINESCENT ASSAY: CPT | Performed by: PEDIATRICS

## 2025-01-17 PROCEDURE — 82947 ASSAY GLUCOSE BLOOD QUANT: CPT | Performed by: PEDIATRICS

## 2025-01-17 PROCEDURE — 84080 ASSAY ALKALINE PHOSPHATASES: CPT | Performed by: PEDIATRICS

## 2025-01-17 PROCEDURE — 83937 ASSAY OF OSTEOCALCIN: CPT | Performed by: PEDIATRICS

## 2025-01-17 PROCEDURE — 84207 ASSAY OF VITAMIN B-6: CPT | Performed by: PEDIATRICS

## 2025-01-17 RX ORDER — CEPHALEXIN 250 MG/5ML
POWDER, FOR SUSPENSION ORAL
Qty: 400 ML | Refills: 0 | Status: SHIPPED | OUTPATIENT
Start: 2025-01-17

## 2025-01-17 NOTE — PROGRESS NOTES
"Pediatric Endocrinology Follow-up Consultation    Patient: Sherly Ramires MRN# [id number]   YOB: 1995 Age: 29 year old   Date of Visit: Jan 17, 2025    Dear  Referred Self:    I had the pleasure of seeing your patient, Sherly Ramires in the Pediatric Endocrinology Clinic, [facility], on 01/17/25 for a follow-up consultation of congenital disorder of glycosylation.            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      History was obtained from Anna's mother, Julianne, and Christiana from Anna's group home.     They are interested in discussing a routine echocardiogram, seeing a dietician about current weight, 2 hr glucose and a 10 day (for hx of hypoglycemia), new permission granting hospitalization at [facility], wound healing, the RSV vaccine, and labs from 1/6/25.     Anna's support mother and Christiana endorse that since her previous visit she has been doing relatively well. No hospitalizations or ED visits. Her strength is definitely weaker than 8 yrs ago, before entering her group home, but has been stable for the past 4 years.     They also endorse that she gets wounds on her hands/wrists after  " bumping them. Over the years, these have been taking longer to heal than usual. The same was expressed at her last appoint, 2 years ago, and very slowly they are seeing slower wound healing. Currently she has a wound on the back of her left hand that was recently noticed. It was more red yesterday with some of this spreading into her finger. The redness has reduced today but there is still a little bit of discharge. She has had this in the past but this seems to be the worst episode.     Fracture history: no recent fracture history.  Recent bone scan reveals very high fracture risk diffusely    Ambulation: No ambulatory     Diet: at least 4 feedings 8,noon,4,and 7. If she wants to eat orally she can have one 250 calorie meal by mouth/day. She is unable to tolerate more than that. She uses Pediatric Compleat 1.5kcal/ml, Half can with each feeding. Full can from 7pm-630am.  Her favorite foods include chicken quesadilla, tuna melts, chicken nuggets, chips, cookies. It can be difficult for her to swallow thin liquids but can swallow food as long as its not tough or hard     Additional supplementation: D mannose, Magnesium citrate, Probiotic.     Voiding normally. Stools in the past 1.5 months have been less formed and more liquid/pasty. Also, more frequently she is requesting pedialyte over her formula in the setting of expressing abdominal discomfort. She has no obvious discomfort after meals. No obvious diarrhea, distension, blood. Tolerating enemas Monday, Wednesday, Friday and taking a full cap of miralax once per day. Julianne and Christiana do endorse that she sometimes can form habits when asking for things in this way but it does not explain her change in stool consistency.     She was recently seen with hematology. Labs were obtained and unremarkable. No new medications started. Plan is to follow every 2 years and see  every other visit. Seen by Day Buckner PA-C this visit.          Social History:     Social  History     Social History Narrative    Anna currently lives in a group home.       Social history was reviewed and is unchanged. Refer to the initial note.         Family History:     Family History   Problem Relation Age of Onset    Glaucoma Mother         no medications or surg to date    Glaucoma Maternal Grandmother         s/p surg, decreased [facility]     Amblyopia No family hx of     Strabismus No family hx of        Family history was reviewed and is unchanged. Refer to the initial note.       Allergies:     Allergies   Allergen Reactions    Ibuprofen GI Disturbance     Pt is able to have motrin with food but not on an empty stomache.   Other reaction(s): Gastrointestinal    Lorazepam Other (See Comments)     Paradoxical reaction  Paradoxical alertness    Pseudoephedrine     Latex Rash          Medications:     Current Outpatient Medications   Medication Sig Dispense Refill    cephALEXin (KEFLEX) 250 MG/5ML suspension 500 mg every four times a day. 400 mL 0    acetaminophen (TYLENOL) 160 MG/5ML solution 480 mg by Per G Tube route every 4 hours as needed for fever or mild pain       ARIPiprazole (ABILIFY) 1 MG/ML SOLN 7.5 mg by Per G Tube route daily       artificial saliva (BIOTENE DRY MOUTHWASH) LIQD liquid Swish and spit in mouth 2 times daily      bacitracin ointment Apply topically 2 times daily as needed for wound care 14 g 0    carbamide peroxide (DEBROX) 6.5 % otic solution INSTILL FIVE DROPS IN BOTH EARS ONCE DAILY 4 DAYS EACH MONTH. 1      Compleat Peptide 1.5 PO LIQD Place 625 mLs into G tube daily. Infuse via pump -Daytime - 1/2 carton formula and 360ml water at 8am, 12noon and 4pm given at 400ml/hr.  Overnight feeds - 1 carton plus 235ml water starting at 7PM at 40ml/hr x 12 hours  Water flush: 60ml water flush before and after feedings. 74692 mL 10    D-Mannose POWD 1 Scoop by Per G Tube route 2 times daily mixed with water      estradiol (ESTRACE) 0.5 MG tablet 0.5 mg daily      levETIRAcetam  "(KEPPRA) 100 MG/ML oral solution GIVE 7.5 ML (750 MG) PER G-TUBE EVERY MORNING; GIVE 7.5 ML (750 MG) PER G-TUBE EVERY EVENING. 450 mL 11    loratadine (CLARITIN) 10 MG tablet 10 mg by Per G Tube route daily       Magnesium Citrate 100 MG TABS 300 mg by Per G Tube route daily      Nutritional Supplements (COMPLEAT PEDIATRIC PO)       ondansetron (ZOFRAN) 4 MG/5ML solution Take 5 mLs (4 mg) by mouth every 6 hours as needed for nausea or vomiting 90 mL 0    ORDER FOR DME Equipment being ordered: Other: chest strap for her tilt in space manual wheel chair  Treatment Diagnosis: For safe transportation to home secondary to poor sitting balance in upright. CDG type Ia, seizures, and G-tube dependence. 1 Device 0    oxybutynin (DITROPAN) 5 MG/5ML syrup       polyethylene glycol (MIRALAX/GLYCOLAX) packet 1 packet by Per G Tube route daily 8.5g daily      sertraline (ZOLOFT) 20 MG/ML (HIGH CONC) solution 200 mg by Gastronomy tube route daily       traZODone (DESYREL) 50 MG tablet Crush one tablet and administer via G-tube 30 minutes prior to bedtime daily      UNABLE TO FIND MEDICATION NAME: Rescue Remedy Herbal Supplement - Used twice daily as needed.             Review of Systems:   Gen: negative  Eye: negative  ENT: negative  Pulmonary:  negative  Cardio: negative  Gastrointestinal: see hpi, changes in stool pattern and apparent abdominal discomfort   Hematologic: negative  Genitourinary: negative  Musculoskeletal: muscle weakness stable for the past 4 years   Psychiatric: negative  Neurologic: negative  Skin: wound on left hand that she picks, some redness and discharge.   Endocrine: see HPI.       Physical Exam:   Blood pressure 109/68, pulse 102, height 1.397 m (4' 7\"), weight 36.6 kg (80 lb 11 oz), SpO2 94%, not currently breastfeeding.  Growth %ile SmartLinks can only be used for patients less than 20 years old.  Height: 4' 7\", Facility age limit for growth %haja is 20 years.  Weight: 80 lbs 11.01 oz, Facility age " limit for growth %haja is 20 years.  BMI: Body mass index is 18.75 kg/m . Facility age limit for growth %haja is 20 years.        Constitutional: awake, alert, interactive with questions, playing on ipad, listening to 12 days of eliana, no apparent distress  Eyes: Lids and lashes normal, sclera clear, conjunctiva normal  ENT: Normocephalic, without obvious abnormality, external ears without lesions  Neck: Supple, symmetrical, trachea midline, thyroid symmetric, not enlarged and no tenderness  Hematologic / Lymphatic: no cervical lymphadenopathy  Lungs: No increased work of breathing, clear to auscultation bilaterally with good air entry.  Cardiovascular: Tachycardic rate and normal rhythm, no murmurs.  Abdomen: Large belly with G-tube present. normal bowel sounds, soft, non-distended, non-tender, no masses palpated, no hepatosplenomegaly  Neurologic: Awake, alert, interactive to questions, mother and care taker understand how she makes needs known and expression, using ipad with headphones   Skin: See picture. Superficial wound with mild amount of discharge on yellow to clear discharge on bandaid.         Data results:     Component      Latest Ref Rng 1/17/2025  11:16 AM   Sodium      135 - 145 mmol/L 136    Potassium      3.4 - 5.3 mmol/L 4.1    Carbon Dioxide (CO2)      22 - 29 mmol/L 28    Anion Gap      7 - 15 mmol/L 7    Urea Nitrogen      6.0 - 20.0 mg/dL 13.4    Creatinine      0.51 - 0.95 mg/dL 0.31 (L)    GFR Estimate      >60 mL/min/1.73m2 >90    Calcium      8.8 - 10.4 mg/dL 8.9    Chloride      98 - 107 mmol/L 101    Glucose      70 - 99 mg/dL 78    Alkaline Phosphatase      40 - 150 U/L 98    AST      0 - 45 U/L 37    ALT      0 - 50 U/L 32    Protein Total      6.4 - 8.3 g/dL 7.0    Albumin      3.5 - 5.2 g/dL 3.4 (L)    Bilirubin Total      <=1.2 mg/dL 0.2    Insulin Growth Factor 1 (External)      63 - 373 ng/mL 102    Insulin Growth Factor I SD Score (External)      -2.0 - 2.0 SD -1.0    IGF  Binding Protein3      3.5 - 7.6 ug/mL 3.7    IGF Binding Protein 3 SD Score -1.9    T4 Free      0.90 - 1.70 ng/dL 1.02    TSH      0.30 - 4.20 uIU/mL 4.03    Cortisol Serum        ug/dL 2.8    Adrenal Corticotropin      <47 pg/mL 126 (H)    Vitamin B6      20.0 - 125.0 nmol/L 51.8    Osteocalcin by ECIA      8 - 36 ng/mL 8    Bone Spec Alk Phosphatase      ug/L 18.2    C-Telopept B-X-Linked      pg/mL 102       Legend:  (L) Low  (H) High    Bone scan results from  12/6/2024:  Impression  The most negative and valid Z-score of -5.2 at the level of the left femoral neck is BELOW normal range according to WHO criteria for premenopausal females and men age less than 50.     Results      Left Femoral  neck  Z-score -5.2 , BMD 0.201 g/cm2.  T-score -6.0  Left Total hip  Z-score -5.1 , BMD 0.266 g/cm2.  T-score -5.9     Right Femoral neck  Z-score -3.9, BMD  0.378 g/cm2.  T-score -4.8  Right Total hip  Z-score -3.5 , BMD  0.466 g/cm2.  T-score -4.3     Wrist   Z-score -3.7 , BMD 0.441 g/cm2.   T-score -3.7     Interval change  No prior study available for comparison.     Fracture risk   Fracture risk is high or very high considering bone density being in the osteoporotic range  The risk of osteoporotic fracture increases approximately 2-fold for each 1.0 SD decrease in T-score.  Low bone density is not the only risk factor for fracture; consider factors such as patient's age, fall risk, injury risk, previous osteoporotic fracture, family history of osteoporosis, etc.       Repeat  Recommend repeat DXA in 2 years.  For patients eligible for Medicare, routine testing is allowed once every 2 years.      Clinical correlation recommended             Assessment and Plan:   Sherly Ramires is a 26 year old non-ambulatory  female with history of congenital disorder of glycosylation, global cognitive delays, complex partial seizures, and ovarian failure.  We also discussed during this visit that Sherly should get the RSV  vaccine because she is at high risk for co-morbidities if she gets sick with RSV due to her underlying condition.    Cellulitis   Delayed wound healing   The wound is not obviously tender but has mild redness that improved since yesterday with scant yellow discharge. Concern for a mild cellulitis. In any other child, would like continue management with bacitracin and bandages. However, with her glycosylation disorder, delayed wound healing and immune compromise, will provide a course of keflex should it not continue to show improvement. Discussed monitoring and when to seek further help. Mother and staff understanding of the plan.     For further assistance with managing wounds will refer to wound care. There was concern in the past for diabetes, fortunately her most recent A1c in July 2024 was 4.1    She is mildly tachycardic on exam today. She apparently has a history of being mildly elevated. Given her well appearance and other factors of appropriate hydration, will continue to monitor.     Hx of Hypoglycemia  Plan is for a 2 hr OGTT and 10 Dexcom monitoring as part of a research study. A tele-visit consultation with Michelle Castro was requested.        Bone laney   DXA scan shows severe bone disease. Discussed that she is high risk for fractures. Will consider repeat in 2 years. Will order labs accordingly as below. We will also initiate Zometa infusion annually for 3 years.    Stool pattern change  Abdominal discomfort   initial caregiver and parental concern were for constipation given her history. The other options that need to be considered are ischemic events as well as any gallbladder pathology. She has a hx of stones but at this time her exam would not suggest an inflammatory process. Additionally her recent coagulopathy labs returned from earlier this month and are reassuring. Unclear at this time the cause of her intermittent discomfort and less formed stools. Recommended her team keep a journal of her  stooling patterns, intake, and expressions of discomfort. Instructions for doing this were provided. We additionally discussed the utility of finding a GI doctor that could care for her as she does not currently have one. Mother will email a potential option.     Plan:  - order routine echocardiogram and EKG, refer to cardiology as needed   - refer to dietician to discuss appropriate weight  - journal about abdominal symptoms and reach out with changes.   - look into the possibility of getting an RSV vaccine   - Wound care referral for ongoing difficulties with healing   - will additionally begin Kefllex for 10 days, 500 mg 4x/day for 10 days. Ok to stop at 7 days if doing well given the low complexity of her wound.   - Will discuss with administration about continued ability to hospitalize at [facility]  - see hematology to discuss the appropriateness of starting aspirin     Orders Placed This Encounter   Procedures    T4 free    TSH    Cortisol    Adrenal corticotropin    Vitamin B6    Osteocalcin    Phosphoethanolamine Quantitative Urine    Bone specific alk phosphatase    Insulin-Like Growth Factor 1 Ped    Igf binding protein 3    C-Telopeptide, Beta-Cross-Linked    Comprehensive metabolic panel    Wound Care Referral    EKG 12 lead - pediatric (Future)    Echocardiogram Complete       Thank you for allowing me to participate in the care of your patient.  Please do not hesitate to call with questions or concerns.    Sincerely,    Wesley George MD  Pediatrics, PGY-2  [facility]     Addendum: 2/6/2025: Review of her labs showed normal thyroid function tests. Her vitamin B6 and bone specific alkaline phosphatase were normal suggesting that she does not have hypophosphatasia due to abnormal glycosylation of the alkaline phosphatase enzyme. Her osteocalcin and c- telopeptide were low normal suggesting decreased bone turnover. Her ACTH level was elevated suggesting that she may have adrenal insufficiency.  We will recommend a low dose ACTH stimulation test to evaluate her adrenal cortisol production.      Patient  was seen in the [facility] by me, Sarah Dubon and Dr. George.  I reviewed, edited and augmented the history & repeated all key aspects of the physical exam.  I agree with Dr. George's  findings and plan of care as documented in this  note.     I have reviewed patient's past medical history, family history, social history, medications and allergies as documented in the electronic medical record.  There were no additional findings except as noted.     I spent 40 minutes of total time, before, during, and after the visit reviewing and interpreting previous labs and records, examining the patient, formulating and discussing the plan of care, ordering  Labs, reviewing resulted labs, and documenting clinical information in the electronic health record.    The longitudinal plan of care for the diagnosis(es)/condition(s) as documented were addressed during this visit. Due to the added complexity in care, I will continue to support Anna in the subsequent management and with ongoing continuity of care.      It is our pleasure to be involved in Sherly Ramires care. If you or the family has questions or concerns regarding these test results, please feel free to contact us via our Access Center at [phone].      Sincerely,    Sarah Dubon MD  Professor   Dept. of Pediatrics - Divisions of Endocrinology and Genetics & Metabolism  Dept. of Experimental & Clinical Pharmacology  [address], [facility] [place] [id number]  Ph: [phone]  Email: [email or web address]         CC  SELF, REFERRED    Copy to patient  TRINHDELIO KING ([place] [employer]AN-[employer]UST ACT JOINTLY) RYAN RAMIRES ([place] [employer]ARDIAN-[employer]UST ACT JOINTLY)  [id number] [facility] [id number]

## 2025-01-17 NOTE — LETTER
2025     Seen today: Yes    Patient:  Sherly Ramires  :   1995  MRN:     [id number]  Physician: JOSE THAKUR    Sherly Ramires was seen in endocrinology clinic for routine evaluation. Based on current stooling patterns, it would be appropriate to journal about her symptoms by documenting when:  - she expresses abdominal discomfort and the setting/time it happens in  - If the discomfort happens before or after a meal   - Her stool consistencies   - effect of orally taken foods   - changes in abdominal distension   - any other useful information that the recorder finds beneficial     Electronically signed by Wesley George MD

## 2025-01-17 NOTE — NURSING NOTE
"Chief Complaint   Patient presents with    RECHECK     RSV vaccine, skin not healing well, recent blood work, ideal weight       Vitals:    01/17/25 0755   BP: 109/68   BP Location: Right arm   Patient Position: Sitting   Cuff Size: Adult Small   Pulse: 102   SpO2: 94%   Weight: 80 lb 11 oz (36.6 kg)   Height: 4' 7\" (139.7 cm)       Abel Borjas  January 17, 2025    "

## 2025-01-17 NOTE — LETTER
"1/17/2025      RE: Sherly Ramires  [address] [facility] [address]     Dear Colleague,    Thank you for the opportunity to participate in the care of your patient, Sherly Ramires, at the [facility] at [facility]. Please see a copy of my visit note below.    Pediatric Endocrinology Follow-up Consultation    Patient: Sherly Ramires MRN# [id number]   YOB: 1995 Age: 29 year old   Date of Visit: Jan 17, 2025    Dear  Referred Self:    I had the pleasure of seeing your patient, Sherly Ramires in the Pediatric Endocrinology Clinic, [facility], on 01/17/25 for a follow-up consultation of congenital disorder of glycosylation.            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      History was obtained from Anna's mother, Julianne, and Christiana from Anna's group home.     They are interested in discussing a routine echocardiogram, seeing a dietician about current weight, 2 hr glucose and a 10 day (for hx of hypoglycemia), new permission granting hospitalization, skin breakdown, the " RSV vaccine, and labs from 1/6/25.     Anna's support mother and Christiana endorse that since her previous visit she has been doing relatively well. No hospitalizations or ED visits. Her strength is definitely weaker than 8 yrs ago, before entering her group home, but has been stable for the past 4 years.     They also endorse that she gets wounds on her hands/wrists after  bumping them. Over the years, these have been taking longer to heal than usual. The same was expressed at her last appoint, 2 years ago, and very slowly they are seeing slower wound healing. Currently, she     Strength ihas reduced since moving into group home 8 yrs. Platued over the past 4 yrs. Healing is getting worse. Picsk her skin if she gets a scratch and takes a long time to heal.  These get infected at times.     Currently has wound on left back of hand at the base of first finger. Time it takes to heal is taking longer despite still picking the same amount.     Has been relatively healthy.     Fracture history: ***    Ambulation: ***    Diet: at least 4 feedings 8,noon,4,and 7. If she wants to eat she can eat one 250 calorie meal by mouth. She is unable to tolerate more than that. Pediatric Compleat. Half can with each feeding. Full can from 7pm-630am.  1.5kcal/ml    Chuicken casadia would  be her favorite. Tuna melts. Chicken nuggest. Snacks. Cheetoes, cookies. Can be difficult for her to swallow thin liquids but swallowing food as long as its not tough it or hard it is ok for her.     Nutritionist: CECIL mannose. Magnesium citrate. Probiotic.     Most of the time her stool is liquid. Not diarrhea. Is she constipated.   Enemas three times a week. Usually no formed stool.   Asking more for pedialyte.  In total 1.5 months. About 1 month of asking for pedialyte instead of formula due to expressed abdominal discomfort. Sometimes though can be a habbit.  Pasty lliquid stools.     Palliative?  Aspirn     Calcium intake: ***    Vitamin D intake:  ***    Steroid treatment: ***    History was obtained from ***.       Social History:     Social History     Social History Narrative    Anna currently lives in a group home.       Social history was reviewed and is unchanged. Refer to the initial note.       Family History:     Family History   Problem Relation Age of Onset     Glaucoma Mother         no medications or surg to date     Glaucoma Maternal Grandmother         s/p surg, decreased [place]      Amblyopia No family hx of      Strabismus No family hx of        Family history was reviewed and is unchanged. Refer to the initial note.       Allergies:     Allergies   Allergen Reactions     Ibuprofen GI Disturbance     Pt is able to have motrin with food but not on an empty stomache.   Other reaction(s): Gastrointestinal     Lorazepam Other (See Comments)     Paradoxical reaction  Paradoxical alertness     Pseudoephedrine      Latex Rash          Medications:     Current Outpatient Medications   Medication Sig Dispense Refill     acetaminophen (TYLENOL) 160 MG/5ML solution 480 mg by Per G Tube route every 4 hours as needed for fever or mild pain        ARIPiprazole (ABILIFY) 1 MG/ML SOLN 7.5 mg by Per G Tube route daily        artificial saliva (BIOTENE DRY MOUTHWASH) LIQD liquid Swish and spit in mouth 2 times daily       bacitracin ointment Apply topically 2 times daily as needed for wound care 14 g 0     carbamide peroxide (DEBROX) 6.5 % otic solution INSTILL FIVE DROPS IN BOTH EARS ONCE DAILY 4 DAYS EACH MONTH. 1       Compleat Peptide 1.5 PO LIQD Place 625 mLs into G tube daily. Infuse via pump -Daytime - 1/2 carton formula and 360ml water at 8am, 12noon and 4pm given at 400ml/hr.  Overnight feeds - 1 carton plus 235ml water starting at 7PM at 40ml/hr x 12 hours  Water flush: 60ml water flush before and after feedings. 20221 mL 10     D-Mannose POWD 1 Scoop by Per G Tube route 2 times daily mixed with water       estradiol (ESTRACE) 0.5 MG tablet 0.5 mg daily   "     levETIRAcetam (KEPPRA) 100 MG/ML oral solution GIVE 7.5 ML (750 MG) PER G-TUBE EVERY MORNING; GIVE 7.5 ML (750 MG) PER G-TUBE EVERY EVENING. 450 mL 11     loratadine (CLARITIN) 10 MG tablet 10 mg by Per G Tube route daily        Magnesium Citrate 100 MG TABS 300 mg by Per G Tube route daily       Nutritional Supplements (COMPLEAT PEDIATRIC PO)        ondansetron (ZOFRAN) 4 MG/5ML solution Take 5 mLs (4 mg) by mouth every 6 hours as needed for nausea or vomiting 90 mL 0     ORDER FOR DME Equipment being ordered: Other: chest strap for her tilt in space manual wheel chair  Treatment Diagnosis: For safe transportation to home secondary to poor sitting balance in upright. CDG type Ia, seizures, and G-tube dependence. 1 Device 0     oxybutynin (DITROPAN) 5 MG/5ML syrup        polyethylene glycol (MIRALAX/GLYCOLAX) packet 1 packet by Per G Tube route daily 8.5g daily       sertraline (ZOLOFT) 20 MG/ML (HIGH CONC) solution 200 mg by Gastronomy tube route daily        traZODone (DESYREL) 50 MG tablet Crush one tablet and administer via G-tube 30 minutes prior to bedtime daily       UNABLE TO FIND MEDICATION NAME: Rescue Remedy Herbal Supplement - Used twice daily as needed.             Review of Systems:   Gen: negative  Eye: negative  ENT: negative  Pulmonary:  negative  Cardio: negative  Gastrointestinal: see hpi, changes in stool pattern and apparent abdominal discomfort   Hematologic: negative  Genitourinary: negative  Musculoskeletal: muscle weakness stable for the past 4 years   Psychiatric: negative  Neurologic: negative  Skin: wound on left hand that she picks, some redness and discharge.   Endocrine: see HPI.       Physical Exam:   Blood pressure 109/68, pulse 102, height 1.397 m (4' 7\"), weight 36.6 kg (80 lb 11 oz), SpO2 94%, not currently breastfeeding.  Growth %ile SmartLinks can only be used for patients less than 20 years old.  Height: 4' 7\", Facility age limit for growth %haja is 20 years.  Weight: 80 " lbs 11.01 oz, Facility age limit for growth %haja is 20 years.  BMI: Body mass index is 18.75 kg/m . Facility age limit for growth %haja is 20 years.        Constitutional: awake, alert, interactive with questions, playing on ipad, listening to 12 days of eliana, no apparent distress  Eyes: Lids and lashes normal, sclera clear, conjunctiva normal  ENT: Normocephalic, without obvious abnormality, external ears without lesions  Neck: Supple, symmetrical, trachea midline, thyroid symmetric, not enlarged and no tenderness  Hematologic / Lymphatic: no cervical lymphadenopathy  Lungs: No increased work of breathing, clear to auscultation bilaterally with good air entry.  Cardiovascular: Regular rate and rhythm, no murmurs.  Abdomen: No scars, normal bowel sounds, soft, non-distended, non-tender, no masses palpated, no hepatosplenomegaly  Genitourinary:  Breasts ***  Genitalia ***  Pubic hair: Aguila stage ***  Neurologic: Awake, alert, oriented to name, place and time.  Neuropsychiatric: General: normal  Skin: no lesions        Laboratory results:   No results found for any visits on 01/17/25.       Assessment and Plan:   ***        Plan:  - order 2 hr and 10 day blood glucose tests   - order routine echocardiogram   - refer to dietician to discuss appropriate weight  - journal about abdominal symptoms and reach out with changes. W  - look into the possibility of getting an RSV vaccine   - Wound care referral for ongoing difficulties with healing   - will additionally begin Kefllex for 10 days, 500 mg 4x/day for 10 days. Ok to stop at 7 days if doing well given the low complexity of her wound.   - Will discuss with administration about continued ability to hospitalize at [facility]  - see *** of hematology to discuss the appropriateness of starting aspirin   -     No orders of the defined types were placed in this encounter.      A return evaluation will be scheduled for: 6 months***    Thank you for allowing me to  "participate in the care of your patient.  Please do not hesitate to call with questions or concerns.    Sincerely,          CC  SELF, REFERRED    Copy to patient  JULIANNE VASQUES ([place] GUARIDAN-MUST ACT JOINTLY) RYAN RAMIRES ([place] GUARDIAN-MUST ACT JOINTLY)  [address] [facility] [address]      Pediatric Endocrinology Follow-up Consultation    Patient: Sherly Ramires MRN# [id number]   YOB: 1995 Age: 29 year old   Date of Visit: Jan 17, 2025    Dear  Referred Self:    I had the pleasure of seeing your patient, Sherly Ramires in the Pediatric Endocrinology Clinic, [facility], on 01/17/25 for a follow-up consultation of congenital disorder of glycosylation.            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      History was obtained from Anna's mother, Julianne, and Christiana from Anna's group home.     They are interested in discussing a routine echocardiogram, seeing a dietician about current weight, 2 hr glucose and a 10 day (for hx of hypoglycemia), new permission granting hospitalization at [facility], wound healing, the RSV vaccine, and labs from 1/6/25.     Anna's " support mother and Christiana endorse that since her previous visit she has been doing relatively well. No hospitalizations or ED visits. Her strength is definitely weaker than 8 yrs ago, before entering her group home, but has been stable for the past 4 years.     They also endorse that she gets wounds on her hands/wrists after  bumping them. Over the years, these have been taking longer to heal than usual. The same was expressed at her last appoint, 2 years ago, and very slowly they are seeing slower wound healing. Currently she has a wound on the back of her left hand that was recently noticed. It was more red yesterday with some of this spreading into her finger. The redness has reduced today but there is still a little bit of discharge.     Strength ihas reduced since moving into group home 8 yrs. Platued over the past 4 yrs. Healing is getting worse. Picsk her skin if she gets a scratch and takes a long time to heal.  These get infected at times.     Currently has wound on left back of hand at the base of first finger. Time it takes to heal is taking longer despite still picking the same amount.     Has been relatively healthy.     Fracture history: ***    Ambulation: ***    Diet: at least 4 feedings 8,noon,4,and 7. If she wants to eat she can eat one 250 calorie meal by mouth. She is unable to tolerate more than that. Pediatric Compleat. Half can with each feeding. Full can from 7pm-630am.  1.5kcal/ml    Chuicken casadia would  be her favorite. Tuna melts. Chicken nuggest. Snacks. Cheetoes, cookies. Can be difficult for her to swallow thin liquids but swallowing food as long as its not tough it or hard it is ok for her.     Nutritionist: CECIL mannose. Magnesium citrate. Probiotic.     Most of the time her stool is liquid. Not diarrhea. Is she constipated.   Enemas three times a week. Usually no formed stool.   Asking more for pedialyte.  In total 1.5 months. About 1 month of asking for pedialyte instead of formula  due to expressed abdominal discomfort. Sometimes though can be a habbit.  Pasty lliquid stools.     Palliative?  Aspirn     Calcium intake: ***    Vitamin D intake: ***    Steroid treatment: ***    History was obtained from ***.       Social History:     Social History     Social History Narrative    Anna currently lives in a group home.       Social history was reviewed and is unchanged. Refer to the initial note.       Family History:     Family History   Problem Relation Age of Onset     Glaucoma Mother         no medications or surg to date     Glaucoma Maternal Grandmother         s/p surg, decreased [place]      Amblyopia No family hx of      Strabismus No family hx of        Family history was reviewed and is unchanged. Refer to the initial note.       Allergies:     Allergies   Allergen Reactions     Ibuprofen GI Disturbance     Pt is able to have motrin with food but not on an empty stomache.   Other reaction(s): Gastrointestinal     Lorazepam Other (See Comments)     Paradoxical reaction  Paradoxical alertness     Pseudoephedrine      Latex Rash          Medications:     Current Outpatient Medications   Medication Sig Dispense Refill     acetaminophen (TYLENOL) 160 MG/5ML solution 480 mg by Per G Tube route every 4 hours as needed for fever or mild pain        ARIPiprazole (ABILIFY) 1 MG/ML SOLN 7.5 mg by Per G Tube route daily        artificial saliva (BIOTENE DRY MOUTHWASH) LIQD liquid Swish and spit in mouth 2 times daily       bacitracin ointment Apply topically 2 times daily as needed for wound care 14 g 0     carbamide peroxide (DEBROX) 6.5 % otic solution INSTILL FIVE DROPS IN BOTH EARS ONCE DAILY 4 DAYS EACH MONTH. 1       Compleat Peptide 1.5 PO LIQD Place 625 mLs into G tube daily. Infuse via pump -Daytime - 1/2 carton formula and 360ml water at 8am, 12noon and 4pm given at 400ml/hr.  Overnight feeds - 1 carton plus 235ml water starting at 7PM at 40ml/hr x 12 hours  Water flush: 60ml water flush  "before and after feedings. 27029 mL 10     D-Mannose POWD 1 Scoop by Per G Tube route 2 times daily mixed with water       estradiol (ESTRACE) 0.5 MG tablet 0.5 mg daily       levETIRAcetam (KEPPRA) 100 MG/ML oral solution GIVE 7.5 ML (750 MG) PER G-TUBE EVERY MORNING; GIVE 7.5 ML (750 MG) PER G-TUBE EVERY EVENING. 450 mL 11     loratadine (CLARITIN) 10 MG tablet 10 mg by Per G Tube route daily        Magnesium Citrate 100 MG TABS 300 mg by Per G Tube route daily       Nutritional Supplements (COMPLEAT PEDIATRIC PO)        ondansetron (ZOFRAN) 4 MG/5ML solution Take 5 mLs (4 mg) by mouth every 6 hours as needed for nausea or vomiting 90 mL 0     ORDER FOR DME Equipment being ordered: Other: chest strap for her tilt in space manual wheel chair  Treatment Diagnosis: For safe transportation to home secondary to poor sitting balance in upright. CDG type Ia, seizures, and G-tube dependence. 1 Device 0     oxybutynin (DITROPAN) 5 MG/5ML syrup        polyethylene glycol (MIRALAX/GLYCOLAX) packet 1 packet by Per G Tube route daily 8.5g daily       sertraline (ZOLOFT) 20 MG/ML (HIGH CONC) solution 200 mg by Gastronomy tube route daily        traZODone (DESYREL) 50 MG tablet Crush one tablet and administer via G-tube 30 minutes prior to bedtime daily       UNABLE TO FIND MEDICATION NAME: Rescue Remedy Herbal Supplement - Used twice daily as needed.             Review of Systems:   Gen: negative  Eye: negative  ENT: negative  Pulmonary:  negative  Cardio: negative  Gastrointestinal: see hpi, changes in stool pattern and apparent abdominal discomfort   Hematologic: negative  Genitourinary: negative  Musculoskeletal: muscle weakness stable for the past 4 years   Psychiatric: negative  Neurologic: negative  Skin: wound on left hand that she picks, some redness and discharge.   Endocrine: see HPI.       Physical Exam:   Blood pressure 109/68, pulse 102, height 1.397 m (4' 7\"), weight 36.6 kg (80 lb 11 oz), SpO2 94%, not currently " "breastfeeding.  Growth %ile SmartLinks can only be used for patients less than 20 years old.  Height: 4' 7\", Facility age limit for growth %haja is 20 years.  Weight: 80 lbs 11.01 oz, Facility age limit for growth %haja is 20 years.  BMI: Body mass index is 18.75 kg/m . Facility age limit for growth %haja is 20 years.        Constitutional: awake, alert, interactive with questions, playing on ipad, listening to 12 days of eliana, no apparent distress  Eyes: Lids and lashes normal, sclera clear, conjunctiva normal  ENT: Normocephalic, without obvious abnormality, external ears without lesions  Neck: Supple, symmetrical, trachea midline, thyroid symmetric, not enlarged and no tenderness  Hematologic / Lymphatic: no cervical lymphadenopathy  Lungs: No increased work of breathing, clear to auscultation bilaterally with good air entry.  Cardiovascular: Regular rate and rhythm, no murmurs.  Abdomen: No scars, normal bowel sounds, soft, non-distended, non-tender, no masses palpated, no hepatosplenomegaly  Genitourinary:  Breasts ***  Genitalia ***  Pubic hair: Aguila stage ***  Neurologic: Awake, alert, oriented to name, place and time.  Neuropsychiatric: General: normal  Skin: no lesions        Laboratory results:   No results found for any visits on 01/17/25.       Assessment and Plan:   ***        Plan:  - order 2 hr and 10 day blood glucose tests   - order routine echocardiogram   - refer to dietician to discuss appropriate weight  - journal about abdominal symptoms and reach out with changes. W  - look into the possibility of getting an RSV vaccine   - Wound care referral for ongoing difficulties with healing   - will additionally begin Kefllex for 10 days, 500 mg 4x/day for 10 days. Ok to stop at 7 days if doing well given the low complexity of her wound.   - Will discuss with administration about continued ability to hospitalize at [facility]  - see *** of hematology to discuss the appropriateness of starting aspirin "   -     No orders of the defined types were placed in this encounter.      A return evaluation will be scheduled for: 6 months***    Thank you for allowing me to participate in the care of your patient.  Please do not hesitate to call with questions or concerns.    Sincerely,          CC  SELF, REFERRED    Copy to patient  DELIO VASQUES ([place] GUARIDAN-MUST ACT JOINTLY) RYAN WHITFIELD ([place] GUARDIAN-MUST ACT JOINTLY)  [address] [facility] [address]    Please do not hesitate to contact me if you have any questions/concerns.     Sincerely,       Sarah Dubon MD

## 2025-01-19 LAB
ALP BONE SERPL-MCNC: 18.2 UG/L
COLLAGEN CTX SERPL-MCNC: 102 PG/ML
OSTEOCALCIN SERPL-MCNC: 8 NG/ML

## 2025-01-20 ENCOUNTER — HOME INFUSION BILLING (OUTPATIENT)
Dept: HOME HEALTH SERVICES | Facility: HOME HEALTH | Age: 30
End: 2025-01-20
Payer: COMMERCIAL

## 2025-01-20 LAB
ACTH PLAS-MCNC: 126 PG/ML
IGF BINDING PROTEIN 3 SD SCORE: -1.9
IGF BP3 SERPL-MCNC: 3.7 UG/ML (ref 3.5–7.6)
PYRIDOXAL PHOS SERPL-SCNC: 51.8 NMOL/L

## 2025-01-21 ENCOUNTER — HOME INFUSION (OUTPATIENT)
Dept: HOME HEALTH SERVICES | Facility: HOME HEALTH | Age: 30
End: 2025-01-21
Payer: COMMERCIAL

## 2025-01-21 LAB
ATRIAL RATE - MUSE: 84 BPM
DIASTOLIC BLOOD PRESSURE - MUSE: NORMAL MMHG
INTERPRETATION ECG - MUSE: NORMAL
P AXIS - MUSE: 37 DEGREES
PR INTERVAL - MUSE: 142 MS
QRS DURATION - MUSE: 78 MS
QT - MUSE: 344 MS
QTC - MUSE: 407 MS
R AXIS - MUSE: 11 DEGREES
SYSTOLIC BLOOD PRESSURE - MUSE: NORMAL MMHG
T AXIS - MUSE: 16 DEGREES
VENTRICULAR RATE- MUSE: 84 BPM

## 2025-01-23 ENCOUNTER — TELEPHONE (OUTPATIENT)
Dept: WOUND CARE | Facility: CLINIC | Age: 30
End: 2025-01-23
Payer: COMMERCIAL

## 2025-01-23 NOTE — TELEPHONE ENCOUNTER
Left Voicemail (1st Attempt) and Sent Mychart (1st Attempt) for the patient to call back and schedule the following:    Appointment type: New Wound Nurse   Provider: Mimi Arellano RN  Return date: next available   Specialty phone number: [phone]  Additional appointment(s) needed: n/a  Additonal Notes: Pt being referred by  for delayed wound healing in the setting of non ambulatory genetic disease

## 2025-01-24 LAB
INSULIN GROWTH FACTOR 1 (EXTERNAL): 102 NG/ML (ref 63–373)
INSULIN GROWTH FACTOR I SD SCORE (EXTERNAL): -1 SD

## 2025-01-29 ENCOUNTER — TELEPHONE (OUTPATIENT)
Dept: WOUND CARE | Facility: CLINIC | Age: 30
End: 2025-01-29
Payer: COMMERCIAL

## 2025-01-29 NOTE — TELEPHONE ENCOUNTER
Left Voicemail (2nd Attempt) for the patient to call back and schedule the following:    Appointment type: New Patient   Provider: Mimi Arellano RN   Return date: Next Available   Specialty phone number: [phone]  Additional appointment(s) needed: n/a  Additonal Notes: Pt being referred by

## 2025-02-10 ENCOUNTER — HOME INFUSION BILLING (OUTPATIENT)
Dept: HOME HEALTH SERVICES | Facility: HOME HEALTH | Age: 30
End: 2025-02-10
Payer: COMMERCIAL

## 2025-02-17 ENCOUNTER — TELEPHONE (OUTPATIENT)
Dept: WOUND CARE | Facility: CLINIC | Age: 30
End: 2025-02-17
Payer: COMMERCIAL

## 2025-02-17 NOTE — TELEPHONE ENCOUNTER
Patient confirmed scheduled appointment:  Date: 03/03/2025  Time: 130 pm   Visit type: New Wound Nurse   Provider: Mimi Arellano RN  [place] [facility]  Testing/imaging: n/a  Additional notes:n/a

## 2025-02-26 NOTE — PROGRESS NOTES
Spoke with Julianne (mother and legal guardian) and [employer] is the only active and primary insurance. Julianne has informed the county [employer] is no longer effective for this patient, and that [employer] is the only and primary plan.      Need to confirm that [employer] is the only active insurance for patient.   -[employer] only shows [employer] as active plan.   -[employer] commercial plan termed 12/31/24.     Called and left a voicemail for Julianne (mother and legal guardian) to call back so we can confirm my findings. AJ

## 2025-03-04 ENCOUNTER — APPOINTMENT (OUTPATIENT)
Dept: PEDIATRICS | Facility: CLINIC | Age: 30
End: 2025-03-04
Attending: DIETITIAN, REGISTERED
Payer: COMMERCIAL

## 2025-03-04 DIAGNOSIS — E28.39 OVARIAN FAILURE: ICD-10-CM

## 2025-03-04 DIAGNOSIS — M81.0 POSTMENOPAUSAL OSTEOPOROSIS: ICD-10-CM

## 2025-03-04 DIAGNOSIS — E74.89: Primary | ICD-10-CM

## 2025-03-04 DIAGNOSIS — M81.0 OSTEOPOROSIS, UNSPECIFIED OSTEOPOROSIS TYPE, UNSPECIFIED PATHOLOGICAL FRACTURE PRESENCE: ICD-10-CM

## 2025-03-04 DIAGNOSIS — E74.89 CDG (CONGENITAL DISORDER OF GLYCOSYLATION): Primary | ICD-10-CM

## 2025-03-04 PROCEDURE — 97803 MED NUTRITION INDIV SUBSEQ: CPT | Mod: GT,95 | Performed by: DIETITIAN, REGISTERED

## 2025-03-04 RX ORDER — METHYLPREDNISOLONE SODIUM SUCCINATE 40 MG/ML
40 INJECTION INTRAMUSCULAR; INTRAVENOUS
Status: CANCELLED
Start: 2025-03-05

## 2025-03-04 RX ORDER — ALBUTEROL SULFATE 0.83 MG/ML
2.5 SOLUTION RESPIRATORY (INHALATION)
Status: CANCELLED | OUTPATIENT
Start: 2025-03-05

## 2025-03-04 RX ORDER — METHYLPREDNISOLONE SODIUM SUCCINATE 40 MG/ML
40 INJECTION INTRAMUSCULAR; INTRAVENOUS
Start: 2025-03-06

## 2025-03-04 RX ORDER — ACETAMINOPHEN 325 MG/1
650 TABLET ORAL
OUTPATIENT
Start: 2025-03-06

## 2025-03-04 RX ORDER — HEPARIN SODIUM,PORCINE 10 UNIT/ML
5-20 VIAL (ML) INTRAVENOUS DAILY PRN
Status: CANCELLED | OUTPATIENT
Start: 2025-03-05

## 2025-03-04 RX ORDER — DIPHENHYDRAMINE HYDROCHLORIDE 50 MG/ML
25 INJECTION, SOLUTION INTRAMUSCULAR; INTRAVENOUS
Status: CANCELLED
Start: 2025-03-05

## 2025-03-04 RX ORDER — HEPARIN SODIUM,PORCINE 10 UNIT/ML
5-20 VIAL (ML) INTRAVENOUS DAILY PRN
OUTPATIENT
Start: 2025-03-06

## 2025-03-04 RX ORDER — HEPARIN SODIUM (PORCINE) LOCK FLUSH IV SOLN 100 UNIT/ML 100 UNIT/ML
5 SOLUTION INTRAVENOUS
Status: CANCELLED | OUTPATIENT
Start: 2025-03-05

## 2025-03-04 RX ORDER — ALBUTEROL SULFATE 90 UG/1
1-2 INHALANT RESPIRATORY (INHALATION)
Status: CANCELLED
Start: 2025-03-05

## 2025-03-04 RX ORDER — ALBUTEROL SULFATE 0.83 MG/ML
2.5 SOLUTION RESPIRATORY (INHALATION)
OUTPATIENT
Start: 2025-03-06

## 2025-03-04 RX ORDER — ZOLEDRONIC ACID 0.05 MG/ML
5 INJECTION, SOLUTION INTRAVENOUS ONCE
Status: CANCELLED
Start: 2025-03-05

## 2025-03-04 RX ORDER — MEPERIDINE HYDROCHLORIDE 25 MG/ML
25 INJECTION INTRAMUSCULAR; INTRAVENOUS; SUBCUTANEOUS
OUTPATIENT
Start: 2025-03-06

## 2025-03-04 RX ORDER — HEPARIN SODIUM (PORCINE) LOCK FLUSH IV SOLN 100 UNIT/ML 100 UNIT/ML
5 SOLUTION INTRAVENOUS
OUTPATIENT
Start: 2025-03-06

## 2025-03-04 RX ORDER — ZOLEDRONIC ACID 0.05 MG/ML
5 INJECTION, SOLUTION INTRAVENOUS ONCE
Status: CANCELLED
Start: 2025-03-06

## 2025-03-04 RX ORDER — ALBUTEROL SULFATE 90 UG/1
1-2 INHALANT RESPIRATORY (INHALATION)
Start: 2025-03-06

## 2025-03-04 RX ORDER — EPINEPHRINE 1 MG/ML
0.3 INJECTION, SOLUTION, CONCENTRATE INTRAVENOUS EVERY 5 MIN PRN
OUTPATIENT
Start: 2025-03-06

## 2025-03-04 RX ORDER — MEPERIDINE HYDROCHLORIDE 25 MG/ML
25 INJECTION INTRAMUSCULAR; INTRAVENOUS; SUBCUTANEOUS
Status: CANCELLED | OUTPATIENT
Start: 2025-03-05

## 2025-03-04 RX ORDER — DIPHENHYDRAMINE HYDROCHLORIDE 50 MG/ML
25 INJECTION, SOLUTION INTRAMUSCULAR; INTRAVENOUS
Start: 2025-03-06

## 2025-03-04 RX ORDER — ACETAMINOPHEN 325 MG/1
650 TABLET ORAL
Status: CANCELLED | OUTPATIENT
Start: 2025-03-05

## 2025-03-04 RX ORDER — DIPHENHYDRAMINE HYDROCHLORIDE 50 MG/ML
50 INJECTION, SOLUTION INTRAMUSCULAR; INTRAVENOUS
Status: CANCELLED
Start: 2025-03-05

## 2025-03-04 RX ORDER — EPINEPHRINE 1 MG/ML
0.3 INJECTION, SOLUTION, CONCENTRATE INTRAVENOUS EVERY 5 MIN PRN
Status: CANCELLED | OUTPATIENT
Start: 2025-03-05

## 2025-03-04 RX ORDER — DIPHENHYDRAMINE HYDROCHLORIDE 50 MG/ML
50 INJECTION, SOLUTION INTRAMUSCULAR; INTRAVENOUS
Start: 2025-03-06

## 2025-03-04 NOTE — LETTER
3/4/2025      RE: Sherly Ramires  [id number] [facility]  [place] [facility] [address]     Dear Colleague,    Thank you for the opportunity to participate in the care of your patient, Sherly Ramires, at the [facility] at [facility]. Please see a copy of my visit note below.    ..CLINICAL NUTRITION SERVICES - ASSESSMENT NOTE    REASON FOR ASSESSMENT  Sherly Ramires is a 30 year old female seen by the dietitian in metabolic clinic for referral for tube feedings with CDG (PMM2 gene) . Patient is accompanied by  and mother, seen virtually.     RECOMMENDATIONS    Recommend trial addition of Benefiber to see if helps with bulking stool (see below)  Patient would likely benefit from a GI referral to review overall bowel regimen         ANTHROPOMETRICS  Height: 124.5 cm or 49 in  Weight: 36 kg or 80 lbs  BMI: 23.5    Comments:  Weight:   Group home doing daily weights.  Overall ranging from 81-88 lbs in past month.      NUTRITION HISTORY  Sherly eats one small snack/day (~250 kcals) + g-tube feedings.    Special considerations:  Nutrition related medical updates:   Patient has been relatively stable.     Type of CDG runs risk of hypoglycemia, planned 2 hour GTT for tomorrow  Allergies/Intolerances: NKFA  Vitamins/Supplements:   Calcium carbonate suspension BID (500 mg elemental BID)  Probiotic  Mg citrate 300 mg   D-mannose powder  1 cap Miralax daily    GI:  Stools:   Main cause of concern/questions today; they have changed in consistency over past 6 months to watery, blowouts    Home Regimen:  Route: GJ-tube  DME:   [facility]  Formula: Compleat Peptide 1.5  Rate/Frequency:   Daytime: 1/2 carton (125 mL) + 360 mL water = 485 mL ran at 400 mL/hr, three times daily (8 am - 12 pm - 4 pm)  Night feed: 1 carton (250 mL) + 235 mL water = 485 mL given at 40 mL/hr over 12 hours (7 pm - 7 am)  Flush: 60 mL flush before and after  "feedings (480 mL/day)     Provides 1450 mL, 938 kcal, (26 kcal/kg), 45 g protein, (1.25 g/kg), 12.5 mcg/d Vitamin D, 11.3 mg/d Iron, 7.5 gm fiber daily.   Meets 100% of kcal and 100% protein needs.    Total Nutrition Provisions:  1190 kcals/day (33 kcal/kg)  50 gm pro (1.4 g/kg)    NUTRITION RELATED PHYSICAL FINDINGS  None    NUTRITION RELATED LABS  Labs reviewed    NUTRITION RELATED MEDICATIONS  Medications reviewed    ESTIMATED NUTRITION NEEDS:  Based on [place] St. Jeor (840) x 1.2-1.3  Energy Needs: 28-30 kcal/kg  Protein Needs: RDA/age: 0.8 g/kg  Fluid Needs: 1 mL/kcal   Micronutrient Needs: RDA for age      NUTRITION DIAGNOSIS  Predicted inadequate oral intake related to reliance on enteral/tube feedings to meet nutrition needs.    INTERVENTIONS  Nutrition Prescription  Sherly to meet 100% estimated needs via enteral nutrition..    Nutrition Education:   Provided education on plan for attempting to improve stools into formed, less frequent.    Discussed adding fiber for bulking.  Plan to give 1 teaspoon + 2 oz water BID via g-tube (total +4 gm fiber/day).  If no improvement in stools after 1 week, try increasing to 2 teaspoons + 2 oz water BID (total + 8 gm fiber/day) for total w/formula 15.5 gm/day.    2. Trial reducing Miralax to only 1/2 cap daily    3. Upon further discussion it was found that in addition to Miralax patient is also receiving Mg citrate and daily \"mini-enemas.\"  Per discussion with mom and group home caregiver, enemas were changed to routine that mom would do when patient lived at home, doing mini-enemas only after 24 hours of no stool.    4. Group home requested letter/orders written for above plan.    Implementation:  Implementation: Collaboration with other providers - discussed/reviewed with Dr. Dubon.    Goals  Weight range 79-83 lbs.  Group home to reach out if 2-3 weights in a row are outside this range  Patient to meet 100% estimated nutrition needs.    FOLLOW " UP/MONITORING  Energy Intake  Enteral nutrition intake  Anthropometric measurements    Spent 30 minutes in consult with Sherly Ramires and parent, caregiver.     Michelle Ortiz RD, LD      Please do not hesitate to contact me if you have any questions/concerns.     Sincerely,       Michelle Ortiz RD

## 2025-03-05 RX ORDER — MEPERIDINE HYDROCHLORIDE 25 MG/ML
25 INJECTION INTRAMUSCULAR; INTRAVENOUS; SUBCUTANEOUS
OUTPATIENT
Start: 2026-03-05

## 2025-03-05 RX ORDER — DIPHENHYDRAMINE HYDROCHLORIDE 50 MG/ML
50 INJECTION, SOLUTION INTRAMUSCULAR; INTRAVENOUS
Start: 2026-03-05

## 2025-03-05 RX ORDER — METHYLPREDNISOLONE SODIUM SUCCINATE 40 MG/ML
40 INJECTION INTRAMUSCULAR; INTRAVENOUS
Start: 2026-03-05

## 2025-03-05 RX ORDER — ALBUTEROL SULFATE 0.83 MG/ML
2.5 SOLUTION RESPIRATORY (INHALATION)
OUTPATIENT
Start: 2026-03-05

## 2025-03-05 RX ORDER — ALBUTEROL SULFATE 90 UG/1
1-2 INHALANT RESPIRATORY (INHALATION)
Start: 2026-03-05

## 2025-03-05 RX ORDER — DIPHENHYDRAMINE HYDROCHLORIDE 50 MG/ML
25 INJECTION, SOLUTION INTRAMUSCULAR; INTRAVENOUS
Start: 2026-03-05

## 2025-03-05 RX ORDER — EPINEPHRINE 1 MG/ML
0.3 INJECTION, SOLUTION, CONCENTRATE INTRAVENOUS EVERY 5 MIN PRN
OUTPATIENT
Start: 2026-03-05

## 2025-03-05 RX ORDER — ACETAMINOPHEN 325 MG/1
650 TABLET ORAL
OUTPATIENT
Start: 2026-03-05

## 2025-03-05 RX ORDER — HEPARIN SODIUM,PORCINE 10 UNIT/ML
5-20 VIAL (ML) INTRAVENOUS DAILY PRN
OUTPATIENT
Start: 2026-03-05

## 2025-03-05 RX ORDER — HEPARIN SODIUM (PORCINE) LOCK FLUSH IV SOLN 100 UNIT/ML 100 UNIT/ML
5 SOLUTION INTRAVENOUS
OUTPATIENT
Start: 2026-03-05

## 2025-03-05 RX ORDER — ZOLEDRONIC ACID 0.05 MG/ML
5 INJECTION, SOLUTION INTRAVENOUS ONCE
Start: 2026-03-05

## 2025-03-06 ENCOUNTER — HOME INFUSION (OUTPATIENT)
Dept: HOME HEALTH SERVICES | Facility: HOME HEALTH | Age: 30
End: 2025-03-06
Payer: COMMERCIAL

## 2025-03-06 ENCOUNTER — INFUSION THERAPY VISIT (OUTPATIENT)
Dept: INFUSION THERAPY | Facility: CLINIC | Age: 30
End: 2025-03-06
Attending: PEDIATRICS
Payer: COMMERCIAL

## 2025-03-06 VITALS
DIASTOLIC BLOOD PRESSURE: 80 MMHG | HEIGHT: 55 IN | HEART RATE: 88 BPM | BODY MASS INDEX: 18.72 KG/M2 | WEIGHT: 80.91 LBS | TEMPERATURE: 97.4 F | OXYGEN SATURATION: 96 % | SYSTOLIC BLOOD PRESSURE: 115 MMHG | RESPIRATION RATE: 24 BRPM

## 2025-03-06 DIAGNOSIS — M81.0 OSTEOPOROSIS, UNSPECIFIED OSTEOPOROSIS TYPE, UNSPECIFIED PATHOLOGICAL FRACTURE PRESENCE: ICD-10-CM

## 2025-03-06 DIAGNOSIS — M81.0 OSTEOPOROSIS, UNSPECIFIED OSTEOPOROSIS TYPE, UNSPECIFIED PATHOLOGICAL FRACTURE PRESENCE: Primary | ICD-10-CM

## 2025-03-06 DIAGNOSIS — E74.89: Primary | ICD-10-CM

## 2025-03-06 DIAGNOSIS — M81.0 POSTMENOPAUSAL OSTEOPOROSIS: ICD-10-CM

## 2025-03-06 LAB
ALBUMIN SERPL BCG-MCNC: 3.7 G/DL (ref 3.5–5.2)
C PEPTIDE SERPL-MCNC: 1.7 NG/ML (ref 0.9–6.9)
CALCIUM SERPL-MCNC: 9.2 MG/DL (ref 8.8–10.4)
CORTIS SERPL-MCNC: 5.3 UG/DL
CREAT SERPL-MCNC: 0.29 MG/DL (ref 0.51–0.95)
EGFRCR SERPLBLD CKD-EPI 2021: >90 ML/MIN/1.73M2
EST. AVERAGE GLUCOSE BLD GHB EST-MCNC: 74 MG/DL
FASTING STATUS PATIENT QL REPORTED: YES
FASTING STATUS PATIENT QL REPORTED: YES
GLUCOSE BLDC GLUCOMTR-MCNC: 104 MG/DL (ref 70–99)
GLUCOSE BLDC GLUCOMTR-MCNC: 115 MG/DL (ref 70–99)
GLUCOSE BLDC GLUCOMTR-MCNC: 120 MG/DL (ref 70–99)
GLUCOSE BLDC GLUCOMTR-MCNC: 125 MG/DL (ref 70–99)
GLUCOSE BLDC GLUCOMTR-MCNC: 179 MG/DL (ref 70–99)
GLUCOSE BLDC GLUCOMTR-MCNC: 63 MG/DL (ref 70–99)
GLUCOSE BLDC GLUCOMTR-MCNC: 70 MG/DL (ref 70–99)
GLUCOSE BLDC GLUCOMTR-MCNC: 72 MG/DL (ref 70–99)
GLUCOSE BLDC GLUCOMTR-MCNC: 75 MG/DL (ref 70–99)
GLUCOSE BLDC GLUCOMTR-MCNC: 75 MG/DL (ref 70–99)
GLUCOSE BLDC GLUCOMTR-MCNC: 93 MG/DL (ref 70–99)
GLUCOSE SERPL-MCNC: 108 MG/DL (ref 70–99)
GLUCOSE SERPL-MCNC: 119 MG/DL (ref 70–99)
GLUCOSE SERPL-MCNC: 191 MG/DL (ref 70–99)
GLUCOSE SERPL-MCNC: 68 MG/DL (ref 70–99)
GLUCOSE SERPL-MCNC: 68 MG/DL (ref 70–99)
GLUCOSE SERPL-MCNC: 71 MG/DL (ref 70–99)
GLUCOSE SERPL-MCNC: 76 MG/DL (ref 70–99)
HBA1C MFR BLD: 4.2 %
INSULIN SERPL-ACNC: 23.7 UU/ML (ref 2.6–24.9)
INSULIN SERPL-ACNC: 29.5 UU/ML (ref 2.6–24.9)
INSULIN SERPL-ACNC: 60.8 UU/ML (ref 2.6–24.9)
INSULIN SERPL-ACNC: 9 UU/ML (ref 2.6–24.9)
INSULIN SERPL-ACNC: 9 UU/ML (ref 2.6–24.9)

## 2025-03-06 PROCEDURE — 83036 HEMOGLOBIN GLYCOSYLATED A1C: CPT

## 2025-03-06 PROCEDURE — 82947 ASSAY GLUCOSE BLOOD QUANT: CPT | Performed by: PEDIATRICS

## 2025-03-06 PROCEDURE — 82040 ASSAY OF SERUM ALBUMIN: CPT | Performed by: PEDIATRICS

## 2025-03-06 PROCEDURE — 82310 ASSAY OF CALCIUM: CPT | Performed by: PEDIATRICS

## 2025-03-06 PROCEDURE — 82533 TOTAL CORTISOL: CPT

## 2025-03-06 PROCEDURE — 250N000011 HC RX IP 250 OP 636: Performed by: PEDIATRICS

## 2025-03-06 PROCEDURE — 83525 ASSAY OF INSULIN: CPT | Performed by: PEDIATRICS

## 2025-03-06 PROCEDURE — 36415 COLL VENOUS BLD VENIPUNCTURE: CPT

## 2025-03-06 PROCEDURE — 36415 COLL VENOUS BLD VENIPUNCTURE: CPT | Performed by: PEDIATRICS

## 2025-03-06 PROCEDURE — 84681 ASSAY OF C-PEPTIDE: CPT

## 2025-03-06 PROCEDURE — 82962 GLUCOSE BLOOD TEST: CPT

## 2025-03-06 PROCEDURE — 83525 ASSAY OF INSULIN: CPT

## 2025-03-06 PROCEDURE — 82565 ASSAY OF CREATININE: CPT | Performed by: PEDIATRICS

## 2025-03-06 RX ORDER — ZOLEDRONIC ACID 0.05 MG/ML
5 INJECTION, SOLUTION INTRAVENOUS ONCE
Status: COMPLETED | OUTPATIENT
Start: 2025-03-06 | End: 2025-03-06

## 2025-03-06 RX ADMIN — ZOLEDRONIC ACID 5 MG: 5 INJECTION, SOLUTION INTRAVENOUS at 12:05

## 2025-03-06 NOTE — PROGRESS NOTES
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 Numbing declined per group home staff. Baseline labs drawn as ordered and sent to lab. Baseline blood sugar = 70; OK to proceed with test per Dr. Dubon.     Coping:   Child Family Life: declined per group home staff; patient was happy utilizing home iPad.     Infusion Note: Patient's group home caregiver denies any concerns. Patient has been fasting since at least midnight per caregiver. 193 mL's of Glucola was measured by Research Staff and administered by this RN via g-tube by gravity over 6 minutes. Time 0 according to Research Staff was when Glucola completed at 0903. Labs were collected at +15, +30, +60, +90, and +120. Blood was difficult to draw at +90, this RN was able to obtain enough for a blood sugar but a venipuncture was completed for the remaining labs. More heat was placed on patient in attempt to get PIV to draw better. Blood was again difficult to draw at +120 and another venipuncture was completed. Blood sugars were 115 at +15, 125 at +30, 179 at +60, 95 at +90, and 72 at +120. Feeds started via g-tube at 50 mL/hr for 22 minutes at 1125 by group home staff. Blood sugar at 1149 was 120. Feeds then increased to 100 mL/hr at 1150 for 20 minutes. Blood sugar at 1209 was 75. Feeds then increased to 325 mL/hr at 1215. Blood sugar was 63 at 1233. Feeds were decreased to 200 mL/hr. Blood sugar was 75 at 1253. Feeds were then held at 200 mL/hr. Blood sugar at 1320 was 104. OK to leave with caregiver per Dr. Dubon.       OK to release Zometa per MD; caregiver verified patient is taking Calcium/Vitamin D, calcium level = 9.2, and  creatinine level = 0.29. Zometa infusion completed without complication.     Vital signs remained stable throughout. Blood return noted pre/post infusion. PIV was always patent and blood return notable; just not enough for labs towards the end of the encounter. PIV removed without issue. Patient was seen and assessed by provider Dr. Ling MD remained present throughout encounter for all instruction.     Discharge Plan:   Caregiver verbalized understanding of discharge instructions; is aware of future lab appointments. [facility] with [facility] in stable condition once visit complete.

## 2025-03-20 DIAGNOSIS — E28.39 OVARIAN FAILURE: ICD-10-CM

## 2025-03-20 DIAGNOSIS — E16.2 HYPOGLYCEMIA: ICD-10-CM

## 2025-03-20 DIAGNOSIS — R79.89 HIGH SERUM ADRENOCORTICOTROPIC HORMONE (ACTH): Primary | ICD-10-CM

## 2025-03-20 DIAGNOSIS — M81.0 POSTMENOPAUSAL OSTEOPOROSIS: ICD-10-CM

## 2025-03-20 DIAGNOSIS — R79.89 ELEVATED TSH: ICD-10-CM

## 2025-03-20 RX ORDER — EPINEPHRINE 1 MG/ML
0.3 INJECTION, SOLUTION, CONCENTRATE INTRAVENOUS EVERY 5 MIN PRN
OUTPATIENT
Start: 2025-03-21

## 2025-03-20 RX ORDER — MEPERIDINE HYDROCHLORIDE 25 MG/ML
25 INJECTION INTRAMUSCULAR; INTRAVENOUS; SUBCUTANEOUS
OUTPATIENT
Start: 2025-03-21

## 2025-03-20 RX ORDER — METHYLPREDNISOLONE SODIUM SUCCINATE 40 MG/ML
40 INJECTION INTRAMUSCULAR; INTRAVENOUS
Start: 2025-03-21

## 2025-03-20 RX ORDER — DIPHENHYDRAMINE HYDROCHLORIDE 50 MG/ML
25 INJECTION, SOLUTION INTRAMUSCULAR; INTRAVENOUS
Start: 2025-03-21

## 2025-03-20 RX ORDER — ALBUTEROL SULFATE 90 UG/1
1-2 INHALANT RESPIRATORY (INHALATION)
Start: 2025-03-21

## 2025-03-20 RX ORDER — ALBUTEROL SULFATE 0.83 MG/ML
2.5 SOLUTION RESPIRATORY (INHALATION)
OUTPATIENT
Start: 2025-03-21

## 2025-03-20 RX ORDER — DIPHENHYDRAMINE HYDROCHLORIDE 50 MG/ML
50 INJECTION, SOLUTION INTRAMUSCULAR; INTRAVENOUS
Start: 2025-03-21

## 2025-03-21 ENCOUNTER — HOME INFUSION BILLING (OUTPATIENT)
Dept: HOME HEALTH SERVICES | Facility: HOME HEALTH | Age: 30
End: 2025-03-21
Payer: COMMERCIAL

## 2025-03-21 PROCEDURE — B4153 EF HYDROLYZED/AMINO ACIDS: HCPCS

## 2025-03-21 PROCEDURE — S9342 HIT ENTERAL PUMP DIEM: HCPCS

## 2025-03-22 PROCEDURE — S9342 HIT ENTERAL PUMP DIEM: HCPCS

## 2025-03-23 PROCEDURE — S9342 HIT ENTERAL PUMP DIEM: HCPCS

## 2025-03-24 ENCOUNTER — PATIENT OUTREACH (OUTPATIENT)
Dept: ONCOLOGY | Facility: CLINIC | Age: 30
End: 2025-03-24
Payer: COMMERCIAL

## 2025-03-24 DIAGNOSIS — E74.89: Primary | ICD-10-CM

## 2025-03-24 PROCEDURE — S9342 HIT ENTERAL PUMP DIEM: HCPCS

## 2025-03-24 NOTE — PROGRESS NOTES
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 There is some suggestion in the literature that patients with this disorder are hypercoagulable, however, Sherly herself does not appear to be hypercoagulable or hypocoagulable at this time. She remains without any history of thrombosis or significant hemorrhagic diatheses, event despite having undergone multiple significant procedures in past. At this time, I do not recommend any intervention for her. In the future, as she ages, we may consider DVT prophylaxis following surgical procedures. Specific recommendations to be made on a case by case basis. Today, we will repeat her labs as part of routine monitoring. Results will be communicated via [employer] per care team request.     She can follow-up with hematology every 2 years. May have her see Dr. Hoskins/MD every other appointment.    Records Location: [facility]     Additional testing needed prior to consult:     ?    Referral updates and Plan:     03/24/2025 12:20 PM -  Referral received and reviewed. Message received from Dr. Johnson requesting consult be  scheduled with him after pt confirms she is agreeable.  Referral placed and will schedule once she agrees.     Evelyn Dumont, LORETON, RN  Hematology/Oncology Nurse Navigator  [facility]  [phone] / [phone]

## 2025-03-25 PROCEDURE — S9342 HIT ENTERAL PUMP DIEM: HCPCS

## 2025-03-26 PROCEDURE — S9342 HIT ENTERAL PUMP DIEM: HCPCS

## 2025-03-27 PROCEDURE — S9342 HIT ENTERAL PUMP DIEM: HCPCS

## 2025-03-27 NOTE — TELEPHONE ENCOUNTER
RECORDS STATUS - ALL OTHER DIAGNOSIS      RECORDS RECEIVED FROM: [facility]   NOTES STATUS DETAILS   OFFICE NOTE from referring provider Epic Dr. Cyrus Johnson   MEDICATION LIST [facility]    LABS     PATHOLOGY REPORTS     ANYTHING RELATED TO DIAGNOSIS Epic 3/14/2025

## 2025-03-28 PROCEDURE — S9342 HIT ENTERAL PUMP DIEM: HCPCS

## 2025-03-29 PROCEDURE — S9342 HIT ENTERAL PUMP DIEM: HCPCS

## 2025-03-30 PROCEDURE — S9342 HIT ENTERAL PUMP DIEM: HCPCS

## 2025-03-31 PROCEDURE — S9342 HIT ENTERAL PUMP DIEM: HCPCS

## 2025-04-01 ENCOUNTER — HOME INFUSION BILLING (OUTPATIENT)
Dept: HOME HEALTH SERVICES | Facility: HOME HEALTH | Age: 30
End: 2025-04-01
Payer: COMMERCIAL

## 2025-04-01 ENCOUNTER — EPISODE UPDATE (OUTPATIENT)
Dept: HOME HEALTH SERVICES | Facility: HOME HEALTH | Age: 30
End: 2025-04-01
Payer: COMMERCIAL

## 2025-04-01 DIAGNOSIS — E74.89: Primary | ICD-10-CM

## 2025-04-01 PROCEDURE — S9342 HIT ENTERAL PUMP DIEM: HCPCS

## 2025-04-01 NOTE — PROGRESS NOTES
RE: Sherly SUSANNAH Ike  [id number] [address] [facility] [id number]         Orders:     Discontinue Benefiber supplementation due to no change in stools during trial     2.   Continue 1/2 capful Miralax (8.5 gm) once daily     3.  Continue mini-enemas PRN only: administer mini-enema on day 3 of no stool (Day 1 being day of last stool, day 2 no significant stool, day 3 - administer mini-enema)     4.   We will place a GI referral for further recommendations for Anna's bowel regimen.         Sarah Dubon MD

## 2025-04-02 PROCEDURE — S9342 HIT ENTERAL PUMP DIEM: HCPCS

## 2025-04-03 PROCEDURE — S9342 HIT ENTERAL PUMP DIEM: HCPCS

## 2025-04-04 PROCEDURE — S9342 HIT ENTERAL PUMP DIEM: HCPCS

## 2025-04-05 PROCEDURE — S9342 HIT ENTERAL PUMP DIEM: HCPCS

## 2025-04-06 PROCEDURE — S9342 HIT ENTERAL PUMP DIEM: HCPCS

## 2025-04-07 ENCOUNTER — DOCUMENTATION ONLY (OUTPATIENT)
Dept: GASTROENTEROLOGY | Facility: CLINIC | Age: 30
End: 2025-04-07
Payer: COMMERCIAL

## 2025-04-07 PROCEDURE — S9342 HIT ENTERAL PUMP DIEM: HCPCS

## 2025-04-08 ENCOUNTER — TELEPHONE (OUTPATIENT)
Dept: GASTROENTEROLOGY | Facility: CLINIC | Age: 30
End: 2025-04-08
Payer: COMMERCIAL

## 2025-04-08 PROCEDURE — S9342 HIT ENTERAL PUMP DIEM: HCPCS

## 2025-04-08 NOTE — TELEPHONE ENCOUNTER
received a message from clinic manager to help get Pt scheduled for a new Gen GI appointment with an YEIMY, per Dr. Sinclair.  called and talked with Pt's mom and legal guardian, Julianne to assist with scheduling.  offered Julianne an appointment with Kaya Bui on 4/10/2025 at 10 AM, for an in-person clinic visit. Julianne would like to take the appointment but would first need to contact with Pt's group home to coordinate it.  informed Julianne that  will hold the appointment time for the Pt until tomorrow, 4/9/2025 at 12 PM.  will call and touch base with Julianne by noon tomorrow to confirm the appointment. Julianne verbalized understanding of the plan.

## 2025-04-09 PROCEDURE — S9342 HIT ENTERAL PUMP DIEM: HCPCS

## 2025-04-09 NOTE — TELEPHONE ENCOUNTER
Pt's mom called back and accepted the appointment with Kaya Bui on 4/9/2025 at 10 AM for an in-person clinic visit. The clinic location was confirmed and verified with the Pt. Writer provided Pt's mom with the clinic address as well.

## 2025-04-10 ENCOUNTER — OFFICE VISIT (OUTPATIENT)
Dept: GASTROENTEROLOGY | Facility: CLINIC | Age: 30
End: 2025-04-10
Payer: COMMERCIAL

## 2025-04-10 ENCOUNTER — TELEPHONE (OUTPATIENT)
Dept: UROLOGY | Facility: CLINIC | Age: 30
End: 2025-04-10

## 2025-04-10 VITALS
HEIGHT: 55 IN | HEART RATE: 89 BPM | BODY MASS INDEX: 18.97 KG/M2 | OXYGEN SATURATION: 97 % | DIASTOLIC BLOOD PRESSURE: 89 MMHG | SYSTOLIC BLOOD PRESSURE: 128 MMHG | WEIGHT: 82 LBS

## 2025-04-10 DIAGNOSIS — K94.23 DRAINAGE FROM GASTROSTOMY TUBE SITE (H): ICD-10-CM

## 2025-04-10 DIAGNOSIS — R19.7 DIARRHEA, UNSPECIFIED TYPE: ICD-10-CM

## 2025-04-10 DIAGNOSIS — Z93.1 G TUBE FEEDINGS (H): ICD-10-CM

## 2025-04-10 DIAGNOSIS — R19.7 DIARRHEA, UNSPECIFIED TYPE: Primary | ICD-10-CM

## 2025-04-10 DIAGNOSIS — E74.89: ICD-10-CM

## 2025-04-10 PROCEDURE — S9342 HIT ENTERAL PUMP DIEM: HCPCS

## 2025-04-10 RX ORDER — CALCIUM CARBONATE 1250 MG/5ML
1250 SUSPENSION ORAL 2 TIMES DAILY WITH MEALS
COMMUNITY

## 2025-04-10 RX ORDER — SACCHAROMYCES BOULARDII 250 MG
250 CAPSULE ORAL 2 TIMES DAILY
COMMUNITY

## 2025-04-10 RX ORDER — WHEAT DEXTRIN 3 G/3.8 G
3 POWDER (GRAM) ORAL 2 TIMES DAILY
Qty: 730 G | Refills: 5 | Status: SHIPPED | OUTPATIENT
Start: 2025-04-10

## 2025-04-10 RX ORDER — WHEAT DEXTRIN 3 G/3.8 G
3 POWDER (GRAM) ORAL 2 TIMES DAILY
Qty: 730 G | Refills: 5 | Status: SHIPPED | OUTPATIENT
Start: 2025-04-10 | End: 2025-04-10

## 2025-04-10 ASSESSMENT — PAIN SCALES - GENERAL: PAINLEVEL_OUTOF10: MILD PAIN (3)

## 2025-04-10 NOTE — PATIENT INSTRUCTIONS
It was a pleasure meeting with you today and discussing your healthcare plan. Below is a summary of what we covered:    Blood work today.     Take home stool collection kit(s). Return the stool sample(s) to any convenient [facility] location.    Abdominal x-ray today to ensure no constipation leading to overflow diarrhea.     Increase Benefiber to 3 teaspoonfuls twice daily. [email or web address]    Continue a daily probiotic.    I will ask around about the G-tube leaking issue.    Virtual visit in 4-6 weeks to check in on progress, review test results, determine need for additional testing/treatment, etc.     Please see below for any additional questions and scheduling guidelines.    Sign up for [employer]: [employer] patient portal serves as a secure platform for accessing your medical records from the [facility]. Additionally, [employer] facilitates easy, timely, and secure messaging with your care team. If you have not signed up, you may do so by using the provided code or calling [phone].    Coordinating your care after your visit:  There are multiple options for scheduling your follow-up care based on your provider's recommendation.    How do I schedule a follow-up clinic appointment:   After your appointment, you may receive scheduling assistance with the Clinic Coordinators by having a seat in the waiting room and a Clinic Coordinator will call you up to schedule.  Virtual visits or after you leave the clinic:  Your provider has placed a follow-up order in the [employer] portal for scheduling your return appointment. A member of the scheduling team will contact you to schedule.  [employer] Scheduling: Timely scheduling through [employer] is advised to ensure appointment availability.   Call to schedule: You may schedule your follow-up appointment(s) by calling [phone], option 1.    How do I schedule my endoscopy or colonoscopy procedure:  If a procedure, such as a colonoscopy or upper  endoscopy was ordered by your provider, the scheduling team will contact you to schedule this procedure. Or you may choose to call to schedule at   [phone], option 2.  Please allow 20-30 minutes when scheduling a procedure.    How do I get my blood work done? To get your blood work done, you need to schedule a lab appointment at an [facility]. There are multiple ways to schedule:   At the clinic: The Clinic Coordinator you meet after your visit can help you schedule a lab appointment.   [employer] scheduling: [employer] offers online lab scheduling at all [facility] laboratory locations.   Call to schedule: You can call [phone] to schedule your lab appointment.    How do I schedule my imaging study: To schedule imaging studies, such as CT scans, ultrasounds, MRIs, or X-rays, contact Imaging Services at [phone].    How do I schedule a referral to another doctor: If your provider recommended a referral to another specialist(s), the referral order was placed by your provider. You will receive a phone call to schedule this referral, or you may choose to call the number attached to the referral to self-schedule.    For Post-Visit Question(s):  For any inquiries following today's visit:  Please utilize [employer] messaging and allow 48 hours for reply or contact the Call Center during normal business hours at [phone], option 3.  For Emergent After-hours questions, contact the On-Call GI Fellow through the [facility]  at [phone].  In addition, you may contact your Nurse directly using the provided contact information.    Test Results:  Test results will be accessible via [employer] in compliance with the 21st Century Cures Act. This means that your results will be available to you at the same time as your provider. Often you may see your results before your provider does. Results are reviewed by staff within two weeks with communication follow-up. Results may be  released in the patient portal prior to your care team review.    Prescription Refill(s):  Medication prescribed by your provider will be addressed during your visit. For future refills, please coordinate with your pharmacy. If you have not had a recent clinic visit or routine labs, for your safety, your provider may not be able to refill your prescription.

## 2025-04-10 NOTE — TELEPHONE ENCOUNTER
[facility] Call Center    Phone Message    May a detailed message be left on voicemail: yes     Reason for Call: Medication Question or concern regarding medication   Prescription Clarification  Name of Medication: Wheat Dextrin (BENEFIBER) POWD  Prescribing Provider: Kaya Bui   Pharmacy: [facility]. [place], [place]   What on the order needs clarification? They are looking to get clarification on the dosage      Action Taken: Message routed to:  [facility] GI    Travel Screening: Not Applicable     Date of Service:

## 2025-04-10 NOTE — PROGRESS NOTES
NEW PATIENT GI CLINIC VISIT    CC/REFERRING MD:  Kimberley Zee  REASON FOR CONSULTATION:   Sherly Ramires is a 30 year old female who I was asked to see in consultation at the request of Dr. Kimberley Zee for   Chief Complaint   Patient presents with    New Patient     Mild abdominal pain       ASSESSMENT/PLAN:  30 year old female with complex PMH including congenital disorder of glycosylation with resulting multiple medical complexities who presents with her mother and caregiver for evaluation of loose stools. This represents a change from her previous bowel pattern which tended more toward constipation and was managed with a bowel regimen of daily Miralax, daily magnesium citrate, and every other day Enemeez. Over the last 6 months, has transitioned to loose/liquid stools which occur 1-2 times per day. No bloody stools, documented weight loss, vomiting, or apparent abdominal pain. Her bowel regimen has been adjusted to 1/2 capful of Miralax daily, magnesium citrate daily, and Benefiber 2 teaspoons twice daily, but loose stools have persisted. She did receive Keflex for a cutaneous infection in 1/2025 but diarrhea preceded antibiotics. We discussed possible etiologies including infection (timeline would make this seem less likely), malabsorption, inflammatory process, medication side effect, IBS, vs other. Will plan to check labs including CBC, CMP, TSH, Celiac serologies, CRP, fecal calprotectin, fecal elastase, enteric panel, and C diff. Will also obtain abdominal x-ray to gauge stool burden and ensure that this is not constipation with overflow diarrhea. In the meantime, will increase fiber supplement to 3 teaspoons twice daily and they can continue with probiotic if desired. Await x-ray results before making adjustments to Miralax and mag citrate. Regarding G-tube issues with leakage per the port when opened, sounds to be a mechanical issue as it only occurs after the tube has been in place for 6 weeks and  improves with tube changes which are taking place every 2 months. Will discuss further with GI and nutrition colleagues about ways to troubleshoot/remedy this.     -Labs as denoted above.  -AXR to gauge overall stool burden.  -Continue 1/2 capful of Miralax daily for now.  -Continue magnesium citrate daily for now.  -Increase fiber to 3 teaspoons BID.   -Okay to continue probiotic.   -Follow up in 4-6 weeks to recheck and discuss next steps.     Thank you for this consultation. It was a pleasure to participate in the care of this patient; please contact us with any further questions.  A total of 30 face-to-face minutes was spent with this patient, >50% of which was counseling regarding the above delineated issues. An additional 30 minutes was spent on the date of the encounter doing chart review, documentation, and further activities as noted above.    This note was created with voice recognition software, and while reviewed for accuracy, typos may remain.     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 She is accompanied by her mother, Julianne, and caretaker, Christiana, who provide the history. Anna receives essentially all of her nutrition via G-tube and has been eating less by mouth over the last few years. She used to eat a few small meals/snacks per day, but has decreased to one small meal/snack per day otherwise she will vomit. They do note issues with fluid  leaking through the tube port when opened. This occurs ~6 weeks after a tube change. For the first 6 weeks, there are no issues with leaking. There is no leakage around the tube site, just via the port when opened. She used to have the G-tube changed every 3 months, now being changed every 2 months due to leakage. They are still able to administer contents via the G-tube without issue.     Regarding her loose stools which is the primary reason for the visit, this has been noticeable over the last 6 months. She used to deal more with constipation and would have a bowel movement every other day on a bowel regimen of Miralax one capful daily, Enemeez every other day, and magnesium citrate once daily. Since stools have started to become loose/liquid, she is now taking a 1/2 capful of Miralax daily, no longer receiving enemas, and has continued on daily mag citrate. She is having 1-2 stools per day that are yellowish-brown in color. Her caretaker, Christiana, does not notice any mucus or particularly foul odor and there has been no blood or black, tarry stools. Stools are typically [place] stool type 6 or 7 and can be pasty and difficult to clean off her skin at times. They added Benefiber a little less than one month ago. Currently taking 2 teaspoons via G-tube twice daily. She seems to be tolerating it well, but they are unsure if it has helped with loose stools. Anna's mother, Julianne, reports off and on abdominal discomfort for most of her life, but she does not seem to be exhibiting signs of increased abdominal discomfort in recent history. Of note, Anna did receive Keflex for a cutaneous infection in 1/2025 but loose stools preceded this. She has taken a saccharomyces probiotic for years as well.    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     Priority: Medium    Congenital nystagmus 07/08/2024     Priority: Medium    Dehydration 03/14/2020     Priority: Medium    Community acquired pneumonia 10/25/2019     Priority: Medium    Ileus (H) 08/24/2017     Priority: Medium    Coagulopathy 07/21/2017     Priority: Medium    Vitamin D toxicity, accidental or unintentional, initial encounter 06/30/2017     Priority: Medium    Myelomalacia of cervical cord (H) 06/02/2017     Priority: Medium    Complex partial seizure evolving to generalized seizure (H) 04/19/2017     Priority: Medium    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 05/26/2016     Priority: Medium    Elevated TSH 02/24/2016     Priority: Medium    Postmenopausal osteoporosis 02/04/2016     Priority: Medium    Hip dislocation, left (H) 11/13/2015     Priority: Medium    Ovarian failure 03/15/2015     Priority: Medium    GHD (growth hormone deficiency) 01/28/2012     Priority: Medium    Hypoglycemia 01/28/2012     Priority: Medium       PERTINENT PAST MEDICAL HISTORY:  Past Medical History:   Diagnosis Date    Anxiety     Global developmental delay     Hypoglycemia     Metabolic disease     congenital disorder of glycosilation    Scoliosis     Seizures (H)     last at age 8 before today    Strabismus        PREVIOUS SURGERIES:  Past Surgical History:   Procedure Laterality Date    ANESTHESIA OUT OF OR MRI 3T N/A 3/7/2017    Procedure: ANESTHESIA PEDS SEDATION MRI 3T;  Surgeon: GENERIC ANESTHESIA PROVIDER;  Location: [facility] PEDS SEDATION     BACK SURGERY      c1 decompression and fusionx3, scoliosis with instrumentation x1    ELECTRORETINOGRAM Bilateral 12/10/2014    ERG ([place])    ENT SURGERY       ear tubes    EXAM UNDER ANESTHESIA EYE(S) Bilateral 12/10/2014    EUA ([place])    EXTRACTION(S) DENTAL N/A 12/10/2014    Procedure: EXTRACTION(S) DENTAL;  Surgeon: Hubert York DDS;  Location: [facility] OR    EYE SURGERY Bilateral 1996     BMR 6mm ([place])     GI SURGERY      g tube    HC REPLACE GASTROSTOMY/CECOSTOMY TUBE PERCUTANEOUS N/A 12/10/2014    Procedure: REPLACE GASTROSTOMY TUBE, PERCUTANEOUS;  Surgeon: Phong Perea MD;  Location: [facility] OR    RECESSION RESECTION (REPAIR STRABISMUS) BILATERAL Bilateral 12/10/2014    BLR 11 ([place])    STRABISMUS SURGERY  1996    (1996) BMR 6 ([place])    STRABISMUS SURGERY  2014    BLR 11 ([place]) -        ALLERGIES:     Allergies   Allergen Reactions    Ibuprofen GI Disturbance     Pt is able to have motrin with food but not on an empty stomache.   Other reaction(s): Gastrointestinal    Lorazepam Other (See Comments)     Paradoxical reaction  Paradoxical alertness    Pseudoephedrine     Latex Rash       PERTINENT MEDICATIONS:  Current Outpatient Medications   Medication Sig Dispense Refill    acetaminophen (TYLENOL) 160 MG/5ML solution 480 mg by Per G Tube route every 4 hours as needed for fever or mild pain       ARIPiprazole (ABILIFY) 1 MG/ML SOLN 7.5 mg by Per G Tube route daily       artificial saliva (BIOTENE DRY MOUTHWASH) LIQD liquid Swish and spit in mouth 2 times daily      bacitracin ointment Apply topically 2 times daily as needed for wound care 14 g 0    calcium carbonate 1250 MG/5ML SUSP suspension Take 1,250 mg by mouth 2 times daily (with meals).      carbamide peroxide (DEBROX) 6.5 % otic solution INSTILL FIVE DROPS IN BOTH EARS ONCE DAILY 4 DAYS EACH MONTH. 1      Compleat Peptide 1.5 PO LIQD Place 625 mLs into G tube daily. Infuse via pump -Daytime - 1/2 carton formula and 360ml water at 8am, 12noon and 4pm given at 400ml/hr.  Overnight feeds - 1 carton plus 235ml water starting at 7PM at 40ml/hr x 12 hours  Water flush: 60ml water flush before and after feedings. 00891 mL 10    D-Mannose POWD 1 Scoop by Per G Tube route 2 times daily mixed with water      estradiol (ESTRACE) 0.5 MG tablet 0.5 mg daily      levETIRAcetam (KEPPRA) 100 MG/ML oral solution GIVE 7.5 ML (750 MG) PER  G-TUBE EVERY MORNING; GIVE 7.5 ML (750 MG) PER G-TUBE EVERY EVENING. 450 mL 11    loratadine (CLARITIN) 10 MG tablet 10 mg by Per G Tube route daily       Magnesium Citrate 100 MG TABS 300 mg by Per G Tube route daily      Nutritional Supplements (COMPLEAT PEDIATRIC PO)       ondansetron (ZOFRAN) 4 MG/5ML solution Take 5 mLs (4 mg) by mouth every 6 hours as needed for nausea or vomiting 90 mL 0    ORDER FOR DME Equipment being ordered: Other: chest strap for her tilt in space manual wheel chair  Treatment Diagnosis: For safe transportation to home secondary to poor sitting balance in upright. CDG type Ia, seizures, and G-tube dependence. 1 Device 0    oxybutynin (DITROPAN) 5 MG/5ML syrup       polyethylene glycol (MIRALAX) 17 g packet Administer  1-2 packs (17-34 grams )per day as needed. (Patient taking differently: Administer  1/2 capful) 45 packet 3    saccharomyces boulardii (FLORASTOR) 250 MG capsule Take 250 mg by mouth 2 times daily.      sertraline (ZOLOFT) 20 MG/ML (HIGH CONC) solution 200 mg by Gastronomy tube route daily       traZODone (DESYREL) 50 MG tablet Crush one tablet and administer via G-tube 30 minutes prior to bedtime daily      UNABLE TO FIND MEDICATION NAME: Rescue Remedy Herbal Supplement - Used twice daily as needed.      cephALEXin (KEFLEX) 250 MG/5ML suspension 500 mg every four times a day. (Patient not taking: Reported on 4/10/2025) 400 mL 0     No current facility-administered medications for this visit.       SOCIAL HISTORY:  Social History     Socioeconomic History    Marital status: Single     Spouse name: Not on file    Number of children: Not on file    Years of education: Not on file    Highest education level: Not on file   Occupational History    Not on file   Tobacco Use    Smoking status: Never    Smokeless tobacco: Never   Substance and Sexual Activity    Alcohol use: No    Drug use: No    Sexual activity: Never   Other Topics Concern    Not on file   Social History  "Rose Castillo currently lives in a group home.     Social Drivers of Health     Financial Resource Strain: Not At Risk (9/27/2023)    Received from [employer]    Financial Resource Strain     Is it hard for you to pay for the very basics like food, housing, medical care or heating?: No   Food Insecurity: Not At Risk (9/27/2023)    Received from [employer]    Food Insecurity     Does your food run out before you have the money to buy more?: No   Transportation Needs: Not At Risk (9/27/2023)    Received from [employer]    Transportation Needs     Does a lack of transportation keep you from your medical appointments or from getting your medications?: No   Physical Activity: Not on file   Stress: Not on file   Social Connections: Not on file   Interpersonal Safety: Not on file   Housing Stability: Not on file       FAMILY HISTORY:  Family History   Problem Relation Age of Onset    Glaucoma Mother         no medications or surg to date    Glaucoma Maternal Grandmother         s/p surg, decreased [facility]     Amblyopia No family hx of     Strabismus No family hx of        Past/family/social history reviewed and no changes    PHYSICAL EXAMINATION:  Vitals /89   Pulse 89   Ht 1.245 m (4' 1\")   Wt 37.2 kg (82 lb)   SpO2 97%   BMI 24.01 kg/m     Wt   Wt Readings from Last 2 Encounters:   04/10/25 37.2 kg (82 lb)   03/06/25 36.7 kg (80 lb 14.5 oz)      Gen: Resting comfortably in a wheelchair, alert, no distress  Eyes: sclerae anicteric  Resp: No increased respiratory effort  GI: abdomen soft, non-distended, non-tender. G-tube site appears clean and dry with very mild circumferential erythema, but no evidence for cellulitis.   Skin: Visible skin clear. No significant rash, abnormal pigmentation or lesions.  Neuro: Cranial nerves grossly intact.     PERTINENT STUDIES:  Reviewed    "

## 2025-04-10 NOTE — NURSING NOTE
"Chief Complaint   Patient presents with    New Patient     Mild abdominal pain     She requests these members of her care team be copied on today's visit information:  PCP: Kimberley Zee    Referring Provider:  Kimberley Zee MD  [facility]  [address] [place] [address]    Vitals:    04/10/25 1020   BP: 128/89   Pulse: 89   SpO2: 97%   Weight: 37.2 kg (82 lb)   Height: 1.245 m (4' 1\")     Body mass index is 24.01 kg/m .    Do you have a history of colon cancer in your immediate family? NO    Medications were reconciled.    Rena Walden, Clinical Assistant      "

## 2025-04-10 NOTE — TELEPHONE ENCOUNTER
Updated order for clarity, spoke to Jennifer at [facility] who voices understanding

## 2025-04-10 NOTE — LETTER
4/10/2025      Sherly Ramires  [address] [facility]  [place] [address]      Dear Colleague,    Thank you for referring your patient, Sherly Ramires, to the [facility]. Please see a copy of my visit note below.    NEW PATIENT GI CLINIC VISIT    CC/REFERRING MD:  Kimberley Zee  REASON FOR CONSULTATION:   Sherly Ramires is a 30 year old female who I was asked to see in consultation at the request of Dr. Kimberley Zee for   Chief Complaint   Patient presents with     New Patient     Mild abdominal pain       ASSESSMENT/PLAN:  30 year old female with complex PMH including congenital disorder of glycosylation with resulting multiple medical complexities who presents with her mother and caregiver for evaluation of loose stools. This represents a change from her previous bowel pattern which tended more toward constipation and was managed with a bowel regimen of daily Miralax, daily magnesium citrate, and every other day Enemeez. Over the last 6 months, has transitioned to loose/liquid stools which occur 1-2 times per day. No bloody stools, documented weight loss, vomiting, or apparent abdominal pain. Her bowel regimen has been adjusted to 1/2 capful of Miralax daily, magnesium citrate daily, and Benefiber 2 teaspoons twice daily, but loose stools have persisted. She did receive Keflex for a cutaneous infection in 1/2025 but diarrhea preceded antibiotics. We discussed possible etiologies including infection (timeline would make this seem less likely), malabsorption, inflammatory process, medication side effect, IBS, vs other. Will plan to check labs including CBC, CMP, TSH, Celiac serologies, CRP, fecal calprotectin, fecal elastase, enteric panel, and C diff. Will also obtain abdominal x-ray to gauge stool burden and ensure that this is not constipation with overflow diarrhea. In the meantime, will increase fiber supplement to 3 teaspoons twice daily and they can continue with probiotic  if desired. Await x-ray results before making adjustments to Miralax and mag citrate. Regarding G-tube issues with leakage per the port when opened, sounds to be a mechanical issue as it only occurs after the tube has been in place for 6 weeks and improves with tube changes which are taking place every 2 months. Will discuss further with GI and nutrition colleagues about ways to troubleshoot/remedy this.     -Labs as denoted above.  -AXR to gauge overall stool burden.  -Continue 1/2 capful of Miralax daily for now.  -Continue magnesium citrate daily for now.  -Increase fiber to 3 teaspoons BID.   -Okay to continue probiotic.   -Follow up in 4-6 weeks to recheck and discuss next steps.     Thank you for this consultation. It was a pleasure to participate in the care of this patient; please contact us with any further questions.  A total of 30 face-to-face minutes was spent with this patient, >50% of which was counseling regarding the above delineated issues. An additional 30 minutes was spent on the date of the encounter doing chart review, documentation, and further activities as noted above.    This note was created with voice recognition software, and while reviewed for accuracy, typos may remain.     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 She is accompanied by her mother, Julianne, and caretaker, Christiana, who provide the history. Anna receives essentially  all of her nutrition via G-tube and has been eating less by mouth over the last few years. She used to eat a few small meals/snacks per day, but has decreased to one small meal/snack per day otherwise she will vomit. They do note issues with fluid leaking through the tube port when opened. This occurs ~6 weeks after a tube change. For the first 6 weeks, there are no issues with leaking. There is no leakage around the tube site, just via the port when opened. She used to have the G-tube changed every 3 months, now being changed every 2 months due to leakage. They are still able to administer contents via the G-tube without issue.     Regarding her loose stools which is the primary reason for the visit, this has been noticeable over the last 6 months. She used to deal more with constipation and would have a bowel movement every other day on a bowel regimen of Miralax one capful daily, Enemeez every other day, and magnesium citrate once daily. Since stools have started to become loose/liquid, she is now taking a 1/2 capful of Miralax daily, no longer receiving enemas, and has continued on daily mag citrate. She is having 1-2 stools per day that are yellowish-brown in color. Her caretaker, Christiana, does not notice any mucus or particularly foul odor and there has been no blood or black, tarry stools. Stools are typically [place] stool type 6 or 7 and can be pasty and difficult to clean off her skin at times. They added Benefiber a little less than one month ago. Currently taking 2 teaspoons via G-tube twice daily. She seems to be tolerating it well, but they are unsure if it has helped with loose stools. Anna's mother, Julianne, reports off and on abdominal discomfort for most of her life, but she does not seem to be exhibiting signs of increased abdominal discomfort in recent history. Of note, Anna did receive Keflex for a cutaneous infection in 1/2025 but loose stools preceded this. She has taken a saccharomyces probiotic  for years as well.    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     Priority: Medium     Congenital nystagmus 07/08/2024     Priority: Medium     Dehydration 03/14/2020     Priority: Medium     Community acquired pneumonia 10/25/2019     Priority: Medium     Ileus (H) 08/24/2017     Priority: Medium     Coagulopathy 07/21/2017     Priority: Medium     Vitamin D toxicity, accidental or unintentional, initial encounter 06/30/2017     Priority: Medium     Myelomalacia of cervical cord (H) 06/02/2017     Priority: Medium     Complex partial seizure evolving to generalized seizure (H) 04/19/2017     Priority: Medium     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 05/26/2016     Priority: Medium     Elevated TSH 02/24/2016     Priority: Medium     Postmenopausal osteoporosis 02/04/2016     Priority: Medium     Hip dislocation, left (H) 11/13/2015     Priority: Medium     Ovarian failure 03/15/2015     Priority: Medium     GHD (growth hormone deficiency) 01/28/2012     Priority: Medium     Hypoglycemia 01/28/2012     Priority: Medium       PERTINENT PAST MEDICAL HISTORY:  Past Medical History:   Diagnosis Date     Anxiety      Global developmental delay      Hypoglycemia      Metabolic disease     congenital disorder of glycosilation     Scoliosis      Seizures (H)     last at age 8 before today     Strabismus        PREVIOUS SURGERIES:  Past Surgical History:   Procedure Laterality Date     ANESTHESIA OUT OF OR MRI 3T N/A 3/7/2017    Procedure: ANESTHESIA PEDS SEDATION MRI 3T;  Surgeon: GENERIC ANESTHESIA PROVIDER;  Location: [facility] PEDS SEDATION      BACK SURGERY      c1 decompression and fusionx3, scoliosis with instrumentation x1     ELECTRORETINOGRAM Bilateral  12/10/2014    ERG ([place])     ENT SURGERY       ear tubes     EXAM UNDER ANESTHESIA EYE(S) Bilateral 12/10/2014    EUA ([place])     EXTRACTION(S) DENTAL N/A 12/10/2014    Procedure: EXTRACTION(S) DENTAL;  Surgeon: Hubert York DDS;  Location: [facility] OR     EYE SURGERY Bilateral 1996    BMR 6mm ([place])      GI SURGERY      g tube     HC REPLACE GASTROSTOMY/CECOSTOMY TUBE PERCUTANEOUS N/A 12/10/2014    Procedure: REPLACE GASTROSTOMY TUBE, PERCUTANEOUS;  Surgeon: Phong Perea MD;  Location: [facility] OR     RECESSION RESECTION (REPAIR STRABISMUS) BILATERAL Bilateral 12/10/2014    BLR 11 ([place]aux)     STRABISMUS SURGERY  1996    (1996) BMR 6 ([place])     STRABISMUS SURGERY  2014    BLR 11 ([place]) -        ALLERGIES:     Allergies   Allergen Reactions     Ibuprofen GI Disturbance     Pt is able to have motrin with food but not on an empty stomache.   Other reaction(s): Gastrointestinal     Lorazepam Other (See Comments)     Paradoxical reaction  Paradoxical alertness     Pseudoephedrine      Latex Rash       PERTINENT MEDICATIONS:  Current Outpatient Medications   Medication Sig Dispense Refill     acetaminophen (TYLENOL) 160 MG/5ML solution 480 mg by Per G Tube route every 4 hours as needed for fever or mild pain        ARIPiprazole (ABILIFY) 1 MG/ML SOLN 7.5 mg by Per G Tube route daily        artificial saliva (BIOTENE DRY MOUTHWASH) LIQD liquid Swish and spit in mouth 2 times daily       bacitracin ointment Apply topically 2 times daily as needed for wound care 14 g 0     calcium carbonate 1250 MG/5ML SUSP suspension Take 1,250 mg by mouth 2 times daily (with meals).       carbamide peroxide (DEBROX) 6.5 % otic solution INSTILL FIVE DROPS IN BOTH EARS ONCE DAILY 4 DAYS EACH MONTH. 1       Compleat Peptide 1.5 PO LIQD Place 625 mLs into G tube daily. Infuse via pump -Daytime - 1/2 carton formula and 360ml water at 8am, 12noon and 4pm given at 400ml/hr.  Overnight feeds - 1 carton plus 235ml water starting at  7PM at 40ml/hr x 12 hours  Water flush: 60ml water flush before and after feedings. 66258 mL 10     D-Mannose POWD 1 Scoop by Per G Tube route 2 times daily mixed with water       estradiol (ESTRACE) 0.5 MG tablet 0.5 mg daily       levETIRAcetam (KEPPRA) 100 MG/ML oral solution GIVE 7.5 ML (750 MG) PER G-TUBE EVERY MORNING; GIVE 7.5 ML (750 MG) PER G-TUBE EVERY EVENING. 450 mL 11     loratadine (CLARITIN) 10 MG tablet 10 mg by Per G Tube route daily        Magnesium Citrate 100 MG TABS 300 mg by Per G Tube route daily       Nutritional Supplements (COMPLEAT PEDIATRIC PO)        ondansetron (ZOFRAN) 4 MG/5ML solution Take 5 mLs (4 mg) by mouth every 6 hours as needed for nausea or vomiting 90 mL 0     ORDER FOR DME Equipment being ordered: Other: chest strap for her tilt in space manual wheel chair  Treatment Diagnosis: For safe transportation to home secondary to poor sitting balance in upright. CDG type Ia, seizures, and G-tube dependence. 1 Device 0     oxybutynin (DITROPAN) 5 MG/5ML syrup        polyethylene glycol (MIRALAX) 17 g packet Administer  1-2 packs (17-34 grams )per day as needed. (Patient taking differently: Administer  1/2 capful) 45 packet 3     saccharomyces boulardii (FLORASTOR) 250 MG capsule Take 250 mg by mouth 2 times daily.       sertraline (ZOLOFT) 20 MG/ML (HIGH CONC) solution 200 mg by Gastronomy tube route daily        traZODone (DESYREL) 50 MG tablet Crush one tablet and administer via G-tube 30 minutes prior to bedtime daily       UNABLE TO FIND MEDICATION NAME: Rescue Remedy Herbal Supplement - Used twice daily as needed.       cephALEXin (KEFLEX) 250 MG/5ML suspension 500 mg every four times a day. (Patient not taking: Reported on 4/10/2025) 400 mL 0     No current facility-administered medications for this visit.       SOCIAL HISTORY:  Social History     Socioeconomic History     Marital status: Single     Spouse name: Not on file     Number of children: Not on file     Years of  "education: Not on file     Highest education level: Not on file   Occupational History     Not on file   Tobacco Use     Smoking status: Never     Smokeless tobacco: Never   Substance and Sexual Activity     Alcohol use: No     Drug use: No     Sexual activity: Never   Other Topics Concern     Not on file   Social History Narrative    Anna currently lives in a group home.     Social Drivers of Health     Financial Resource Strain: Not At Risk (9/27/2023)    Received from [facility]    Financial Resource Strain      Is it hard for you to pay for the very basics like food, housing, medical care or heating?: No   Food Insecurity: Not At Risk (9/27/2023)    Received from [facility]    Food Insecurity      Does your food run out before you have the money to buy more?: No   Transportation Needs: Not At Risk (9/27/2023)    Received from [facility]    Transportation Needs      Does a lack of transportation keep you from your medical appointments or from getting your medications?: No   Physical Activity: Not on file   Stress: Not on file   Social Connections: Not on file   Interpersonal Safety: Not on file   Housing Stability: Not on file       FAMILY HISTORY:  Family History   Problem Relation Age of Onset     Glaucoma Mother         no medications or surg to date     Glaucoma Maternal Grandmother         s/p surg, decreased [facility]      Amblyopia No family hx of      Strabismus No family hx of        Past/family/social history reviewed and no changes    PHYSICAL EXAMINATION:  Vitals /89   Pulse 89   Ht 1.245 m (4' 1\")   Wt 37.2 kg (82 lb)   SpO2 97%   BMI 24.01 kg/m     Wt   Wt Readings from Last 2 Encounters:   04/10/25 37.2 kg (82 lb)   03/06/25 36.7 kg (80 lb 14.5 oz)      Gen: Resting comfortably in a wheelchair, alert, no distress  Eyes: sclerae anicteric  Resp: No increased respiratory effort  GI: abdomen soft, non-distended, non-tender. G-tube site " appears clean and dry with very mild circumferential erythema, but no evidence for cellulitis.   Skin: Visible skin clear. No significant rash, abnormal pigmentation or lesions.  Neuro: Cranial nerves grossly intact.     PERTINENT STUDIES:  Reviewed      Again, thank you for allowing me to participate in the care of your patient.        Sincerely,        Kaya Bui PA-C    Electronically signed

## 2025-04-11 ENCOUNTER — ANCILLARY PROCEDURE (OUTPATIENT)
Dept: GENERAL RADIOLOGY | Facility: CLINIC | Age: 30
End: 2025-04-11
Attending: PHYSICIAN ASSISTANT
Payer: COMMERCIAL

## 2025-04-11 DIAGNOSIS — R19.7 DIARRHEA, UNSPECIFIED TYPE: ICD-10-CM

## 2025-04-11 PROCEDURE — S9342 HIT ENTERAL PUMP DIEM: HCPCS

## 2025-04-12 PROCEDURE — S9342 HIT ENTERAL PUMP DIEM: HCPCS

## 2025-04-13 PROCEDURE — S9342 HIT ENTERAL PUMP DIEM: HCPCS

## 2025-04-14 PROCEDURE — S9342 HIT ENTERAL PUMP DIEM: HCPCS

## 2025-04-15 PROCEDURE — S9342 HIT ENTERAL PUMP DIEM: HCPCS

## 2025-04-15 NOTE — PROGRESS NOTES
..CLINICAL NUTRITION SERVICES - ASSESSMENT NOTE    REASON FOR ASSESSMENT  Sherly Ramires is a 30 year old female seen by the dietitian in metabolic clinic for referral for tube feedings with CDG (PMM2 gene) . Patient is accompanied by  and mother, seen virtually.     RECOMMENDATIONS    Recommend trial addition of Benefiber to see if helps with bulking stool (see below)  Patient would likely benefit from a GI referral to review overall bowel regimen         ANTHROPOMETRICS  Height: 124.5 cm or 49 in  Weight: 36 kg or 80 lbs  BMI: 23.5    Comments:  Weight:   Group home doing daily weights.  Overall ranging from 81-88 lbs in past month.      NUTRITION HISTORY  Sherly eats one small snack/day (~250 kcals) + g-tube feedings.    Special considerations:  Nutrition related medical updates:   Patient has been relatively stable.     Type of CDG runs risk of hypoglycemia, planned 2 hour GTT for tomorrow  Allergies/Intolerances: NKFA  Vitamins/Supplements:   Calcium carbonate suspension BID (500 mg elemental BID)  Probiotic  Mg citrate 300 mg   D-mannose powder  1 cap Miralax daily    GI:  Stools:   Main cause of concern/questions today; they have changed in consistency over past 6 months to watery, blowouts    Home Regimen:  Route: GJ-tube  DME:   [facility]  Formula: Compleat Peptide 1.5  Rate/Frequency:   Daytime: 1/2 carton (125 mL) + 360 mL water = 485 mL ran at 400 mL/hr, three times daily (8 am - 12 pm - 4 pm)  Night feed: 1 carton (250 mL) + 235 mL water = 485 mL given at 40 mL/hr over 12 hours (7 pm - 7 am)  Flush: 60 mL flush before and after feedings (480 mL/day)     Provides 1450 mL, 938 kcal, (26 kcal/kg), 45 g protein, (1.25 g/kg), 12.5 mcg/d Vitamin D, 11.3 mg/d Iron, 7.5 gm fiber daily.   Meets 100% of kcal and 100% protein needs.    Total Nutrition Provisions:  1190 kcals/day (33 kcal/kg)  50 gm pro (1.4 g/kg)    NUTRITION RELATED PHYSICAL FINDINGS  None    NUTRITION RELATED LABS  Labs  "reviewed    NUTRITION RELATED MEDICATIONS  Medications reviewed    ESTIMATED NUTRITION NEEDS:  Based on [place] (840) x 1.2-1.3  Energy Needs: 28-30 kcal/kg  Protein Needs: RDA/age: 0.8 g/kg  Fluid Needs: 1 mL/kcal   Micronutrient Needs: RDA for age      NUTRITION DIAGNOSIS  Predicted inadequate oral intake related to reliance on enteral/tube feedings to meet nutrition needs.    INTERVENTIONS  Nutrition Prescription  Sherly to meet 100% estimated needs via enteral nutrition..    Nutrition Education:   Provided education on plan for attempting to improve stools into formed, less frequent.    Discussed adding fiber for bulking.  Plan to give 1 teaspoon + 2 oz water BID via g-tube (total +4 gm fiber/day).  If no improvement in stools after 1 week, try increasing to 2 teaspoons + 2 oz water BID (total + 8 gm fiber/day) for total w/formula 15.5 gm/day.    2. Trial reducing Miralax to only 1/2 cap daily    3. Upon further discussion it was found that in addition to Miralax patient is also receiving Mg citrate and daily \"mini-enemas.\"  Per discussion with mom and group home caregiver, enemas were changed to routine that mom would do when patient lived at home, doing mini-enemas only after 24 hours of no stool.    4. Group home requested letter/orders written for above plan.    Implementation:  Implementation: Collaboration with other providers - discussed/reviewed with Dr. Dubon.    Goals  Weight range 79-83 lbs.  Group home to reach out if 2-3 weights in a row are outside this range  Patient to meet 100% estimated nutrition needs.    FOLLOW UP/MONITORING  Energy Intake  Enteral nutrition intake  Anthropometric measurements    Spent 30 minutes in consult with Sherly Ramires and parent, caregiver.     Michelle Ortiz, RD, LD    "

## 2025-04-16 PROCEDURE — S9342 HIT ENTERAL PUMP DIEM: HCPCS

## 2025-04-17 PROCEDURE — S9342 HIT ENTERAL PUMP DIEM: HCPCS

## 2025-04-18 PROCEDURE — S9342 HIT ENTERAL PUMP DIEM: HCPCS

## 2025-04-19 PROCEDURE — S9342 HIT ENTERAL PUMP DIEM: HCPCS

## 2025-04-21 DIAGNOSIS — R19.5 LOOSE STOOLS: ICD-10-CM

## 2025-04-21 DIAGNOSIS — R62.51 FAILURE TO GAIN WEIGHT (0-17): Primary | ICD-10-CM

## 2025-04-21 DIAGNOSIS — E74.89 CDG (CONGENITAL DISORDER OF GLYCOSYLATION): ICD-10-CM

## 2025-04-22 ENCOUNTER — HOME INFUSION BILLING (OUTPATIENT)
Dept: HOME HEALTH SERVICES | Facility: HOME HEALTH | Age: 30
End: 2025-04-22
Payer: COMMERCIAL

## 2025-04-22 ENCOUNTER — TELEPHONE (OUTPATIENT)
Dept: NEUROLOGY | Facility: CLINIC | Age: 30
End: 2025-04-22

## 2025-04-22 PROCEDURE — S9342 HIT ENTERAL PUMP DIEM: HCPCS

## 2025-04-22 PROCEDURE — B9002 ENTER NUTR INF PUMP ANY TYPE: HCPCS | Mod: RR

## 2025-04-22 PROCEDURE — B4153 EF HYDROLYZED/AMINO ACIDS: HCPCS

## 2025-04-22 NOTE — TELEPHONE ENCOUNTER
Patient caregiver from group [facility] wanting to see if the patient is needing another Keppra lab done. Please contact at [phone].

## 2025-04-23 ENCOUNTER — OFFICE VISIT (OUTPATIENT)
Dept: NEUROLOGY | Facility: CLINIC | Age: 30
End: 2025-04-23
Payer: COMMERCIAL

## 2025-04-23 VITALS
DIASTOLIC BLOOD PRESSURE: 79 MMHG | RESPIRATION RATE: 19 BRPM | OXYGEN SATURATION: 97 % | HEART RATE: 91 BPM | SYSTOLIC BLOOD PRESSURE: 108 MMHG | TEMPERATURE: 97.8 F

## 2025-04-23 DIAGNOSIS — G40.209 COMPLEX PARTIAL SEIZURE EVOLVING TO GENERALIZED SEIZURE (H): Primary | ICD-10-CM

## 2025-04-23 PROCEDURE — 80177 DRUG SCRN QUAN LEVETIRACETAM: CPT | Mod: ORL | Performed by: PSYCHIATRY & NEUROLOGY

## 2025-04-23 PROCEDURE — S9342 HIT ENTERAL PUMP DIEM: HCPCS

## 2025-04-23 RX ORDER — LEVETIRACETAM 100 MG/ML
SOLUTION ORAL
Qty: 450 ML | Refills: 11 | Status: SHIPPED | OUTPATIENT
Start: 2025-04-23

## 2025-04-23 ASSESSMENT — PAIN SCALES - GENERAL: PAINLEVEL_OUTOF10: NO PAIN (0)

## 2025-04-23 NOTE — PROGRESS NOTES
[facility]:  RETURN VISIT          Service Date: 04/23/2025     HISTORY:  Ms. 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  She came to the visit with her mother and a group home staff member.       Following the most recent virtual visit to this clinic on 05/01/2024, the patient reportedly has had no seizures and no apparent adverse effects of levetiracetam.       By report, the most recent seizures occurred a number of years ago, as a GTC seizure in 2014, a complex partial seizure in 2014, and an atonic seizure in early childhood.       No new medical problems were reported by the  staff persons.       Ictal semiology-history:   The patient has had essentially 3 types of seizures in her lifetime, which have been responsive to levetiracetam in the case of the most recent seizures.  These essentially occurred as generalized atonic seizures in early childhood and later as complex partial seizures and generalized tonic-clonic seizures.      The patient had generalized atonic seizures which occurred approximately 12 times, usually in association with a febrile illness or other physiological stressors, between the ages of 3 and 6 years.  Her mother was the primary witnesses of these events and noted that the patient suddenly became unresponsive, usually while lying in her crib and when she moves the patient's limbs, she noted generalized flaccidity.  She was told that these were generalized atonic seizures and the patient was given rectal Diastat to prevent a cluster of these seizures.  She did not receive chronic antiepileptic drug therapy at this time.  The patient had essentially no seizures that were definitely witnessed between the ages of 6 and 16 years.      At 16 years of age, the patient had her first generalized tonic-clonic  seizure witnessed by her mother.  She had 3 of these on the day of onset, a single grand mal seizure later that year and then a single grand mal seizure about 1 year after the first seizures.  All of these were associated with sleep deprivation.  Her mother witnessed her to suddenly extend her limbs stiffly with truncal extension followed by generalized rhythmic jerking for a minute or 2.  She did not have tongue biting.      It appears that over several years after the grand mal seizures began, there was a question with various care providers of brief staring spells.  Her mother witnessed a definite staring spell with essentially no movement and no responsiveness, but with diffuse stiffness for a minute or 2.  This occurred in , in association with sleep deprivation.  Subsequently, no one has seen any definite staring spell seizures.      The patient reportedly has no other type of paroxysmal behavioral alterations.      Epilepsy-seizure predispositions:   With early developmental delay, the patient was evaluated and eventually diagnosed with congenital disorder of glycosylation type 1.  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  She has had slow language development and has learned to communicate by producing single linguistic sound sequences which are not complete intelligible words, but are accompanied by gesturing to supplement the vocalization.  In this manner the patient is able to make a number of requests of people who know her well.  Her mother thinks that she has much greater comprehension of speech compared with her own speech production.      The patient has no family history of epilepsy or seizures.  She has no history of gestational or  injury, febrile convulsions, developmental delay, stroke, meningitis, encephalitis, significant head injury, or other epileptic predispositions than CDG type 1a.       Laboratory evaluations:   The patient has had brain imaging and electroencephalography at a number of different clinics in [facility] and [place].  The most recent brain MRI was performed at [facility] on 2013, and this was reported to show deformities at the craniocervical junction consistent with reported surgical procedures, marked hypoplasia of the cerebellar vermis and cerebellar hemispheres (unchanged) and myelomalacia of adjacent portions of the superior cervical spinal cord, with limited T2 signal alterations in subcortical white matter.       Notes from her earlier pediatric neurologist, Dr. Gus Ny indicate that she had definite right frontal lobe interictal spikes on electroencephalograms in  and .     Out-pateint VEE2024  The interictal EEG recording during waking was abnormal due to ongoing generalized theta-delta slowing, with frequent brief bursts of high-amplitude bifrontocentral delta slowing. No interictal epileptiform abnormalities, no electrographic seizures, and no paroxysmal behavioral events were recorded during the period of monitoring.     Epilepsy therapeutics:   The patient was treated with levetiracetam following her first grand mal seizure at 16 years of age.  She has continued on this since then, with upward dose adjustments a number of years ago.  She has not been noted to have adverse effects of levetiracetam.  She has not been on other chronic antiepileptic drugs therapies.      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  Her mother thinks that she was twice accidentally dropped or fell a small distance when she was about 6 years of age, once sliding often an adaptive tricycle onto the floor and once collapsing down out of the hands of a person who was holding her in an erect position.  It was at around this time  that the patient was noted to have bilateral leg weakness, which progressed at an early age, leaving her with a spastic quadriplegia involving legs much more than arms.  Now over the last 6 months she appears to have had progression in arm weakness, however, probably involving the left arm more than the right arm.  She appears clearly weaker in her assistance with transfers, although she is able to readily manipulate light objects such that she can feed herself and use coloring crayons.  With this myelopathy, she has had complete urinary and fecal incontinence, although possibly these problems began before other manifestations of myelopathy.  She seems to have some feeling in her feet.  Her mother is planning to pursue evaluation to determine whether further cervical spine surgery is indicated.      PAST MEDICAL-SURGICAL HISTORY:    1.  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     2.  Congenital cervical spine malformations, possibly complicated by early cervical trauma, with spastic quadriparesis; status post occipital-C2 fusion and C1 laminectomy (2003); status post foramen magnum decompression and C1 decompression, with dorsal [id number] fusion (2003); status post additional decompression and stabilization procedure (2004).   3.  Status post Burns rojelio procedure for kyphoscoliosis (2007).   4.  Status post gastrostomy tube placement and multiple replacements.   5.  Status post 2 procedures for strabismus.      FAMILY HISTORY:  There is no family history of epilepsy, seizures or other neurological conditions than migraine headaches in both parents.      PERSONAL AND SOCIAL HISTORY:  The patient lived with her parents and siblings for most of her life, and had special education classes for much of this  time.  More recently she moved into a specialized group home with 3 other highly disabled clients and appears to be doing well there.  She reportedly is able to swallow safely based on repeated swallowing studies, but has very little appetite, so receives most of her nutrition through the gastrostomy tube.  She does assist with some with activities of daily living.      REVIEW OF SYSTEMS:  The patient reportedly has not been evaluated for peripheral neuropathy.   She has no history of rashes and easy bruising.  The remainder of a 14-system symptom review was negative or unobtainable, except as noted above.        MEDICATIONS:  Levetiracetam solution 750 mg b.i.d. per gastrostomy, and other medications as per the electronic medical record.      PHYSICAL EXAMINATION:    The patient was sitting up in a wheelchair. Alert, tracks. Does not follow commands. Communicates via single words. Skull and cervical deformities consistent with surgeries. Wearing soft collar. Pupils equal, round. BLE antigravity. Face symmetric. DTRs 2+ symmetric in biceps, brachioradialis, patellar, and Achilles. Appeared somewhat uncomfortable with examination.     IMPRESSION:    The patient continues to be free of seizures on levetiracetam monotherapy.  The levetiracetam level was 67.6 mcg per mL on 04/09/2024.   We agreed to reduce the levetiracetam dose, in view of the prolonged period of freed from seizures.       On 04/19/2017, she had an outpatient 3-hour video-EEG recording at [facility], which showed ongoing generalized theta-delta slowing, with frequent brief bursts of high amplitude bifrontocentral delta slowing, but no epileptiform abnormalities.  She was reported to have had right frontal spikes on prior interictal EEG recordings.  We will re-check EEG activities with an out-patient recording.       Her weakness is reportedly stable. She has previously been seen again by surgeon in [place] who recommended soft collar without further  intervention.     PLAN:    1.  Continue levetiracetam at 750 mg b.i.d.   2.  Check levetiracetam level.   3.  Return visit in approximately 11 months.      I spent 21 minutes in this patient care, with 15 minutes in direct patient contact, and 6 minutes in chart review and document preparation.         Nhan Knott M.D.   Professor of Neurology

## 2025-04-23 NOTE — LETTER
2025       RE: Sherly Ramires  : 1995   MRN: [id number]      Dear Colleague,    Thank you for referring your patient, Sherly Ramires, to the [facility] [place], operated by [facility] at [facility]. Please see a copy of my visit note below.      [facility]psy [facility]:  RETURN VISIT          Service Date: 2025     HISTORY:  Ms. 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  She came to the visit with her mother and a group home staff member.       Following the most recent virtual visit to this clinic on 2024, the patient reportedly has had no seizures and no apparent adverse effects of levetiracetam.       By report, the most recent seizures occurred a number of years ago, as a GTC seizure in , a complex partial seizure in , and an atonic seizure in early childhood.       No new medical problems were reported by the  staff persons.       Ictal semiology-history:   The patient has had essentially 3 types of seizures in her lifetime, which have been responsive to levetiracetam in the case of the most recent seizures.  These essentially occurred as generalized atonic seizures in early childhood and later as complex partial seizures and generalized tonic-clonic seizures.      The patient had generalized atonic seizures which occurred approximately 12 times, usually in association with a febrile illness or other physiological stressors, between the ages of 3 and 6 years.  Her mother was the primary witnesses of these events and noted that the patient suddenly became unresponsive, usually while lying in her crib and when she moves the patient's limbs, she noted generalized flaccidity.  She was told that these were generalized atonic seizures and the patient was given rectal Diastat  to prevent a cluster of these seizures.  She did not receive chronic antiepileptic drug therapy at this time.  The patient had essentially no seizures that were definitely witnessed between the ages of 6 and 16 years.      At 16 years of age, the patient had her first generalized tonic-clonic seizure witnessed by her mother.  She had 3 of these on the day of onset, a single grand mal seizure later that year and then a single grand mal seizure about 1 year after the first seizures.  All of these were associated with sleep deprivation.  Her mother witnessed her to suddenly extend her limbs stiffly with truncal extension followed by generalized rhythmic jerking for a minute or 2.  She did not have tongue biting.      It appears that over several years after the grand mal seizures began, there was a question with various care providers of brief staring spells.  Her mother witnessed a definite staring spell with essentially no movement and no responsiveness, but with diffuse stiffness for a minute or 2.  This occurred in 2014, in association with sleep deprivation.  Subsequently, no one has seen any definite staring spell seizures.      The patient reportedly has no other type of paroxysmal behavioral alterations.      Epilepsy-seizure predispositions:   With early developmental delay, the patient was evaluated and eventually diagnosed with congenital disorder of glycosylation type 1.  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  She has had slow language development and has learned to communicate by producing single linguistic sound sequences which are not complete intelligible words, but are accompanied by gesturing to supplement the vocalization.  In this manner the patient is able to make a number of requests of people who know her well.  Her mother thinks that she has much greater comprehension of speech compared with her own  speech production.      The patient has no family history of epilepsy or seizures.  She has no history of gestational or  injury, febrile convulsions, developmental delay, stroke, meningitis, encephalitis, significant head injury, or other epileptic predispositions than CDG type 1a.      Laboratory evaluations:   The patient has had brain imaging and electroencephalography at a number of different clinics in [facility] and [place].  The most recent brain MRI was performed at [facility] on 2013, and this was reported to show deformities at the craniocervical junction consistent with reported surgical procedures, marked hypoplasia of the cerebellar vermis and cerebellar hemispheres (unchanged) and myelomalacia of adjacent portions of the superior cervical spinal cord, with limited T2 signal alterations in subcortical white matter.       Notes from her earlier pediatric neurologist, Dr. Gus Ny indicate that she had definite right frontal lobe interictal spikes on electroencephalograms in  and .     Out-pateint VEE2024  The interictal EEG recording during waking was abnormal due to ongoing generalized theta-delta slowing, with frequent brief bursts of high-amplitude bifrontocentral delta slowing. No interictal epileptiform abnormalities, no electrographic seizures, and no paroxysmal behavioral events were recorded during the period of monitoring.     Epilepsy therapeutics:   The patient was treated with levetiracetam following her first grand mal seizure at 16 years of age.  She has continued on this since then, with upward dose adjustments a number of years ago.  She has not been noted to have adverse effects of levetiracetam.  She has not been on other chronic antiepileptic drugs therapies.      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   Her mother thinks that she was twice accidentally dropped or fell a small distance when she was about 6 years of age, once sliding often an adaptive tricycle onto the floor and once collapsing down out of the hands of a person who was holding her in an erect position.  It was at around this time that the patient was noted to have bilateral leg weakness, which progressed at an early age, leaving her with a spastic quadriplegia involving legs much more than arms.  Now over the last 6 months she appears to have had progression in arm weakness, however, probably involving the left arm more than the right arm.  She appears clearly weaker in her assistance with transfers, although she is able to readily manipulate light objects such that she can feed herself and use coloring crayons.  With this myelopathy, she has had complete urinary and fecal incontinence, although possibly these problems began before other manifestations of myelopathy.  She seems to have some feeling in her feet.  Her mother is planning to pursue evaluation to determine whether further cervical spine surgery is indicated.      PAST MEDICAL-SURGICAL HISTORY:    1.  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     2.  Congenital cervical spine malformations, possibly complicated by early cervical trauma, with spastic quadriparesis; status post occipital-C2 fusion and C1 laminectomy (2003); status post foramen magnum decompression and C1 decompression, with dorsal [id number] fusion (2003); status post additional decompression and stabilization procedure (2004).   3.  Status post Burns rojelio procedure for kyphoscoliosis (2007).   4.  Status post gastrostomy tube placement and multiple replacements.   5.  Status post 2 procedures for strabismus.       FAMILY HISTORY:  There is no family history of epilepsy, seizures or other neurological conditions than migraine headaches in both parents.      PERSONAL AND SOCIAL HISTORY:  The patient lived with her parents and siblings for most of her life, and had special education classes for much of this time.  More recently she moved into a specialized group home with 3 other highly disabled clients and appears to be doing well there.  She reportedly is able to swallow safely based on repeated swallowing studies, but has very little appetite, so receives most of her nutrition through the gastrostomy tube.  She does assist with some with activities of daily living.      REVIEW OF SYSTEMS:  The patient reportedly has not been evaluated for peripheral neuropathy.   She has no history of rashes and easy bruising.  The remainder of a 14-system symptom review was negative or unobtainable, except as noted above.        MEDICATIONS:  Levetiracetam solution 750 mg b.i.d. per gastrostomy, and other medications as per the electronic medical record.      PHYSICAL EXAMINATION:    The patient was sitting up in a wheelchair. Alert, tracks. Does not follow commands. Communicates via single words. Skull and cervical deformities consistent with surgeries. Wearing soft collar. Pupils equal, round. BLE antigravity. Face symmetric. DTRs 2+ symmetric in biceps, brachioradialis, patellar, and Achilles. Appeared somewhat uncomfortable with examination.     IMPRESSION:    The patient continues to be free of seizures on levetiracetam monotherapy.  The levetiracetam level was 67.6 mcg per mL on 04/09/2024.   We agreed to reduce the levetiracetam dose, in view of the prolonged period of freed from seizures.       On 04/19/2017, she had an outpatient 3-hour video-EEG recording at [facility], which showed ongoing generalized theta-delta slowing, with frequent brief bursts of high amplitude bifrontocentral delta slowing, but no epileptiform abnormalities.  She  was reported to have had right frontal spikes on prior interictal EEG recordings.  We will re-check EEG activities with an out-patient recording.       Her weakness is reportedly stable. She has previously been seen again by surgeon in [place] who recommended soft collar without further intervention.     PLAN:    1.  Continue levetiracetam at 750 mg b.i.d.   2.  Check levetiracetam level.   3.  Return visit in approximately 11 months.      I spent 21 minutes in this patient care, with 15 minutes in direct patient contact, and 6 minutes in chart review and document preparation.         Nhan Knott M.D.   Professor of Neurology           Again, thank you for allowing me to participate in the care of your patient.      Sincerely,    Nhan Knott MD

## 2025-04-24 LAB — LEVETIRACETAM SERPL-MCNC: 41.5 ÂΜG/ML (ref 10–40)

## 2025-04-24 PROCEDURE — S9342 HIT ENTERAL PUMP DIEM: HCPCS

## 2025-04-24 NOTE — TELEPHONE ENCOUNTER
RN placed call back to patient's group home.  Staff reported this was done yesterday and RN confirmed they should not need an additional level at this time. Staff had no additional questions.

## 2025-04-25 PROCEDURE — S9342 HIT ENTERAL PUMP DIEM: HCPCS

## 2025-04-26 PROCEDURE — S9342 HIT ENTERAL PUMP DIEM: HCPCS

## 2025-04-27 PROCEDURE — S9342 HIT ENTERAL PUMP DIEM: HCPCS

## 2025-04-28 PROCEDURE — S9342 HIT ENTERAL PUMP DIEM: HCPCS

## 2025-04-29 PROCEDURE — S9342 HIT ENTERAL PUMP DIEM: HCPCS

## 2025-04-30 PROCEDURE — S9342 HIT ENTERAL PUMP DIEM: HCPCS

## 2025-05-01 ENCOUNTER — HOME INFUSION BILLING (OUTPATIENT)
Dept: HOME HEALTH SERVICES | Facility: HOME HEALTH | Age: 30
End: 2025-05-01
Payer: COMMERCIAL

## 2025-05-01 PROCEDURE — S9342 HIT ENTERAL PUMP DIEM: HCPCS

## 2025-05-02 PROCEDURE — S9342 HIT ENTERAL PUMP DIEM: HCPCS

## 2025-05-03 PROCEDURE — S9342 HIT ENTERAL PUMP DIEM: HCPCS

## 2025-05-04 PROCEDURE — S9342 HIT ENTERAL PUMP DIEM: HCPCS

## 2025-05-05 PROCEDURE — S9342 HIT ENTERAL PUMP DIEM: HCPCS

## 2025-05-06 PROCEDURE — S9342 HIT ENTERAL PUMP DIEM: HCPCS

## 2025-05-07 PROCEDURE — S9342 HIT ENTERAL PUMP DIEM: HCPCS

## 2025-05-08 PROCEDURE — S9342 HIT ENTERAL PUMP DIEM: HCPCS

## 2025-05-09 PROCEDURE — S9342 HIT ENTERAL PUMP DIEM: HCPCS

## 2025-05-10 PROCEDURE — S9342 HIT ENTERAL PUMP DIEM: HCPCS

## 2025-05-11 PROCEDURE — S9342 HIT ENTERAL PUMP DIEM: HCPCS

## 2025-05-12 PROCEDURE — S9342 HIT ENTERAL PUMP DIEM: HCPCS

## 2025-05-13 PROCEDURE — S9342 HIT ENTERAL PUMP DIEM: HCPCS

## 2025-05-14 PROCEDURE — S9342 HIT ENTERAL PUMP DIEM: HCPCS

## 2025-05-15 PROCEDURE — S9342 HIT ENTERAL PUMP DIEM: HCPCS

## 2025-05-16 PROCEDURE — S9342 HIT ENTERAL PUMP DIEM: HCPCS

## 2025-05-17 PROCEDURE — S9342 HIT ENTERAL PUMP DIEM: HCPCS

## 2025-05-18 PROCEDURE — S9342 HIT ENTERAL PUMP DIEM: HCPCS

## 2025-05-19 ENCOUNTER — HOME INFUSION BILLING (OUTPATIENT)
Dept: HOME HEALTH SERVICES | Facility: HOME HEALTH | Age: 30
End: 2025-05-19
Payer: COMMERCIAL

## 2025-05-19 PROCEDURE — B4153 EF HYDROLYZED/AMINO ACIDS: HCPCS

## 2025-05-19 PROCEDURE — S9342 HIT ENTERAL PUMP DIEM: HCPCS

## 2025-05-20 ENCOUNTER — RESULTS FOLLOW-UP (OUTPATIENT)
Dept: GASTROENTEROLOGY | Facility: CLINIC | Age: 30
End: 2025-05-20

## 2025-05-20 PROCEDURE — S9342 HIT ENTERAL PUMP DIEM: HCPCS

## 2025-05-21 PROCEDURE — S9342 HIT ENTERAL PUMP DIEM: HCPCS

## 2025-05-22 PROCEDURE — S9342 HIT ENTERAL PUMP DIEM: HCPCS

## 2025-05-23 PROCEDURE — S9342 HIT ENTERAL PUMP DIEM: HCPCS

## 2025-05-24 PROCEDURE — S9342 HIT ENTERAL PUMP DIEM: HCPCS

## 2025-05-25 PROCEDURE — S9342 HIT ENTERAL PUMP DIEM: HCPCS

## 2025-05-26 PROCEDURE — S9342 HIT ENTERAL PUMP DIEM: HCPCS

## 2025-05-27 PROCEDURE — S9342 HIT ENTERAL PUMP DIEM: HCPCS

## 2025-05-28 PROCEDURE — S9342 HIT ENTERAL PUMP DIEM: HCPCS

## 2025-05-29 PROCEDURE — S9342 HIT ENTERAL PUMP DIEM: HCPCS

## 2025-05-30 PROCEDURE — S9342 HIT ENTERAL PUMP DIEM: HCPCS

## 2025-05-31 PROCEDURE — S9342 HIT ENTERAL PUMP DIEM: HCPCS

## 2025-06-01 PROCEDURE — S9342 HIT ENTERAL PUMP DIEM: HCPCS

## 2025-06-02 PROCEDURE — S9342 HIT ENTERAL PUMP DIEM: HCPCS

## 2025-06-03 PROCEDURE — S9342 HIT ENTERAL PUMP DIEM: HCPCS

## 2025-06-04 PROCEDURE — S9342 HIT ENTERAL PUMP DIEM: HCPCS

## 2025-06-05 PROCEDURE — S9342 HIT ENTERAL PUMP DIEM: HCPCS

## 2025-06-06 PROCEDURE — S9342 HIT ENTERAL PUMP DIEM: HCPCS

## 2025-06-07 PROCEDURE — S9342 HIT ENTERAL PUMP DIEM: HCPCS

## 2025-06-08 PROCEDURE — S9342 HIT ENTERAL PUMP DIEM: HCPCS

## 2025-06-09 PROCEDURE — S9342 HIT ENTERAL PUMP DIEM: HCPCS

## 2025-06-10 PROCEDURE — S9342 HIT ENTERAL PUMP DIEM: HCPCS

## 2025-06-11 PROCEDURE — S9342 HIT ENTERAL PUMP DIEM: HCPCS

## 2025-06-12 PROCEDURE — S9342 HIT ENTERAL PUMP DIEM: HCPCS

## 2025-06-13 PROCEDURE — S9342 HIT ENTERAL PUMP DIEM: HCPCS

## 2025-06-14 PROCEDURE — S9342 HIT ENTERAL PUMP DIEM: HCPCS

## 2025-06-15 PROCEDURE — S9342 HIT ENTERAL PUMP DIEM: HCPCS

## 2025-06-16 ENCOUNTER — HOME INFUSION (OUTPATIENT)
Dept: HOME HEALTH SERVICES | Facility: HOME HEALTH | Age: 30
End: 2025-06-16
Payer: COMMERCIAL

## 2025-06-16 ENCOUNTER — HOME INFUSION BILLING (OUTPATIENT)
Dept: HOME HEALTH SERVICES | Facility: HOME HEALTH | Age: 30
End: 2025-06-16
Payer: COMMERCIAL

## 2025-06-16 PROCEDURE — B4153 EF HYDROLYZED/AMINO ACIDS: HCPCS

## 2025-06-16 PROCEDURE — S9342 HIT ENTERAL PUMP DIEM: HCPCS

## 2025-06-17 PROCEDURE — S9342 HIT ENTERAL PUMP DIEM: HCPCS

## 2025-06-18 PROCEDURE — S9342 HIT ENTERAL PUMP DIEM: HCPCS

## 2025-06-19 PROCEDURE — S9342 HIT ENTERAL PUMP DIEM: HCPCS

## 2025-06-20 ENCOUNTER — MEDICAL CORRESPONDENCE (OUTPATIENT)
Dept: HEALTH INFORMATION MANAGEMENT | Facility: CLINIC | Age: 30
End: 2025-06-20
Payer: COMMERCIAL

## 2025-06-20 PROCEDURE — S9342 HIT ENTERAL PUMP DIEM: HCPCS

## 2025-06-21 PROCEDURE — S9342 HIT ENTERAL PUMP DIEM: HCPCS

## 2025-06-22 PROCEDURE — S9342 HIT ENTERAL PUMP DIEM: HCPCS

## 2025-06-23 ENCOUNTER — TELEPHONE (OUTPATIENT)
Dept: UROLOGY | Facility: CLINIC | Age: 30
End: 2025-06-23
Payer: COMMERCIAL

## 2025-06-23 PROCEDURE — S9342 HIT ENTERAL PUMP DIEM: HCPCS

## 2025-06-23 NOTE — TELEPHONE ENCOUNTER
Spoke with Senait. I let her know I did receive the fax for Anna regarding her orders. I will have Kaya review the orders and sign tomorrow and I will fax it to Senait at [phone].

## 2025-06-23 NOTE — TELEPHONE ENCOUNTER
[facility] Call Center    Phone Message    May a detailed message be left on voicemail: yes     Reason for Call: Other: Senait, an RN with the Pt's group home, is calling in asking for a call back to discuss faxes that she had sent regarding the Pt's medications. Writer confirmed that they had the correct fax number. Please call back as soon as possible to discuss.     Action Taken: Message routed to:  [facility] GI    Travel Screening: Not Applicable     Date of Service:

## 2025-06-24 PROCEDURE — S9342 HIT ENTERAL PUMP DIEM: HCPCS

## 2025-06-24 NOTE — TELEPHONE ENCOUNTER
JUANCHO spoke with MARSHA Sapp from [facility]. I let her know Kaya Garcialeelalaura is not in today but she will be back tomorrow. I will give her the order and if appropriate Kaya will sign and I will fax it back to her.    I let Senait know as soon as she signs and I fax it back to her I will call her to let her know. She agreed.

## 2025-06-24 NOTE — TELEPHONE ENCOUNTER
[facility] Call Center    Phone Message    May a detailed message be left on voicemail: yes     Reason for Call: Other: Senait is calling in asking for a call back once more regarding these orders. Please call back as soon as possible to discuss.     Action Taken: Message routed to:  [facility] GI    Travel Screening: Not Applicable     Date of Service:

## 2025-06-25 ENCOUNTER — MEDICAL CORRESPONDENCE (OUTPATIENT)
Dept: HEALTH INFORMATION MANAGEMENT | Facility: CLINIC | Age: 30
End: 2025-06-25
Payer: COMMERCIAL

## 2025-06-25 PROCEDURE — S9342 HIT ENTERAL PUMP DIEM: HCPCS

## 2025-06-25 NOTE — TELEPHONE ENCOUNTER
JUANCHO spoke with Senait from [facility] I let her know I fax the new orders to the fax number on the form [phone]. Senait was grateful for my call. Form was send to scanning.

## 2025-06-26 PROCEDURE — S9342 HIT ENTERAL PUMP DIEM: HCPCS

## 2025-06-27 PROCEDURE — S9342 HIT ENTERAL PUMP DIEM: HCPCS

## 2025-06-28 PROCEDURE — S9342 HIT ENTERAL PUMP DIEM: HCPCS

## 2025-06-29 PROCEDURE — S9342 HIT ENTERAL PUMP DIEM: HCPCS

## 2025-06-30 PROCEDURE — S9342 HIT ENTERAL PUMP DIEM: HCPCS

## 2025-07-01 PROCEDURE — S9342 HIT ENTERAL PUMP DIEM: HCPCS

## 2025-07-02 PROCEDURE — S9342 HIT ENTERAL PUMP DIEM: HCPCS

## 2025-07-03 PROCEDURE — S9342 HIT ENTERAL PUMP DIEM: HCPCS

## 2025-07-04 PROCEDURE — S9342 HIT ENTERAL PUMP DIEM: HCPCS

## 2025-07-05 PROCEDURE — S9342 HIT ENTERAL PUMP DIEM: HCPCS

## 2025-07-06 PROCEDURE — S9342 HIT ENTERAL PUMP DIEM: HCPCS

## 2025-07-07 ENCOUNTER — PRE VISIT (OUTPATIENT)
Dept: ONCOLOGY | Facility: CLINIC | Age: 30
End: 2025-07-07

## 2025-07-07 PROCEDURE — S9342 HIT ENTERAL PUMP DIEM: HCPCS

## 2025-07-08 PROCEDURE — S9342 HIT ENTERAL PUMP DIEM: HCPCS

## 2025-07-09 PROCEDURE — S9342 HIT ENTERAL PUMP DIEM: HCPCS

## 2025-07-10 ENCOUNTER — LAB (OUTPATIENT)
Dept: LAB | Facility: CLINIC | Age: 30
End: 2025-07-10
Attending: PEDIATRICS
Payer: COMMERCIAL

## 2025-07-10 ENCOUNTER — ONCOLOGY VISIT (OUTPATIENT)
Dept: ONCOLOGY | Facility: CLINIC | Age: 30
End: 2025-07-10
Attending: PEDIATRICS
Payer: COMMERCIAL

## 2025-07-10 VITALS
RESPIRATION RATE: 16 BRPM | SYSTOLIC BLOOD PRESSURE: 108 MMHG | HEIGHT: 55 IN | WEIGHT: 84.2 LBS | BODY MASS INDEX: 19.48 KG/M2 | DIASTOLIC BLOOD PRESSURE: 74 MMHG | HEART RATE: 94 BPM | OXYGEN SATURATION: 96 %

## 2025-07-10 DIAGNOSIS — E74.89: ICD-10-CM

## 2025-07-10 DIAGNOSIS — E74.89: Primary | ICD-10-CM

## 2025-07-10 LAB
ALBUMIN SERPL BCG-MCNC: 3.7 G/DL (ref 3.5–5.2)
ALP SERPL-CCNC: 91 U/L (ref 40–150)
ALT SERPL W P-5'-P-CCNC: 20 U/L (ref 0–50)
ANION GAP SERPL CALCULATED.3IONS-SCNC: 9 MMOL/L (ref 7–15)
AST SERPL W P-5'-P-CCNC: 31 U/L (ref 0–45)
BASOPHILS # BLD AUTO: 0 10E3/UL (ref 0–0.2)
BASOPHILS NFR BLD AUTO: 0 %
BILIRUB SERPL-MCNC: 0.2 MG/DL
BUN SERPL-MCNC: 11.1 MG/DL (ref 6–20)
CALCIUM SERPL-MCNC: 9.1 MG/DL (ref 8.8–10.4)
CHLORIDE SERPL-SCNC: 103 MMOL/L (ref 98–107)
CREAT SERPL-MCNC: 0.25 MG/DL (ref 0.51–0.95)
EGFRCR SERPLBLD CKD-EPI 2021: >90 ML/MIN/1.73M2
EOSINOPHIL # BLD AUTO: 0 10E3/UL (ref 0–0.7)
EOSINOPHIL NFR BLD AUTO: 0 %
ERYTHROCYTE [DISTWIDTH] IN BLOOD BY AUTOMATED COUNT: 13.8 % (ref 10–15)
GLUCOSE SERPL-MCNC: 87 MG/DL (ref 70–99)
HCO3 SERPL-SCNC: 26 MMOL/L (ref 22–29)
HCT VFR BLD AUTO: 39.5 % (ref 35–47)
HGB BLD-MCNC: 13 G/DL (ref 11.7–15.7)
IMM GRANULOCYTES # BLD: 0 10E3/UL
IMM GRANULOCYTES NFR BLD: 0 %
INR PPP: 1 (ref 0.85–1.15)
LYMPHOCYTES # BLD AUTO: 1.1 10E3/UL (ref 0.8–5.3)
LYMPHOCYTES NFR BLD AUTO: 30 %
MCH RBC QN AUTO: 30.1 PG (ref 26.5–33)
MCHC RBC AUTO-ENTMCNC: 32.9 G/DL (ref 31.5–36.5)
MCV RBC AUTO: 91 FL (ref 78–100)
MONOCYTES # BLD AUTO: 0.3 10E3/UL (ref 0–1.3)
MONOCYTES NFR BLD AUTO: 9 %
NEUTROPHILS # BLD AUTO: 2.3 10E3/UL (ref 1.6–8.3)
NEUTROPHILS NFR BLD AUTO: 61 %
NRBC # BLD AUTO: 0 10E3/UL
NRBC BLD AUTO-RTO: 0 /100
PLATELET # BLD AUTO: 110 10E3/UL (ref 150–450)
POTASSIUM SERPL-SCNC: 3.9 MMOL/L (ref 3.4–5.3)
PROT SERPL-MCNC: 7.1 G/DL (ref 6.4–8.3)
PROTHROMBIN TIME: 13.4 SECONDS (ref 11.8–14.8)
RBC # BLD AUTO: 4.32 10E6/UL (ref 3.8–5.2)
SODIUM SERPL-SCNC: 138 MMOL/L (ref 135–145)
WBC # BLD AUTO: 3.7 10E3/UL (ref 4–11)

## 2025-07-10 PROCEDURE — 36415 COLL VENOUS BLD VENIPUNCTURE: CPT

## 2025-07-10 PROCEDURE — 85300 ANTITHROMBIN III ACTIVITY: CPT

## 2025-07-10 PROCEDURE — S9342 HIT ENTERAL PUMP DIEM: HCPCS

## 2025-07-10 PROCEDURE — G0463 HOSPITAL OUTPT CLINIC VISIT: HCPCS | Performed by: PEDIATRICS

## 2025-07-10 PROCEDURE — 84155 ASSAY OF PROTEIN SERUM: CPT

## 2025-07-10 PROCEDURE — 85004 AUTOMATED DIFF WBC COUNT: CPT

## 2025-07-10 PROCEDURE — 85610 PROTHROMBIN TIME: CPT

## 2025-07-10 PROCEDURE — 85270 CLOT FACTOR XI PTA: CPT

## 2025-07-10 PROCEDURE — 85306 CLOT INHIBIT PROT S FREE: CPT

## 2025-07-10 PROCEDURE — 99215 OFFICE O/P EST HI 40 MIN: CPT | Performed by: PEDIATRICS

## 2025-07-10 ASSESSMENT — PAIN SCALES - GENERAL: PAINLEVEL_OUTOF10: NO PAIN (0)

## 2025-07-10 NOTE — LETTER
7/10/2025      Sherly Ramires  [address] [place]  [place] [address]      Dear Colleague,    Thank you for referring your patient, Sherly Ramires, to the [facility]. Please see a copy of my visit note below.    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 Sarah Muse. She is new to me but has previously been seen in [facility] and prior to that, over on pediatrics with Dr. Mays.    Anna is here today with a staff member from her group home who gives the history, as Anna is mostly nonverbal and her speech is unintelligible. Her mother is out of town and her father is unavailable.    History of Present Illness:  Anna is a 29 year old female that has a congenital disorder of glycosylation (mutaiton in PMM2 gene). She saw Dr. Mays when she was younger and since then, saw Dr. Hoskins in 2021 and 2022 and more recently, KULDIP Buckner in [facility] in 2024 and 2025. However, all of the patients with CDG are transitioning to me so she is here today in clinic for ongoing discussions and management. In the past, she has had low ATIII and FXI, though she has had normal protein C. Her free protein S levels have been borderline over the years. She had a TEG done in 2018 with Dr. Mays and was found to have pretty balanced coagulation at that time.  She has not had any thrombosis or hemorrhagic complications over the years despite having undergone multiple significant procedures in past.      It sounds like Anna has overall been doing well over the past year or so since her last hematology visit. No bleeding or clotting complications have arisen. Bleeding complications are minimal. No gingival bleeding, hematuria, headaches, epistaxis, hematochezia, or melena. She  does pick at her skin repeatedly and sometimes that oozes but does eventually heal without the need for intervention. She has not been on any antiplatelet agents or anticoagulants.        Key results prior to referral:   Latest Reference Range & Units 08/24/17 10:05 11/30/17 11:03 05/23/18 11:26 11/29/18 11:40 03/14/20 11:39 06/30/21 08:45 03/15/23 14:18 01/06/25 14:03   INR 0.85 - 1.15  1.03 1.07  1.07 1.03 1.08 1.07 1.03   PTT 22 - 38 Seconds 30 31  31  34  31   Fibrinogen 200 - 420 mg/dL  272  291       TEG without Heparinase   Rpt ! Rpt Rpt !       Factor 2 Assay 60 - 140 %  99  101       Factor 7 Assay 50 - 129 %  77         Factor 8 Assay 55 - 200 %  111  116       von Willebrand Antigen 50 - 200 %    105       von Willebrand Factor Activity 50 - 180 %    121       Factor 9 Assay 65 - 150 %        109   Factor 11 Assay 65 - 150 %  52 (L)  51 (L)  42 (L)  42 (L)   Antithrombin III Chromogenic 85 - 135 %  69 (L)  65 (L)  60 (L)  60 (L)   Prot C Chromogenic 70 - 170 %  87  78  76  78   Protein S Free 55 - 125 %  48 (L)  42 (L)       Protein S Antigen Free 55 - 125 %        58   !: Data is abnormal  (L): Data is abnormally low  Rpt: View report in Results Review for more information      Review of systems:  A complete 14 point review of systems was completed. All were negative except for what was reported in the HPI or highlighted here.    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     Anxiety      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     CDG 1a     Global developmental delay      Hypoglycemia      Scoliosis      Seizures (H)     rare, last at age 8     Strabismus        Past Surgical History:  Past Surgical History:   Procedure Laterality Date     ANESTHESIA OUT OF OR MRI 3T N/A 3/7/2017    Procedure: ANESTHESIA PEDS SEDATION MRI 3T;  Surgeon: GENERIC ANESTHESIA PROVIDER;  Location:  PEDS SEDATION       BACK SURGERY      c1 decompression and fusionx3, scoliosis with instrumentation x1     ELECTRORETINOGRAM Bilateral 12/10/2014    ERG ([place])     ENT SURGERY       ear tubes     EXAM UNDER ANESTHESIA EYE(S) Bilateral 12/10/2014    EUA ([place])     EXTRACTION(S) DENTAL N/A 12/10/2014    Procedure: EXTRACTION(S) DENTAL;  Surgeon: Hubert York DDS;  Location: [facility] OR     EYE SURGERY Bilateral 1996    BMR 6mm ([place])      GI SURGERY      g tube     HC REPLACE GASTROSTOMY/CECOSTOMY TUBE PERCUTANEOUS N/A 12/10/2014    Procedure: REPLACE GASTROSTOMY TUBE, PERCUTANEOUS;  Surgeon: Phong Perea MD;  Location: [facility] OR     RECESSION RESECTION (REPAIR STRABISMUS) BILATERAL Bilateral 12/10/2014    BLR 11 ([place])     STRABISMUS SURGERY  1996    (1996) BMR 6 ([place])     STRABISMUS SURGERY  2014    BLR 11 ([place]) -        Family History:   Family History   Problem Relation Age of Onset     Glaucoma Mother         no medications or surg to date     Glaucoma Maternal Grandmother         s/p surg, decreased [facility]      Amblyopia No family hx of      Strabismus No family hx of        Social History:  Social History     Socioeconomic History     Marital status: Single     Spouse name: Not on file     Number of children: Not on file     Years of education: Not on file     Highest education level: Not on file   Occupational History     Not on file   Tobacco Use     Smoking status: Never     Smokeless tobacco: Never   Substance and Sexual Activity     Alcohol use: No     Drug use: No     Sexual activity: Never   Other Topics Concern     Not on file   Social History Narrative    Anna currently lives in a group home but parents are involved and are the health care agents.     Social Drivers of Health     Financial Resource Strain: Not At Risk (9/27/2023)    [facility]    Financial Resource Strain      Is it hard for you to pay for the very basics like food, housing, medical care or heating?: No   Food Insecurity:  Not At Risk (9/27/2023)    [email or web address]    Food Insecurity      Does your food run out before you have the money to buy more?: No   Transportation Needs: Not At Risk (9/27/2023)    [facility] from [facility]    Transportation Needs      Does a lack of transportation keep you from your medical appointments or from getting your medications?: No   Physical Activity: Not on file   Stress: Not on file   Social Connections: Not on file   Interpersonal Safety: Not on file   Housing Stability: Not on file       Medications:  Current Outpatient Medications   Medication Sig Dispense Refill     acetaminophen (TYLENOL) 160 MG/5ML solution 480 mg by Per G Tube route every 4 hours as needed for fever or mild pain        ARIPiprazole (ABILIFY) 1 MG/ML SOLN 7.5 mg by Per G Tube route daily        artificial saliva (BIOTENE DRY MOUTHWASH) LIQD liquid Swish and spit in mouth 2 times daily       bacitracin ointment Apply topically 2 times daily as needed for wound care 14 g 0     calcium carbonate 1250 MG/5ML SUSP suspension Take 1,250 mg by mouth 2 times daily (with meals).       carbamide peroxide (DEBROX) 6.5 % otic solution INSTILL FIVE DROPS IN BOTH EARS ONCE DAILY 4 DAYS EACH MONTH. 1       Compleat Peptide 1.5 PO LIQD Place 625 mLs into G tube daily. Infuse via pump -Daytime - 1/2 carton formula and 360ml water at 8am, 12noon and 4pm given at 400ml/hr.  Overnight feeds - 1 carton plus 235ml water starting at 7PM at 40ml/hr x 12 hours  Water flush: 60ml water flush before and after feedings. 71068 mL 10     D-Mannose POWD 1 Scoop by Per G Tube route 2 times daily mixed with water       estradiol (ESTRACE) 0.5 MG tablet 0.5 mg daily       levETIRAcetam (KEPPRA) 100 MG/ML oral solution GIVE 7.5 ML (750 MG) PER G-TUBE EVERY MORNING; GIVE 7.5 ML (750 MG) PER G-TUBE EVERY EVENING. 450 mL 11     loratadine (CLARITIN) 10 MG tablet 10 mg by Per G Tube route daily        Magnesium Citrate 100 MG TABS 300 mg by Per G  "Tube route daily       Nutritional Supplements (COMPLEAT PEDIATRIC PO)        ondansetron (ZOFRAN) 4 MG/5ML solution Take 5 mLs (4 mg) by mouth every 6 hours as needed for nausea or vomiting 90 mL 0     ORDER FOR DME Equipment being ordered: Other: chest strap for her tilt in space manual wheel chair  Treatment Diagnosis: For safe transportation to home secondary to poor sitting balance in upright. CDG type Ia, seizures, and G-tube dependence. 1 Device 0     oxybutynin (DITROPAN) 5 MG/5ML syrup        polyethylene glycol (MIRALAX) 17 g packet Administer  1-2 packs (17-34 grams )per day as needed. (Patient taking differently: Administer  1/2 capful) 45 packet 3     saccharomyces boulardii (FLORASTOR) 250 MG capsule Take 250 mg by mouth 2 times daily.       sertraline (ZOLOFT) 20 MG/ML (HIGH CONC) solution 200 mg by Gastronomy tube route daily        traZODone (DESYREL) 50 MG tablet Crush one tablet and administer via G-tube 30 minutes prior to bedtime daily       UNABLE TO FIND MEDICATION NAME: Rescue Remedy Herbal Supplement - Used twice daily as needed.       Wheat Dextrin (BENEFIBER) POWD Place 10.5 g into G tube 2 times daily. 10.5 g = 3 teaspoons 730 g 5     No current facility-administered medications for this visit.         Physical Exam:   /74   Pulse 94   Resp 16   Ht 1.245 m (4' 1\")   Wt 38.2 kg (84 lb 3.2 oz)   SpO2 96%   BMI 24.66 kg/m       GENERAL APPEARANCE: appears well today but has global developmental delay. She is in a wheelchair with some contractures  EYES: Eyes grossly normal to inspection, PERRL and conjunctivae and sclerae normal  HENT: oropharynx clear and oral mucous membranes moist  RESP: breathing comfortably on RA  MS: limited exam due to her sitting in wheel chair.  SKIN: no suspicious lesions or rashes  NEURO: low tone overall, sensory exam grossly normal, global developmental delay, limited speech      Labs:   Recent Results (from the past 240 hours)   Comprehensive " metabolic panel    Collection Time: 07/10/25  4:50 PM   Result Value Ref Range    Sodium 138 135 - 145 mmol/L    Potassium 3.9 3.4 - 5.3 mmol/L    Carbon Dioxide (CO2) 26 22 - 29 mmol/L    Anion Gap 9 7 - 15 mmol/L    Urea Nitrogen 11.1 6.0 - 20.0 mg/dL    Creatinine 0.25 (L) 0.51 - 0.95 mg/dL    GFR Estimate >90 >60 mL/min/1.73m2    Calcium 9.1 8.8 - 10.4 mg/dL    Chloride 103 98 - 107 mmol/L    Glucose 87 70 - 99 mg/dL    Alkaline Phosphatase 91 40 - 150 U/L    AST 31 0 - 45 U/L    ALT 20 0 - 50 U/L    Protein Total 7.1 6.4 - 8.3 g/dL    Albumin 3.7 3.5 - 5.2 g/dL    Bilirubin Total 0.2 <=1.2 mg/dL   INR    Collection Time: 07/10/25  4:50 PM   Result Value Ref Range    INR 1.00 0.85 - 1.15    PT 13.4 11.8 - 14.8 Seconds   CBC with platelets and differential    Collection Time: 07/10/25  4:50 PM   Result Value Ref Range    WBC Count 3.7 (L) 4.0 - 11.0 10e3/uL    RBC Count 4.32 3.80 - 5.20 10e6/uL    Hemoglobin 13.0 11.7 - 15.7 g/dL    Hematocrit 39.5 35.0 - 47.0 %    MCV 91 78 - 100 fL    MCH 30.1 26.5 - 33.0 pg    MCHC 32.9 31.5 - 36.5 g/dL    RDW 13.8 10.0 - 15.0 %    Platelet Count 110 (L) 150 - 450 10e3/uL    % Neutrophils 61 %    % Lymphocytes 30 %    % Monocytes 9 %    % Eosinophils 0 %    % Basophils 0 %    % Immature Granulocytes 0 %    NRBCs per 100 WBC 0 <1 /100    Absolute Neutrophils 2.3 1.6 - 8.3 10e3/uL    Absolute Lymphocytes 1.1 0.8 - 5.3 10e3/uL    Absolute Monocytes 0.3 0.0 - 1.3 10e3/uL    Absolute Eosinophils 0.0 0.0 - 0.7 10e3/uL    Absolute Basophils 0.0 0.0 - 0.2 10e3/uL    Absolute Immature Granulocytes 0.0 <=0.4 10e3/uL    Absolute NRBCs 0.0 10e3/uL           Assessment:  Sherly Prescott (Tali)oya is a 30 year old female who was referred to hematology for management of Congenital Disorder of Glycosylation Type 1a secondary to a mutation in PMM2. She has seen a couple of my colleagues but is transferring to me since I have more expertise with this disorder. From a coagulopathy risk  standpoint, which is a feature of CDG 1a with phenotypic heterogeneity, she has overall done quite well without any hematologic complications. She continues to have a normal INR and PTT. I am awaiting the measurements of some of the proteins that have been lower for Anna (FXI, Antithrombin, Protein S) but other levels in the past have been stable. I did not have ready access to a TEG in clinic but past testing have shown a balanced coagulation phenotype which is good in this situation. CDG 1a does carry with it a risk of stroke. She is on oral low-dose estradiol rather than transdermal estrogen but that still increases thrombotic risk somewhat. Her Antithrombin level is the one I would worry most about from a blood clot risk standpoint. She does also have mild thrombocytopenia which is less common for CDG 1a but has been reported. It is asymptomatic at this point.    At this point, given that parents were unavailable and she has not had any thromboses, we will not start anything today. When thinking about the risks of VTE and stroke for those with CDG1a, there are no guidelines for management so I think shared decision making is important. The treatment strategies range from anticoagulation (e.g. DOAC) to antiplatelet agent (Aspirin) to nothing. For Anna, I do not think full anticoagulation is warranted but there may be theoretical benefits to ASA while on estrogen, particularly for the stroke risk. However, we can discuss it more when mom and/or dad are available at a follow up visit (planning for November).      Congenital Disorder of Glycosylation Type 1a  Low Factor XI (bleeding risk)  Low Antithrombin (clotting risk)  Labs: INR, PTT, CBC, FXI, AT3, Protein S free  Medication Changes: none  Other orders/recommendations: none  Follow up plan: 5-6 months    Thank you for the opportunity to participate in Sherly Ramires's care. Please feel free to reach out with any questions you may have.    45 minutes spent by  me on the date of the encounter doing chart review, history and exam, documentation and further activities per the note        Cyrus Johnson MD  Classical Hematologist  Division of Hematology, Oncology, and Transplantation  [facility]  [facility]      CC: Referred Self, MD  No address on file       Again, thank you for allowing me to participate in the care of your patient.        Sincerely,        Cyrus Johnson MD    Electronically signed

## 2025-07-10 NOTE — NURSING NOTE
Chief Complaint   Patient presents with    Blood Draw     Labs collected from venipuncture by RN. Vitals taken. Checked in for appointment(s).      Labs collected from venipuncture by VAT. Vitals taken. Checked in for appointment(s).     Tejal Patel RN

## 2025-07-10 NOTE — PROGRESS NOTES
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 Sarah Muse. She is new to me but has previously been seen in [facility] and prior to that, over on pediatrics with Dr. Mays.    Anna is here today with a staff member from her group home who gives the history, as Anna is mostly nonverbal and her speech is unintelligible. Her mother is out of town and her father is unavailable.    History of Present Illness:  Anna is a 29 year old female that has a congenital disorder of glycosylation (mutaiton in PMM2 gene). She saw Dr. Mays when she was younger and since then, saw Dr. Hoskins in 2021 and 2022 and more recently[facility]. However, all of the patients with CDG are transitioning to me so she is here today in clinic for ongoing discussions and management. In the past, she has had low ATIII and FXI, though she has had normal protein C. Her free protein S levels have been borderline over the years. She had a TEG done in 2018 with Dr. Mays and was found to have pretty balanced coagulation at that time.  She has not had any thrombosis or hemorrhagic complications over the years despite having undergone multiple significant procedures in past.      It sounds like Anna has overall been doing well over the past year or so since her last hematology visit. No bleeding or clotting complications have arisen. Bleeding complications are minimal. No gingival bleeding, hematuria, headaches, epistaxis, hematochezia, or melena. She does pick at her skin repeatedly and sometimes that oozes but does eventually heal without the need for intervention. She has not been on any antiplatelet agents or anticoagulants.        Key results prior to referral:   Latest Reference Range & Units  08/24/17 10:05 11/30/17 11:03 05/23/18 11:26 11/29/18 11:40 03/14/20 11:39 06/30/21 08:45 03/15/23 14:18 01/06/25 14:03   INR 0.85 - 1.15  1.03 1.07  1.07 1.03 1.08 1.07 1.03   PTT 22 - 38 Seconds 30 31  31  34  31   Fibrinogen 200 - 420 mg/dL  272  291       TEG without Heparinase   Rpt ! Rpt Rpt !       Factor 2 Assay 60 - 140 %  99  101       Factor 7 Assay 50 - 129 %  77         Factor 8 Assay 55 - 200 %  111  116       von Willebrand Antigen 50 - 200 %    105       von Willebrand Factor Activity 50 - 180 %    121       Factor 9 Assay 65 - 150 %        109   Factor 11 Assay 65 - 150 %  52 (L)  51 (L)  42 (L)  42 (L)   Antithrombin III Chromogenic 85 - 135 %  69 (L)  65 (L)  60 (L)  60 (L)   Prot C Chromogenic 70 - 170 %  87  78  76  78   Protein S Free 55 - 125 %  48 (L)  42 (L)       Protein S Antigen Free 55 - 125 %        58   !: Data is abnormal  (L): Data is abnormally low  Rpt: View report in Results Review for more information      Review of systems:  A complete 14 point review of systems was completed. All were negative except for what was reported in the HPI or highlighted here.    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    Anxiety     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     CDG 1a    Global developmental delay     Hypoglycemia     Scoliosis     Seizures (H)     rare, last at age 8    Strabismus        Past Surgical History:  Past Surgical History:   Procedure Laterality Date    ANESTHESIA OUT OF OR MRI 3T N/A 3/7/2017    Procedure: ANESTHESIA PEDS SEDATION MRI 3T;  Surgeon: GENERIC ANESTHESIA PROVIDER;  Location:  PEDS SEDATION     BACK SURGERY      c1 decompression and fusionx3, scoliosis with instrumentation x1    ELECTRORETINOGRAM Bilateral 12/10/2014    ERG ([facility])    ENT SURGERY       ear tubes    EXAM UNDER ANESTHESIA EYE(S) Bilateral 12/10/2014    EUA ([facility])    EXTRACTION(S)  DENTAL N/A 12/10/2014    Procedure: EXTRACTION(S) DENTAL;  Surgeon: Hubert York DDS;  Location: [facility] OR    EYE SURGERY Bilateral 1996    BMR 6mm ([facility])     GI SURGERY      g tube    HC REPLACE GASTROSTOMY/CECOSTOMY TUBE PERCUTANEOUS N/A 12/10/2014    Procedure: REPLACE GASTROSTOMY TUBE, PERCUTANEOUS;  Surgeon: Phong Perea MD;  Location: [facility] OR    RECESSION RESECTION (REPAIR STRABISMUS) BILATERAL Bilateral 12/10/2014    BLR 11 ([facility])    STRABISMUS SURGERY  1996    (1996) BMR 6 ([facility])    STRABISMUS SURGERY  2014    BLR 11 ([facility]) -        Family History:   Family History   Problem Relation Age of Onset    Glaucoma Mother         no medications or surg to date    Glaucoma Maternal Grandmother         s/p surg, decreased [facility]     Amblyopia No family hx of     Strabismus No family hx of        Social History:  Social History     Socioeconomic History    Marital status: Single     Spouse name: Not on file    Number of children: Not on file    Years of education: Not on file    Highest education level: Not on file   Occupational History    Not on file   Tobacco Use    Smoking status: Never    Smokeless tobacco: Never   Substance and Sexual Activity    Alcohol use: No    Drug use: No    Sexual activity: Never   Other Topics Concern    Not on file   Social History Narrative    Anna currently lives in a group home but parents are involved and are the health care agents.     Social Drivers of Health     Financial Resource Strain: Not At Risk (9/27/2023)    Received from [facility]    Financial Resource Strain     Is it hard for you to pay for the very basics like food, housing, medical care or heating?: No   Food Insecurity: Not At Risk (9/27/2023)    Received from [facility]    Food Insecurity     Does your food run out before you have the money to buy more?: No   Transportation Needs: Not At Risk (9/27/2023)    Received from [facility]    Transportation Needs     Does a lack of transportation  keep you from your medical appointments or from getting your medications?: No   Physical Activity: Not on file   Stress: Not on file   Social Connections: Not on file   Interpersonal Safety: Not on file   Housing Stability: Not on file       Medications:  Current Outpatient Medications   Medication Sig Dispense Refill    acetaminophen (TYLENOL) 160 MG/5ML solution 480 mg by Per G Tube route every 4 hours as needed for fever or mild pain       ARIPiprazole (ABILIFY) 1 MG/ML SOLN 7.5 mg by Per G Tube route daily       artificial saliva (BIOTENE DRY MOUTHWASH) LIQD liquid Swish and spit in mouth 2 times daily      bacitracin ointment Apply topically 2 times daily as needed for wound care 14 g 0    calcium carbonate 1250 MG/5ML SUSP suspension Take 1,250 mg by mouth 2 times daily (with meals).      carbamide peroxide (DEBROX) 6.5 % otic solution INSTILL FIVE DROPS IN BOTH EARS ONCE DAILY 4 DAYS EACH MONTH. 1      Compleat Peptide 1.5 PO LIQD Place 625 mLs into G tube daily. Infuse via pump -Daytime - 1/2 carton formula and 360ml water at 8am, 12noon and 4pm given at 400ml/hr.  Overnight feeds - 1 carton plus 235ml water starting at 7PM at 40ml/hr x 12 hours  Water flush: 60ml water flush before and after feedings. 20420 mL 10    D-Mannose POWD 1 Scoop by Per G Tube route 2 times daily mixed with water      estradiol (ESTRACE) 0.5 MG tablet 0.5 mg daily      levETIRAcetam (KEPPRA) 100 MG/ML oral solution GIVE 7.5 ML (750 MG) PER G-TUBE EVERY MORNING; GIVE 7.5 ML (750 MG) PER G-TUBE EVERY EVENING. 450 mL 11    loratadine (CLARITIN) 10 MG tablet 10 mg by Per G Tube route daily       Magnesium Citrate 100 MG TABS 300 mg by Per G Tube route daily      Nutritional Supplements (COMPLEAT PEDIATRIC PO)       ondansetron (ZOFRAN) 4 MG/5ML solution Take 5 mLs (4 mg) by mouth every 6 hours as needed for nausea or vomiting 90 mL 0    ORDER FOR DME Equipment being ordered: Other: chest strap for her tilt in space manual wheel  "chair  Treatment Diagnosis: For safe transportation to home secondary to poor sitting balance in upright. CDG type Ia, seizures, and G-tube dependence. 1 Device 0    oxybutynin (DITROPAN) 5 MG/5ML syrup       polyethylene glycol (MIRALAX) 17 g packet Administer  1-2 packs (17-34 grams )per day as needed. (Patient taking differently: Administer  1/2 capful) 45 packet 3    saccharomyces boulardii (FLORASTOR) 250 MG capsule Take 250 mg by mouth 2 times daily.      sertraline (ZOLOFT) 20 MG/ML (HIGH CONC) solution 200 mg by Gastronomy tube route daily       traZODone (DESYREL) 50 MG tablet Crush one tablet and administer via G-tube 30 minutes prior to bedtime daily      UNABLE TO FIND MEDICATION NAME: Rescue Remedy Herbal Supplement - Used twice daily as needed.      Wheat Dextrin (BENEFIBER) POWD Place 10.5 g into G tube 2 times daily. 10.5 g = 3 teaspoons 730 g 5     No current facility-administered medications for this visit.         Physical Exam:   /74   Pulse 94   Resp 16   Ht 1.245 m (4' 1\")   Wt 38.2 kg (84 lb 3.2 oz)   SpO2 96%   BMI 24.66 kg/m       GENERAL APPEARANCE: appears well today but has global developmental delay. She is in a wheelchair with some contractures  EYES: Eyes grossly normal to inspection, PERRL and conjunctivae and sclerae normal  HENT: oropharynx clear and oral mucous membranes moist  RESP: breathing comfortably on RA  MS: limited exam due to her sitting in wheel chair.  SKIN: no suspicious lesions or rashes  NEURO: low tone overall, sensory exam grossly normal, global developmental delay, limited speech      Labs:   Recent Results (from the past 240 hours)   Comprehensive metabolic panel    Collection Time: 07/10/25  4:50 PM   Result Value Ref Range    Sodium 138 135 - 145 mmol/L    Potassium 3.9 3.4 - 5.3 mmol/L    Carbon Dioxide (CO2) 26 22 - 29 mmol/L    Anion Gap 9 7 - 15 mmol/L    Urea Nitrogen 11.1 6.0 - 20.0 mg/dL    Creatinine 0.25 (L) 0.51 - 0.95 mg/dL    GFR " Estimate >90 >60 mL/min/1.73m2    Calcium 9.1 8.8 - 10.4 mg/dL    Chloride 103 98 - 107 mmol/L    Glucose 87 70 - 99 mg/dL    Alkaline Phosphatase 91 40 - 150 U/L    AST 31 0 - 45 U/L    ALT 20 0 - 50 U/L    Protein Total 7.1 6.4 - 8.3 g/dL    Albumin 3.7 3.5 - 5.2 g/dL    Bilirubin Total 0.2 <=1.2 mg/dL   INR    Collection Time: 07/10/25  4:50 PM   Result Value Ref Range    INR 1.00 0.85 - 1.15    PT 13.4 11.8 - 14.8 Seconds   CBC with platelets and differential    Collection Time: 07/10/25  4:50 PM   Result Value Ref Range    WBC Count 3.7 (L) 4.0 - 11.0 10e3/uL    RBC Count 4.32 3.80 - 5.20 10e6/uL    Hemoglobin 13.0 11.7 - 15.7 g/dL    Hematocrit 39.5 35.0 - 47.0 %    MCV 91 78 - 100 fL    MCH 30.1 26.5 - 33.0 pg    MCHC 32.9 31.5 - 36.5 g/dL    RDW 13.8 10.0 - 15.0 %    Platelet Count 110 (L) 150 - 450 10e3/uL    % Neutrophils 61 %    % Lymphocytes 30 %    % Monocytes 9 %    % Eosinophils 0 %    % Basophils 0 %    % Immature Granulocytes 0 %    NRBCs per 100 WBC 0 <1 /100    Absolute Neutrophils 2.3 1.6 - 8.3 10e3/uL    Absolute Lymphocytes 1.1 0.8 - 5.3 10e3/uL    Absolute Monocytes 0.3 0.0 - 1.3 10e3/uL    Absolute Eosinophils 0.0 0.0 - 0.7 10e3/uL    Absolute Basophils 0.0 0.0 - 0.2 10e3/uL    Absolute Immature Granulocytes 0.0 <=0.4 10e3/uL    Absolute NRBCs 0.0 10e3/uL           Assessment:  Sherly  SUSANNAH Ramires is a 30 year old female who was referred to hematology for management of Congenital Disorder of Glycosylation Type 1a secondary to a mutation in PMM2. She has seen a couple of my colleagues but is transferring to me since I have more expertise with this disorder. From a coagulopathy risk standpoint, which is a feature of CDG 1a with phenotypic heterogeneity, she has overall done quite well without any hematologic complications. She continues to have a normal INR and PTT. I am awaiting the measurements of some of the proteins that have been lower for Anna (FXI, Antithrombin, Protein S) but other  levels in the past have been stable. I did not have ready access to a TEG in clinic but past testing have shown a balanced coagulation phenotype which is good in this situation. CDG 1a does carry with it a risk of stroke. She is on oral low-dose estradiol rather than transdermal estrogen but that still increases thrombotic risk somewhat. Her Antithrombin level is the one I would worry most about from a blood clot risk standpoint. She does also have mild thrombocytopenia which is less common for CDG 1a but has been reported. It is asymptomatic at this point.    At this point, given that parents were unavailable and she has not had any thromboses, we will not start anything today. When thinking about the risks of VTE and stroke for those with CDG1a, there are no guidelines for management so I think shared decision making is important. The treatment strategies range from anticoagulation (e.g. DOAC) to antiplatelet agent (Aspirin) to nothing. For Anna, I do not think full anticoagulation is warranted but there may be theoretical benefits to ASA while on estrogen, particularly for the stroke risk. However, we can discuss it more when mom and/or dad are available at a follow up visit (planning for November).      Congenital Disorder of Glycosylation Type 1a  Low Factor XI (bleeding risk)  Low Antithrombin (clotting risk)  Labs: INR, PTT, CBC, FXI, AT3, Protein S free  Medication Changes: none  Other orders/recommendations: none  Follow up plan: 5-6 months    Thank you for the opportunity to participate in Sherly Ramires's care. Please feel free to reach out with any questions you may have.    45 minutes spent by me on the date of the encounter doing chart review, history and exam, documentation and further activities per the note        Cyrus Johnson MD  Classical Hematologist  Division of Hematology, Oncology, and Transplantation  [facility]  [facility]      CC: Referred Self,  MD  No address on file

## 2025-07-11 PROCEDURE — S9342 HIT ENTERAL PUMP DIEM: HCPCS

## 2025-07-12 PROCEDURE — S9342 HIT ENTERAL PUMP DIEM: HCPCS

## 2025-07-13 PROCEDURE — S9342 HIT ENTERAL PUMP DIEM: HCPCS

## 2025-07-14 LAB
AT III ACT/NOR PPP CHRO: 60 % (ref 85–135)
FACT XI ACT/NOR PPP: 39 % (ref 65–150)
PROT S FREE AG ACT/NOR PPP IA: 51 % (ref 55–125)

## 2025-07-14 PROCEDURE — S9342 HIT ENTERAL PUMP DIEM: HCPCS

## 2025-07-15 PROCEDURE — S9342 HIT ENTERAL PUMP DIEM: HCPCS

## 2025-07-16 PROCEDURE — S9342 HIT ENTERAL PUMP DIEM: HCPCS

## 2025-07-17 ENCOUNTER — HOME INFUSION BILLING (OUTPATIENT)
Dept: HOME HEALTH SERVICES | Facility: HOME HEALTH | Age: 30
End: 2025-07-17
Payer: COMMERCIAL

## 2025-07-17 PROCEDURE — B4153 EF HYDROLYZED/AMINO ACIDS: HCPCS

## 2025-07-17 PROCEDURE — S9342 HIT ENTERAL PUMP DIEM: HCPCS

## 2025-07-18 PROCEDURE — S9342 HIT ENTERAL PUMP DIEM: HCPCS

## 2025-07-19 PROCEDURE — S9342 HIT ENTERAL PUMP DIEM: HCPCS

## 2025-07-20 PROCEDURE — S9342 HIT ENTERAL PUMP DIEM: HCPCS

## 2025-07-21 ENCOUNTER — TELEPHONE (OUTPATIENT)
Dept: GASTROENTEROLOGY | Facility: CLINIC | Age: 30
End: 2025-07-21
Payer: COMMERCIAL

## 2025-07-21 PROCEDURE — S9342 HIT ENTERAL PUMP DIEM: HCPCS

## 2025-07-21 NOTE — TELEPHONE ENCOUNTER
[facility] staff called in to report      Bowel movements every 2 -3 days, needing Miralax and mini enema to produce BM.     2x day 3 tsp of fiber.  Miralax PRN - 1/2 capful at this time.     Did offer/accept sooner visit 7/25 VV with Kaya Tovar

## 2025-07-22 ENCOUNTER — HOME INFUSION BILLING (OUTPATIENT)
Dept: HOME HEALTH SERVICES | Facility: HOME HEALTH | Age: 30
End: 2025-07-22
Payer: COMMERCIAL

## 2025-07-22 PROCEDURE — S9342 HIT ENTERAL PUMP DIEM: HCPCS

## 2025-07-22 NOTE — TELEPHONE ENCOUNTER
Spoke with Julianne,mother.     Asks for a lesser amount of Miralax daily. States in the past 1/2 cap daily with the fiber resulted in loose stools.     Would like to start with 1-2tsp daily.  States is only using the Miralax to promote BM when mini enema is not effectve on day 2 of no BM. Is not always needing the Miralax on day 2.     Routed to provider to advise.

## 2025-07-23 PROCEDURE — S9342 HIT ENTERAL PUMP DIEM: HCPCS

## 2025-07-23 NOTE — TELEPHONE ENCOUNTER
JUANCHO called Julianne pt mother to call back at [phone] to let her know Miralax isn't usually dosed in teaspoon increments, but if that's what they prefer to do, I'm fine with having them try 2 teaspoons of Miralax daily.

## 2025-07-23 NOTE — TELEPHONE ENCOUNTER
Martine Kirkpatrick,    I spoke with pt mother and let her know Miralax isn't usually dosed in teaspoon increments, but if that's what they prefer to do, I'm fine with having them try 2 teaspoons of Miralax daily.     Per mother she agrees with Miralax 1-2 tsp daily. But she mentioned that benifiber pt is getting stomach pain with 3 tsp twice a day she would like to change it to 1 tsp twice a day. Please advise.    Than you,  JUANCHO Hitchcock

## 2025-07-24 PROCEDURE — S9342 HIT ENTERAL PUMP DIEM: HCPCS

## 2025-07-24 NOTE — TELEPHONE ENCOUNTER
ALIRION spoke with Julianne Castillo mother and guardian I let her know Kaya Ok to change Benefiber to 1 tsp twice daily.     Per mother she needs orders to go to the group home Phone number [phone] stating pt is to be on Miralax 1t sp daily and Benefiber 1 tsp twice daily.

## 2025-07-25 PROCEDURE — S9342 HIT ENTERAL PUMP DIEM: HCPCS

## 2025-07-26 PROCEDURE — S9342 HIT ENTERAL PUMP DIEM: HCPCS

## 2025-07-27 PROCEDURE — S9342 HIT ENTERAL PUMP DIEM: HCPCS

## 2025-07-28 PROCEDURE — S9342 HIT ENTERAL PUMP DIEM: HCPCS

## 2025-07-29 PROCEDURE — S9342 HIT ENTERAL PUMP DIEM: HCPCS

## 2025-07-30 ENCOUNTER — DOCUMENTATION ONLY (OUTPATIENT)
Dept: GASTROENTEROLOGY | Facility: CLINIC | Age: 30
End: 2025-07-30
Payer: COMMERCIAL

## 2025-07-30 ENCOUNTER — TELEPHONE (OUTPATIENT)
Dept: GASTROENTEROLOGY | Facility: CLINIC | Age: 30
End: 2025-07-30
Payer: COMMERCIAL

## 2025-07-30 DIAGNOSIS — K59.01 CONSTIPATION DUE TO SLOW TRANSIT: Primary | ICD-10-CM

## 2025-07-30 PROCEDURE — S9342 HIT ENTERAL PUMP DIEM: HCPCS

## 2025-07-30 NOTE — TELEPHONE ENCOUNTER
[facility] Call Center    Phone Message    May a detailed message be left on voicemail: yes     Reason for Call: Other: Group Home Nurse Senait is calling stating pt has not had actual good bowel movement for 4 days now, have done all the bowel programs except the emergent ones. Please give a call back to discuss.      Action Taken: Message routed to:  Adult Clinics: Gastroenterology (GI) p [id number]    Travel Screening: Not Applicable     Date of Service:

## 2025-07-30 NOTE — TELEPHONE ENCOUNTER
Spoke with Senait who states Anna has only had 2 small watery bowel movements today, is distended, received 1 tsp Miralax. 2nd bm was after mini enema was given and has a few pieces of stool however mostly watery.     Last BM 26th 1- Med and 1- small/loose  No BM 27th  Scant BM 28th  No BM 29th    Asks for next steps since today is day 4 without a bowel movement. ( Unless counting watery BM)  Asks if should use their emergency comfort medications- either milk of magnesia, fleet enema or Doculax supository     Writer discuss bowel program as follows:  -Decrease fiber supplement to 1 teaspoon twice daily.  -Start Miralax 1 teaspoon once daily (dose specifically requested by family).  -Continue mini enema PRN on day 2 and if needed day 3 without BM.   -Continue Miralax 1/2 capful on day 2 and if needed day 3 without BM if mini enema not successful.     Senait notes the mini enema was not given on day 3 and only 1 tsp of miralax, states does not have above protocol. Writer will have clinic staff fax.

## 2025-07-30 NOTE — TELEPHONE ENCOUNTER
Spoke with Sen[facility], Provider recommendation from Kaya Bui- Okay to use comfort bowel medications per their protocol to treat acute worsening of constipation.  Going forward, recommend increasing Miralax to 1/2 capful once daily.    Senait states has not received the fax with bowel program discussed at visit , also needs updated orders for the 1/2 cap Miralax daily.   Fax number given- [phone] Message sent to LPN.     Writer spoke to Julianne / Guardian- is agreeable to changes per Kaya Bui, does asks for mag citrate dose to be given at HS and if Golytely  bowel clean out would be appropriate now for worsening constipation.

## 2025-07-31 ENCOUNTER — TELEPHONE (OUTPATIENT)
Dept: GASTROENTEROLOGY | Facility: CLINIC | Age: 30
End: 2025-07-31
Payer: COMMERCIAL

## 2025-07-31 ENCOUNTER — TELEPHONE (OUTPATIENT)
Dept: UROLOGY | Facility: CLINIC | Age: 30
End: 2025-07-31
Payer: COMMERCIAL

## 2025-07-31 DIAGNOSIS — E74.89 CDG (CONGENITAL DISORDER OF GLYCOSYLATION): ICD-10-CM

## 2025-07-31 DIAGNOSIS — K59.01 CONSTIPATION DUE TO SLOW TRANSIT: ICD-10-CM

## 2025-07-31 PROCEDURE — S9342 HIT ENTERAL PUMP DIEM: HCPCS

## 2025-07-31 RX ORDER — MAGNESIUM CITRATE
300 POWDER (GRAM) MISCELLANEOUS PRN
Qty: 250 G | Refills: 0 | Status: SHIPPED | OUTPATIENT
Start: 2025-07-31 | End: 2025-07-31

## 2025-07-31 RX ORDER — POLYETHYLENE GLYCOL 3350 17 G/17G
POWDER, FOR SOLUTION ORAL
Qty: 510 G | Refills: 5 | Status: SHIPPED | OUTPATIENT
Start: 2025-07-31

## 2025-07-31 RX ORDER — MAGNESIUM CITRATE
300 POWDER (GRAM) MISCELLANEOUS AT BEDTIME
Qty: 250 G | Refills: 0 | Status: SHIPPED | OUTPATIENT
Start: 2025-07-31

## 2025-07-31 NOTE — TELEPHONE ENCOUNTER
Spoke to Julianne provided update with new orders from Kaya Bui 1/2 capful Miralax daily and PRN Mag Citrate powder at bedtime if no BM x 2 days.     Julianne is not in agreement  with change  Would like 1tsp Miralax daily and Mag citrate powder at HS daily provider is ok with this plan.     New orders faxed to facility. Spoke to Senait, nurse  at [facility] with updated orders as well.

## 2025-07-31 NOTE — TELEPHONE ENCOUNTER
LPN spoke with [facility] pharmacist and wanted to clarify the dosage since the dosage is administer 1 tsp of Miralax and normally the dosage doesn't come in 1 tsp. Clarified Miralax 1 tsp daily.

## 2025-07-31 NOTE — TELEPHONE ENCOUNTER
Per Kaya avila for PRN order on day 2 no BM magnesium citrate powder 300 mg at bedtime.  Routed to N to fax order to [facility].     Writer updated [facility] staff Senait.

## 2025-07-31 NOTE — TELEPHONE ENCOUNTER
Okay to add back in dose of magnesium citrate at bedtime which she took previously.   Would see if constipation improves with comfort bowel medications prior to doing Golytely bowel clean out.  Thanks,  Kaya Bui PA-C    Writer spoke with Senait with above recommendation from Kaya Bui. Senait will clinic with update on stool output with current medications.

## 2025-07-31 NOTE — TELEPHONE ENCOUNTER
[facility] Call Center    Phone Message    May a detailed message be left on voicemail: yes     Reason for Call: Medication Question or concern regarding medication   Prescription Clarification  Name of Medication: polyethylene glycol (MIRALAX) 17 GM/Dose powder     Prescribing Provider: Kaya Bui     Pharmacy: [facility]     What on the order needs clarification? How many grams is the tsp supposed to be?  Please follow up with pharmacy.      Action Taken: Message routed to:  [facility]    Travel Screening: Not Applicable

## 2025-07-31 NOTE — TELEPHONE ENCOUNTER
Spoke with Senait who states Anna was given MOM at 3pm and 7pm no results , received a GI Ducoloax suppository this Am and had a large loose BM. Was feeling good this AM and went to day program.   Writer updated provider who states Golytely clean out is not needed at this time.     Senait does not have previous order for Magnesium Citrate Powder.     Left message for Julianne with call back number.

## 2025-08-01 PROCEDURE — S9342 HIT ENTERAL PUMP DIEM: HCPCS

## 2025-08-02 PROCEDURE — S9342 HIT ENTERAL PUMP DIEM: HCPCS

## 2025-08-03 PROCEDURE — S9342 HIT ENTERAL PUMP DIEM: HCPCS

## 2025-08-04 PROCEDURE — S9342 HIT ENTERAL PUMP DIEM: HCPCS

## 2025-08-05 DIAGNOSIS — R22.9 SINGLE SKIN NODULE: Primary | ICD-10-CM

## 2025-08-05 PROCEDURE — S9342 HIT ENTERAL PUMP DIEM: HCPCS

## 2025-08-06 ENCOUNTER — PATIENT OUTREACH (OUTPATIENT)
Dept: CARE COORDINATION | Facility: CLINIC | Age: 30
End: 2025-08-06
Payer: COMMERCIAL

## 2025-08-06 PROCEDURE — S9342 HIT ENTERAL PUMP DIEM: HCPCS

## 2025-08-07 ENCOUNTER — PATIENT OUTREACH (OUTPATIENT)
Dept: ONCOLOGY | Facility: CLINIC | Age: 30
End: 2025-08-07
Payer: COMMERCIAL

## 2025-08-07 PROCEDURE — S9342 HIT ENTERAL PUMP DIEM: HCPCS

## 2025-08-08 PROCEDURE — S9342 HIT ENTERAL PUMP DIEM: HCPCS

## 2025-08-09 PROCEDURE — S9342 HIT ENTERAL PUMP DIEM: HCPCS

## 2025-08-10 PROCEDURE — S9342 HIT ENTERAL PUMP DIEM: HCPCS

## 2025-08-11 ENCOUNTER — INFUSION THERAPY VISIT (OUTPATIENT)
Dept: INFUSION THERAPY | Facility: CLINIC | Age: 30
End: 2025-08-11
Attending: PEDIATRICS
Payer: COMMERCIAL

## 2025-08-11 VITALS
RESPIRATION RATE: 16 BRPM | HEART RATE: 94 BPM | DIASTOLIC BLOOD PRESSURE: 75 MMHG | SYSTOLIC BLOOD PRESSURE: 108 MMHG | TEMPERATURE: 97.4 F | OXYGEN SATURATION: 98 %

## 2025-08-11 DIAGNOSIS — R79.89 HIGH SERUM ADRENOCORTICOTROPIC HORMONE (ACTH): Primary | ICD-10-CM

## 2025-08-11 DIAGNOSIS — E28.39 OVARIAN FAILURE: ICD-10-CM

## 2025-08-11 DIAGNOSIS — M81.0 POSTMENOPAUSAL OSTEOPOROSIS: ICD-10-CM

## 2025-08-11 DIAGNOSIS — R79.89 ELEVATED TSH: ICD-10-CM

## 2025-08-11 DIAGNOSIS — E16.2 HYPOGLYCEMIA: ICD-10-CM

## 2025-08-11 LAB
ACTH PLAS-MCNC: 70 PG/ML
ANION GAP SERPL CALCULATED.3IONS-SCNC: 8 MMOL/L (ref 7–15)
CHLORIDE SERPL-SCNC: 102 MMOL/L (ref 98–107)
CORTICOSTER 1H P 250 UG ACTH SERPL-SCNC: 21.3 UG/DL
CORTICOSTER 30M P 250 UG ACTH SERPL-SCNC: 19 UG/DL
CORTICOSTER SERPL-MCNC: 5.5 UG/DL (ref 4–22)
HCO3 SERPL-SCNC: 28 MMOL/L (ref 22–29)
POTASSIUM SERPL-SCNC: 4.2 MMOL/L (ref 3.4–5.3)
SODIUM SERPL-SCNC: 138 MMOL/L (ref 135–145)
TIME OF COSYNTROPIN INJECTION: NORMAL

## 2025-08-11 PROCEDURE — 84244 ASSAY OF RENIN: CPT | Performed by: PEDIATRICS

## 2025-08-11 PROCEDURE — 82024 ASSAY OF ACTH: CPT | Performed by: PEDIATRICS

## 2025-08-11 PROCEDURE — 250N000011 HC RX IP 250 OP 636: Performed by: PEDIATRICS

## 2025-08-11 PROCEDURE — 82435 ASSAY OF BLOOD CHLORIDE: CPT | Performed by: PEDIATRICS

## 2025-08-11 PROCEDURE — 82533 TOTAL CORTISOL: CPT | Performed by: PEDIATRICS

## 2025-08-11 PROCEDURE — 96374 THER/PROPH/DIAG INJ IV PUSH: CPT

## 2025-08-11 PROCEDURE — S9342 HIT ENTERAL PUMP DIEM: HCPCS

## 2025-08-11 PROCEDURE — 36415 COLL VENOUS BLD VENIPUNCTURE: CPT | Performed by: PEDIATRICS

## 2025-08-11 RX ORDER — DIPHENHYDRAMINE HYDROCHLORIDE 50 MG/ML
50 INJECTION, SOLUTION INTRAMUSCULAR; INTRAVENOUS
Start: 2025-08-11

## 2025-08-11 RX ORDER — ALBUTEROL SULFATE 90 UG/1
1-2 INHALANT RESPIRATORY (INHALATION)
Start: 2025-08-11

## 2025-08-11 RX ORDER — ALBUTEROL SULFATE 0.83 MG/ML
2.5 SOLUTION RESPIRATORY (INHALATION)
OUTPATIENT
Start: 2025-08-11

## 2025-08-11 RX ORDER — METHYLPREDNISOLONE SODIUM SUCCINATE 40 MG/ML
40 INJECTION INTRAMUSCULAR; INTRAVENOUS
Start: 2025-08-11

## 2025-08-11 RX ORDER — DIPHENHYDRAMINE HYDROCHLORIDE 50 MG/ML
25 INJECTION, SOLUTION INTRAMUSCULAR; INTRAVENOUS
Start: 2025-08-11

## 2025-08-11 RX ORDER — MEPERIDINE HYDROCHLORIDE 25 MG/ML
25 INJECTION INTRAMUSCULAR; INTRAVENOUS; SUBCUTANEOUS
OUTPATIENT
Start: 2025-08-11

## 2025-08-11 RX ORDER — EPINEPHRINE 1 MG/ML
0.3 INJECTION, SOLUTION INTRAMUSCULAR; SUBCUTANEOUS EVERY 5 MIN PRN
OUTPATIENT
Start: 2025-08-11

## 2025-08-11 RX ADMIN — COSYNTROPIN 1 MCG: 0.25 INJECTION, POWDER, LYOPHILIZED, FOR SOLUTION INTRAVENOUS at 08:56

## 2025-08-12 PROCEDURE — S9342 HIT ENTERAL PUMP DIEM: HCPCS

## 2025-08-13 PROCEDURE — S9342 HIT ENTERAL PUMP DIEM: HCPCS

## 2025-08-14 ENCOUNTER — HOME INFUSION BILLING (OUTPATIENT)
Dept: HOME HEALTH SERVICES | Facility: HOME HEALTH | Age: 30
End: 2025-08-14
Payer: COMMERCIAL

## 2025-08-14 LAB — RENIN PLAS-CCNC: 1.4 NG/ML/HR

## 2025-08-14 PROCEDURE — S9342 HIT ENTERAL PUMP DIEM: HCPCS

## 2025-08-15 PROCEDURE — S9342 HIT ENTERAL PUMP DIEM: HCPCS

## 2025-08-16 PROCEDURE — S9342 HIT ENTERAL PUMP DIEM: HCPCS

## 2025-08-17 PROCEDURE — S9342 HIT ENTERAL PUMP DIEM: HCPCS

## 2025-08-18 PROCEDURE — S9342 HIT ENTERAL PUMP DIEM: HCPCS

## 2025-08-19 PROCEDURE — S9342 HIT ENTERAL PUMP DIEM: HCPCS

## 2025-08-20 ENCOUNTER — MEDICAL CORRESPONDENCE (OUTPATIENT)
Dept: HEALTH INFORMATION MANAGEMENT | Facility: CLINIC | Age: 30
End: 2025-08-20
Payer: COMMERCIAL

## 2025-08-26 ENCOUNTER — HOME INFUSION (OUTPATIENT)
Dept: HOME HEALTH SERVICES | Facility: HOME HEALTH | Age: 30
End: 2025-08-26
Payer: COMMERCIAL

## 2025-08-26 ENCOUNTER — HOME INFUSION BILLING (OUTPATIENT)
Dept: HOME HEALTH SERVICES | Facility: HOME HEALTH | Age: 30
End: 2025-08-26
Payer: COMMERCIAL

## 2025-08-26 PROCEDURE — B4153 EF HYDROLYZED/AMINO ACIDS: HCPCS

## 2025-08-26 PROCEDURE — S9342 HIT ENTERAL PUMP DIEM: HCPCS

## 2025-08-27 PROCEDURE — S9342 HIT ENTERAL PUMP DIEM: HCPCS

## 2025-08-28 PROCEDURE — S9342 HIT ENTERAL PUMP DIEM: HCPCS

## 2025-08-29 PROCEDURE — S9342 HIT ENTERAL PUMP DIEM: HCPCS

## 2025-08-30 PROCEDURE — S9342 HIT ENTERAL PUMP DIEM: HCPCS

## 2025-08-31 PROCEDURE — S9342 HIT ENTERAL PUMP DIEM: HCPCS

## 2025-09-01 PROCEDURE — S9342 HIT ENTERAL PUMP DIEM: HCPCS

## 2025-09-02 PROCEDURE — S9342 HIT ENTERAL PUMP DIEM: HCPCS

## 2025-09-03 PROCEDURE — S9342 HIT ENTERAL PUMP DIEM: HCPCS

## 2025-09-04 DIAGNOSIS — E74.89 CONGENITAL DISORDER OF GLYCOSYLATION (CDG): Primary | ICD-10-CM

## 2025-09-04 PROCEDURE — S9342 HIT ENTERAL PUMP DIEM: HCPCS

## 2025-09-04 RX ORDER — PROGESTERONE 100 MG/1
CAPSULE ORAL
Qty: 10 CAPSULE | Refills: 11 | Status: SHIPPED | OUTPATIENT
Start: 2025-09-04

## 2025-09-05 PROCEDURE — S9342 HIT ENTERAL PUMP DIEM: HCPCS

## 2025-09-06 PROCEDURE — S9342 HIT ENTERAL PUMP DIEM: HCPCS

## 2025-09-07 PROCEDURE — S9342 HIT ENTERAL PUMP DIEM: HCPCS

## 2025-09-08 PROCEDURE — S9342 HIT ENTERAL PUMP DIEM: HCPCS

## 2025-09-09 PROCEDURE — S9342 HIT ENTERAL PUMP DIEM: HCPCS

## 2025-09-10 PROCEDURE — S9342 HIT ENTERAL PUMP DIEM: HCPCS

## 2025-09-11 PROCEDURE — S9342 HIT ENTERAL PUMP DIEM: HCPCS

## 2025-09-12 PROCEDURE — S9342 HIT ENTERAL PUMP DIEM: HCPCS

## 2025-09-13 PROCEDURE — S9342 HIT ENTERAL PUMP DIEM: HCPCS

## 2025-09-14 PROCEDURE — S9342 HIT ENTERAL PUMP DIEM: HCPCS

## 2025-09-15 PROCEDURE — S9342 HIT ENTERAL PUMP DIEM: HCPCS

## 2025-09-16 PROCEDURE — S9342 HIT ENTERAL PUMP DIEM: HCPCS

## 2025-09-17 PROCEDURE — S9342 HIT ENTERAL PUMP DIEM: HCPCS

## 2025-09-18 PROCEDURE — S9342 HIT ENTERAL PUMP DIEM: HCPCS

## 2025-09-19 PROCEDURE — S9342 HIT ENTERAL PUMP DIEM: HCPCS

## 2025-09-20 PROCEDURE — S9342 HIT ENTERAL PUMP DIEM: HCPCS

## 2025-09-21 PROCEDURE — S9342 HIT ENTERAL PUMP DIEM: HCPCS

## 2025-09-22 PROCEDURE — S9342 HIT ENTERAL PUMP DIEM: HCPCS

## 2025-09-23 PROCEDURE — S9342 HIT ENTERAL PUMP DIEM: HCPCS

## 2025-09-24 PROCEDURE — S9342 HIT ENTERAL PUMP DIEM: HCPCS
